# Patient Record
Sex: MALE | Race: WHITE | NOT HISPANIC OR LATINO | Employment: FULL TIME | URBAN - NONMETROPOLITAN AREA
[De-identification: names, ages, dates, MRNs, and addresses within clinical notes are randomized per-mention and may not be internally consistent; named-entity substitution may affect disease eponyms.]

---

## 2017-09-01 ENCOUNTER — OFFICE VISIT (OUTPATIENT)
Dept: PODIATRY | Facility: CLINIC | Age: 37
End: 2017-09-01

## 2017-09-01 VITALS — BODY MASS INDEX: 37.19 KG/M2 | HEART RATE: 85 BPM | WEIGHT: 315 LBS | HEIGHT: 77 IN | OXYGEN SATURATION: 97 %

## 2017-09-01 DIAGNOSIS — L60.0 INGROWN TOENAIL: Primary | ICD-10-CM

## 2017-09-01 DIAGNOSIS — M79.674 PAIN OF TOE OF RIGHT FOOT: ICD-10-CM

## 2017-09-01 DIAGNOSIS — L03.011 PARONYCHIA, RIGHT: ICD-10-CM

## 2017-09-01 PROCEDURE — 99203 OFFICE O/P NEW LOW 30 MIN: CPT | Performed by: PODIATRIST

## 2017-09-01 NOTE — PROGRESS NOTES
"Constantin Seals  1980  37 y.o. male   Patient presents today for right hallux ingrown toenail.      09/01/2017  Chief Complaint   Patient presents with   • Right Foot - Ingrown Toenail           History of Present Illness    Constnatin Seals is a 37 y.o. male who presents for evaluation of right hallux ingrown toenail.  He states is been ongoing for several weeks.  He relates associated sharp pain with direct pressure.  There is some redness and clear drainage from the site.  He states he does have a history of ingrown toenails in the past but has typically been able to care for them on his own.  He denies any trauma or injuries.  He denies any formal treatments for this issue.      No past medical history on file.      No past surgical history on file.      No family history on file.      Social History     Social History   • Marital status: Single     Spouse name: N/A   • Number of children: N/A   • Years of education: N/A     Occupational History   • Not on file.     Social History Main Topics   • Smoking status: Former Smoker   • Smokeless tobacco: Current User     Types: Chew   • Alcohol use No   • Drug use: Not on file   • Sexual activity: Not on file     Other Topics Concern   • Not on file     Social History Narrative   • No narrative on file         No current outpatient prescriptions on file.     No current facility-administered medications for this visit.          OBJECTIVE    Pulse 85  Ht 77\" (195.6 cm)  Wt (!) 510 lb (231 kg)  SpO2 97%  BMI 60.48 kg/m2      Review of Systems   Constitutional:  Denies recent weight loss, weight gain, fever or chills, no change in exercise tolerance  Musculoskeletal: Toe/foot pain.   Skin: No wounds or lesions  Neurological: Denies paresthesias.  Psychiatric/Behavioral: Denies depression        Physical Exam   Constitutional: he appears well-developed and well-nourished.   CV: No chest pain. Normal RR  Resp: Non labored respirations  Psychiatric: he has a " normal mood and affect. her behavior is normal.      Lower Extremity Exam:  Vascular: DP/PT pulses palpable 2+.   Right hallux edema  Toes warm  Neuro: Protective sensation intact, b/l.  DTRs intact  Integument: No open wounds.  Ingrown medial nail border, right hallux. Mild erythema, edema. +tenderness to palpation.  Web spaces c/d/i  No skin lesions  Musculoskeletal: LE muscle strength 5/5.   Gait normal  Ankle ROM full without pain or crepitus  STJ ROM full without pain or crepitus  No digital deformities      Nail Removal  Date/Time: 9/17/2017 9:24 PM  Performed by: NATHALY DICKINSON  Authorized by: NATHALY DICKINSON   Consent: Written consent obtained.  Risks and benefits: risks, benefits and alternatives were discussed  Consent given by: patient  Location: right foot  Location details: right big toe  Anesthesia: digital block    Anesthesia:  Local Anesthetic: lidocaine 2% without epinephrine  Anesthetic total: 3 mL    Sedation:  Patient sedated: no  Preparation: skin prepped with Betadine  Amount removed: complete  Wedge excision of skin of nail fold: no  Nail bed sutured: no  Nail matrix removed: complete  Removed nail replaced and anchored: no  Dressing: 4x4, antibiotic ointment and gauze roll  Patient tolerance: Patient tolerated the procedure well with no immediate complications  Comments: Matrixectomy with 10% NaOH. Neutralized with acetic acid.              ASSESSMENT AND PLAN    Constantin was seen today for ingrown toenail.    Diagnoses and all orders for this visit:    Ingrown toenail    Paronychia, right    Pain of toe of right foot    -Comprehensive foot and ankle exam performed  -Diagnosis, prevention, and treatment of ingrown toe nails discussed with patient, including risks and potential benefits of nail avulsion both temporary and permanent versus simple debridement.  -Pt elected for total permanent avulsion of right hallux  -Aftercare instructions for soapy barrios soaks BID  -Recheck 2  weeks            This document has been electronically signed by Trent Barnes DPM on September 17, 2017 9:24 PM     EMR Dragon/Transcription disclaimer:   Much of this encounter note is an electronic transcription/translation of spoken language to printed text. The electronic translation of spoken language may permit erroneous, or at times, nonsensical words or phrases to be inadvertently transcribed; Although I have reviewed the note for such errors, some may still exist.    Trent Barnes DPM  9/17/2017  9:24 PM

## 2017-09-17 PROCEDURE — 11750 EXCISION NAIL&NAIL MATRIX: CPT | Performed by: PODIATRIST

## 2017-09-21 ENCOUNTER — OFFICE VISIT (OUTPATIENT)
Dept: PODIATRY | Facility: CLINIC | Age: 37
End: 2017-09-21

## 2017-09-21 VITALS — WEIGHT: 315 LBS | HEIGHT: 77 IN | BODY MASS INDEX: 37.19 KG/M2

## 2017-09-21 DIAGNOSIS — M79.674 PAIN OF TOE OF RIGHT FOOT: ICD-10-CM

## 2017-09-21 DIAGNOSIS — L60.0 INGROWN TOENAIL: Primary | ICD-10-CM

## 2017-09-21 PROCEDURE — 99212 OFFICE O/P EST SF 10 MIN: CPT | Performed by: PODIATRIST

## 2019-06-07 ENCOUNTER — APPOINTMENT (OUTPATIENT)
Dept: LAB | Facility: HOSPITAL | Age: 39
End: 2019-06-07

## 2019-06-07 ENCOUNTER — TRANSCRIBE ORDERS (OUTPATIENT)
Dept: ADMINISTRATIVE | Facility: HOSPITAL | Age: 39
End: 2019-06-07

## 2019-06-07 DIAGNOSIS — Z31.41 FERTILITY TESTING: Primary | ICD-10-CM

## 2019-06-07 LAB
CHARACTER SMN: NORMAL
COLLECTION METHOD SMN: NORMAL
EPI CELLS #/AREA SMN HPF: NORMAL /HPF
LIQUEFACTION TIME SMN: NORMAL MIN
NON PROGRESSIVE MOTILITY: 14.4 %
RBC #/AREA SMN HPF: NORMAL /HPF
SEX ABSTIN DURATION TIME PATIENT: 2 DAYS
SLOW PROGRESSIVE MOTILITY: 24.6 %
SMI: 287 (ref 80–400)
SPEC CONTAINER SPEC: NORMAL
SPECIMEN VOL SMN: 3 ML
SPERM # SMN: 55.7 MILLIONS/ML
SPERM IMMOTILE NFR SMN: 3.4 %
SPERM MOTILE NFR SMN: 96.6 % MOTILE (ref 50–100)
SPERM PROG NFR SMN: 57.6 % (ref 25–100)
SPERM SMN: 33.4 % NORMAL (ref 15–100)
VISC SMN: NORMAL CP
WBC SMN QL: NORMAL /HPF
WHO STANDARD: NORMAL

## 2019-06-07 PROCEDURE — 89320 SEMEN ANAL VOL/COUNT/MOT: CPT | Performed by: OBSTETRICS & GYNECOLOGY

## 2021-04-05 ENCOUNTER — TRANSCRIBE ORDERS (OUTPATIENT)
Dept: GENERAL RADIOLOGY | Facility: HOSPITAL | Age: 41
End: 2021-04-05

## 2021-04-05 ENCOUNTER — HOSPITAL ENCOUNTER (OUTPATIENT)
Dept: GENERAL RADIOLOGY | Facility: HOSPITAL | Age: 41
Discharge: HOME | End: 2021-04-05
Admitting: NURSE PRACTITIONER

## 2021-04-05 DIAGNOSIS — S49.91XA RIGHT SHOULDER INJURY, INITIAL ENCOUNTER: Primary | ICD-10-CM

## 2021-04-05 PROCEDURE — 73030 X-RAY EXAM OF SHOULDER: CPT

## 2021-11-02 ENCOUNTER — PATIENT ROUNDING (BHMG ONLY) (OUTPATIENT)
Dept: BARIATRICS/WEIGHT MGMT | Facility: CLINIC | Age: 41
End: 2021-11-02

## 2021-11-02 ENCOUNTER — OFFICE VISIT (OUTPATIENT)
Dept: BARIATRICS/WEIGHT MGMT | Facility: CLINIC | Age: 41
End: 2021-11-02

## 2021-11-02 ENCOUNTER — OFFICE VISIT (OUTPATIENT)
Dept: BARIATRICS/WEIGHT MGMT | Facility: HOSPITAL | Age: 41
End: 2021-11-02

## 2021-11-02 VITALS
OXYGEN SATURATION: 96 % | BODY MASS INDEX: 38.36 KG/M2 | SYSTOLIC BLOOD PRESSURE: 138 MMHG | HEIGHT: 76 IN | TEMPERATURE: 97.7 F | DIASTOLIC BLOOD PRESSURE: 85 MMHG | HEART RATE: 68 BPM | WEIGHT: 315 LBS

## 2021-11-02 DIAGNOSIS — K21.9 GASTROESOPHAGEAL REFLUX DISEASE WITHOUT ESOPHAGITIS: ICD-10-CM

## 2021-11-02 DIAGNOSIS — G47.33 OSA (OBSTRUCTIVE SLEEP APNEA): ICD-10-CM

## 2021-11-02 DIAGNOSIS — I10 HYPERTENSION, UNSPECIFIED TYPE: ICD-10-CM

## 2021-11-02 DIAGNOSIS — E66.01 CLASS 3 SEVERE OBESITY DUE TO EXCESS CALORIES WITH SERIOUS COMORBIDITY AND BODY MASS INDEX (BMI) OF 60.0 TO 69.9 IN ADULT (HCC): Primary | ICD-10-CM

## 2021-11-02 PROBLEM — E66.813 CLASS 3 SEVERE OBESITY DUE TO EXCESS CALORIES WITH SERIOUS COMORBIDITY AND BODY MASS INDEX (BMI) OF 60.0 TO 69.9 IN ADULT: Status: ACTIVE | Noted: 2021-11-02

## 2021-11-02 PROCEDURE — 99204 OFFICE O/P NEW MOD 45 MIN: CPT | Performed by: NURSE PRACTITIONER

## 2021-11-02 RX ORDER — DICLOFENAC SODIUM AND MISOPROSTOL 75; 200 MG/1; UG/1
1 TABLET, DELAYED RELEASE ORAL 2 TIMES DAILY
COMMUNITY

## 2021-11-02 RX ORDER — AMLODIPINE BESYLATE AND BENAZEPRIL HYDROCHLORIDE 5; 10 MG/1; MG/1
1 CAPSULE ORAL DAILY
COMMUNITY

## 2021-11-02 RX ORDER — ACETAMINOPHEN AND CODEINE PHOSPHATE 300; 30 MG/1; MG/1
1 TABLET ORAL EVERY 4 HOURS PRN
COMMUNITY
End: 2022-06-06

## 2021-11-02 NOTE — PROGRESS NOTES
"Nutrition Services    Patient Name:  Constantin Seals  YOB: 1980  MRN: [id number]  Admit Date:  (Not on file)    NUTRITION BARIATRIC/[facility] NOTE     Visit 1  Initial Assessment   BA#   [facility]    Anthropometrics   Height: 75.75 in  Weight: 527 lbs 12.8 oz  BMI: 64.67     Nutrition Recall  Eating __1____ meals daily   Snacking  Limited sweet intake  Drinking carbonated beverages-occasionally  Drinking greater to or equal to 64 fluid ounces    Education    Goal Setting and Information Packet  4 meal per day diet plan  4 meal per day sample menu  \"Perfect Protein, Fiber in Foods, and Reducing Fat\"  Reinforce Nutritional Needs for Surgery  Reinforce Nutritional Needs for M[facility]    Nutrition Goals   Eat __4___ meals per day with protein  Protein goal: 65gms   discussed protein guidelines for shakes and bar  Eliminate snacks  Healthier food choices  Portion control / Use smaller plate or measuring cup   Eliminate soda  Increase fluid intake to 64 ounces per day      Electronically signed by:  Adriana Parekh  11/02/21 17:06 CDT   "

## 2021-11-02 NOTE — PROGRESS NOTES
November 2, 2021    Hello, may I speak with Constantin Seals?    My name is Kristie       I am  with [facility]  [address], [address]  [place] [address] [phone].    Before we get started may I verify your date of birth? 1980    I am calling to officially welcome you to our practice and ask about your recent visit. Is this a good time to talk? Yes it is.    Tell me about your visit with us. What things went well?  Well it was fine . She told me how much I will need to lose and it concerned me . But I do have a 3 month old daughter that I have to think about . I expressed that the office is available for questions and he can also send my chart messages. I expressed It is a lot of information to soak in on one day .       We're always looking for ways to make our patients' experiences even better. Do you have recommendations on ways we may improve?  No ma'am .    Overall were you satisfied with your first visit to our practice? Yes I was considering when I went to my PCP he told me I was 1 point away from stroke level. I need to do this for my health .       I appreciate you taking the time to speak with me today. Is there anything else I can do for you? No ma'am but I really appreciate you calling me back.      Thank you, and have a great day.

## 2021-11-02 NOTE — PROGRESS NOTES
Patient Care Team:  Giovani Lujan DO as PCP - General (Internal Medicine)      Subjective     Patient is a 41 y.o. male presents with morbid obesity and his Body mass index is 64.67 kg/m².     He is here for discussion of surgical weight loss options.  He stated he has been with the disease of obesity for year(s).  He stated he suffers from GERD, HTN and morbid obesity due to his weight gain.  He stated that weight loss helps alleviate these symptoms.   He stated that he has tried other diet regimens such as smaller portions to help with weight loss.  He stated that he has attempted these conservative methods for weight loss without maintaining long term success.  Today he would like to discuss surgical weight loss options such as the Laparoscopic Sleeve Gastrectomy or the Laparoscopic R - Y Gastric Bypass.     Review of Systems   Constitutional: Negative.    Respiratory: Negative.    Cardiovascular: Negative.    Gastrointestinal: Negative.    Endocrine: Negative.    Musculoskeletal: Positive for arthralgias.   Psychiatric/Behavioral: Negative.         History  Past Medical History:   Diagnosis Date   • Gout    • Heartburn    • Hypertension       Past Surgical History:   Procedure Laterality Date   • ELBOW PROCEDURE     • EYE SURGERY Right 1984      Social History     Socioeconomic History   • Marital status:    Tobacco Use   • Smoking status: Never Smoker   • Smokeless tobacco: Current User     Types: Chew   Substance and Sexual Activity   • Alcohol use: No      Family History   Problem Relation Age of Onset   • Diabetes Father    • Hypertension Father    • Heart disease Father       No Known Allergies       Current Outpatient Medications:   •  acetaminophen-codeine (TYLENOL #3) 300-30 MG per tablet, Take 1 tablet by mouth Every 4 (Four) Hours As Needed for Moderate Pain ., Disp: , Rfl:   •  amLODIPine-benazepril (LOTREL 5-10) 5-10 MG per capsule, Take 1 capsule by mouth Daily., Disp: , Rfl:   •   diclofenac-miSOPROStol (ARTHROTEC 75) 75-0.2 MG EC tablet, Take 1 tablet by mouth 2 (Two) Times a Day., Disp: , Rfl:   •  Misc Natural Products (BLACK CHERRY CONCENTRATE PO), Take  by mouth., Disp: , Rfl:   •  raNITIdine HCl (ZANTAC PO), Take  by mouth., Disp: , Rfl:     Objective     Vital Signs  Temp:  [97.7 °F (36.5 °C)] 97.7 °F (36.5 °C)  Heart Rate:  [68] 68  BP: (138)/(85) 138/85  Body mass index is 64.67 kg/m².      11/02/21  1355   Weight: (!) 239 kg (527 lb 12.8 oz)       Physical Exam  Vitals reviewed.   Constitutional:       Appearance: He is obese.   Eyes:      Pupils: Pupils are equal, round, and reactive to light.   Cardiovascular:      Rate and Rhythm: Normal rate and regular rhythm.   Pulmonary:      Effort: Pulmonary effort is normal.   Abdominal:      General: Bowel sounds are normal.      Palpations: Abdomen is soft.   Musculoskeletal:         General: Normal range of motion.   Skin:     General: Skin is warm and dry.   Neurological:      Mental Status: He is alert and oriented to person, place, and time.   Psychiatric:         Mood and Affect: Mood normal.         Behavior: Behavior normal.            Results Review:   I reviewed the patient's new clinical results.      Patient's Body mass index is 64.67 kg/m². indicating that he is morbidly obese (BMI > 40 or > 35 with obesity - related health condition). Obesity-related health conditions include the following: hypertension and GERD. Obesity is worsening. BMI is is above average; BMI management plan is completed. We discussed portion control and increasing exercise..      Assessment/Plan   Diagnoses and all orders for this visit:    1. Class 3 severe obesity due to excess calories with serious comorbidity and body mass index (BMI) of 60.0 to 69.9 in adult (HCC) (Primary)    2. Hypertension, unspecified type  Comments:  Patient counseling provided.  Follow-up with PCP if medication changes are needed.    3. Gastroesophageal reflux disease without  esophagitis  Comments:  Continue current regimen.  Patient will have endoscopy at later date.    4. SHERRON (obstructive sleep apnea)  Comments:  Patient states that he is not on CPAP machine.  Will discuss sleep study at next appointment if needed.        I discussed the patient's findings and my recommendations with patient.     I have also recommended that he obtain a cardiac risk assessment and psychiatric evaluation prior to surgery consideration.He has been provided a structured dietary regimen based off of his behavior.  I discussed with the patient the etiology of the disease of obesity and the potential comorbid conditions associated with this disease.  He was instructed to follow the dietary regimen and follow-up with our program in 1 month's time with any additional questions as they may arise during this time.  We emphasized on focusing on proteins and meals high in fiber as well as adequate hydration that exceed 64 ounces of water daily.    I explained that I anticipate the patient to lose weight prior to his next monthly visit.        1. Patient is a 41 y.o. male who has morbid obesity with Body mass index is 64.67 kg/m². and desires surgical weight loss. Patient has been advised that this surgery is considered to be elective major surgery that is typically done laparoscopically. Patient is a potentially good surgical candidate. Patient will need to meet with the Bariatric Surgeon to further discuss surgical options. Patient has been advised that they will need to have a work-up prior to surgery. This work-up will include but is not limited to an EKG, an EGD to assess for H. Pylori and Winchester's esophagus, and a psychological evaluation, with additional testing as necessary. Pre-op testing will be ordered at next visit. Today the patient  received the 4 meals/day diet prescription, which was explained to patient.  Patient will see the dietitian today to further discuss goals for diet, exercise, and  lifestyle. Patient has received intensive behavioral therapy for obesity today. I explained the pathophysiology of the disease and its storage component. We also discussed Dr. Hobbs' pearls of the program. Nutrition counseling ordered today.     2. Current comorbid condition of  hypertension, GERD, obstructive sleep apnea associated with his morbid obesity is reported to be stable on his current treatment regimen and medications. We anticipate the comorbid condition to improve as we address his morbid obesity.       Pre-op testing:     Seminar - Completed  EGD - Needed, but not yet ordered  H. Pylori - Will be done with EGD   H. Pylori Stool -  N/A    Psychological Evaluation - Needed, but not yet ordered    EKG - Needed, but not yet ordered    Cardiology - If EKG abnormal, cardiology will be ordered     TSH - Needed, but not yet ordered  Nicotine - NEEDED  Pulmonary Clearance - N/A   Drug Tests - N/A    Dietitian - Completed       Bianca Davis, APRN     11/02/21  14:34 CDT

## 2021-11-19 ENCOUNTER — TELEPHONE (OUTPATIENT)
Dept: BARIATRICS/WEIGHT MGMT | Facility: CLINIC | Age: 41
End: 2021-11-19

## 2021-11-19 NOTE — TELEPHONE ENCOUNTER
I called and spoke to patient regarding his insurance. [employer] does not cover bariatrics per his plan documents. They have been scanned in the chart. Patient expressed understanding and asked that next appointment be canceled.

## 2021-12-06 ENCOUNTER — TRANSCRIBE ORDERS (OUTPATIENT)
Dept: GENERAL RADIOLOGY | Facility: HOSPITAL | Age: 41
End: 2021-12-06

## 2021-12-06 ENCOUNTER — HOSPITAL ENCOUNTER (OUTPATIENT)
Dept: GENERAL RADIOLOGY | Facility: HOSPITAL | Age: 41
Discharge: HOME | End: 2021-12-06
Admitting: NURSE PRACTITIONER

## 2021-12-06 DIAGNOSIS — M79.672 LEFT FOOT PAIN: ICD-10-CM

## 2021-12-06 DIAGNOSIS — M79.672 LEFT FOOT PAIN: Primary | ICD-10-CM

## 2021-12-06 PROCEDURE — 73630 X-RAY EXAM OF FOOT: CPT

## 2022-04-16 ENCOUNTER — HOSPITAL ENCOUNTER (EMERGENCY)
Facility: HOSPITAL | Age: 42
Discharge: HOME | End: 2022-04-16
Admitting: EMERGENCY MEDICINE

## 2022-04-16 ENCOUNTER — APPOINTMENT (OUTPATIENT)
Dept: GENERAL RADIOLOGY | Facility: HOSPITAL | Age: 42
End: 2022-04-16

## 2022-04-16 VITALS
HEART RATE: 77 BPM | BODY MASS INDEX: 37.19 KG/M2 | RESPIRATION RATE: 18 BRPM | DIASTOLIC BLOOD PRESSURE: 89 MMHG | OXYGEN SATURATION: 96 % | TEMPERATURE: 98.5 F | WEIGHT: 315 LBS | HEIGHT: 77 IN | SYSTOLIC BLOOD PRESSURE: 151 MMHG

## 2022-04-16 DIAGNOSIS — M79.601 RIGHT ARM PAIN: Primary | ICD-10-CM

## 2022-04-16 PROCEDURE — 99284 EMERGENCY DEPT VISIT MOD MDM: CPT

## 2022-04-16 PROCEDURE — 99283 EMERGENCY DEPT VISIT LOW MDM: CPT

## 2022-04-16 PROCEDURE — 73080 X-RAY EXAM OF ELBOW: CPT

## 2022-04-16 RX ORDER — HYDROCODONE BITARTRATE AND ACETAMINOPHEN 7.5; 325 MG/1; MG/1
1 TABLET ORAL ONCE
Status: DISCONTINUED | OUTPATIENT
Start: 2022-04-16 | End: 2022-04-17 | Stop reason: HOSPADM

## 2022-04-16 RX ORDER — LIDOCAINE 50 MG/G
1 PATCH TOPICAL EVERY 24 HOURS
Qty: 6 EACH | Refills: 0 | Status: SHIPPED | OUTPATIENT
Start: 2022-04-16

## 2022-04-16 RX ORDER — ONDANSETRON 4 MG/1
4 TABLET, ORALLY DISINTEGRATING ORAL ONCE
Status: DISCONTINUED | OUTPATIENT
Start: 2022-04-16 | End: 2022-04-17 | Stop reason: HOSPADM

## 2022-04-16 RX ORDER — NAPROXEN 500 MG/1
500 TABLET ORAL ONCE
Status: COMPLETED | OUTPATIENT
Start: 2022-04-16 | End: 2022-04-16

## 2022-04-16 RX ORDER — TIZANIDINE 4 MG/1
4 TABLET ORAL EVERY 6 HOURS PRN
Qty: 12 TABLET | Refills: 0 | Status: SHIPPED | OUTPATIENT
Start: 2022-04-16 | End: 2022-06-06

## 2022-04-16 RX ADMIN — NAPROXEN 500 MG: 500 TABLET ORAL at 22:15

## 2022-04-16 RX ADMIN — LIDOCAINE HYDROCHLORIDE: 20 JELLY TOPICAL at 22:16

## 2022-04-21 ENCOUNTER — TRANSCRIBE ORDERS (OUTPATIENT)
Dept: ADMINISTRATIVE | Facility: HOSPITAL | Age: 42
End: 2022-04-21

## 2022-04-21 DIAGNOSIS — S49.91XA INJURY OF UPPER ARM, RIGHT, INITIAL ENCOUNTER: Primary | ICD-10-CM

## 2022-05-05 ENCOUNTER — HOSPITAL ENCOUNTER (OUTPATIENT)
Dept: GENERAL RADIOLOGY | Facility: HOSPITAL | Age: 42
Discharge: HOME | End: 2022-05-05
Admitting: NURSE PRACTITIONER

## 2022-05-05 ENCOUNTER — TRANSCRIBE ORDERS (OUTPATIENT)
Dept: GENERAL RADIOLOGY | Facility: HOSPITAL | Age: 42
End: 2022-05-05

## 2022-05-05 DIAGNOSIS — R31.9 HEMATURIA SYNDROME: Primary | ICD-10-CM

## 2022-05-05 DIAGNOSIS — R31.9 HEMATURIA SYNDROME: ICD-10-CM

## 2022-05-05 PROCEDURE — 74018 RADEX ABDOMEN 1 VIEW: CPT

## 2022-05-06 ENCOUNTER — TRANSCRIBE ORDERS (OUTPATIENT)
Dept: ADMINISTRATIVE | Facility: HOSPITAL | Age: 42
End: 2022-05-06

## 2022-05-06 DIAGNOSIS — R31.9 HEMATURIA, UNSPECIFIED TYPE: Primary | ICD-10-CM

## 2022-05-09 ENCOUNTER — HOSPITAL ENCOUNTER (OUTPATIENT)
Dept: MRI IMAGING | Facility: HOSPITAL | Age: 42
Discharge: HOME | End: 2022-05-09

## 2022-05-09 DIAGNOSIS — S49.91XA INJURY OF UPPER ARM, RIGHT, INITIAL ENCOUNTER: ICD-10-CM

## 2022-05-16 ENCOUNTER — HOSPITAL ENCOUNTER (OUTPATIENT)
Dept: CT IMAGING | Facility: HOSPITAL | Age: 42
Discharge: HOME | End: 2022-05-16
Admitting: NURSE PRACTITIONER

## 2022-05-16 DIAGNOSIS — R31.9 HEMATURIA, UNSPECIFIED TYPE: ICD-10-CM

## 2022-05-16 PROCEDURE — 74176 CT ABD & PELVIS W/O CONTRAST: CPT

## 2022-06-02 ENCOUNTER — TRANSCRIBE ORDERS (OUTPATIENT)
Dept: LAB | Facility: HOSPITAL | Age: 42
End: 2022-06-02

## 2022-06-02 DIAGNOSIS — Z11.59 SCREENING FOR VIRAL DISEASE: Primary | ICD-10-CM

## 2022-06-02 NOTE — DISCHARGE INSTRUCTIONS
What to expect after a Nerve Block  Nerve blocks administered to block pain affect many types of nerves, including those nerves that control movement, pain, and normal sensation.  Following a nerve block, you may notice some bruising at the site where the block was given.  You may experience sensations such as:  numbness of the affected area or limb, tingling, heaviness (that is the limb feels heavy to you), weakness or inability to move the affected arm or leg, or a feeling as if your arm or leg has “fallen asleep”.    A nerve block can last from 9-18 hours depending on the medications used.  Certain medications can last up to 72 hours, your anesthesiologist may have discussed this with you pre-op. Usually the weakness wears off first followed by the tingling and heaviness.  As the block wears off, you may begin to notice pain; however, this sequence of events may occur in any order.  Typically, you will be able to move your limb before you will feel it.  Once a nerve block begins to wear off, the effects are usually completely gone within 60 minutes.    If you experience continued side effects that you believe are block related, please call your healthcare provider.  Please see block-specific instructions below.      Instructions for any block involving the shoulder or arm  If you have had any kind of shoulder/arm block, you will go home with your arm in a sling.  Wear the sling until the block has completely worn off or as directed by your doctor.  You may be required to wear it for a longer period of time per your surgeon’s recommendations.  I you have had a shoulder/arm block; it is a good idea to sleep on a recliner with pillows under your arm.    Note:  If you have severe or prolonged shortness of breath, please seek medical assistance as soon as possible.    Protection of a “blocked” arm (limb)  After a nerve block, you cannot feel pain, pressure, or extremes of temperature in the affected  limb.  And because of this, your blocked limb is at more risk for injury.  For example, it is possible to burn your limb on an extremely hot surface without feeling it.  When resting, it is important to reposition your limb periodically to avoid prolonged pressure on it.  This may require the use of pillows and padding.  While sleeping, you should avoid rolling onto the affected limb or putting too much pressure on it.  If you have a cast or tight dressing, check the color of your fingers of the affected limb.  Call your surgeon if they look discolored (that is, dusky, dark colored)  Use caution in cold weather.  Cover your limb appropriately to protect it from the cold.  Pain Management  Your surgeon will give you a prescription for pain medication.  Begin taking this before the nerve block wears off.  Bear in mind that sometimes the block can wear off in the middle of the night.              UPPER EXTREMITY POST-OP INSTRUCTIONS - DR. QUICK    IMPORTANT PHONE NUMBERS:   For emergencies, please call [phone]   You may reach Dr. Quick and clinical staff at [phone]- M-Y 8:00 am-5:00 pm   After 5pm or on the weekends, please call [phone]   Call immediately if you have any of the following symptoms:     Elevated temperature above 101.5 degrees for more than 48 hours after surgery     Persistent drainage from wound     Severe pain around surgical site    Sling use: The sling is provided for your comfort and to ensure proper healing of your repair following surgery. Please place the abduction pillow with the curved side against your side and the sling on the side of the pillow. Your surgery requires that you wear the sling if noted below.  ____ For comfort. Remove sling 24 hours and begin range of motion exercises  ____ At all times except bathing, dressing, and therapy. Also wear the sling during sleep.  __x__ No sling required    Bathing:  ___No bandages, no restrictions!!  ___You may remove you dressing and  shower on the 3rd day after surgery (Ex. Tu surgery, shower on Friday)  ** if you are told to it is ok to remove your dressing and shower, DO NOT SOAK your incisions in a tub.  _x__Keep splint clean, dry, and intact. DO NOT place foreign objects into your splint.      Dressings: Keep dressing/splint intact unless instructed otherwise below. SOME DRAINAGE IS NORMAL!     DO NOT touch or apply ointment to the incision.     DO NOT remove the steri-strips over the incisions (if you have steri-strips). They will         generally fall off on their own or can be removed 1 weeksafter surgery.     If you have yellow gauze and it comes off, do not worry about it. Leave them off.    Signs of infection that warrant a phone call to our clinical line:     o Excessive drainage or redness     o Red streaking coming away from the incision  o Increased pain  o Increased temperature above 101 degrees      Physical Therapy:        *  Your physical therapy status will be discussed with you postoperatively and at your first post-op appointment. Some injuries will not require physical therapy.      *  If you have a shoulder manipulation, please schedule therapy for the next day      Medications: You will be discharged with the appropriate medications following your surgery. Fill these at the pharmacy and take them as directed on the label. Not all of the medications below may be prescribed. Occasionally, other medications may be prescribed with specific instructions.    Percocet/Lortab (oxycodone/hydrocodone with tylenol) - Pain Medication, will cause drowsiness, possibly itchiness (this is NOT an allergy - use benadryl or an over the counter allergy medication such as Claritin or Zyrtec)     o Take 1-2 tablets every 4-6 hours. DO NOT EXCEED 4,000mg of Tylenol in 24 hours.  **DO NOT MIX WITH ALCOHOL, DRIVE WHILE TAKING, OR TAKE with extra TYLENOL**    Colace (Docusate) - stool softener, used for constipation. Take this only if you feel  constipated.      Zofran (Ondansetron) or Phenergan - Anti-nausea medication, will cause drowsiness      *Starting January 2021, all narcotic medication must be prescribed electronically to your pharmacy.  Be sure to notify nursing of your preferred pharmacy.  If you are running low on pain medications, please notify us if you need a refill 24-48 hours prior to when you run out, so we can make arrangements to refill the prescription for you if we determine is necessary

## 2022-06-03 PROBLEM — S46.211A: Status: ACTIVE | Noted: 2022-06-03

## 2022-06-03 NOTE — OP NOTE
Patient Name: Melida  MRN: [id number]  : 1980    DATE of SURGERY: 2022    SURGEON: Benjamin العراقي MD    ASSISTANT: NONE    PREOPERATIVE DIAGNOSIS    Subacute traumatic rupture of the Right distal biceps tendon.     POSTOPERATIVE DIAGNOSIS    Subacute traumatic rupture of the Right distal biceps tendon.     PROCEDURE PERFORMED  Primary repair of traumatic Right distal biceps tendon rupture with allograft.     Please note that a 22 modifier will be added to the procedure due to the patient's morbid obesity and sub-acute nature of tear that required an allograft tendon.  His obesity increased the complexity of every portion of the case from positioning to splinting.      IMPLANTS  Arthrex biceps tendon repair kit, tibialis anterior allograft     ANESTHESIA USED  General endotracheal anesthesia.     OPERATIVE INDICATIONS  The patient is a 42 y.o. male, morbidly obese male (510 lbs) who presented to my clinic with an injury to the left elbow on 22.  He was fishing with his nephew when a lure got caught in a tree.  He reached to retrieve the lure and pulled a branch downward feeling a pop in the elbow.   Given the injury, as well as the sequela of not repairing the tendon (loss of strength with flexion and supination), I did recommend surgical treatment.  Risks included, but were not limited to, that of anesthesia, bleeding, infection, pain, damage to local structures, need for further surgery.  We discussed damage to the lateral antebrachial cutaneous nerve, damage to vascular structures, retear, loss of motion, loss of function and strength. He understood given the delay in treatment, an allograft tendon may be needed.     ESTIMATED BLOOD LOSS    Less than 10 mL.     SPECIMENS  None.     DRAINS  None.     COMPLICATIONS  None      PROCEDURE IN DETAIL  The patient was seen in the preoperative holding room.  Once again, the informed consent form was reviewed with the patient and signed.  The site  of surgery was marked with the patient's agreement.  The patient was transported to the operating room where a time out was performed identifying the correct patient as well as the operative site.  Perioperative antibiotics were given and the operative extremity was prepped and draped in the usual sterile fashion.  Tourniquet was placed about the brachium and inflated to 200 mmHg and total tourniquet time was less than 1 hour.        A transverse incision was made 4 cm distal to the elbow flexion crease. Soft tissue was dissected allowing the incision and multiple vessels were encountered and protected.  Proximally within the incision site, the ruptured biceps tendon was isolated, pulled distally.  After debriding the tendon's distal edge, a fiber loop suture was then placed within the tendon substance with the suture edges exiting distally. I twas readily apparent that the length of the graft would not be sufficient.  As a result, a tibialis anterior allograft was whipstitched into the distal end of the native tendon adding approximately 4 cm of tendon to the length.     Strategic retractors were then placed surrounding the radial tuberosity, which was located via C-arm imaging, keep the forearm in full supination.  The anatomic location of the biceps tendon origin was isolated.  A 2.4 mm spade tipped guide pin was inserted in this location and drilled bicortically.  The tendon was previously measured and the appropriately sized cannulated drill bit was used to drill the near cortex.     A cortical button was then utilized and the sutures from the tendon were placed in a tension-slide technique.  The button was placed through both cortices, flipped on the contralateral cortex, verified with C-arm imaging. The sutures from the tendon were then pulled such that the tendon was then walked into the bone tunnel on the near cortex.  One limb of suture was then passed through the substance of the tendon with a free needle  and a knot was tied.  This solidified fixation and anatomic repair was created.       The tourniquet was let down.  Hemostasis was achieved.  The incision was closed in layers and the skin was closed with adhesive glue.  Sterile dressing was placed followed by a well-padded long-arm splint.  The patient was awakened from anesthesia, transported to the recovery room in stable condition.     POSTOPERATIVE PLAN  The patient discharged home with family.  Follow up in 1 week for a clinical check.  Begin the distal biceps tendon repair protocol and placed into a range of motion brace.    Electronically signed by Benjamin العراقي MD on 6/7/2022 at 14:20 CDT

## 2022-06-06 ENCOUNTER — LAB (OUTPATIENT)
Dept: LAB | Facility: HOSPITAL | Age: 42
End: 2022-06-06

## 2022-06-06 ENCOUNTER — PRE-ADMISSION TESTING (OUTPATIENT)
Dept: PREADMISSION TESTING | Facility: HOSPITAL | Age: 42
End: 2022-06-06

## 2022-06-06 VITALS
HEART RATE: 92 BPM | DIASTOLIC BLOOD PRESSURE: 99 MMHG | RESPIRATION RATE: 18 BRPM | SYSTOLIC BLOOD PRESSURE: 151 MMHG | OXYGEN SATURATION: 99 % | BODY MASS INDEX: 38.36 KG/M2 | HEIGHT: 76 IN | WEIGHT: 315 LBS

## 2022-06-06 LAB
ANION GAP SERPL CALCULATED.3IONS-SCNC: 9 MMOL/L (ref 5–15)
BUN SERPL-MCNC: 12 MG/DL (ref 6–20)
BUN/CREAT SERPL: 13.3 (ref 7–25)
CALCIUM SPEC-SCNC: 9.2 MG/DL (ref 8.6–10.5)
CHLORIDE SERPL-SCNC: 103 MMOL/L (ref 98–107)
CO2 SERPL-SCNC: 28 MMOL/L (ref 22–29)
CREAT SERPL-MCNC: 0.9 MG/DL (ref 0.76–1.27)
DEPRECATED RDW RBC AUTO: 43.6 FL (ref 37–54)
EGFRCR SERPLBLD CKD-EPI 2021: 110 ML/MIN/1.73
ERYTHROCYTE [DISTWIDTH] IN BLOOD BY AUTOMATED COUNT: 13.4 % (ref 12.3–15.4)
GLUCOSE SERPL-MCNC: 108 MG/DL (ref 65–99)
HCT VFR BLD AUTO: 43.4 % (ref 37.5–51)
HGB BLD-MCNC: 13.9 G/DL (ref 13–17.7)
MCH RBC QN AUTO: 28.4 PG (ref 26.6–33)
MCHC RBC AUTO-ENTMCNC: 32 G/DL (ref 31.5–35.7)
MCV RBC AUTO: 88.6 FL (ref 79–97)
PLATELET # BLD AUTO: 279 10*3/MM3 (ref 140–450)
PMV BLD AUTO: 10.3 FL (ref 6–12)
POTASSIUM SERPL-SCNC: 4.6 MMOL/L (ref 3.5–5.2)
RBC # BLD AUTO: 4.9 10*6/MM3 (ref 4.14–5.8)
SARS-COV-2 RNA PNL SPEC NAA+PROBE: NOT DETECTED
SODIUM SERPL-SCNC: 140 MMOL/L (ref 136–145)
WBC NRBC COR # BLD: 7.48 10*3/MM3 (ref 3.4–10.8)

## 2022-06-06 PROCEDURE — 93005 ELECTROCARDIOGRAM TRACING: CPT | Performed by: ORTHOPAEDIC SURGERY

## 2022-06-06 PROCEDURE — 85027 COMPLETE CBC AUTOMATED: CPT | Performed by: ORTHOPAEDIC SURGERY

## 2022-06-06 PROCEDURE — 87635 SARS-COV-2 COVID-19 AMP PRB: CPT | Performed by: ORTHOPAEDIC SURGERY

## 2022-06-06 PROCEDURE — 93010 ELECTROCARDIOGRAM REPORT: CPT | Performed by: INTERNAL MEDICINE

## 2022-06-06 PROCEDURE — C9803 HOPD COVID-19 SPEC COLLECT: HCPCS

## 2022-06-06 PROCEDURE — 80048 BASIC METABOLIC PNL TOTAL CA: CPT | Performed by: ORTHOPAEDIC SURGERY

## 2022-06-06 RX ORDER — BISOPROLOL FUMARATE AND HYDROCHLOROTHIAZIDE 2.5; 6.25 MG/1; MG/1
1 TABLET ORAL DAILY
COMMUNITY

## 2022-06-06 NOTE — DISCHARGE INSTRUCTIONS
Before you come to the hospital        Arrival time: AS DIRECTED BY OFFICE     YOU MAY TAKE THE FOLLOWING MEDICATION(S) THE MORNING OF SURGERY WITH A SIP OF WATER: hold amlodipine- benazepril 24 hours prior to surgery; ok to take bisoprolol- htz morning of surgery with a sip of water            ALL OTHER HOME MEDICATION CHECK WITH YOUR PHYSICIAN (especially if you are taking diabetes medicines or blood thinners)    Do not take any Erectile Dysfunction medications (EX: CIALIS, VIAGRA) 24 hours prior to surgery      If you were given and instructed to use a germ- killing soap, use as directed the night before surgery and the morning of surgery before coming to the hospital.             Eating and drinking restrictions prior to scheduled arrival time    2 Hours before arrival time STOP   Drinking Clear liquids (water, apple juice-no pulp)     6 Hours before arrival time STOP   Milk or drinks that contain milk, full liquids    6 Hours before arrival time STOP   Light meals or foods, such as toast or cereal    8 Hours before arrival time STOP   Heavy foods, such as meat, fried foods, or fatty foods    (It is extremely important that you follow these guidelines to prevent delay or cancelation of your procedure)     Clear Liquids  Water and flavored water                                                                      Clear Fruit juices, such as cranberry juice and apple juice.  Black coffee (NO cream of any kind, including powdered).  Plain tea  Clear bouillon or broth.  Flavored gelatin.  Soda.  Gatorade or Powerade.  Full liquid examples  Juices that have pulp.  Frozen ice pops that contain fruit pieces.  Coffee with creamer  Milk.  Yogurt.              MANAGING PAIN AFTER SURGERY    We know you are probably wondering what your pain will be like after surgery.  Following surgery it is unrealistic to expect you will not have pain.   Pain is how our bodies let us know that something is wrong or cautions us to be  careful.  That said, our goal is to make your pain tolerable.    Methods we may use to treat your pain include (oral or IV medications, PCAs, epidurals, nerve blocks, etc.)   While some procedures require IV pain medications for a short time after surgery, transitioning to pain medications by mouth allows for better management of pain.   Your nurse will encourage you to take oral pain medications whenever possible.  IV medications work almost immediately, but only last a short while.  Taking medications by mouth allows for a more constant level of medication in your blood stream for a longer period of time.      Once your pain is out of control it is harder to get back under control.  It is important you are aware when your next dose of pain medication is due.  If you are admitted, your nurse may write the time of your next dose on the white board in your room to help you remember.      We are interested in your pain and encourage you to inform us about aggravating factors during your visit.   Many times a simple repositioning every few hours can make a big difference.    If your physician says it is okay, do not let your pain prevent you from getting out of bed. Be sure to call your nurse for assistance prior to getting up so you do not fall.      Before surgery, please decide your tolerable pain goal.  These faces help describe the pain ratings we use on a 0-10 scale.   Be prepared to tell us your goal and whether or not you take pain or anxiety medications at home.

## 2022-06-07 ENCOUNTER — ANESTHESIA EVENT (OUTPATIENT)
Dept: PERIOP | Facility: HOSPITAL | Age: 42
End: 2022-06-07

## 2022-06-07 ENCOUNTER — ANESTHESIA (OUTPATIENT)
Dept: PERIOP | Facility: HOSPITAL | Age: 42
End: 2022-06-07

## 2022-06-07 ENCOUNTER — APPOINTMENT (OUTPATIENT)
Dept: GENERAL RADIOLOGY | Facility: HOSPITAL | Age: 42
End: 2022-06-07

## 2022-06-07 ENCOUNTER — HOSPITAL ENCOUNTER (OUTPATIENT)
Facility: HOSPITAL | Age: 42
Setting detail: HOSPITAL OUTPATIENT SURGERY
Discharge: HOME | End: 2022-06-07
Attending: ORTHOPAEDIC SURGERY | Admitting: ORTHOPAEDIC SURGERY

## 2022-06-07 VITALS
RESPIRATION RATE: 16 BRPM | SYSTOLIC BLOOD PRESSURE: 141 MMHG | TEMPERATURE: 97.9 F | OXYGEN SATURATION: 99 % | HEART RATE: 50 BPM | DIASTOLIC BLOOD PRESSURE: 88 MMHG

## 2022-06-07 DIAGNOSIS — S46.211A TRAUMATIC RUPTURE OF DISTAL BICEPS TENDON, RIGHT, INITIAL ENCOUNTER: Primary | ICD-10-CM

## 2022-06-07 PROCEDURE — C1713 ANCHOR/SCREW BN/BN,TIS/BN: HCPCS | Performed by: ORTHOPAEDIC SURGERY

## 2022-06-07 PROCEDURE — 25010000002 DEXAMETHASONE PER 1 MG: Performed by: NURSE ANESTHETIST, CERTIFIED REGISTERED

## 2022-06-07 PROCEDURE — 76942 ECHO GUIDE FOR BIOPSY: CPT | Performed by: ORTHOPAEDIC SURGERY

## 2022-06-07 PROCEDURE — 73070 X-RAY EXAM OF ELBOW: CPT

## 2022-06-07 PROCEDURE — 76000 FLUOROSCOPY <1 HR PHYS/QHP: CPT

## 2022-06-07 PROCEDURE — 25010000002 PROPOFOL 10 MG/ML EMULSION: Performed by: NURSE ANESTHETIST, CERTIFIED REGISTERED

## 2022-06-07 PROCEDURE — 25010000002 ONDANSETRON PER 1 MG: Performed by: NURSE ANESTHETIST, CERTIFIED REGISTERED

## 2022-06-07 PROCEDURE — 25010000002 ROPIVACAINE PER 1 MG: Performed by: ANESTHESIOLOGY

## 2022-06-07 PROCEDURE — 25010000002 FENTANYL CITRATE (PF) 100 MCG/2ML SOLUTION: Performed by: NURSE ANESTHETIST, CERTIFIED REGISTERED

## 2022-06-07 PROCEDURE — 25010000002 CEFAZOLIN PER 500 MG: Performed by: ORTHOPAEDIC SURGERY

## 2022-06-07 PROCEDURE — 25010000002 FENTANYL CITRATE (PF) 50 MCG/ML SOLUTION: Performed by: ANESTHESIOLOGY

## 2022-06-07 DEVICE — KT BUTN REPR SHLDR DIST BIOCOMP: Type: IMPLANTABLE DEVICE | Site: ARM | Status: FUNCTIONAL

## 2022-06-07 DEVICE — SUT TAPE W/TPR END 1/2 CIR TPR NDL 1.3MM 36IN: Type: IMPLANTABLE DEVICE | Site: ARM | Status: FUNCTIONAL

## 2022-06-07 DEVICE — IMPLANTABLE DEVICE: Type: IMPLANTABLE DEVICE | Site: ARM | Status: FUNCTIONAL

## 2022-06-07 RX ORDER — OXYCODONE AND ACETAMINOPHEN 7.5; 325 MG/1; MG/1
1 TABLET ORAL EVERY 6 HOURS PRN
Qty: 20 TABLET | Refills: 0 | Status: SHIPPED | OUTPATIENT
Start: 2022-06-07

## 2022-06-07 RX ORDER — PROPOFOL 10 MG/ML
VIAL (ML) INTRAVENOUS AS NEEDED
Status: DISCONTINUED | OUTPATIENT
Start: 2022-06-07 | End: 2022-06-07 | Stop reason: SURG

## 2022-06-07 RX ORDER — OXYCODONE AND ACETAMINOPHEN 7.5; 325 MG/1; MG/1
2 TABLET ORAL EVERY 4 HOURS PRN
Status: DISCONTINUED | OUTPATIENT
Start: 2022-06-07 | End: 2022-06-07 | Stop reason: HOSPADM

## 2022-06-07 RX ORDER — MAGNESIUM HYDROXIDE 1200 MG/15ML
LIQUID ORAL AS NEEDED
Status: DISCONTINUED | OUTPATIENT
Start: 2022-06-07 | End: 2022-06-07 | Stop reason: HOSPADM

## 2022-06-07 RX ORDER — LIDOCAINE HYDROCHLORIDE 10 MG/ML
0.5 INJECTION, SOLUTION EPIDURAL; INFILTRATION; INTRACAUDAL; PERINEURAL ONCE AS NEEDED
Status: DISCONTINUED | OUTPATIENT
Start: 2022-06-07 | End: 2022-06-07 | Stop reason: HOSPADM

## 2022-06-07 RX ORDER — LIDOCAINE HYDROCHLORIDE 20 MG/ML
INJECTION, SOLUTION EPIDURAL; INFILTRATION; INTRACAUDAL; PERINEURAL AS NEEDED
Status: DISCONTINUED | OUTPATIENT
Start: 2022-06-07 | End: 2022-06-07 | Stop reason: SURG

## 2022-06-07 RX ORDER — FENTANYL CITRATE 50 UG/ML
INJECTION, SOLUTION INTRAMUSCULAR; INTRAVENOUS AS NEEDED
Status: DISCONTINUED | OUTPATIENT
Start: 2022-06-07 | End: 2022-06-07 | Stop reason: SURG

## 2022-06-07 RX ORDER — FENTANYL CITRATE 50 UG/ML
50 INJECTION, SOLUTION INTRAMUSCULAR; INTRAVENOUS ONCE
Status: COMPLETED | OUTPATIENT
Start: 2022-06-07 | End: 2022-06-07

## 2022-06-07 RX ORDER — FLUMAZENIL 0.1 MG/ML
0.2 INJECTION INTRAVENOUS AS NEEDED
Status: DISCONTINUED | OUTPATIENT
Start: 2022-06-07 | End: 2022-06-07 | Stop reason: HOSPADM

## 2022-06-07 RX ORDER — CEFAZOLIN SODIUM IN 0.9 % NACL 3 G/100 ML
3 INTRAVENOUS SOLUTION, PIGGYBACK (ML) INTRAVENOUS ONCE
Status: COMPLETED | OUTPATIENT
Start: 2022-06-07 | End: 2022-06-07

## 2022-06-07 RX ORDER — SUCCINYLCHOLINE/SOD CL,ISO/PF 200MG/10ML
SYRINGE (ML) INTRAVENOUS AS NEEDED
Status: DISCONTINUED | OUTPATIENT
Start: 2022-06-07 | End: 2022-06-07 | Stop reason: SURG

## 2022-06-07 RX ORDER — ROPIVACAINE HYDROCHLORIDE 5 MG/ML
INJECTION, SOLUTION EPIDURAL; INFILTRATION; PERINEURAL
Status: COMPLETED | OUTPATIENT
Start: 2022-06-07 | End: 2022-06-07

## 2022-06-07 RX ORDER — SODIUM CHLORIDE, SODIUM LACTATE, POTASSIUM CHLORIDE, CALCIUM CHLORIDE 600; 310; 30; 20 MG/100ML; MG/100ML; MG/100ML; MG/100ML
100 INJECTION, SOLUTION INTRAVENOUS CONTINUOUS PRN
Status: DISCONTINUED | OUTPATIENT
Start: 2022-06-07 | End: 2022-06-07 | Stop reason: HOSPADM

## 2022-06-07 RX ORDER — ONDANSETRON 2 MG/ML
4 INJECTION INTRAMUSCULAR; INTRAVENOUS ONCE AS NEEDED
Status: DISCONTINUED | OUTPATIENT
Start: 2022-06-07 | End: 2022-06-07 | Stop reason: HOSPADM

## 2022-06-07 RX ORDER — DROPERIDOL 2.5 MG/ML
0.62 INJECTION, SOLUTION INTRAMUSCULAR; INTRAVENOUS ONCE AS NEEDED
Status: DISCONTINUED | OUTPATIENT
Start: 2022-06-07 | End: 2022-06-07 | Stop reason: HOSPADM

## 2022-06-07 RX ORDER — NEOSTIGMINE METHYLSULFATE 5 MG/5 ML
SYRINGE (ML) INTRAVENOUS AS NEEDED
Status: DISCONTINUED | OUTPATIENT
Start: 2022-06-07 | End: 2022-06-07 | Stop reason: SURG

## 2022-06-07 RX ORDER — NALOXONE HCL 0.4 MG/ML
0.4 VIAL (ML) INJECTION AS NEEDED
Status: DISCONTINUED | OUTPATIENT
Start: 2022-06-07 | End: 2022-06-07 | Stop reason: HOSPADM

## 2022-06-07 RX ORDER — FENTANYL CITRATE 50 UG/ML
25 INJECTION, SOLUTION INTRAMUSCULAR; INTRAVENOUS
Status: DISCONTINUED | OUTPATIENT
Start: 2022-06-07 | End: 2022-06-07 | Stop reason: HOSPADM

## 2022-06-07 RX ORDER — SODIUM CHLORIDE 0.9 % (FLUSH) 0.9 %
10 SYRINGE (ML) INJECTION AS NEEDED
Status: DISCONTINUED | OUTPATIENT
Start: 2022-06-07 | End: 2022-06-07 | Stop reason: HOSPADM

## 2022-06-07 RX ORDER — DEXAMETHASONE SODIUM PHOSPHATE 4 MG/ML
INJECTION, SOLUTION INTRA-ARTICULAR; INTRALESIONAL; INTRAMUSCULAR; INTRAVENOUS; SOFT TISSUE AS NEEDED
Status: DISCONTINUED | OUTPATIENT
Start: 2022-06-07 | End: 2022-06-07 | Stop reason: SURG

## 2022-06-07 RX ORDER — OXYCODONE AND ACETAMINOPHEN 10; 325 MG/1; MG/1
1 TABLET ORAL ONCE AS NEEDED
Status: DISCONTINUED | OUTPATIENT
Start: 2022-06-07 | End: 2022-06-07 | Stop reason: HOSPADM

## 2022-06-07 RX ORDER — ONDANSETRON 2 MG/ML
INJECTION INTRAMUSCULAR; INTRAVENOUS AS NEEDED
Status: DISCONTINUED | OUTPATIENT
Start: 2022-06-07 | End: 2022-06-07 | Stop reason: SURG

## 2022-06-07 RX ORDER — LABETALOL HYDROCHLORIDE 5 MG/ML
5 INJECTION, SOLUTION INTRAVENOUS
Status: DISCONTINUED | OUTPATIENT
Start: 2022-06-07 | End: 2022-06-07 | Stop reason: HOSPADM

## 2022-06-07 RX ORDER — SODIUM CHLORIDE 0.9 % (FLUSH) 0.9 %
10 SYRINGE (ML) INJECTION EVERY 12 HOURS SCHEDULED
Status: DISCONTINUED | OUTPATIENT
Start: 2022-06-07 | End: 2022-06-07 | Stop reason: HOSPADM

## 2022-06-07 RX ORDER — ACETAMINOPHEN 500 MG
1000 TABLET ORAL ONCE
Status: COMPLETED | OUTPATIENT
Start: 2022-06-07 | End: 2022-06-07

## 2022-06-07 RX ORDER — ONDANSETRON 4 MG/1
4 TABLET, FILM COATED ORAL EVERY 8 HOURS PRN
Qty: 10 TABLET | Refills: 0 | Status: SHIPPED | OUTPATIENT
Start: 2022-06-07

## 2022-06-07 RX ORDER — MIDAZOLAM HYDROCHLORIDE 1 MG/ML
1 INJECTION INTRAMUSCULAR; INTRAVENOUS
Status: DISCONTINUED | OUTPATIENT
Start: 2022-06-07 | End: 2022-06-07 | Stop reason: HOSPADM

## 2022-06-07 RX ORDER — SODIUM CHLORIDE 0.9 % (FLUSH) 0.9 %
3-10 SYRINGE (ML) INJECTION AS NEEDED
Status: DISCONTINUED | OUTPATIENT
Start: 2022-06-07 | End: 2022-06-07 | Stop reason: HOSPADM

## 2022-06-07 RX ORDER — SODIUM CHLORIDE 0.9 % (FLUSH) 0.9 %
3 SYRINGE (ML) INJECTION EVERY 12 HOURS SCHEDULED
Status: DISCONTINUED | OUTPATIENT
Start: 2022-06-07 | End: 2022-06-07 | Stop reason: HOSPADM

## 2022-06-07 RX ORDER — ROCURONIUM BROMIDE 10 MG/ML
INJECTION, SOLUTION INTRAVENOUS AS NEEDED
Status: DISCONTINUED | OUTPATIENT
Start: 2022-06-07 | End: 2022-06-07 | Stop reason: SURG

## 2022-06-07 RX ORDER — SODIUM CHLORIDE, SODIUM LACTATE, POTASSIUM CHLORIDE, CALCIUM CHLORIDE 600; 310; 30; 20 MG/100ML; MG/100ML; MG/100ML; MG/100ML
100 INJECTION, SOLUTION INTRAVENOUS CONTINUOUS
Status: DISCONTINUED | OUTPATIENT
Start: 2022-06-07 | End: 2022-06-07 | Stop reason: HOSPADM

## 2022-06-07 RX ADMIN — ROPIVACAINE HYDROCHLORIDE 20 ML: 5 INJECTION, SOLUTION EPIDURAL; INFILTRATION; PERINEURAL at 11:40

## 2022-06-07 RX ADMIN — LIDOCAINE HYDROCHLORIDE 100 MG: 20 INJECTION, SOLUTION EPIDURAL; INFILTRATION; INTRACAUDAL; PERINEURAL at 12:54

## 2022-06-07 RX ADMIN — GLYCOPYRROLATE 0.4 MG: 0.2 INJECTION INTRAMUSCULAR; INTRAVENOUS at 14:15

## 2022-06-07 RX ADMIN — Medication 140 MG: at 12:56

## 2022-06-07 RX ADMIN — Medication 3 G: at 13:01

## 2022-06-07 RX ADMIN — FENTANYL CITRATE 100 MCG: 50 INJECTION, SOLUTION INTRAMUSCULAR; INTRAVENOUS at 12:54

## 2022-06-07 RX ADMIN — ONDANSETRON 4 MG: 2 INJECTION INTRAMUSCULAR; INTRAVENOUS at 13:39

## 2022-06-07 RX ADMIN — ROCURONIUM BROMIDE 40 MG: 10 SOLUTION INTRAVENOUS at 13:04

## 2022-06-07 RX ADMIN — PROPOFOL 300 MG: 10 INJECTION, EMULSION INTRAVENOUS at 12:55

## 2022-06-07 RX ADMIN — ROCURONIUM BROMIDE 5 MG: 10 SOLUTION INTRAVENOUS at 12:54

## 2022-06-07 RX ADMIN — DEXAMETHASONE SODIUM PHOSPHATE 4 MG: 4 INJECTION, SOLUTION INTRA-ARTICULAR; INTRALESIONAL; INTRAMUSCULAR; INTRAVENOUS; SOFT TISSUE at 13:39

## 2022-06-07 RX ADMIN — FENTANYL CITRATE 50 MCG: 50 INJECTION INTRAMUSCULAR; INTRAVENOUS at 11:35

## 2022-06-07 RX ADMIN — Medication 5 MG: at 14:16

## 2022-06-07 RX ADMIN — ACETAMINOPHEN 1000 MG: 500 TABLET ORAL at 11:38

## 2022-06-07 RX ADMIN — SODIUM CHLORIDE, POTASSIUM CHLORIDE, SODIUM LACTATE AND CALCIUM CHLORIDE 100 ML/HR: 600; 310; 30; 20 INJECTION, SOLUTION INTRAVENOUS at 11:08

## 2022-06-07 NOTE — ANESTHESIA PREPROCEDURE EVALUATION
Anesthesia Evaluation     Patient summary reviewed   no history of anesthetic complications:  NPO Solid Status: > 8 hours             Airway   Mallampati: II  TM distance: >3 FB  No difficulty expected  Dental - normal exam     Pulmonary    (+) sleep apnea,   (-) asthma, not a smoker  Cardiovascular   Exercise tolerance: good (4-7 METS)    ECG reviewed    (+) hypertension,   (-) past MI, CAD, dysrhythmias, cardiac stents, hyperlipidemia      Neuro/Psych  (-) seizures, TIA, CVA  GI/Hepatic/Renal/Endo    (+) morbid obesity,    (-) liver disease, no renal disease, diabetes    ROS Comment: On metformin for weight loss    Musculoskeletal     Abdominal    Substance History      OB/GYN          Other                        Anesthesia Plan    ASA 3     general with block     intravenous induction     Anesthetic plan, risks, benefits, and alternatives have been provided, discussed and informed consent has been obtained with: patient.        CODE STATUS:

## 2022-06-07 NOTE — ANESTHESIA PROCEDURE NOTES
Airway  Urgency: elective    Date/Time: 6/7/2022 12:58 PM  Airway not difficult    General Information and Staff    Patient location during procedure: OR  CRNA/CAA: Nathan Patten CRNA    Indications and Patient Condition  Indications for airway management: airway protection    Preoxygenated: yes  Mask difficulty assessment: 0 - not attempted    Final Airway Details  Final airway type: endotracheal airway      Successful airway: ETT  Cuffed: yes   Successful intubation technique: direct laryngoscopy  Blade: Nicholas  Blade size: 4  ETT size (mm): 8.0  Cormack-Lehane Classification: grade I - full view of glottis  Placement verified by: chest auscultation and capnometry   Cuff volume (mL): 8  Measured from: teeth  ETT/EBT  to teeth (cm): 23  Number of attempts at approach: 1  Assessment: lips, teeth, and gum same as pre-op and atraumatic intubation

## 2022-06-07 NOTE — BRIEF OP NOTE
TENDON DISTAL BICEPS REPAIR/RELEASE  Progress Note    Constantin Seals  6/7/2022    Pre-op Diagnosis:   846.211A       Post-Op Diagnosis Codes:     * Rupture of right distal biceps tendon, initial encounter [S46.211A]    Procedure/CPT® Codes:  MI REINSERT BI/TRICEPS TENDON,DISTAL [12412]      Procedure(s):  RIGHT DISTAL BICEPS TENDON REPAIR WITH SEMI-T ALLOGRAFT    Surgeon(s):  Benjamin العراقي MD    Anesthesia: General with Block    Staff:   Circulator: Callie Chávez RN; Jason Rodriguez RN  Scrub Person: Linda Novak; Jaren Garcia         Estimated Blood Loss: minimal    Urine Voided: * No values recorded between 6/7/2022 12:49 PM and 6/7/2022  2:09 PM *    Specimens:                None          Drains: * No LDAs found *    Findings: see op note         Complications: none    Benjamin العراقي MD     Date: 6/7/2022  Time: 14:18 CDT

## 2022-06-07 NOTE — ANESTHESIA POSTPROCEDURE EVALUATION
Patient: Constantin Seals    Procedure Summary     Date: 06/07/22 Room / Location: [facility] OR  /  [facility] OR    Anesthesia Start: 1248 Anesthesia Stop: 1429    Procedure: RIGHT DISTAL BICEPS TENDON REPAIR WITH SEMI-T ALLOGRAFT (Right Arm Upper) Diagnosis:       Rupture of right distal biceps tendon, initial encounter      (846.211A)    Surgeons: Benjamin العراقي MD Provider: Nathan Patten CRNA    Anesthesia Type: general with block ASA Status: 3          Anesthesia Type: general with block    Vitals  Vitals Value Taken Time   /81 06/07/22 1451   Temp 97.9 °F (36.6 °C) 06/07/22 1450   Pulse 42 06/07/22 1452   Resp 16 06/07/22 1450   SpO2 98 % 06/07/22 1452   Vitals shown include unvalidated device data.        Post Anesthesia Care and Evaluation    Patient location during evaluation: PACU  Patient participation: complete - patient participated  Level of consciousness: awake and alert  Pain management: adequate    Airway patency: patent  Anesthetic complications: No anesthetic complications  PONV Status: none  Cardiovascular status: acceptable and hemodynamically stable  Respiratory status: acceptable  Hydration status: acceptable    Comments: Blood pressure 141/88, pulse 50, temperature 97.9 °F (36.6 °C), temperature source Temporal, resp. rate 16, SpO2 99 %.    Patient discharged from PACU based upon Jake score. Please see RN notes for further details

## 2022-06-07 NOTE — H&P
Pt Name: Constantin Seals  MRN: [id number]  YOB: 1980  Date of evaluation: 6/7/2022    H&P including current review of systems was updated in the paper chart and/or the document previously scanned into the record.  There have been no significant changes or new problems since the original evaluation.  The patient's problems continue and indications for contemplated procedure have not changed.    Electronically signed by Benjamin العراقي MD on 6/7/2022 at 10:45 CDT

## 2022-06-07 NOTE — ANESTHESIA PROCEDURE NOTES
Peripheral Block    Pre-sedation assessment completed: 6/7/2022 11:33 AM    Patient reassessed immediately prior to procedure    Patient location during procedure: holding area  Start time: 6/7/2022 11:38 AM  Stop time: 6/7/2022 11:40 AM  Reason for block: procedure for pain, at surgeon's request, post-op pain management and Request by Dr. العراقي  Performed by  Anesthesiologist: Dodie Guerrero MD  Preanesthetic Checklist  Completed: patient identified, IV checked, site marked, risks and benefits discussed, surgical consent, monitors and equipment checked, pre-op evaluation and timeout performed  Prep:  Pt Position: supine  Sterile barriers:gloves, cap, mask and washed/disinfected hands  Prep: ChloraPrep  Patient monitoring: blood pressure monitoring, continuous pulse oximetry and EKG  Procedure    Sedation: yes    Guidance:ultrasound guided and Brachial plexus and axillary artery identified and local anesthetic seen surrounding nerves  Ultrasound documentation: picture printed and placed in patients chart.    Block Type:infraclavicular  Injection Technique:single-shot  Needle Type:echogenic  Needle Gauge:20 G      Medications Used: ropivacaine (NAROPIN) injection 0.5 %, 20 mL  Med administered at 6/7/2022 11:40 AM      Post Assessment  Injection Assessment: negative aspiration for heme, no paresthesia on injection and incremental injection  Patient Tolerance:comfortable throughout block  Complications:no

## 2022-06-08 LAB
QT INTERVAL: 382 MS
QTC INTERVAL: 435 MS

## 2024-05-31 ENCOUNTER — HOSPITAL ENCOUNTER (INPATIENT)
Facility: HOSPITAL | Age: 44
LOS: 12 days | Discharge: HOME | End: 2024-06-12
Attending: EMERGENCY MEDICINE | Admitting: INTERNAL MEDICINE
Payer: COMMERCIAL

## 2024-05-31 ENCOUNTER — APPOINTMENT (OUTPATIENT)
Dept: GENERAL RADIOLOGY | Facility: HOSPITAL | Age: 44
End: 2024-05-31
Payer: COMMERCIAL

## 2024-05-31 DIAGNOSIS — R07.9 CHEST PAIN, UNSPECIFIED TYPE: ICD-10-CM

## 2024-05-31 DIAGNOSIS — I25.110 CORONARY ARTERY DISEASE INVOLVING NATIVE CORONARY ARTERY OF NATIVE HEART WITH UNSTABLE ANGINA PECTORIS: ICD-10-CM

## 2024-05-31 DIAGNOSIS — R79.89 TROPONIN I ABOVE REFERENCE RANGE: ICD-10-CM

## 2024-05-31 DIAGNOSIS — Z95.1 S/P CABG (CORONARY ARTERY BYPASS GRAFT): Primary | ICD-10-CM

## 2024-05-31 DIAGNOSIS — I21.4 NSTEMI (NON-ST ELEVATED MYOCARDIAL INFARCTION): ICD-10-CM

## 2024-05-31 DIAGNOSIS — Z74.09 IMPAIRED FUNCTIONAL MOBILITY AND ACTIVITY TOLERANCE: ICD-10-CM

## 2024-05-31 LAB
ALBUMIN SERPL-MCNC: 4 G/DL (ref 3.5–5.2)
ALBUMIN/GLOB SERPL: 1.3 G/DL
ALP SERPL-CCNC: 79 U/L (ref 39–117)
ALT SERPL W P-5'-P-CCNC: 37 U/L (ref 1–41)
ANION GAP SERPL CALCULATED.3IONS-SCNC: 8 MMOL/L (ref 5–15)
AST SERPL-CCNC: 50 U/L (ref 1–40)
BASOPHILS # BLD AUTO: 0.07 10*3/MM3 (ref 0–0.2)
BASOPHILS NFR BLD AUTO: 0.9 % (ref 0–1.5)
BILIRUB SERPL-MCNC: 0.4 MG/DL (ref 0–1.2)
BUN SERPL-MCNC: 11 MG/DL (ref 6–20)
BUN/CREAT SERPL: 12.6 (ref 7–25)
CALCIUM SPEC-SCNC: 9.1 MG/DL (ref 8.6–10.5)
CHLORIDE SERPL-SCNC: 104 MMOL/L (ref 98–107)
CHOLEST SERPL-MCNC: 176 MG/DL (ref 0–200)
CO2 SERPL-SCNC: 27 MMOL/L (ref 22–29)
CREAT SERPL-MCNC: 0.87 MG/DL (ref 0.76–1.27)
D DIMER PPP FEU-MCNC: 0.63 MCGFEU/ML (ref 0–0.5)
DEPRECATED RDW RBC AUTO: 42.7 FL (ref 37–54)
EGFRCR SERPLBLD CKD-EPI 2021: 110.5 ML/MIN/1.73
EOSINOPHIL # BLD AUTO: 0.16 10*3/MM3 (ref 0–0.4)
EOSINOPHIL NFR BLD AUTO: 2.1 % (ref 0.3–6.2)
ERYTHROCYTE [DISTWIDTH] IN BLOOD BY AUTOMATED COUNT: 13.7 % (ref 12.3–15.4)
GEN 5 2HR TROPONIN T REFLEX: 740 NG/L
GLOBULIN UR ELPH-MCNC: 3 GM/DL
GLUCOSE SERPL-MCNC: 101 MG/DL (ref 65–99)
HBA1C MFR BLD: 5.6 % (ref 4.8–5.6)
HCT VFR BLD AUTO: 46.9 % (ref 37.5–51)
HDLC SERPL-MCNC: 38 MG/DL (ref 40–60)
HGB BLD-MCNC: 15.2 G/DL (ref 13–17.7)
HOLD SPECIMEN: NORMAL
HOLD SPECIMEN: NORMAL
IMM GRANULOCYTES # BLD AUTO: 0.03 10*3/MM3 (ref 0–0.05)
IMM GRANULOCYTES NFR BLD AUTO: 0.4 % (ref 0–0.5)
LDLC SERPL CALC-MCNC: 121 MG/DL (ref 0–100)
LDLC/HDLC SERPL: 3.16 {RATIO}
LYMPHOCYTES # BLD AUTO: 1.87 10*3/MM3 (ref 0.7–3.1)
LYMPHOCYTES NFR BLD AUTO: 24.1 % (ref 19.6–45.3)
MCH RBC QN AUTO: 27.5 PG (ref 26.6–33)
MCHC RBC AUTO-ENTMCNC: 32.4 G/DL (ref 31.5–35.7)
MCV RBC AUTO: 85 FL (ref 79–97)
MONOCYTES # BLD AUTO: 0.71 10*3/MM3 (ref 0.1–0.9)
MONOCYTES NFR BLD AUTO: 9.2 % (ref 5–12)
NEUTROPHILS NFR BLD AUTO: 4.91 10*3/MM3 (ref 1.7–7)
NEUTROPHILS NFR BLD AUTO: 63.3 % (ref 42.7–76)
NRBC BLD AUTO-RTO: 0 /100 WBC (ref 0–0.2)
NT-PROBNP SERPL-MCNC: 378 PG/ML (ref 0–450)
PLATELET # BLD AUTO: 280 10*3/MM3 (ref 140–450)
PMV BLD AUTO: 9.2 FL (ref 6–12)
POTASSIUM SERPL-SCNC: 4.2 MMOL/L (ref 3.5–5.2)
PROT SERPL-MCNC: 7 G/DL (ref 6–8.5)
RBC # BLD AUTO: 5.52 10*6/MM3 (ref 4.14–5.8)
SODIUM SERPL-SCNC: 139 MMOL/L (ref 136–145)
TRIGL SERPL-MCNC: 89 MG/DL (ref 0–150)
TROPONIN T DELTA: 85 NG/L
TROPONIN T SERPL HS-MCNC: 655 NG/L
VLDLC SERPL-MCNC: 17 MG/DL (ref 5–40)
WBC NRBC COR # BLD AUTO: 7.75 10*3/MM3 (ref 3.4–10.8)
WHOLE BLOOD HOLD COAG: NORMAL
WHOLE BLOOD HOLD SPECIMEN: NORMAL

## 2024-05-31 PROCEDURE — 25010000002 ENOXAPARIN PER 10 MG: Performed by: EMERGENCY MEDICINE

## 2024-05-31 PROCEDURE — 25010000002 FENTANYL CITRATE (PF) 50 MCG/ML SOLUTION: Performed by: INTERNAL MEDICINE

## 2024-05-31 PROCEDURE — 80053 COMPREHEN METABOLIC PANEL: CPT | Performed by: EMERGENCY MEDICINE

## 2024-05-31 PROCEDURE — 25010000002 HEPARIN (PORCINE) 1000-0.9 UT/500ML-% SOLUTION: Performed by: INTERNAL MEDICINE

## 2024-05-31 PROCEDURE — 25010000002 HEPARIN (PORCINE) PER 1000 UNITS: Performed by: INTERNAL MEDICINE

## 2024-05-31 PROCEDURE — C1894 INTRO/SHEATH, NON-LASER: HCPCS | Performed by: INTERNAL MEDICINE

## 2024-05-31 PROCEDURE — 36415 COLL VENOUS BLD VENIPUNCTURE: CPT

## 2024-05-31 PROCEDURE — 93010 ELECTROCARDIOGRAM REPORT: CPT | Performed by: STUDENT IN AN ORGANIZED HEALTH CARE EDUCATION/TRAINING PROGRAM

## 2024-05-31 PROCEDURE — 25010000002 ENOXAPARIN PER 10 MG: Performed by: INTERNAL MEDICINE

## 2024-05-31 PROCEDURE — 71045 X-RAY EXAM CHEST 1 VIEW: CPT

## 2024-05-31 PROCEDURE — 4A023N7 MEASUREMENT OF CARDIAC SAMPLING AND PRESSURE, LEFT HEART, PERCUTANEOUS APPROACH: ICD-10-PCS | Performed by: INTERNAL MEDICINE

## 2024-05-31 PROCEDURE — 25510000001 IOPAMIDOL PER 1 ML: Performed by: INTERNAL MEDICINE

## 2024-05-31 PROCEDURE — 83880 ASSAY OF NATRIURETIC PEPTIDE: CPT | Performed by: EMERGENCY MEDICINE

## 2024-05-31 PROCEDURE — 85379 FIBRIN DEGRADATION QUANT: CPT | Performed by: EMERGENCY MEDICINE

## 2024-05-31 PROCEDURE — 83036 HEMOGLOBIN GLYCOSYLATED A1C: CPT | Performed by: INTERNAL MEDICINE

## 2024-05-31 PROCEDURE — 99152 MOD SED SAME PHYS/QHP 5/>YRS: CPT | Performed by: INTERNAL MEDICINE

## 2024-05-31 PROCEDURE — 80061 LIPID PANEL: CPT | Performed by: INTERNAL MEDICINE

## 2024-05-31 PROCEDURE — B2111ZZ FLUOROSCOPY OF MULTIPLE CORONARY ARTERIES USING LOW OSMOLAR CONTRAST: ICD-10-PCS | Performed by: INTERNAL MEDICINE

## 2024-05-31 PROCEDURE — 93454 CORONARY ARTERY ANGIO S&I: CPT | Performed by: INTERNAL MEDICINE

## 2024-05-31 PROCEDURE — 25010000002 HEPARIN (PORCINE) 2000-0.9 UNIT/L-% SOLUTION: Performed by: INTERNAL MEDICINE

## 2024-05-31 PROCEDURE — 25010000002 MIDAZOLAM PER 1 MG: Performed by: INTERNAL MEDICINE

## 2024-05-31 PROCEDURE — 93005 ELECTROCARDIOGRAM TRACING: CPT | Performed by: EMERGENCY MEDICINE

## 2024-05-31 PROCEDURE — 25010000002 DIPHENHYDRAMINE PER 50 MG: Performed by: INTERNAL MEDICINE

## 2024-05-31 PROCEDURE — 99222 1ST HOSP IP/OBS MODERATE 55: CPT | Performed by: INTERNAL MEDICINE

## 2024-05-31 PROCEDURE — 84484 ASSAY OF TROPONIN QUANT: CPT | Performed by: EMERGENCY MEDICINE

## 2024-05-31 PROCEDURE — 85025 COMPLETE CBC W/AUTO DIFF WBC: CPT | Performed by: EMERGENCY MEDICINE

## 2024-05-31 PROCEDURE — 99285 EMERGENCY DEPT VISIT HI MDM: CPT

## 2024-05-31 RX ORDER — HEPARIN SODIUM 1000 [USP'U]/ML
INJECTION, SOLUTION INTRAVENOUS; SUBCUTANEOUS
Status: DISCONTINUED | OUTPATIENT
Start: 2024-05-31 | End: 2024-05-31 | Stop reason: HOSPADM

## 2024-05-31 RX ORDER — ENOXAPARIN SODIUM 100 MG/ML
1 INJECTION SUBCUTANEOUS ONCE
Qty: 2 ML | Refills: 0 | Status: COMPLETED | OUTPATIENT
Start: 2024-05-31 | End: 2024-05-31

## 2024-05-31 RX ORDER — TEMAZEPAM 15 MG/1
15 CAPSULE ORAL NIGHTLY PRN
Status: ACTIVE | OUTPATIENT
Start: 2024-05-31 | End: 2024-06-05

## 2024-05-31 RX ORDER — ONDANSETRON 4 MG/1
4 TABLET, ORALLY DISINTEGRATING ORAL EVERY 8 HOURS PRN
Status: DISCONTINUED | OUTPATIENT
Start: 2024-05-31 | End: 2024-06-06

## 2024-05-31 RX ORDER — ASPIRIN 81 MG/1
81 TABLET, CHEWABLE ORAL DAILY
Status: DISCONTINUED | OUTPATIENT
Start: 2024-06-01 | End: 2024-06-06

## 2024-05-31 RX ORDER — CARVEDILOL 3.12 MG/1
3.12 TABLET ORAL 2 TIMES DAILY WITH MEALS
Status: DISCONTINUED | OUTPATIENT
Start: 2024-05-31 | End: 2024-06-02

## 2024-05-31 RX ORDER — DIPHENHYDRAMINE HYDROCHLORIDE 50 MG/ML
INJECTION INTRAMUSCULAR; INTRAVENOUS
Status: DISCONTINUED | OUTPATIENT
Start: 2024-05-31 | End: 2024-05-31 | Stop reason: HOSPADM

## 2024-05-31 RX ORDER — HEPARIN SODIUM 200 [USP'U]/100ML
INJECTION, SOLUTION INTRAVENOUS
Status: DISCONTINUED | OUTPATIENT
Start: 2024-05-31 | End: 2024-05-31 | Stop reason: HOSPADM

## 2024-05-31 RX ORDER — COLCHICINE 0.6 MG/1
1.2 TABLET ORAL ONCE AS NEEDED
Status: COMPLETED | OUTPATIENT
Start: 2024-05-31 | End: 2024-05-31

## 2024-05-31 RX ORDER — FENTANYL CITRATE 50 UG/ML
INJECTION, SOLUTION INTRAMUSCULAR; INTRAVENOUS
Status: DISCONTINUED | OUTPATIENT
Start: 2024-05-31 | End: 2024-05-31 | Stop reason: HOSPADM

## 2024-05-31 RX ORDER — ALUMINA, MAGNESIA, AND SIMETHICONE 2400; 2400; 240 MG/30ML; MG/30ML; MG/30ML
15 SUSPENSION ORAL EVERY 6 HOURS PRN
Status: DISCONTINUED | OUTPATIENT
Start: 2024-05-31 | End: 2024-06-06

## 2024-05-31 RX ORDER — MAGNESIUM HYDROXIDE 1200 MG/15ML
LIQUID ORAL
Status: DISCONTINUED | OUTPATIENT
Start: 2024-05-31 | End: 2024-05-31 | Stop reason: HOSPADM

## 2024-05-31 RX ORDER — COLCHICINE 0.6 MG/1
0.6 TABLET ORAL DAILY PRN
COMMUNITY

## 2024-05-31 RX ORDER — SEMAGLUTIDE 2.68 MG/ML
2 INJECTION, SOLUTION SUBCUTANEOUS WEEKLY
COMMUNITY
Start: 2024-04-06

## 2024-05-31 RX ORDER — FAMOTIDINE 20 MG/1
20 TABLET, FILM COATED ORAL DAILY
Status: DISCONTINUED | OUTPATIENT
Start: 2024-06-01 | End: 2024-06-06

## 2024-05-31 RX ORDER — ACETAMINOPHEN 325 MG/1
650 TABLET ORAL EVERY 4 HOURS PRN
Status: DISCONTINUED | OUTPATIENT
Start: 2024-05-31 | End: 2024-06-06

## 2024-05-31 RX ORDER — DICLOFENAC SODIUM 75 MG/1
75 TABLET, DELAYED RELEASE ORAL 2 TIMES DAILY PRN
COMMUNITY
Start: 2024-02-23 | End: 2024-06-12 | Stop reason: HOSPADM

## 2024-05-31 RX ORDER — ATORVASTATIN CALCIUM 40 MG/1
40 TABLET, FILM COATED ORAL NIGHTLY
Status: DISCONTINUED | OUTPATIENT
Start: 2024-05-31 | End: 2024-06-06

## 2024-05-31 RX ORDER — ASPIRIN 81 MG/1
324 TABLET, CHEWABLE ORAL ONCE
Status: COMPLETED | OUTPATIENT
Start: 2024-05-31 | End: 2024-05-31

## 2024-05-31 RX ORDER — ALLOPURINOL 100 MG/1
2 TABLET ORAL DAILY
Status: ON HOLD | COMMUNITY
End: 2024-06-01

## 2024-05-31 RX ORDER — SODIUM CHLORIDE 0.9 % (FLUSH) 0.9 %
10 SYRINGE (ML) INJECTION AS NEEDED
Status: DISCONTINUED | OUTPATIENT
Start: 2024-05-31 | End: 2024-06-06

## 2024-05-31 RX ORDER — AMLODIPINE BESYLATE 5 MG/1
5 TABLET ORAL
Status: DISCONTINUED | OUTPATIENT
Start: 2024-05-31 | End: 2024-06-04

## 2024-05-31 RX ORDER — COLCHICINE 0.6 MG/1
0.6 TABLET ORAL DAILY PRN
Status: DISCONTINUED | OUTPATIENT
Start: 2024-05-31 | End: 2024-06-06

## 2024-05-31 RX ORDER — ENOXAPARIN SODIUM 150 MG/ML
0.7 INJECTION SUBCUTANEOUS EVERY 12 HOURS
Status: DISCONTINUED | OUTPATIENT
Start: 2024-05-31 | End: 2024-06-02

## 2024-05-31 RX ORDER — MIDAZOLAM HYDROCHLORIDE 1 MG/ML
INJECTION INTRAMUSCULAR; INTRAVENOUS
Status: DISCONTINUED | OUTPATIENT
Start: 2024-05-31 | End: 2024-05-31 | Stop reason: HOSPADM

## 2024-05-31 RX ORDER — LISINOPRIL 10 MG/1
10 TABLET ORAL
Status: DISCONTINUED | OUTPATIENT
Start: 2024-05-31 | End: 2024-06-04

## 2024-05-31 RX ORDER — LIDOCAINE HYDROCHLORIDE 20 MG/ML
INJECTION, SOLUTION INFILTRATION; PERINEURAL
Status: DISCONTINUED | OUTPATIENT
Start: 2024-05-31 | End: 2024-05-31 | Stop reason: HOSPADM

## 2024-05-31 RX ORDER — NITROGLYCERIN 0.4 MG/1
0.4 TABLET SUBLINGUAL
Status: DISCONTINUED | OUTPATIENT
Start: 2024-05-31 | End: 2024-06-06

## 2024-05-31 RX ORDER — VERAPAMIL HYDROCHLORIDE 2.5 MG/ML
INJECTION, SOLUTION INTRAVENOUS
Status: DISCONTINUED | OUTPATIENT
Start: 2024-05-31 | End: 2024-05-31 | Stop reason: HOSPADM

## 2024-05-31 RX ADMIN — ASPIRIN 81 MG CHEWABLE TABLET 324 MG: 81 TABLET CHEWABLE at 08:20

## 2024-05-31 RX ADMIN — ENOXAPARIN SODIUM 150 MG: 150 INJECTION SUBCUTANEOUS at 20:22

## 2024-05-31 RX ADMIN — AMLODIPINE BESYLATE 5 MG: 5 TABLET ORAL at 20:22

## 2024-05-31 RX ADMIN — ATORVASTATIN CALCIUM 40 MG: 40 TABLET ORAL at 20:22

## 2024-05-31 RX ADMIN — CARVEDILOL 3.12 MG: 3.12 TABLET, FILM COATED ORAL at 20:22

## 2024-05-31 RX ADMIN — COLCHICINE 1.2 MG: 0.6 TABLET ORAL at 22:40

## 2024-05-31 RX ADMIN — LISINOPRIL 10 MG: 10 TABLET ORAL at 20:22

## 2024-05-31 RX ADMIN — ENOXAPARIN SODIUM 200 MG: 100 INJECTION SUBCUTANEOUS at 09:11

## 2024-05-31 RX ADMIN — COLCHICINE 0.6 MG: 0.6 TABLET, FILM COATED ORAL at 23:40

## 2024-05-31 NOTE — ED PROVIDER NOTES
Subjective   History of Present Illness  Patient is a morbidly obese gentleman who came to the ED with complains of chest pain.  Episodically there is no exacerbating factor associate with this.    Chest Pain  Pain location:  Substernal area  Pain quality: aching and tightness    Pain radiates to:  Does not radiate  Pain severity:  Moderate  Onset quality:  Gradual  Timing:  Intermittent  Progression:  Waxing and waning  Chronicity:  New  Context: not breathing, not drug use, not eating, not lifting, not movement and not raising an arm    Relieved by:  Nothing  Worsened by:  Movement and deep breathing  Ineffective treatments:  None tried  Associated symptoms: no abdominal pain, no AICD problem, no altered mental status, no anorexia, no anxiety, no back pain, no cough, no diaphoresis, no dizziness, no dysphagia, no fatigue, no fever, no headache, no heartburn, no nausea, no near-syncope, no orthopnea, no shortness of breath, no syncope, no vomiting and no weakness    Risk factors: high cholesterol, hypertension, male sex and obesity    Risk factors: no aortic disease, no coronary artery disease, no Amaya-Danlos syndrome, no immobilization and no smoking        Review of Systems   Constitutional: Negative.  Negative for chills, diaphoresis, fatigue and fever.   HENT: Negative.  Negative for congestion and trouble swallowing.    Respiratory: Negative.  Negative for cough, chest tightness, shortness of breath and stridor.    Cardiovascular:  Positive for chest pain. Negative for orthopnea, syncope and near-syncope.   Gastrointestinal: Negative.  Negative for abdominal distention, abdominal pain, anorexia, heartburn, nausea and vomiting.   Endocrine: Negative.    Genitourinary: Negative.  Negative for difficulty urinating and flank pain.   Musculoskeletal: Negative.  Negative for back pain.   Skin: Negative.  Negative for color change.   Neurological: Negative.  Negative for dizziness, weakness and headaches.   All  other systems reviewed and are negative.      Past Medical History:   Diagnosis Date    COVID     GERD (gastroesophageal reflux disease)     Gout     Heartburn     Hypertension     Obesity        No Known Allergies    Past Surgical History:   Procedure Laterality Date    DISTAL BICEPS TENDON REPAIR Right 6/7/2022    Procedure: RIGHT DISTAL BICEPS TENDON REPAIR WITH SEMI-T ALLOGRAFT;  Surgeon: Benjamin العراقي MD;  Location: [facility] OR;  Service: Orthopedics;  Laterality: Right;    ELBOW PROCEDURE      EYE SURGERY Right 1984       Family History   Problem Relation Age of Onset    Diabetes Father     Hypertension Father     Heart disease Father        Social History     Socioeconomic History    Marital status:    Tobacco Use    Smoking status: Never    Smokeless tobacco: Current     Types: Chew   Vaping Use    Vaping status: Never Used   Substance and Sexual Activity    Alcohol use: No    Drug use: Defer           Objective   Physical Exam  Vitals and nursing note reviewed. Exam conducted with a chaperone present.   Constitutional:       General: He is not in acute distress.     Appearance: Normal appearance. He is well-developed. He is obese. He is not toxic-appearing.   HENT:      Head: Normocephalic and atraumatic.      Nose: Nose normal.      Mouth/Throat:      Mouth: Mucous membranes are moist.      Pharynx: Uvula midline.   Eyes:      General: Lids are normal. Lids are everted, no foreign bodies appreciated.      Conjunctiva/sclera: Conjunctivae normal.      Pupils: Pupils are equal, round, and reactive to light.   Neck:      Vascular: Normal carotid pulses. No carotid bruit or JVD.      Trachea: Trachea and phonation normal. No tracheal deviation.   Cardiovascular:      Rate and Rhythm: Normal rate and regular rhythm.      Chest Wall: PMI is not displaced.      Pulses: Normal pulses.      Heart sounds: Normal heart sounds.      No gallop.   Pulmonary:      Effort: Pulmonary effort is normal. No  tachypnea, accessory muscle usage or respiratory distress.      Breath sounds: Normal breath sounds. No stridor. No decreased breath sounds, wheezing, rhonchi or rales.   Abdominal:      General: Bowel sounds are normal. There is no distension.      Palpations: Abdomen is soft.      Tenderness: There is no abdominal tenderness.   Musculoskeletal:         General: No swelling. Normal range of motion.      Cervical back: Full passive range of motion without pain, normal range of motion and neck supple. No rigidity.      Comments: Lower extremity exam bilaterally is unremarkable.  There is no right or left calf tenderness .  There is no palpable venous cord.  No obvious difference in the size of the legs.  No pitting edema.  The dorsalis pedis and posterior tibial femoral and popliteal pulses are palpable and +2 bilaterally.  Homans sign is negative   Skin:     General: Skin is warm and dry.      Capillary Refill: Capillary refill takes less than 2 seconds.      Coloration: Skin is not jaundiced or pale.      Nails: There is no clubbing.   Neurological:      General: No focal deficit present.      Mental Status: He is alert and oriented to person, place, and time.      GCS: GCS eye subscore is 4. GCS verbal subscore is 5. GCS motor subscore is 6.      Cranial Nerves: No cranial nerve deficit.      Motor: Motor function is intact.      Gait: Gait normal.      Deep Tendon Reflexes: Reflexes are normal and symmetric. Reflexes normal.   Psychiatric:         Speech: Speech normal.         Behavior: Behavior normal.         Procedures           ED Course  ED Course as of 05/31/24 0904   Fri May 31, 2024   0855 Patient came with chest pain EKG shows normal sinus rhythm without any evidence of acute ischemia troponin is 655 [TS]   0855 Calling cardiology [TS]      ED Course User Index  [TS] Adán Bailey MD                HEART Score: 5                              Medical Decision Making  Differential Diagnosis:  I  considered chest wall pain, muscle strain, costochondritis, pleurisy, rib fracture, herpes zoster, cardiovascular etiology, myocardial infarction, intermediate coronary syndrome, unstable angina, angina, aortic dissection, pericarditis, pulmonary etiology, pulmonary embolism, pneumonia, pneumothorax, lung cancer, gastroesophageal reflux disease, esophagitis, esophageal spasm and gastrointestinal etiology as a possible cause of chest pain in this patient. This is a partial list of diagnoses considered.        Problems Addressed:  Chest pain, unspecified type: acute illness or injury  Troponin I above reference range: acute illness or injury    Amount and/or Complexity of Data Reviewed  Labs: ordered.     Details: Labs reviewed  Radiology: ordered.     Details: X-rays reviewed  ECG/medicine tests: ordered.     Details: Normal sinus rhythm  Discussion of management or test interpretation with external provider(s): Discussed with Dr. Aleksandr Trevino  OTC drugs.  Prescription drug management.  Risk Details: The patient came in the ER with chest pain  Heart score is 5  Wells score is 0  Years Algorithm for Pulmonary Embolus  To be used in hemodynamically stable patient >18 years old    No signs of DVT are present, there is no hemoptysis, PE is not the most likely diagnosis and the D-dimer is not greater or equal to 1000 ng/mL. Therefore PE is excluded . The Years algorithm rules out PE (0.43 % with symptomatic VTE during 3-month follow-up)    Patient's troponin is elevated he is on any pain at this time.  Was given Lovenox Case were discussed with cardiology the emergency and taken to the Cath Lab.        Final diagnoses:   Chest pain, unspecified type   Troponin I above reference range       ED Disposition  ED Disposition       ED Disposition   Intended Admit    Condition   --    Comment   --               No follow-up provider specified.       Medication List      No changes were made to your prescriptions during this  visit.            Adán Bailey MD  05/31/24 0828       Adán Bailey MD  05/31/24 0853       Adán Bailey MD  05/31/24 0904

## 2024-05-31 NOTE — ED NOTES
Dr. Bailey notified of pt 2nd trop. Dr. Roberts called to notify of 2nd trop. Dr. Roberts stated on the phone that he is on his way down to see pt now.

## 2024-05-31 NOTE — H&P
Chief Complaint: chest tightness    HPI: Mr Seals is a 43-year-old male with significant past medical history of morbid obesity, hypertension, prediabetes, GERD, tobacco use, and obstructive sleep apnea.  He started having chest tightness 2 days ago.  He reported as a squeezing sensation in the center of his chest that reminded him a little bit of his acid reflux.  This eventually resolved on its own.  He went up to the fire station and got an EKG done.  Last night the symptoms returned and he ultimately came to [facility] for evaluation this morning.  Initial troponin was elevated at 655 with repeat at 740.  He is not currently having any chest tightness or other symptoms.  He denies any palpitations, dizziness, syncope, PND, orthopnea, fever, or chills.  He is taking Ozempic for weight loss and reports losing about 70 pounds to date.      Patient Active Problem List   Diagnosis    Class 3 severe obesity due to excess calories with serious comorbidity and body mass index (BMI) of 60.0 to 69.9 in adult    Hypertension    Gastroesophageal reflux disease without esophagitis    SHERRON (obstructive sleep apnea)    Traumatic rupture of distal biceps tendon, right, initial encounter       Past Medical History:   Diagnosis Date    COVID     GERD (gastroesophageal reflux disease)     Gout     Heartburn     Hypertension     Obesity      Past Surgical History:   Procedure Laterality Date    DISTAL BICEPS TENDON REPAIR Right 6/7/2022    Procedure: RIGHT DISTAL BICEPS TENDON REPAIR WITH SEMI-T ALLOGRAFT;  Surgeon: Benjamin العراقي MD;  Location: [facility];  Service: Orthopedics;  Laterality: Right;    ELBOW PROCEDURE      EYE SURGERY Right 1984       The following portions of the patient's history were reviewed and updated as appropriate: allergies, current medications, past family history, past medical history, past social history, past surgical history, and problem list.    Social History     Socioeconomic  "History    Marital status:    Tobacco Use    Smoking status: Never    Smokeless tobacco: Current     Types: Chew   Vaping Use    Vaping status: Never Used   Substance and Sexual Activity    Alcohol use: No    Drug use: Defer       Family History   Problem Relation Age of Onset    Diabetes Father     Hypertension Father     Heart disease Father        Review of Systems: A 12 system review of systems was completed and is negative except stated in HPI.     Objective   /95   Pulse 70   Temp 98.2 °F (36.8 °C)   Resp 22   Ht 195.6 cm (77\")   Wt (!) 204 kg (449 lb)   SpO2 100%   BMI 53.24 kg/m²     Physical Exam:  Constitutional:       Appearance: Well-developed. Morbidly obese.   Eyes:      Conjunctiva/sclera: Conjunctivae normal.      Pupils: Pupils are equal, round, and reactive to light.   HENT:      Head: Normocephalic and atraumatic.   Neck:      Thyroid: No thyromegaly.      Vascular: No JVD.   Pulmonary:      Effort: Pulmonary effort is normal.      Breath sounds: Normal breath sounds. No wheezing. No rales.   Cardiovascular:      Normal rate. Regular rhythm.      Murmurs: There is no murmur.   Edema:     Peripheral edema absent.   Abdominal:      General: Bowel sounds are normal. There is no distension.      Palpations: Abdomen is soft.      Tenderness: There is no abdominal tenderness.   Musculoskeletal: Normal range of motion.      Cervical back: Normal range of motion and neck supple. Skin:     General: Skin is warm and dry.      Findings: No rash.   Neurological:      Mental Status: Alert and oriented to person, place, and time.      Coordination: Coordination normal.   Psychiatric:         Behavior: Behavior normal.         Lab Results   Component Value Date    TROPONINT 740 (C) 05/31/2024     Lab Results   Component Value Date    GLUCOSE 101 (H) 05/31/2024    CALCIUM 9.1 05/31/2024     05/31/2024    K 4.2 05/31/2024    CO2 27.0 05/31/2024     05/31/2024    BUN 11 05/31/2024    " "CREATININE 0.87 05/31/2024    BCR 12.6 05/31/2024    ANIONGAP 8.0 05/31/2024     Lab Results   Component Value Date    WBC 7.75 05/31/2024    HGB 15.2 05/31/2024    HCT 46.9 05/31/2024    MCV 85.0 05/31/2024     05/31/2024     Lab Results   Component Value Date    CHOL 176 05/31/2024     Lab Results   Component Value Date    TRIG 89 05/31/2024     Lab Results   Component Value Date    HDL 38 (L) 05/31/2024     No components found for: \"LDLCALC\"  Lab Results   Component Value Date     (H) 05/31/2024           Assessment:  1.  Non-STEMI: Initial troponin 655 with repeat at 740.  Currently chest pain-free.  No acute EKG changes.  2.  Morbid obesity: Body mass index is 53.24 kg/m².   3.  Essential hypertension  4.  Mixed hyperlipidemia: Lipid panel today shows mildly elevated LDL with low HDL.  5.  Prediabetes  6.  Obstructive sleep apnea    Plan:  -Urgent cardiac catheterization today.  -Patient given aspirin and Lovenox in the emergency room.  -Start high intensity statin.  -Further management following heart catheterization.  "

## 2024-06-01 ENCOUNTER — APPOINTMENT (OUTPATIENT)
Dept: CARDIOLOGY | Facility: HOSPITAL | Age: 44
End: 2024-06-01
Payer: COMMERCIAL

## 2024-06-01 PROBLEM — E66.01 CLASS 3 SEVERE OBESITY DUE TO EXCESS CALORIES WITH SERIOUS COMORBIDITY AND BODY MASS INDEX (BMI) OF 50.0 TO 59.9 IN ADULT: Chronic | Status: ACTIVE | Noted: 2021-11-02

## 2024-06-01 PROBLEM — S46.211A: Status: RESOLVED | Noted: 2022-06-03 | Resolved: 2024-06-01

## 2024-06-01 PROBLEM — I25.700 CORONARY ARTERY DISEASE INVOLVING CORONARY BYPASS GRAFT OF NATIVE HEART WITH UNSTABLE ANGINA PECTORIS: Status: ACTIVE | Noted: 2024-06-01

## 2024-06-01 PROBLEM — E66.813 CLASS 3 SEVERE OBESITY DUE TO EXCESS CALORIES WITH SERIOUS COMORBIDITY AND BODY MASS INDEX (BMI) OF 50.0 TO 59.9 IN ADULT: Chronic | Status: ACTIVE | Noted: 2021-11-02

## 2024-06-01 PROBLEM — I25.110 CORONARY ARTERY DISEASE INVOLVING NATIVE CORONARY ARTERY OF NATIVE HEART WITH UNSTABLE ANGINA PECTORIS: Status: ACTIVE | Noted: 2024-06-01

## 2024-06-01 LAB
ANION GAP SERPL CALCULATED.3IONS-SCNC: 9 MMOL/L (ref 5–15)
BUN SERPL-MCNC: 13 MG/DL (ref 6–20)
BUN/CREAT SERPL: 15.3 (ref 7–25)
CALCIUM SPEC-SCNC: 8.9 MG/DL (ref 8.6–10.5)
CHLORIDE SERPL-SCNC: 104 MMOL/L (ref 98–107)
CO2 SERPL-SCNC: 26 MMOL/L (ref 22–29)
CREAT SERPL-MCNC: 0.85 MG/DL (ref 0.76–1.27)
DEPRECATED RDW RBC AUTO: 44.2 FL (ref 37–54)
EGFRCR SERPLBLD CKD-EPI 2021: 110.6 ML/MIN/1.73
ERYTHROCYTE [DISTWIDTH] IN BLOOD BY AUTOMATED COUNT: 13.7 % (ref 12.3–15.4)
GLUCOSE SERPL-MCNC: 97 MG/DL (ref 65–99)
HCT VFR BLD AUTO: 48.3 % (ref 37.5–51)
HGB BLD-MCNC: 15.3 G/DL (ref 13–17.7)
MCH RBC QN AUTO: 27.5 PG (ref 26.6–33)
MCHC RBC AUTO-ENTMCNC: 31.7 G/DL (ref 31.5–35.7)
MCV RBC AUTO: 86.9 FL (ref 79–97)
PLATELET # BLD AUTO: 259 10*3/MM3 (ref 140–450)
PMV BLD AUTO: 9.5 FL (ref 6–12)
POTASSIUM SERPL-SCNC: 4.4 MMOL/L (ref 3.5–5.2)
QT INTERVAL: 392 MS
QT INTERVAL: 394 MS
QTC INTERVAL: 422 MS
QTC INTERVAL: 429 MS
RBC # BLD AUTO: 5.56 10*6/MM3 (ref 4.14–5.8)
SODIUM SERPL-SCNC: 139 MMOL/L (ref 136–145)
WBC NRBC COR # BLD AUTO: 5.92 10*3/MM3 (ref 3.4–10.8)

## 2024-06-01 PROCEDURE — 99232 SBSQ HOSP IP/OBS MODERATE 35: CPT | Performed by: INTERNAL MEDICINE

## 2024-06-01 PROCEDURE — 93356 MYOCRD STRAIN IMG SPCKL TRCK: CPT

## 2024-06-01 PROCEDURE — 93306 TTE W/DOPPLER COMPLETE: CPT

## 2024-06-01 PROCEDURE — 80048 BASIC METABOLIC PNL TOTAL CA: CPT | Performed by: INTERNAL MEDICINE

## 2024-06-01 PROCEDURE — 93306 TTE W/DOPPLER COMPLETE: CPT | Performed by: INTERNAL MEDICINE

## 2024-06-01 PROCEDURE — 85027 COMPLETE CBC AUTOMATED: CPT | Performed by: INTERNAL MEDICINE

## 2024-06-01 PROCEDURE — 99223 1ST HOSP IP/OBS HIGH 75: CPT | Performed by: THORACIC SURGERY (CARDIOTHORACIC VASCULAR SURGERY)

## 2024-06-01 PROCEDURE — 25010000002 ENOXAPARIN PER 10 MG: Performed by: INTERNAL MEDICINE

## 2024-06-01 PROCEDURE — 93356 MYOCRD STRAIN IMG SPCKL TRCK: CPT | Performed by: INTERNAL MEDICINE

## 2024-06-01 RX ADMIN — CARVEDILOL 3.12 MG: 3.12 TABLET, FILM COATED ORAL at 17:51

## 2024-06-01 RX ADMIN — ENOXAPARIN SODIUM 150 MG: 150 INJECTION SUBCUTANEOUS at 09:05

## 2024-06-01 RX ADMIN — FAMOTIDINE 20 MG: 20 TABLET, FILM COATED ORAL at 09:04

## 2024-06-01 RX ADMIN — AMLODIPINE BESYLATE 5 MG: 5 TABLET ORAL at 09:04

## 2024-06-01 RX ADMIN — ASPIRIN 81 MG CHEWABLE TABLET 81 MG: 81 TABLET CHEWABLE at 09:04

## 2024-06-01 RX ADMIN — ENOXAPARIN SODIUM 150 MG: 150 INJECTION SUBCUTANEOUS at 20:29

## 2024-06-01 RX ADMIN — ATORVASTATIN CALCIUM 40 MG: 40 TABLET ORAL at 20:29

## 2024-06-01 RX ADMIN — LISINOPRIL 10 MG: 10 TABLET ORAL at 09:04

## 2024-06-01 RX ADMIN — CARVEDILOL 3.12 MG: 3.12 TABLET, FILM COATED ORAL at 09:04

## 2024-06-01 NOTE — PLAN OF CARE
Goal Outcome Evaluation:  Plan of Care Reviewed With: patient, spouse        Progress: no change  Outcome Evaluation: S 65-93 w/ PVCs.  PRN colchicine added for gout flare (home dose).  R radial cath site is soft, without hematoma.  Denies pain or feeling of pressure.  Wife at bedside, safety maintained.

## 2024-06-01 NOTE — CONSULTS
History and Physical   Cardiothoracic Surgeon     Chief Complaint   Patient presents with    Chest Pain         Subjective     History of Present Illness    Mr. Seals is a 44-year-old male with salient past medical history of prediabetes, hypertension, tobacco use, morbid obesity, sedentary lifestyle, previous COVID 19 infection, gout, and a family history of heart disease presents with chest pain for couple days prior to hospitalization.  Wife thought this was similar to his reflux disease symptoms he did have recurrent symptoms that ultimately he decided present to [facility] for further evaluation.  He did rule in for a non-STEMI.  Further workup identified multivessel coronary disease.  He is currently taking Ozempic for weight loss and reports losing 70 pounds thus far.  His last dose of Ozempic was taken last Tuesday night.  With the aforementioned I have been asked to evaluate for consideration of coronary artery bypass grafting.  He is chest pain-free.      Review of Systems   Constitutional:  Positive for activity change and fatigue. Negative for appetite change, chills, diaphoresis, fever and unexpected weight change.   HENT:  Negative for dental problem, hearing loss, nosebleeds, sore throat, trouble swallowing and voice change.    Eyes:  Negative for photophobia, redness and visual disturbance.   Respiratory:  Positive for shortness of breath. Negative for apnea, cough, chest tightness, wheezing and stridor.    Cardiovascular:  Positive for chest pain and leg swelling (Right greater than left). Negative for palpitations.   Gastrointestinal:  Negative for abdominal distention, abdominal pain, blood in stool, constipation, diarrhea, nausea and vomiting.   Endocrine: Negative for cold intolerance, heat intolerance, polyphagia and polyuria.   Genitourinary:  Negative for decreased urine volume, difficulty urinating, dysuria, flank pain, frequency, hematuria and urgency.   Musculoskeletal:  Negative for  arthralgias, back pain, gait problem, joint swelling, myalgias and neck pain.   Skin:  Negative for pallor, rash and wound.   Allergic/Immunologic: Negative for immunocompromised state.   Neurological:  Negative for dizziness, tremors, seizures, syncope, speech difficulty, weakness, light-headedness, numbness and headaches.   Hematological:  Does not bruise/bleed easily.   Psychiatric/Behavioral:  Negative for confusion, sleep disturbance and suicidal ideas. The patient is not nervous/anxious.           Past Medical History:   Diagnosis Date    COVID     GERD (gastroesophageal reflux disease)     Gout     Heartburn     Hypertension     Obesity      Past Surgical History:   Procedure Laterality Date    DISTAL BICEPS TENDON REPAIR Right 6/7/2022    Procedure: RIGHT DISTAL BICEPS TENDON REPAIR WITH SEMI-T ALLOGRAFT;  Surgeon: Benjamin العراقي MD;  Location: [facility];  Service: Orthopedics;  Laterality: Right;    ELBOW PROCEDURE      EYE SURGERY Right 1984     Family History   Problem Relation Age of Onset    Diabetes Father     Hypertension Father     Heart disease Father      Social History     Tobacco Use    Smoking status: Never    Smokeless tobacco: Current     Types: Chew   Vaping Use    Vaping status: Never Used   Substance Use Topics    Alcohol use: No    Drug use: Defer     Medications Prior to Admission   Medication Sig Dispense Refill Last Dose    amLODIPine-benazepril (LOTREL) 10-40 MG per capsule Take 1 capsule by mouth Daily.   5/30/2024    colchicine 0.6 MG tablet Take 1 tablet by mouth Daily As Needed (gout flare up).       Ozempic, 2 MG/DOSE, 8 MG/3ML solution pen-injector Inject 2 mg under the skin into the appropriate area as directed 1 (One) Time Per Week.   5/30/2024    diclofenac (VOLTAREN) 75 MG EC tablet Take 1 tablet by mouth 2 (Two) Times a Day As Needed.       raNITIdine HCl (ZANTAC PO) Take 1 tablet by mouth Daily As Needed.   More than a month     Allergies:  Patient has no known  allergies.    Objective      Vital Signs  Temp:  [97.5 °F (36.4 °C)-98.4 °F (36.9 °C)] 98 °F (36.7 °C)  Heart Rate:  [62-80] 62  Resp:  [15-79] 16  BP: ()/(53-89) 152/89    Physical Exam  Constitutional:       Appearance: He is well-developed. He is obese.   HENT:      Head: Normocephalic and atraumatic.   Eyes:      Pupils: Pupils are equal, round, and reactive to light.   Neck:      Thyroid: No thyromegaly.      Vascular: No JVD.      Trachea: No tracheal deviation.   Cardiovascular:      Rate and Rhythm: Normal rate and regular rhythm.      Heart sounds: Normal heart sounds. No murmur heard.     No friction rub. No gallop.   Pulmonary:      Effort: Pulmonary effort is normal. No respiratory distress.      Breath sounds: Normal breath sounds. No wheezing or rales.   Chest:      Chest wall: No tenderness.   Abdominal:      General: There is no distension.      Palpations: Abdomen is soft.      Tenderness: There is no abdominal tenderness.   Musculoskeletal:         General: Normal range of motion.      Cervical back: Normal range of motion and neck supple.      Right lower leg: Edema present.      Left lower leg: Edema present.   Lymphadenopathy:      Cervical: No cervical adenopathy.   Skin:     General: Skin is warm and dry.   Neurological:      Mental Status: He is alert and oriented to person, place, and time.      Cranial Nerves: No cranial nerve deficit.         Results Review:   Cardiac Catheterization/Vascular Study    Result Date: 5/31/2024  Narrative: Date: 5/31/2024  Procedures: 1. Selective coronary angiography 2. Monitoring of conscious sedation  Indication: NSTEMI  Attending: Chip Roberts  Risks, benefits, and alternatives discussed with the patient and/or family.  Plan is for moderate sedation, and the patient agrees to proceed with the procedure.  An immediate assessment was done prior to the administration of moderate sedation. Procedure Description: The patient was brought to the cardiac  catheterization lab in a fasting state. Informed consent was obtained after explaining the risks, benefits, alternatives of the procedure. The patient was sterilely prepped and draped for right radial artery access usual fashion by the cath lab staff. Time out was taken to confirm the correct patient, site, procedure. The area over the right radial artery was anesthetized with 1% lidocaine. A micropuncture needle was used to puncture the right radial artery resulting bright red pulsatile arterial blood flow. Following this a 5 Stateless glide sheath was placed and the sheath was aspirated and flushed. Next, a 5 Malay Zoya diagnostic catheter was advanced under fluoroscopic guidance into the ascending aorta and selectively engaged into the right coronary artery.  A cineogram was obtained via power injection of contrast in [place] projection.  The catheter was unable to engage the left main coronary artery and was exchanged for a 5 Stateless JL 3.5 diagnostic coronary catheter.  This was selectively engaged into the left main coronary artery under fluoroscopic guidance.  Multiple cineograms were obtained via power injection of contrast in multiple orthogonal views.  The catheter was then removed and the sheath was aspirated and flushed.  A TR band was then placed and adequate hemostasis was obtained.  The patient was then transferred to cardiac observation unit in stable condition.  There is no early complications noted.  I supervised the administration of conscious sedation by nursing staff throughout the case.  First dose was given at 15:02 and the end of my face-to-face encounter was at 15:23, accounting for a total of 21 minutes of supervision.  During the case, continuous pulse oximetry, heart rate, blood pressure, and patient status were monitored. Total contrast: 98 ml Fluoro time: 4 minutes Radiation: 395 mGy Estimated Blood Loss: < 20 ml Specimens: None Complications: None  Findings: Selective coronary angiography:  1. Left main: The left main bifurcates into the LAD and left circumflex arteries.  There is mild distal narrowing. 2. Left anterior descending artery: The LAD runs in the interventricular groove giving off 1 significant bifurcating diagonal and multiple septal perforators before wrapping around the apex as an apical recurrent branch.  There is a severe 70 to 80% stenosis in the proximal vessel. 3. Left circumflex artery: The left circumflex is nondominant for posterior circulation.  It gives off 1 small proximal obtuse marginal branch and a second moderate-sized distal branch.  Obtuse marginal branches.  There is a moderate to severe 60 to 70% stenosis in the proximal portion of the larger OM. 4. Right coronary artery: The RCA is dominant for posterior circulation giving rise to PDA and right PL branches.  The RCA is chronically totally occluded proximally.  The PDA and right PL branches fill via left to right collaterals.  Impression: 1.  Severe multi-vessel coronary artery disease as described above.  Plan: -CT surgery consult for evaluation of CABG. -Admit to telemetry. -Check echo -Optimize medical therapy -Routine post-cath care and TR band removal. Chip Roberts MD    XR Chest 1 View    Result Date: 5/31/2024  Narrative: EXAM: XR CHEST 1 VW-  DATE: 5/31/2024 7:13 AM  HISTORY: Chest Pain Protocol   COMPARISON: None available.  TECHNIQUE:  Frontal view(s) of the chest submitted.  FINDINGS:  No pneumothorax, pleural effusion or focal consolidation. Cardiac mediastinal silhouette within normal limits. No acute bony finding.       Impression: 1. No acute cardiopulmonary findings.  This report was signed and finalized on 5/31/2024 8:25 AM by Kehinde Rios.            I reviewed the patient's new clinical results.  Discussed with patient and family(wife, mother and father)      Assessment & Plan       NSTEMI (non-ST elevated myocardial infarction)    Class 3 severe obesity due to excess calories with serious  comorbidity and body mass index (BMI) of 50.0 to 59.9 in adult    Primary hypertension    Gastroesophageal reflux disease without esophagitis    SHERRON (obstructive sleep apnea)    Coronary artery disease involving native coronary artery of native heart with unstable angina pectoris  Prediabetes  Use of Ozempic      I discussed the patients findings and my recommendations with patient and family.  We discussed the options for treatment of coronary artery disease to include medical therapy, coronary stenting, and surgical revascularization.  We discussed the pros and cons of each option and how it pertains to the current case.  The STS Risk score was calculated using the STS Risk Calculator and discussed with the patient with available data.  Coronary artery bypass grafting is best option given patient's findings and risk factors.  We discussed the operative conduct and expected hospital and outpatient recovery.  Risks were discussed to include but not limited to bleeding, infection, stroke, heart attack, need for additional procedures, anesthesia risk, organ dysfunction and/or failure, prolonged mechanical ventilation, prolonged ICU stay, chronic pain syndromes, sternal nonunion, and/or death.  It is acknowledged that he has significant morbid obesity with a BMI of 53 and his weight we will have anticipated difficulty providing potentially full support while on cardiopulmonary bypass which may result in increased morbidity and mortality.  In the setting of an MI this only further increases his risk.  While he will be considered a moderate to high risk candidate for surgical revascularization given his age, high function, previous set of efforts to improve his overall wellbeing the benefits of CABG in a prediabetic outweigh anticipated risks.    We discussed the need to utilize all medical treatments additionally prescribed post surgery.       We discussed that he will require his beard to be trimmed given the length it  exists at this time he is agreeable to do so  The patient and family understands the risks, benefits, and alternatives and he wishes to proceed forward with surgery.    I favor CABG with sternal plating/Prevena application end of next week.  As he did use Ozempic on Tuesday evening it is recommended to prevent time for washout of Ozempic.  1 to 2 weeks is the current consideration of time.  As he remains chest pain-free we will shoot for a timeline between 1 to 2 weeks from last use of Ozempic    Sean Trejo MD  06/01/24  14:01 CDT

## 2024-06-01 NOTE — H&P (VIEW-ONLY)
History and Physical   Cardiothoracic Surgeon     Chief Complaint   Patient presents with    Chest Pain         Subjective     History of Present Illness    Mr. Seals is a 44-year-old male with salient past medical history of prediabetes, hypertension, tobacco use, morbid obesity, sedentary lifestyle, previous COVID 19 infection, gout, and a family history of heart disease presents with chest pain for couple days prior to hospitalization.  Wife thought this was similar to his reflux disease symptoms he did have recurrent symptoms that ultimately he decided present to [facility] for further evaluation.  He did rule in for a non-STEMI.  Further workup identified multivessel coronary disease.  He is currently taking Ozempic for weight loss and reports losing 70 pounds thus far.  His last dose of Ozempic was taken last Tuesday night.  With the aforementioned I have been asked to evaluate for consideration of coronary artery bypass grafting.  He is chest pain-free.      Review of Systems   Constitutional:  Positive for activity change and fatigue. Negative for appetite change, chills, diaphoresis, fever and unexpected weight change.   HENT:  Negative for dental problem, hearing loss, nosebleeds, sore throat, trouble swallowing and voice change.    Eyes:  Negative for photophobia, redness and visual disturbance.   Respiratory:  Positive for shortness of breath. Negative for apnea, cough, chest tightness, wheezing and stridor.    Cardiovascular:  Positive for chest pain and leg swelling (Right greater than left). Negative for palpitations.   Gastrointestinal:  Negative for abdominal distention, abdominal pain, blood in stool, constipation, diarrhea, nausea and vomiting.   Endocrine: Negative for cold intolerance, heat intolerance, polyphagia and polyuria.   Genitourinary:  Negative for decreased urine volume, difficulty urinating, dysuria, flank pain, frequency, hematuria and urgency.   Musculoskeletal:  Negative for  arthralgias, back pain, gait problem, joint swelling, myalgias and neck pain.   Skin:  Negative for pallor, rash and wound.   Allergic/Immunologic: Negative for immunocompromised state.   Neurological:  Negative for dizziness, tremors, seizures, syncope, speech difficulty, weakness, light-headedness, numbness and headaches.   Hematological:  Does not bruise/bleed easily.   Psychiatric/Behavioral:  Negative for confusion, sleep disturbance and suicidal ideas. The patient is not nervous/anxious.           Past Medical History:   Diagnosis Date    COVID     GERD (gastroesophageal reflux disease)     Gout     Heartburn     Hypertension     Obesity      Past Surgical History:   Procedure Laterality Date    DISTAL BICEPS TENDON REPAIR Right 6/7/2022    Procedure: RIGHT DISTAL BICEPS TENDON REPAIR WITH SEMI-T ALLOGRAFT;  Surgeon: Benjamin العراقي MD;  Location: [facility];  Service: Orthopedics;  Laterality: Right;    ELBOW PROCEDURE      EYE SURGERY Right 1984     Family History   Problem Relation Age of Onset    Diabetes Father     Hypertension Father     Heart disease Father      Social History     Tobacco Use    Smoking status: Never    Smokeless tobacco: Current     Types: Chew   Vaping Use    Vaping status: Never Used   Substance Use Topics    Alcohol use: No    Drug use: Defer     Medications Prior to Admission   Medication Sig Dispense Refill Last Dose    amLODIPine-benazepril (LOTREL) 10-40 MG per capsule Take 1 capsule by mouth Daily.   5/30/2024    colchicine 0.6 MG tablet Take 1 tablet by mouth Daily As Needed (gout flare up).       Ozempic, 2 MG/DOSE, 8 MG/3ML solution pen-injector Inject 2 mg under the skin into the appropriate area as directed 1 (One) Time Per Week.   5/30/2024    diclofenac (VOLTAREN) 75 MG EC tablet Take 1 tablet by mouth 2 (Two) Times a Day As Needed.       raNITIdine HCl (ZANTAC PO) Take 1 tablet by mouth Daily As Needed.   More than a month     Allergies:  Patient has no known  allergies.    Objective      Vital Signs  Temp:  [97.5 °F (36.4 °C)-98.4 °F (36.9 °C)] 98 °F (36.7 °C)  Heart Rate:  [62-80] 62  Resp:  [15-79] 16  BP: ()/(53-89) 152/89    Physical Exam  Constitutional:       Appearance: He is well-developed. He is obese.   HENT:      Head: Normocephalic and atraumatic.   Eyes:      Pupils: Pupils are equal, round, and reactive to light.   Neck:      Thyroid: No thyromegaly.      Vascular: No JVD.      Trachea: No tracheal deviation.   Cardiovascular:      Rate and Rhythm: Normal rate and regular rhythm.      Heart sounds: Normal heart sounds. No murmur heard.     No friction rub. No gallop.   Pulmonary:      Effort: Pulmonary effort is normal. No respiratory distress.      Breath sounds: Normal breath sounds. No wheezing or rales.   Chest:      Chest wall: No tenderness.   Abdominal:      General: There is no distension.      Palpations: Abdomen is soft.      Tenderness: There is no abdominal tenderness.   Musculoskeletal:         General: Normal range of motion.      Cervical back: Normal range of motion and neck supple.      Right lower leg: Edema present.      Left lower leg: Edema present.   Lymphadenopathy:      Cervical: No cervical adenopathy.   Skin:     General: Skin is warm and dry.   Neurological:      Mental Status: He is alert and oriented to person, place, and time.      Cranial Nerves: No cranial nerve deficit.         Results Review:   Cardiac Catheterization/Vascular Study    Result Date: 5/31/2024  Narrative: Date: 5/31/2024  Procedures: 1. Selective coronary angiography 2. Monitoring of conscious sedation  Indication: NSTEMI  Attending: Chip Roberts  Risks, benefits, and alternatives discussed with the patient and/or family.  Plan is for moderate sedation, and the patient agrees to proceed with the procedure.  An immediate assessment was done prior to the administration of moderate sedation. Procedure Description: The patient was brought to the cardiac  catheterization lab in a fasting state. Informed consent was obtained after explaining the risks, benefits, alternatives of the procedure. The patient was sterilely prepped and draped for right radial artery access usual fashion by the cath lab staff. Time out was taken to confirm the correct patient, site, procedure. The area over the right radial artery was anesthetized with 1% lidocaine. A micropuncture needle was used to puncture the right radial artery resulting bright red pulsatile arterial blood flow. Following this a 5 Tajik glide sheath was placed and the sheath was aspirated and flushed. Next, a 5 Irish Zoya diagnostic catheter was advanced under fluoroscopic guidance into the ascending aorta and selectively engaged into the right coronary artery.  A cineogram was obtained via power injection of contrast in Hebrew projection.  The catheter was unable to engage the left main coronary artery and was exchanged for a 5 Tajik JL 3.5 diagnostic coronary catheter.  This was selectively engaged into the left main coronary artery under fluoroscopic guidance.  Multiple cineograms were obtained via power injection of contrast in multiple orthogonal views.  The catheter was then removed and the sheath was aspirated and flushed.  A TR band was then placed and adequate hemostasis was obtained.  The patient was then transferred to cardiac observation unit in stable condition.  There is no early complications noted.  I supervised the administration of conscious sedation by nursing staff throughout the case.  First dose was given at 15:02 and the end of my face-to-face encounter was at 15:23, accounting for a total of 21 minutes of supervision.  During the case, continuous pulse oximetry, heart rate, blood pressure, and patient status were monitored. Total contrast: 98 ml Fluoro time: 4 minutes Radiation: 395 mGy Estimated Blood Loss: < 20 ml Specimens: None Complications: None  Findings: Selective coronary angiography:  1. Left main: The left main bifurcates into the LAD and left circumflex arteries.  There is mild distal narrowing. 2. Left anterior descending artery: The LAD runs in the interventricular groove giving off 1 significant bifurcating diagonal and multiple septal perforators before wrapping around the apex as an apical recurrent branch.  There is a severe 70 to 80% stenosis in the proximal vessel. 3. Left circumflex artery: The left circumflex is nondominant for posterior circulation.  It gives off 1 small proximal obtuse marginal branch and a second moderate-sized distal branch.  Obtuse marginal branches.  There is a moderate to severe 60 to 70% stenosis in the proximal portion of the larger OM. 4. Right coronary artery: The RCA is dominant for posterior circulation giving rise to PDA and right PL branches.  The RCA is chronically totally occluded proximally.  The PDA and right PL branches fill via left to right collaterals.  Impression: 1.  Severe multi-vessel coronary artery disease as described above.  Plan: -CT surgery consult for evaluation of CABG. -Admit to telemetry. -Check echo -Optimize medical therapy -Routine post-cath care and TR band removal. Chip Roberts MD    XR Chest 1 View    Result Date: 5/31/2024  Narrative: EXAM: XR CHEST 1 VW-  DATE: 5/31/2024 7:13 AM  HISTORY: Chest Pain Protocol   COMPARISON: None available.  TECHNIQUE:  Frontal view(s) of the chest submitted.  FINDINGS:  No pneumothorax, pleural effusion or focal consolidation. Cardiac mediastinal silhouette within normal limits. No acute bony finding.       Impression: 1. No acute cardiopulmonary findings.  This report was signed and finalized on 5/31/2024 8:25 AM by Kehinde Rios.            I reviewed the patient's new clinical results.  Discussed with patient and family(wife, mother and father)      Assessment & Plan       NSTEMI (non-ST elevated myocardial infarction)    Class 3 severe obesity due to excess calories with serious  comorbidity and body mass index (BMI) of 50.0 to 59.9 in adult    Primary hypertension    Gastroesophageal reflux disease without esophagitis    SHERRON (obstructive sleep apnea)    Coronary artery disease involving native coronary artery of native heart with unstable angina pectoris  Prediabetes  Use of Ozempic      I discussed the patients findings and my recommendations with patient and family.  We discussed the options for treatment of coronary artery disease to include medical therapy, coronary stenting, and surgical revascularization.  We discussed the pros and cons of each option and how it pertains to the current case.  The STS Risk score was calculated using the STS Risk Calculator and discussed with the patient with available data.  Coronary artery bypass grafting is best option given patient's findings and risk factors.  We discussed the operative conduct and expected hospital and outpatient recovery.  Risks were discussed to include but not limited to bleeding, infection, stroke, heart attack, need for additional procedures, anesthesia risk, organ dysfunction and/or failure, prolonged mechanical ventilation, prolonged ICU stay, chronic pain syndromes, sternal nonunion, and/or death.  It is acknowledged that he has significant morbid obesity with a BMI of 53 and his weight we will have anticipated difficulty providing potentially full support while on cardiopulmonary bypass which may result in increased morbidity and mortality.  In the setting of an MI this only further increases his risk.  While he will be considered a moderate to high risk candidate for surgical revascularization given his age, high function, previous set of efforts to improve his overall wellbeing the benefits of CABG in a prediabetic outweigh anticipated risks.    We discussed the need to utilize all medical treatments additionally prescribed post surgery.       We discussed that he will require his beard to be trimmed given the length it  exists at this time he is agreeable to do so  The patient and family understands the risks, benefits, and alternatives and he wishes to proceed forward with surgery.    I favor CABG with sternal plating/Prevena application end of next week.  As he did use Ozempic on Tuesday evening it is recommended to prevent time for washout of Ozempic.  1 to 2 weeks is the current consideration of time.  As he remains chest pain-free we will shoot for a timeline between 1 to 2 weeks from last use of Ozempic    Sean Trejo MD  06/01/24  14:01 CDT

## 2024-06-01 NOTE — PROGRESS NOTES
"Pharmacy Dosing Service  Anticoagulant  Enoxaparin    Assessment/Action/Plan:  Continues on Lovenox 0.7 mg/kg q 12hrs for ACS.  Dose adjusted due to BMI >50. Ordered Anti-Factor Xa level, due at 1300 6/2/24.  Labs reviewed.  Renal function reviewed.  Continue as ordered.     Subjective:  Constantin Seals is a 44 y.o. male on Enoxaparin 0.7 mg SQ every 12 hours for indication of ACS.  Objective:  [Ht: 195.6 cm (77\"); Wt: (!) 204 kg (449 lb); BMI: Body mass index is 53.24 kg/m².]  Estimated Creatinine Clearance: 214 mL/min (by C-G formula based on SCr of 0.85 mg/dL). No results found for: \"DDIMER\" No results found for: \"INR\", \"PROTIME\"   Lab Results   Component Value Date    HGB 15.3 06/01/2024    HGB 15.2 05/31/2024    HGB 13.9 06/06/2022      Lab Results   Component Value Date     06/01/2024     05/31/2024     06/06/2022       RADHA Leonard [facility]  06/01/24 12:22 CDT     "

## 2024-06-01 NOTE — PROGRESS NOTES
"[facility] -  Progress Note     LOS: 1 day   Patient Care Team:  Giovani Lujan DO as PCP - General (Internal Medicine)    Chief Complaint: F/U CAD    Subjective   Resting in bed, family at bedside. Denies any chest pain or shortness of breath. \"I feel like I can go and cut down a tree.\"      REVIEW OF SYSTEMS:  Constitutional: Denies fever or chills. Denies change in energy level or malaise.  HEENT: Denies headaches or visual disturbances. Denies difficulty swallowing or sore throat.  Respiratory: Denies cough or hoarseness. Denies shortness of breath.   Cardiovascular: Denies chest pain or pressure. Denies palpitations. Denies presyncope/syncope. Denies orthopnea/PND. Denies lower extremity edema.   Gastrointestinal: Denies abdominal pain. Denies nausea/vomiting. Denies change in bowel habits or history of recent GI tract blood loss.   Genitourinary: Denies urinary urgency or frequency. Denies dysuria, hematuria.  Musculoskeletal: Denies pain or swelling in joints.  Neurological: Denies paresthesias. Denies headache. Denies seizure or stroke symptoms.  Behavioral/Psych: Denies problems with anxiety or depression.    All other systems are negative except where stated above.      Objective     Vital Sign Min/Max for last 24 hours  Temp  Min: 97.5 °F (36.4 °C)  Max: 98.4 °F (36.9 °C)   BP  Min: 97/53  Max: 148/77   Pulse  Min: 65  Max: 97   Resp  Min: 15  Max: 79   SpO2  Min: 96 %  Max: 100 %   No data recorded   No data recorded         05/31/24  0752   Weight: (!) 204 kg (449 lb)         Intake/Output Summary (Last 24 hours) at 6/1/2024 0967  Last data filed at 6/1/2024 0856  Gross per 24 hour   Intake 1560 ml   Output 450 ml   Net 1110 ml         Physical Exam:    General Appearance:  Alert, cooperative, in no acute distress. Morbidly obese.   Head:  Normocephalic. Atraumatic. DAVID.   Lungs:  Clear to auscultation, respirations regular, even and unlabored    Heart:  Regular rhythm and " normal rate, normal S1 and S2, no murmur, no gallop, no rub, no click   Abdomen:  Normal bowel sounds, obese, soft, non-tender, non-distended   Extremities:  Moves all extremities, no edema, no cyanosis, no redness   Pulses:  Pulses palpable and equal bilaterally   Skin:  No bleeding, bruising or rash. Right radial puncture site without evidence of bleeding or hematoma. Strong radial pulse. Strong hand .    Neurologic:  Grossly intact         Results Review:   Lab Results (last 72 hours)       Procedure Component Value Units Date/Time    Basic Metabolic Panel [[id number]]  (Normal) Collected: 06/01/24 0343    Specimen: Blood Updated: 06/01/24 0526     Glucose 97 mg/dL      BUN 13 mg/dL      Creatinine 0.85 mg/dL      Sodium 139 mmol/L      Potassium 4.4 mmol/L      Chloride 104 mmol/L      CO2 26.0 mmol/L      Calcium 8.9 mg/dL      BUN/Creatinine Ratio 15.3     Anion Gap 9.0 mmol/L      eGFR 110.6 mL/min/1.73     Narrative:      GFR Normal >60  Chronic Kidney Disease <60  Kidney Failure <15      CBC (No Diff) [[id number]]  (Normal) Collected: 06/01/24 0343    Specimen: Blood Updated: 06/01/24 0504     WBC 5.92 10*3/mm3      RBC 5.56 10*6/mm3      Hemoglobin 15.3 g/dL      Hematocrit 48.3 %      MCV 86.9 fL      MCH 27.5 pg      MCHC 31.7 g/dL      RDW 13.7 %      RDW-SD 44.2 fl      MPV 9.5 fL      Platelets 259 10*3/mm3     Hemoglobin A1c [[id number]]  (Normal) Collected: 05/31/24 0814    Specimen: Blood Updated: 05/31/24 1116     Hemoglobin A1C 5.60 %     Narrative:      Hemoglobin A1C Ranges:    Increased Risk for Diabetes  5.7% to 6.4%  Diabetes                     >= 6.5%  Diabetic Goal                < 7.0%    Lipid Panel [[id number]]  (Abnormal) Collected: 05/31/24 1014    Specimen: Blood from Arm, Right Updated: 05/31/24 1116     Total Cholesterol 176 mg/dL      Triglycerides 89 mg/dL      HDL Cholesterol 38 mg/dL      LDL Cholesterol  121 mg/dL      VLDL Cholesterol 17 mg/dL      LDL/HDL Ratio 3.16     Narrative:      Cholesterol Reference Ranges  (U.S. Department of Health and Human Services ATP III Classifications)    Desirable          <200 mg/dL  Borderline High    200-239 mg/dL  High Risk          >240 mg/dL      Triglyceride Reference Ranges  (U.S. Department of Health and Human Services ATP III Classifications)    Normal           <150 mg/dL  Borderline High  150-199 mg/dL  High             200-499 mg/dL  Very High        >500 mg/dL    HDL Reference Ranges  (U.S. Department of Health and Human Services ATP III Classifications)    Low     <40 mg/dl (major risk factor for CHD)  High    >60 mg/dl ('negative' risk factor for CHD)        LDL Reference Ranges  (U.S. Department of Health and Human Services ATP III Classifications)    Optimal          <100 mg/dL  Near Optimal     100-129 mg/dL  Borderline High  130-159 mg/dL  High             160-189 mg/dL  Very High        >189 mg/dL    High Sensitivity Troponin T 2Hr [[id number]]  (Abnormal) Collected: 05/31/24 1014    Specimen: Blood from Arm, Right Updated: 05/31/24 1046     HS Troponin T 740 ng/L      Troponin T Delta 85 ng/L     Narrative:      High Sensitive Troponin T Reference Range:  <14.0 ng/L- Negative Female for AMI  <22.0 ng/L- Negative Male for AMI  >=14 - Abnormal Female indicating possible myocardial injury.  >=22 - Abnormal Male indicating possible myocardial injury.   Clinicians would have to utilize clinical acumen, EKG, Troponin, and serial changes to determine if it is an Acute Myocardial Infarction or myocardial injury due to an underlying chronic condition.         High Sensitivity Troponin T [[id number]]  (Abnormal) Collected: 05/31/24 0814    Specimen: Blood Updated: 05/31/24 0845     HS Troponin T 655 ng/L     Narrative:      High Sensitive Troponin T Reference Range:  <14.0 ng/L- Negative Female for AMI  <22.0 ng/L- Negative Male for AMI  >=14 - Abnormal Female indicating possible myocardial injury.  >=22 - Abnormal Male indicating  possible myocardial injury.   Clinicians would have to utilize clinical acumen, EKG, Troponin, and serial changes to determine if it is an Acute Myocardial Infarction or myocardial injury due to an underlying chronic condition.         Comprehensive Metabolic Panel [[id number]]  (Abnormal) Collected: 05/31/24 0814    Specimen: Blood Updated: 05/31/24 0845     Glucose 101 mg/dL      BUN 11 mg/dL      Creatinine 0.87 mg/dL      Sodium 139 mmol/L      Potassium 4.2 mmol/L      Comment: Slight hemolysis detected by analyzer. Result may be falsely elevated.        Chloride 104 mmol/L      CO2 27.0 mmol/L      Calcium 9.1 mg/dL      Total Protein 7.0 g/dL      Albumin 4.0 g/dL      ALT (SGPT) 37 U/L      AST (SGOT) 50 U/L      Alkaline Phosphatase 79 U/L      Total Bilirubin 0.4 mg/dL      Globulin 3.0 gm/dL      A/G Ratio 1.3 g/dL      BUN/Creatinine Ratio 12.6     Anion Gap 8.0 mmol/L      eGFR 110.5 mL/min/1.73     Narrative:      GFR Normal >60  Chronic Kidney Disease <60  Kidney Failure <15      D-dimer, Quantitative [[id number]]  (Abnormal) Collected: 05/31/24 0814    Specimen: Blood Updated: 05/31/24 0844     D-Dimer, Quantitative 0.63 MCGFEU/mL     Narrative:      According to the assay 's published package insert, a normal (<0.50 MCGFEU/mL) D-dimer result in conjunction with a non-high clinical probability assessment, excludes deep vein thrombosis (DVT) and pulmonary embolism (PE) with high sensitivity.    D-dimer values increase with age and this can make VTE exclusion of an older population difficult. To address this, the [employer], based on best available evidence and recent guidelines, recommends that clinicians use age-adjusted D-dimer thresholds in patients greater than 50 years of age with: a) a low probability of PE who do not meet all Pulmonary Embolism Rule Out Criteria, or b) in those with intermediate probability of PE.   The formula for an age-adjusted D-dimer cut-off  "is \"age/100\".  For example, a 60 year old patient would have an age-adjusted cut-off of 0.60 MCGFEU/mL and an 80 year old 0.80 MCGFEU/mL.    BNP [[id number]]  (Normal) Collected: 05/31/24 0814    Specimen: Blood Updated: 05/31/24 0842     proBNP 378.0 pg/mL     Narrative:      This assay is used as an aid in the diagnosis of individuals suspected of having heart failure. It can be used as an aid in the diagnosis of acute decompensated heart failure (ADHF) in patients presenting with signs and symptoms of ADHF to the emergency department (ED). In addition, NT-proBNP of <300 pg/mL indicates ADHF is not likely.    Age Range Result Interpretation  NT-proBNP Concentration (pg/mL:      <50             Positive            >450                   Gray                 300-450                    Negative             <300    50-75           Positive            >900                  Gray                300-900                  Negative            <300      >75             Positive            >1800                  Gray                300-1800                  Negative            <300    [place] Draw [[id number]] Collected: 05/31/24 0814    Specimen: Blood Updated: 05/31/24 0830    Narrative:      The following orders were created for panel order [place] Draw.  Procedure                               Abnormality         Status                     ---------                               -----------         ------                     Green Top (Gel)[[id number]]                                  Final result               Lavender Top[[id number]]                                     Final result               Red Top[[id number]]                                          Final result               Light Blue Top[[id number]]                                   Final result                 Please view results for these tests on the individual orders.    Green Top (Gel) [[id number]] Collected: 05/31/24 0814    Specimen: Blood Updated: 05/31/24 0830 "     Extra Tube Hold for add-ons.     Comment: Auto resulted.       Lavender Top [[id number]] Collected: 05/31/24 0814    Specimen: Blood Updated: 05/31/24 0830     Extra Tube hold for add-on     Comment: Auto resulted       Red Top [[id number]] Collected: 05/31/24 0814    Specimen: Blood Updated: 05/31/24 0830     Extra Tube Hold for add-ons.     Comment: Auto resulted.       Light Blue Top [[id number]] Collected: 05/31/24 0814    Specimen: Blood Updated: 05/31/24 0830     Extra Tube Hold for add-ons.     Comment: Auto resulted       CBC & Differential [[id number]]  (Normal) Collected: 05/31/24 0814    Specimen: Blood Updated: 05/31/24 0825    Narrative:      The following orders were created for panel order CBC & Differential.  Procedure                               Abnormality         Status                     ---------                               -----------         ------                     CBC Auto Differential[[id number]]        Normal              Final result                 Please view results for these tests on the individual orders.    CBC Auto Differential [[id number]]  (Normal) Collected: 05/31/24 0814    Specimen: Blood Updated: 05/31/24 0825     WBC 7.75 10*3/mm3      RBC 5.52 10*6/mm3      Hemoglobin 15.2 g/dL      Hematocrit 46.9 %      MCV 85.0 fL      MCH 27.5 pg      MCHC 32.4 g/dL      RDW 13.7 %      RDW-SD 42.7 fl      MPV 9.2 fL      Platelets 280 10*3/mm3      Neutrophil % 63.3 %      Lymphocyte % 24.1 %      Monocyte % 9.2 %      Eosinophil % 2.1 %      Basophil % 0.9 %      Immature Grans % 0.4 %      Neutrophils, Absolute 4.91 10*3/mm3      Lymphocytes, Absolute 1.87 10*3/mm3      Monocytes, Absolute 0.71 10*3/mm3      Eosinophils, Absolute 0.16 10*3/mm3      Basophils, Absolute 0.07 10*3/mm3      Immature Grans, Absolute 0.03 10*3/mm3      nRBC 0.0 /100 WBC                Medication Review: yes  Current Facility-Administered Medications   Medication Dose Route Frequency Provider Last Rate  Last Admin    acetaminophen (TYLENOL) tablet 650 mg  650 mg Oral Q4H PRN Chip Roberts MD        aluminum-magnesium hydroxide-simethicone (MAALOX MAX) 400-400-40 MG/5ML suspension 15 mL  15 mL Oral Q6H PRN Chip Roberts MD        amLODIPine (NORVASC) tablet 5 mg  5 mg Oral Q24H Chip Roberts MD   5 mg at 06/01/24 0904    And    lisinopril (PRINIVIL,ZESTRIL) tablet 10 mg  10 mg Oral Q24H Chip Roberts MD   10 mg at 06/01/24 0904    aspirin chewable tablet 81 mg  81 mg Oral Daily Chip Roberts MD   81 mg at 06/01/24 0904    atorvastatin (LIPITOR) tablet 40 mg  40 mg Oral Nightly Chip Roberts MD   40 mg at 05/31/24 2022    carvedilol (COREG) tablet 3.125 mg  3.125 mg Oral BID With Meals Chip Roberts MD   3.125 mg at 06/01/24 0904    colchicine tablet 0.6 mg  0.6 mg Oral Daily PRN Chip Roberts MD   0.6 mg at 05/31/24 2340    Enoxaparin Sodium (LOVENOX) syringe 150 mg  0.7 mg/kg Subcutaneous Q12H Chip Roberts MD   150 mg at 06/01/24 0905    famotidine (PEPCID) tablet 20 mg  20 mg Oral Daily Chip Roberts MD   20 mg at 06/01/24 0904    magnesium hydroxide (MILK OF MAGNESIA) suspension 10 mL  10 mL Oral Daily PRN Chip Roberts MD        nicotine polacrilex (NICORETTE) gum 2 mg  2 mg Mouth/Throat Q1H PRN Chip Roberts MD        nitroglycerin (NITROSTAT) SL tablet 0.4 mg  0.4 mg Sublingual Q5 Min PRN Chip Roberts MD        ondansetron ODT (ZOFRAN-ODT) disintegrating tablet 4 mg  4 mg Translingual Q8H PRN Chip Roberts MD        sodium chloride 0.9 % flush 10 mL  10 mL Intravenous PRN Chip Roberts MD        temazepam (RESTORIL) capsule 15 mg  15 mg Oral Nightly PRN Chip Roberts MD             Assessment & Plan       NSTEMI (non-ST elevated myocardial infarction) associated with coronary artery disease - CT surgery consulted. Currently on ASA 81 mg daily, carvedilol 3.125 mg BID, Lotrel 5-10 mg daily, and atorvastatin 40 mg nightly. Patient is pain free. Await CT surgery  recommendations.     Primary hypertension - controlled with carvedilol and Lotrel. Continue to monitor.     Mixed hyperlipidemia - on high-intensity statin    Gastroesophageal reflux disease without esophagitis    Class 3 severe obesity due to excess calories with serious comorbidity and body mass index (BMI) of 50.0 to 59.9 in adult    SHERRON (obstructive sleep apnea)      Patient is stable from cardiology standpoint. Awaiting CT surgery evaluation and recommendations.       KURT Welsh  06/01/24  09:26 CDT

## 2024-06-02 LAB
ASCENDING AORTA: 3.6 CM
BH CV ECHO LEFT VENTRICLE GLOBAL LONGITUDINAL STRAIN: -16.7 %
BH CV ECHO MEAS - AO MAX PG: 6 MMHG
BH CV ECHO MEAS - AO MEAN PG: 3.6 MMHG
BH CV ECHO MEAS - AO ROOT DIAM: 3.7 CM
BH CV ECHO MEAS - AO V2 MAX: 122.4 CM/SEC
BH CV ECHO MEAS - AO V2 VTI: 23.4 CM
BH CV ECHO MEAS - AVA(I,D): 4.1 CM2
BH CV ECHO MEAS - EDV(CUBED): 144.7 ML
BH CV ECHO MEAS - EDV(MOD-SP2): 135.6 ML
BH CV ECHO MEAS - EDV(MOD-SP4): 169.1 ML
BH CV ECHO MEAS - EF(MOD-BP): 56 %
BH CV ECHO MEAS - EF(MOD-SP2): 49.7 %
BH CV ECHO MEAS - EF(MOD-SP4): 62.3 %
BH CV ECHO MEAS - ESV(CUBED): 44.2 ML
BH CV ECHO MEAS - ESV(MOD-SP2): 68.2 ML
BH CV ECHO MEAS - ESV(MOD-SP4): 63.7 ML
BH CV ECHO MEAS - FS: 32.7 %
BH CV ECHO MEAS - IVS/LVPW: 1.09 CM
BH CV ECHO MEAS - IVSD: 1.17 CM
BH CV ECHO MEAS - LA DIMENSION: 4.2 CM
BH CV ECHO MEAS - LAT PEAK E' VEL: 14 CM/SEC
BH CV ECHO MEAS - LV DIASTOLIC VOL/BSA (35-75): 53.6 CM2
BH CV ECHO MEAS - LV MASS(C)D: 231.3 GRAMS
BH CV ECHO MEAS - LV MAX PG: 4.4 MMHG
BH CV ECHO MEAS - LV MEAN PG: 2.36 MMHG
BH CV ECHO MEAS - LV SYSTOLIC VOL/BSA (12-30): 20.2 CM2
BH CV ECHO MEAS - LV V1 MAX: 104.4 CM/SEC
BH CV ECHO MEAS - LV V1 VTI: 20.8 CM
BH CV ECHO MEAS - LVIDD: 5.2 CM
BH CV ECHO MEAS - LVIDS: 3.5 CM
BH CV ECHO MEAS - LVOT AREA: 4.6 CM2
BH CV ECHO MEAS - LVOT DIAM: 2.42 CM
BH CV ECHO MEAS - LVPWD: 1.08 CM
BH CV ECHO MEAS - MED PEAK E' VEL: 12 CM/SEC
BH CV ECHO MEAS - MV A MAX VEL: 39.9 CM/SEC
BH CV ECHO MEAS - MV DEC SLOPE: 279.7 CM/SEC2
BH CV ECHO MEAS - MV DEC TIME: 0.23 SEC
BH CV ECHO MEAS - MV E MAX VEL: 63.3 CM/SEC
BH CV ECHO MEAS - MV E/A: 1.58
BH CV ECHO MEAS - MV MAX PG: 2.7 MMHG
BH CV ECHO MEAS - MV MEAN PG: 0.99 MMHG
BH CV ECHO MEAS - MV P1/2T: 66 MSEC
BH CV ECHO MEAS - MV V2 VTI: 21.7 CM
BH CV ECHO MEAS - MVA(P1/2T): 3.4 CM2
BH CV ECHO MEAS - MVA(VTI): 4.4 CM2
BH CV ECHO MEAS - PA V2 MAX: 99.7 CM/SEC
BH CV ECHO MEAS - PI END-D VEL: 108 CM/SEC
BH CV ECHO MEAS - PULM A REVS DUR: 0.08 SEC
BH CV ECHO MEAS - PULM A REVS VEL: 21.9 CM/SEC
BH CV ECHO MEAS - RV MAX PG: 2.28 MMHG
BH CV ECHO MEAS - RV V1 MAX: 75.5 CM/SEC
BH CV ECHO MEAS - RV V1 VTI: 16 CM
BH CV ECHO MEAS - RVDD: 3.9 CM
BH CV ECHO MEAS - SV(LVOT): 95.5 ML
BH CV ECHO MEAS - SV(MOD-SP2): 67.4 ML
BH CV ECHO MEAS - SV(MOD-SP4): 105.4 ML
BH CV ECHO MEAS - SVI(LVOT): 30.3 ML/M2
BH CV ECHO MEAS - SVI(MOD-SP2): 21.4 ML/M2
BH CV ECHO MEAS - SVI(MOD-SP4): 33.4 ML/M2
BH CV ECHO MEAS - TAPSE (>1.6): 2.5 CM
BH CV ECHO MEASUREMENTS AVERAGE E/E' RATIO: 4.87
BH CV XLRA - RV BASE: 4 CM
BH CV XLRA - RV LENGTH: 9.8 CM
BH CV XLRA - RV MID: 4.4 CM
BH CV XLRA - TDI S': 14 CM/SEC
LEFT ATRIUM VOLUME INDEX: 27 ML/M2
LEFT ATRIUM VOLUME: 82 ML
UFH PPP CHRO-ACNC: 0.56 IU/ML

## 2024-06-02 PROCEDURE — 93005 ELECTROCARDIOGRAM TRACING: CPT | Performed by: INTERNAL MEDICINE

## 2024-06-02 PROCEDURE — 25010000002 ENOXAPARIN PER 10 MG: Performed by: INTERNAL MEDICINE

## 2024-06-02 PROCEDURE — 99232 SBSQ HOSP IP/OBS MODERATE 35: CPT | Performed by: INTERNAL MEDICINE

## 2024-06-02 PROCEDURE — 93010 ELECTROCARDIOGRAM REPORT: CPT | Performed by: INTERNAL MEDICINE

## 2024-06-02 PROCEDURE — 99232 SBSQ HOSP IP/OBS MODERATE 35: CPT | Performed by: THORACIC SURGERY (CARDIOTHORACIC VASCULAR SURGERY)

## 2024-06-02 PROCEDURE — 85520 HEPARIN ASSAY: CPT | Performed by: INTERNAL MEDICINE

## 2024-06-02 RX ORDER — METOPROLOL TARTRATE 50 MG/1
25 TABLET, FILM COATED ORAL EVERY 12 HOURS SCHEDULED
Status: DISCONTINUED | OUTPATIENT
Start: 2024-06-02 | End: 2024-06-06

## 2024-06-02 RX ORDER — ENOXAPARIN SODIUM 100 MG/ML
165 INJECTION SUBCUTANEOUS EVERY 12 HOURS
Status: DISCONTINUED | OUTPATIENT
Start: 2024-06-02 | End: 2024-06-06

## 2024-06-02 RX ADMIN — ENOXAPARIN SODIUM 160 MG: 100 INJECTION SUBCUTANEOUS at 21:45

## 2024-06-02 RX ADMIN — ASPIRIN 81 MG CHEWABLE TABLET 81 MG: 81 TABLET CHEWABLE at 10:25

## 2024-06-02 RX ADMIN — LISINOPRIL 10 MG: 10 TABLET ORAL at 10:25

## 2024-06-02 RX ADMIN — ATORVASTATIN CALCIUM 40 MG: 40 TABLET ORAL at 21:19

## 2024-06-02 RX ADMIN — NITROGLYCERIN 0.4 MG: 0.4 TABLET SUBLINGUAL at 00:34

## 2024-06-02 RX ADMIN — METOPROLOL TARTRATE 25 MG: 50 TABLET, FILM COATED ORAL at 17:05

## 2024-06-02 RX ADMIN — AMLODIPINE BESYLATE 5 MG: 5 TABLET ORAL at 10:25

## 2024-06-02 RX ADMIN — FAMOTIDINE 20 MG: 20 TABLET, FILM COATED ORAL at 10:25

## 2024-06-02 RX ADMIN — CARVEDILOL 3.12 MG: 3.12 TABLET, FILM COATED ORAL at 10:25

## 2024-06-02 RX ADMIN — ENOXAPARIN SODIUM 150 MG: 150 INJECTION SUBCUTANEOUS at 10:25

## 2024-06-02 NOTE — CASE MANAGEMENT/SOCIAL WORK
Discharge Planning Assessment  [facility]     Patient Name: Constantin Seals  MRN: [id number]  Today's Date: 6/2/2024    Admit Date: 5/31/2024        Discharge Needs Assessment       Row Name 06/02/24 1049       Living Environment    People in Home spouse    Current Living Arrangements home    Potentially Unsafe Housing Conditions none    Primary Care Provided by self    Provides Primary Care For no one    Family Caregiver if Needed spouse    Quality of Family Relationships helpful;involved;supportive    Able to Return to Prior Arrangements yes       Resource/Environmental Concerns    Resource/Environmental Concerns none    Transportation Concerns none       Transition Planning    Patient/Family Anticipates Transition to home    Patient/Family Anticipated Services at Transition none    Transportation Anticipated family or friend will provide       Discharge Needs Assessment    Readmission Within the Last 30 Days no previous admission in last 30 days    Equipment Currently Used at Home none    Concerns to be Addressed denies needs/concerns at this time    Anticipated Changes Related to Illness none    Equipment Needed After Discharge none    Discharge Coordination/Progress PT resides at home with spouse and was independent prior to admission. PT is scheduled for CABG this Thursday. Will follow up after CABG for dc planning needs.                   Discharge Plan    No documentation.                 Continued Care and Services - Admitted Since 5/31/2024    No active coordination exists for this encounter.       Expected Discharge Date and Time       Expected Discharge Date Expected Discharge Time    Jun 4, 2024            Demographic Summary    No documentation.                  Functional Status    No documentation.                  Psychosocial    No documentation.                  Abuse/Neglect    No documentation.                  Legal    No documentation.                  Substance Abuse    No  documentation.                  Patient Forms    No documentation.                     BILL PardoW

## 2024-06-02 NOTE — PROGRESS NOTES
"Chief Complaint   Patient presents with    Chest Pain       Chest pain last night and felt like he ate ice cream.  His chest pain that prompted him to present to the emergency room was similar but also had left arm pain.  No shortness of breath.  He did take 1 sublingual nitroglycerin.  This relieved this.    Otherwise no complaints.      Visit Vitals  /79 (BP Location: Right arm, Patient Position: Lying)   Pulse 77   Temp 97.9 °F (36.6 °C) (Oral)   Resp 16   Ht 195.6 cm (77\")   Wt (!) 204 kg (449 lb)   SpO2 99%   BMI 53.24 kg/m²         Intake/Output Summary (Last 24 hours) at 6/2/2024 1541  Last data filed at 6/2/2024 1410  Gross per 24 hour   Intake 1020 ml   Output 1875 ml   Net -855 ml       Physical Exam:    General: No acute distress, in good spirits, obese.  With beard  Cardiovascular: RRR, no murmur, rubs, or gallops.    Pulmonary: Clear to auscultation bilaterally, no wheezing, rubs, or rales.  Abdomen: Soft, nondistended, and nontender.  Extremities: Warm, moves all extremities.  Neurologic:  No focal deficits, CN II-XII intact grossly.        Data:  Interpretation Summary         Left ventricular systolic function is normal. Left ventricular ejection fraction appears to be 61 - 65%.    Left ventricular wall thickness is consistent with mild concentric hypertrophy.    Left ventricular diastolic function was normal.    Normal right ventricular cavity size and systolic function noted.    There is no significant (greater than mild) valvular dysfunction.     Cardiac History    Diagnosis Date Comment Source   Hypertension        Social History    Tobacco Use    Never   Smokeless Tobacco: Current user of smokeless tobacco; Types: Chew.     Vaping Use    Never used     Family Cardiac History as of 6/2/2024  Pedigree Problem Relation Age of Onset   Diabetes Father    Heart disease Father    Hypertension Father      Additional Study Details    Study Quality The study is technically adequate for diagnosis. "     Echocardiogram Findings    Left Ventricle Left ventricular systolic function is normal. Left ventricular ejection fraction appears to be 61 - 65%.   Global longitudinal LV strain (GLS) = -16.7%. Left ventricle strain data was reviewed by the physician and found to be accurate. Normal left ventricular cavity size noted. Left ventricular wall thickness is consistent with mild concentric hypertrophy. All left ventricular wall segments contract normally. Left ventricular diastolic function was normal.   Right Ventricle Normal right ventricular cavity size and systolic function noted.   Left Atrium The left atrial cavity is borderline dilated.   Right Atrium Normal right atrial cavity size noted. Right atrial volume is 101 ml.   Aortic Valve The aortic valve is structurally normal with no stenosis present. The aortic valve appears trileaflet. No significant aortic valve regurgitation is present.   Mitral Valve The mitral valve is structurally normal with no significant stenosis present. No significant mitral valve regurgitation is present.   Tricuspid Valve The tricuspid valve is structurally normal with no significant regurgitation or significant stenosis present. Insufficient TR velocity profile to estimate the right ventricular systolic pressure.   Pulmonic Valve The pulmonic valve is grossly normal in structure. There is no significant pulmonic valve regurgitation present. There is no pulmonic valve stenosis present.   Greater Vessels No dilation of the aortic root is present. The inferior vena cava is normally sized. Normal IVC inspiratory collapse of greater than 50% noted.   Pericardium The pericardium is normal. There is no evidence of pericardial effusion. .            LV Measurements    Dimensions   LVIDd 5.2 cm         IVSd 1.17 cm         FS 32.7 %         ESV(cubed) 44.2 ml         EDV(cubed) 144.7 ml         LVOT area 4.6 cm2         LVOT diam 2.42 cm         EDV(MOD-sp2) 135.6 ml         ESV(MOD-sp2)  68.2 ml         Diastolic Filling   MV E max niall 63.3 cm/sec         MV A max niall 39.9 cm/sec         MV dec time 0.23 sec         MV E/A 1.58         Pulm A Revs Dur 0.08 sec         LA ESV Index (BP) 27 ml/m2         Med Peak E' Niall 12 cm/sec         Lat Peak E' Niall 14 cm/sec         Avg E/e' ratio 4.87         Shunt Ratio   SV(LVOT) 95.5 ml          Dimensions   LVIDs 3.5 cm         LVPWd 1.08 cm         IVS/LVPW 1.09 cm         LV Sys Vol (BSA corrected) 20.2 cm2         LV Chua Vol (BSA corrected) 53.6 cm2         LV mass(C)d 231.3 grams         EDV(MOD-sp4) 169.1 ml         ESV(MOD-sp4) 63.7 ml         Systolic Function   SV(MOD-sp2) 67.4 ml         SV(MOD-sp4) 105.4 ml         SVi(MOD-SP2) 21.4 ml/m2         SVi(MOD-SP4) 33.4 ml/m2         SVi (LVOT) 30.3 ml/m2         EF(MOD-sp2) 49.7 %         EF(MOD-sp4) 62.3 %         EF(MOD-bp) 56 %                Right Ventricle Measurements    RVIDd 3.9 cm         RV Base 4 cm         RV Mid 4.4 cm         RV Length 9.8 cm          TAPSE (>1.6) 2.5 cm         RV S' 14 cm/sec                LA Measurements    LA Dimensions   LA dimension (2D) 4.2 cm         LA ESV (BP) 82 ml         LA ESV Index (BP) 27 ml/m2          Pulmonary Veins   Pulm A Revs Niall 21.9 cm/sec         Pulm A Revs Dur 0.08 sec                Aortic Valve Measurements    Stenosis   LVOT diam 2.42 cm         LV V1 max 104.4 cm/sec         LV V1 max PG 4.4 mmHg         LV V1 mean PG 2.36 mmHg         LV V1 VTI 20.8 cm         Ao pk niall 122.4 cm/sec          Stenosis   Ao max PG 6 mmHg         Ao mean PG 3.6 mmHg         Ao V2 VTI 23.4 cm         SHARMAINE(I,D) 4.1 cm2                Mitral Valve Measurements    Stenosis   MV max PG 2.7 mmHg         MV mean PG 0.99 mmHg         MV V2 VTI 21.7 cm         MV P1/2t 66 msec         MVA(P1/2t) 3.4 cm2         MVA(VTI) 4.4 cm2         MV dec slope 279.7 cm/sec2         MV dec time 0.23 sec                   Pulmonic Valve Measurements    Stenosis   RV V1 max PG 2.28  mmHg         RV V1 max 75.5 cm/sec         RV V1 VTI 16 cm         PA V2 max 99.7 cm/sec          Regurgitation   PI end-d donovan 108 cm/sec                Greater Vessels Measurements    Ao root diam 3.7 cm         Ascending aorta 3.6 cm             Impression:  Coronary artery disease  Prediabetes  Obesity, class III, BMI 53  Hypertension  NSTEMI  Obstructive sleep apnea  Doubt    Medical decision-making/recommendations/plan:    Currently chest pain-free.  Continue ongoing workup with plan tentatively for coronary artery bypass grafting this upcoming Thursday.  Plan for CTA chest tomorrow and he will need full pulmonary function test pre and postbronchodilator with DLCO and ABG  Continue to hold Ozempic  All questions answered raised by both he and his wife.  Discussed that he will require trimming his beard a bit for planned sternotomy.

## 2024-06-02 NOTE — NURSING NOTE
Pt c/o chest pain. Pain 2/3. Vitals taken, and charted. Nitro given EKG done. Pt feels fine. No c/o chest pain anymore.

## 2024-06-02 NOTE — PROGRESS NOTES
[facility] -  Progress Note     LOS: 2 days   Patient Care Team:  Giovani Lujan DO as PCP - General (Internal Medicine)    Chief Complaint: F/U Chest Pain/CAD    Subjective   Patient sitting up in bed. Sister at bedside. Stated he had chest pain last night, described as if he had eaten ice cream and caused his esophagus to spasm. No shortness of breath or nausea.       REVIEW OF SYSTEMS:  Constitutional: Denies fever or chills. Denies change in energy level or malaise.  HEENT: Denies headaches or visual disturbances. Denies difficulty swallowing or sore throat.  Respiratory: Denies cough or hoarseness. Denies shortness of breath.   Cardiovascular: + chest pain last night - relieved with NTG x 1. Denies palpitations. Denies presyncope/syncope. Denies orthopnea/PND. Denies lower extremity edema.   Gastrointestinal: Denies abdominal pain. Denies nausea/vomiting. Denies change in bowel habits or history of recent GI tract blood loss.   Genitourinary: Denies urinary urgency or frequency. Denies dysuria, hematuria.  Musculoskeletal: Denies pain or swelling in joints.  Neurological: Denies paresthesias. Denies headache. Denies seizure or stroke symptoms.  Behavioral/Psych: Denies problems with anxiety or depression.    All other systems are negative except where stated above.      Objective     Vital Sign Min/Max for last 24 hours  Temp  Min: 97.4 °F (36.3 °C)  Max: 98.1 °F (36.7 °C)   BP  Min: 124/64  Max: 152/89   Pulse  Min: 62  Max: 84   Resp  Min: 16  Max: 16   SpO2  Min: 95 %  Max: 99 %   No data recorded   Weight  Min: 204 kg (449 lb)  Max: 204 kg (449 lb)         06/01/24  1452   Weight: (!) 204 kg (449 lb)         Intake/Output Summary (Last 24 hours) at 6/2/2024 0811  Last data filed at 6/2/2024 0509  Gross per 24 hour   Intake 1380 ml   Output 600 ml   Net 780 ml         Physical Exam:    General Appearance:   Alert, cooperative, in no acute distress. Morbidly obese.   Head:    Normocephalic. Atraumatic. DAVID.   Lungs:   Clear to auscultation, respirations regular, even and unlabored    Heart:   Regular rhythm and normal rate, normal S1 and S2, no murmur, no gallop, no rub, no click   Abdomen:   Normal bowel sounds, obese, soft, non-tender, non-distended   Extremities:   Moves all extremities, no edema, no cyanosis, no redness   Pulses:   Pulses palpable and equal bilaterally   Skin:   No bleeding, bruising or rash. Right radial puncture site without evidence of bleeding or hematoma. Strong radial pulse. Strong hand .    Neurologic:   Grossly intact       Results Review:   Lab Results (last 72 hours)       Procedure Component Value Units Date/Time    Basic Metabolic Panel [[id number]]  (Normal) Collected: 06/01/24 0343    Specimen: Blood Updated: 06/01/24 0526     Glucose 97 mg/dL      BUN 13 mg/dL      Creatinine 0.85 mg/dL      Sodium 139 mmol/L      Potassium 4.4 mmol/L      Chloride 104 mmol/L      CO2 26.0 mmol/L      Calcium 8.9 mg/dL      BUN/Creatinine Ratio 15.3     Anion Gap 9.0 mmol/L      eGFR 110.6 mL/min/1.73     Narrative:      GFR Normal >60  Chronic Kidney Disease <60  Kidney Failure <15      CBC (No Diff) [[id number]]  (Normal) Collected: 06/01/24 0343    Specimen: Blood Updated: 06/01/24 0504     WBC 5.92 10*3/mm3      RBC 5.56 10*6/mm3      Hemoglobin 15.3 g/dL      Hematocrit 48.3 %      MCV 86.9 fL      MCH 27.5 pg      MCHC 31.7 g/dL      RDW 13.7 %      RDW-SD 44.2 fl      MPV 9.5 fL      Platelets 259 10*3/mm3     Hemoglobin A1c [[id number]]  (Normal) Collected: 05/31/24 0814    Specimen: Blood Updated: 05/31/24 1116     Hemoglobin A1C 5.60 %     Narrative:      Hemoglobin A1C Ranges:    Increased Risk for Diabetes  5.7% to 6.4%  Diabetes                     >= 6.5%  Diabetic Goal                < 7.0%    Lipid Panel [[id number]]  (Abnormal) Collected: 05/31/24 1014    Specimen: Blood from Arm, Right Updated: 05/31/24 1116     Total Cholesterol 176 mg/dL       Triglycerides 89 mg/dL      HDL Cholesterol 38 mg/dL      LDL Cholesterol  121 mg/dL      VLDL Cholesterol 17 mg/dL      LDL/HDL Ratio 3.16    Narrative:      Cholesterol Reference Ranges  (U.S. Department of Health and Human Services ATP III Classifications)    Desirable          <200 mg/dL  Borderline High    200-239 mg/dL  High Risk          >240 mg/dL      Triglyceride Reference Ranges  (U.S. Department of Health and Human Services ATP III Classifications)    Normal           <150 mg/dL  Borderline High  150-199 mg/dL  High             200-499 mg/dL  Very High        >500 mg/dL    HDL Reference Ranges  (U.S. Department of Health and Human Services ATP III Classifications)    Low     <40 mg/dl (major risk factor for CHD)  High    >60 mg/dl ('negative' risk factor for CHD)        LDL Reference Ranges  (U.S. Department of Health and Human Services ATP III Classifications)    Optimal          <100 mg/dL  Near Optimal     100-129 mg/dL  Borderline High  130-159 mg/dL  High             160-189 mg/dL  Very High        >189 mg/dL    High Sensitivity Troponin T 2Hr [[id number]]  (Abnormal) Collected: 05/31/24 1014    Specimen: Blood from Arm, Right Updated: 05/31/24 1046     HS Troponin T 740 ng/L      Troponin T Delta 85 ng/L     Narrative:      High Sensitive Troponin T Reference Range:  <14.0 ng/L- Negative Female for AMI  <22.0 ng/L- Negative Male for AMI  >=14 - Abnormal Female indicating possible myocardial injury.  >=22 - Abnormal Male indicating possible myocardial injury.   Clinicians would have to utilize clinical acumen, EKG, Troponin, and serial changes to determine if it is an Acute Myocardial Infarction or myocardial injury due to an underlying chronic condition.         High Sensitivity Troponin T [[id number]]  (Abnormal) Collected: 05/31/24 0814    Specimen: Blood Updated: 05/31/24 0845     HS Troponin T 655 ng/L     Narrative:      High Sensitive Troponin T Reference Range:  <14.0 ng/L- Negative Female for  AMI  <22.0 ng/L- Negative Male for AMI  >=14 - Abnormal Female indicating possible myocardial injury.  >=22 - Abnormal Male indicating possible myocardial injury.   Clinicians would have to utilize clinical acumen, EKG, Troponin, and serial changes to determine if it is an Acute Myocardial Infarction or myocardial injury due to an underlying chronic condition.         Comprehensive Metabolic Panel [[id number]]  (Abnormal) Collected: 05/31/24 0814    Specimen: Blood Updated: 05/31/24 0845     Glucose 101 mg/dL      BUN 11 mg/dL      Creatinine 0.87 mg/dL      Sodium 139 mmol/L      Potassium 4.2 mmol/L      Comment: Slight hemolysis detected by analyzer. Result may be falsely elevated.        Chloride 104 mmol/L      CO2 27.0 mmol/L      Calcium 9.1 mg/dL      Total Protein 7.0 g/dL      Albumin 4.0 g/dL      ALT (SGPT) 37 U/L      AST (SGOT) 50 U/L      Alkaline Phosphatase 79 U/L      Total Bilirubin 0.4 mg/dL      Globulin 3.0 gm/dL      A/G Ratio 1.3 g/dL      BUN/Creatinine Ratio 12.6     Anion Gap 8.0 mmol/L      eGFR 110.5 mL/min/1.73     Narrative:      GFR Normal >60  Chronic Kidney Disease <60  Kidney Failure <15      D-dimer, Quantitative [[id number]]  (Abnormal) Collected: 05/31/24 0814    Specimen: Blood Updated: 05/31/24 0844     D-Dimer, Quantitative 0.63 MCGFEU/mL     Narrative:      According to the assay 's published package insert, a normal (<0.50 MCGFEU/mL) D-dimer result in conjunction with a non-high clinical probability assessment, excludes deep vein thrombosis (DVT) and pulmonary embolism (PE) with high sensitivity.    D-dimer values increase with age and this can make VTE exclusion of an older population difficult. To address this, the [employer], based on best available evidence and recent guidelines, recommends that clinicians use age-adjusted D-dimer thresholds in patients greater than 50 years of age with: a) a low probability of PE who do not meet all  "Pulmonary Embolism Rule Out Criteria, or b) in those with intermediate probability of PE.   The formula for an age-adjusted D-dimer cut-off is \"age/100\".  For example, a 60 year old patient would have an age-adjusted cut-off of 0.60 MCGFEU/mL and an 80 year old 0.80 MCGFEU/mL.    BNP [[id number]]  (Normal) Collected: 05/31/24 0814    Specimen: Blood Updated: 05/31/24 0842     proBNP 378.0 pg/mL     Narrative:      This assay is used as an aid in the diagnosis of individuals suspected of having heart failure. It can be used as an aid in the diagnosis of acute decompensated heart failure (ADHF) in patients presenting with signs and symptoms of ADHF to the emergency department (ED). In addition, NT-proBNP of <300 pg/mL indicates ADHF is not likely.    Age Range Result Interpretation  NT-proBNP Concentration (pg/mL:      <50             Positive            >450                   Gray                 300-450                    Negative             <300    50-75           Positive            >900                  Gray                300-900                  Negative            <300      >75             Positive            >1800                  Gray                300-1800                  Negative            <300    [place] Draw [[id number]] Collected: 05/31/24 0814    Specimen: Blood Updated: 05/31/24 0830    Narrative:      The following orders were created for panel order [place] Draw.  Procedure                               Abnormality         Status                     ---------                               -----------         ------                     Green Top (Gel)[[id number]]                                  Final result               Lavender Top[[id number]]                                     Final result               Red Top[[id number]]                                          Final result               Light Blue Top[[id number]]                                   Final result                 Please view results " for these tests on the individual orders.    Green Top (Gel) [[id number]] Collected: 05/31/24 0814    Specimen: Blood Updated: 05/31/24 0830     Extra Tube Hold for add-ons.     Comment: Auto resulted.       Lavender Top [[id number]] Collected: 05/31/24 0814    Specimen: Blood Updated: 05/31/24 0830     Extra Tube hold for add-on     Comment: Auto resulted       Red Top [[id number]] Collected: 05/31/24 0814    Specimen: Blood Updated: 05/31/24 0830     Extra Tube Hold for add-ons.     Comment: Auto resulted.       Light Blue Top [[id number]] Collected: 05/31/24 0814    Specimen: Blood Updated: 05/31/24 0830     Extra Tube Hold for add-ons.     Comment: Auto resulted       CBC & Differential [[id number]]  (Normal) Collected: 05/31/24 0814    Specimen: Blood Updated: 05/31/24 0825    Narrative:      The following orders were created for panel order CBC & Differential.  Procedure                               Abnormality         Status                     ---------                               -----------         ------                     CBC Auto Differential[[id number]]        Normal              Final result                 Please view results for these tests on the individual orders.    CBC Auto Differential [[id number]]  (Normal) Collected: 05/31/24 0814    Specimen: Blood Updated: 05/31/24 0825     WBC 7.75 10*3/mm3      RBC 5.52 10*6/mm3      Hemoglobin 15.2 g/dL      Hematocrit 46.9 %      MCV 85.0 fL      MCH 27.5 pg      MCHC 32.4 g/dL      RDW 13.7 %      RDW-SD 42.7 fl      MPV 9.2 fL      Platelets 280 10*3/mm3      Neutrophil % 63.3 %      Lymphocyte % 24.1 %      Monocyte % 9.2 %      Eosinophil % 2.1 %      Basophil % 0.9 %      Immature Grans % 0.4 %      Neutrophils, Absolute 4.91 10*3/mm3      Lymphocytes, Absolute 1.87 10*3/mm3      Monocytes, Absolute 0.71 10*3/mm3      Eosinophils, Absolute 0.16 10*3/mm3      Basophils, Absolute 0.07 10*3/mm3      Immature Grans, Absolute 0.03 10*3/mm3      nRBC 0.0  /100 WBC              2D Echocardiogram:  Completed, report pending.         Medication Review: yes  Current Facility-Administered Medications   Medication Dose Route Frequency Provider Last Rate Last Admin    acetaminophen (TYLENOL) tablet 650 mg  650 mg Oral Q4H PRN Chip Roberts MD        aluminum-magnesium hydroxide-simethicone (MAALOX MAX) 400-400-40 MG/5ML suspension 15 mL  15 mL Oral Q6H PRN Chip Roberts MD        amLODIPine (NORVASC) tablet 5 mg  5 mg Oral Q24H Chip Roberts MD   5 mg at 06/01/24 0904    And    lisinopril (PRINIVIL,ZESTRIL) tablet 10 mg  10 mg Oral Q24H Chip Roberts MD   10 mg at 06/01/24 0904    aspirin chewable tablet 81 mg  81 mg Oral Daily Chip Roberts MD   81 mg at 06/01/24 0904    atorvastatin (LIPITOR) tablet 40 mg  40 mg Oral Nightly Chip Roberts MD   40 mg at 06/01/24 2029    carvedilol (COREG) tablet 3.125 mg  3.125 mg Oral BID With Meals Chip Roberts MD   3.125 mg at 06/01/24 1751    colchicine tablet 0.6 mg  0.6 mg Oral Daily PRN Chip Roberts MD   0.6 mg at 05/31/24 2340    Enoxaparin Sodium (LOVENOX) syringe 150 mg  0.7 mg/kg Subcutaneous Q12H Chip Roberts MD   150 mg at 06/01/24 2029    famotidine (PEPCID) tablet 20 mg  20 mg Oral Daily Chip Roberts MD   20 mg at 06/01/24 0904    magnesium hydroxide (MILK OF MAGNESIA) suspension 10 mL  10 mL Oral Daily PRN Chip Roberts MD        nicotine polacrilex (NICORETTE) gum 2 mg  2 mg Mouth/Throat Q1H PRN Chip Roberts MD        nitroglycerin (NITROSTAT) SL tablet 0.4 mg  0.4 mg Sublingual Q5 Min PRN Chip Roberts MD   0.4 mg at 06/02/24 0034    ondansetron ODT (ZOFRAN-ODT) disintegrating tablet 4 mg  4 mg Translingual Q8H PRN Chip Roberts MD        sodium chloride 0.9 % flush 10 mL  10 mL Intravenous PRN Chip Roberts MD        temazepam (RESTORIL) capsule 15 mg  15 mg Oral Nightly PRN Chip Roberts MD             Assessment & Plan       NSTEMI (non-ST elevated myocardial  infarction) associated with coronary artery disease - Currently on ASA 81 mg daily, carvedilol 3.125 mg BID, Lotrel 5-10 mg daily, and atorvastatin 40 mg nightly. Patient is pain free. Tentative plan for CABG on Thursday, June 6.      Primary hypertension - controlled with carvedilol and Lotrel. Continue to monitor.     Mixed hyperlipidemia - on high-intensity statin    Gastroesophageal reflux disease without esophagitis    Class 3 severe obesity due to excess calories with serious comorbidity and body mass index (BMI) of 50.0 to 59.9 in adult    SHERRON (obstructive sleep apnea)    Continue medical treatment with plans for CABG on Thursday.       Babita Rust, KURT  06/02/24  08:11 CDT

## 2024-06-02 NOTE — PROGRESS NOTES
"Pharmacy Dosing Service  Anticoagulant  Enoxaparin    Assessment/Action/Plan:  Continues on Lovenox 0.7 mg/kg q 12hrs for ACS (BMI >50).  Anti-Factor Xa level = 0.56 units/ml.  Dose adjusted to 160 mg q 12hrs per protocol.  Pharmacy will continue to follow and adjust as needed     Subjective:  Constantin Seals is a 44 y.o. male on Enoxaparin 160 mg SQ every 12 hours for indication of ACS.  Objective:  [Ht: 195.6 cm (77\"); Wt: (!) 204 kg (449 lb); BMI: Body mass index is 53.24 kg/m².]  Estimated Creatinine Clearance: 214 mL/min (by C-G formula based on SCr of 0.85 mg/dL). No results found for: \"DDIMER\" No results found for: \"INR\", \"PROTIME\"   Lab Results   Component Value Date    HGB 15.3 06/01/2024    HGB 15.2 05/31/2024    HGB 13.9 06/06/2022      Lab Results   Component Value Date     06/01/2024     05/31/2024     06/06/2022       L Shaq Leonard [facility]  06/02/24 14:17 CDT     "

## 2024-06-02 NOTE — PLAN OF CARE
Goal Outcome Evaluation:              Outcome Evaluation: ALOX4. RA. NSR on tele. Rt. radial site looks CDI. getting up to void.

## 2024-06-03 ENCOUNTER — APPOINTMENT (OUTPATIENT)
Dept: ULTRASOUND IMAGING | Facility: HOSPITAL | Age: 44
End: 2024-06-03
Payer: COMMERCIAL

## 2024-06-03 ENCOUNTER — APPOINTMENT (OUTPATIENT)
Dept: CT IMAGING | Facility: HOSPITAL | Age: 44
End: 2024-06-03
Payer: COMMERCIAL

## 2024-06-03 LAB
ANION GAP SERPL CALCULATED.3IONS-SCNC: 10 MMOL/L (ref 5–15)
BUN SERPL-MCNC: 12 MG/DL (ref 6–20)
BUN/CREAT SERPL: 15.6 (ref 7–25)
CALCIUM SPEC-SCNC: 9.2 MG/DL (ref 8.6–10.5)
CHLORIDE SERPL-SCNC: 101 MMOL/L (ref 98–107)
CO2 SERPL-SCNC: 26 MMOL/L (ref 22–29)
CREAT SERPL-MCNC: 0.77 MG/DL (ref 0.76–1.27)
DEPRECATED RDW RBC AUTO: 42.5 FL (ref 37–54)
EGFRCR SERPLBLD CKD-EPI 2021: 113.9 ML/MIN/1.73
ERYTHROCYTE [DISTWIDTH] IN BLOOD BY AUTOMATED COUNT: 13.5 % (ref 12.3–15.4)
GLUCOSE SERPL-MCNC: 94 MG/DL (ref 65–99)
HCT VFR BLD AUTO: 49.6 % (ref 37.5–51)
HGB BLD-MCNC: 15.9 G/DL (ref 13–17.7)
MCH RBC QN AUTO: 27.7 PG (ref 26.6–33)
MCHC RBC AUTO-ENTMCNC: 32.1 G/DL (ref 31.5–35.7)
MCV RBC AUTO: 86.3 FL (ref 79–97)
PLATELET # BLD AUTO: 269 10*3/MM3 (ref 140–450)
PMV BLD AUTO: 9.6 FL (ref 6–12)
POTASSIUM SERPL-SCNC: 4.2 MMOL/L (ref 3.5–5.2)
RBC # BLD AUTO: 5.75 10*6/MM3 (ref 4.14–5.8)
SODIUM SERPL-SCNC: 137 MMOL/L (ref 136–145)
WBC NRBC COR # BLD AUTO: 6.65 10*3/MM3 (ref 3.4–10.8)

## 2024-06-03 PROCEDURE — 80048 BASIC METABOLIC PNL TOTAL CA: CPT | Performed by: INTERNAL MEDICINE

## 2024-06-03 PROCEDURE — 25510000001 IOPAMIDOL PER 1 ML: Performed by: INTERNAL MEDICINE

## 2024-06-03 PROCEDURE — 93880 EXTRACRANIAL BILAT STUDY: CPT

## 2024-06-03 PROCEDURE — 99232 SBSQ HOSP IP/OBS MODERATE 35: CPT | Performed by: SURGERY

## 2024-06-03 PROCEDURE — 71275 CT ANGIOGRAPHY CHEST: CPT

## 2024-06-03 PROCEDURE — 93880 EXTRACRANIAL BILAT STUDY: CPT | Performed by: SURGERY

## 2024-06-03 PROCEDURE — 25010000002 ENOXAPARIN PER 10 MG: Performed by: INTERNAL MEDICINE

## 2024-06-03 PROCEDURE — 85027 COMPLETE CBC AUTOMATED: CPT | Performed by: INTERNAL MEDICINE

## 2024-06-03 PROCEDURE — 99232 SBSQ HOSP IP/OBS MODERATE 35: CPT | Performed by: NURSE PRACTITIONER

## 2024-06-03 RX ADMIN — METOPROLOL TARTRATE 25 MG: 50 TABLET, FILM COATED ORAL at 20:44

## 2024-06-03 RX ADMIN — ATORVASTATIN CALCIUM 40 MG: 40 TABLET ORAL at 20:44

## 2024-06-03 RX ADMIN — FAMOTIDINE 20 MG: 20 TABLET, FILM COATED ORAL at 10:56

## 2024-06-03 RX ADMIN — IOPAMIDOL 100 ML: 755 INJECTION, SOLUTION INTRAVENOUS at 10:42

## 2024-06-03 RX ADMIN — METOPROLOL TARTRATE 25 MG: 50 TABLET, FILM COATED ORAL at 10:56

## 2024-06-03 RX ADMIN — ENOXAPARIN SODIUM 160 MG: 100 INJECTION SUBCUTANEOUS at 10:57

## 2024-06-03 RX ADMIN — AMLODIPINE BESYLATE 5 MG: 5 TABLET ORAL at 10:56

## 2024-06-03 RX ADMIN — ENOXAPARIN SODIUM 160 MG: 100 INJECTION SUBCUTANEOUS at 20:42

## 2024-06-03 RX ADMIN — ASPIRIN 81 MG CHEWABLE TABLET 81 MG: 81 TABLET CHEWABLE at 10:56

## 2024-06-03 RX ADMIN — LISINOPRIL 10 MG: 10 TABLET ORAL at 10:56

## 2024-06-03 NOTE — PLAN OF CARE
Goal Outcome Evaluation:  Plan of Care Reviewed With: patient        Progress: no change  Outcome Evaluation: AAOx4, VSS, pt on bariatric bed, had LHC on 31st of May, multi vessel disease, is here waiting for Ozempic to rid his system and then CABG on Thursday 6-6-24. Pt had no c/o pain overnight, wife at bedside. SR 60-90 on tele. safety maintained, up ad trang to toilet. care plan ongoing.

## 2024-06-03 NOTE — PROGRESS NOTES
CHIEF COMPLAINT:   Chief Complaint   Patient presents with   • Cough       HISTORY OF PRESENT ILLNESS:   Patient is a 49-year-old female who comes to Urgent Care today with allergy symptoms for the last 5 days and new onset of cold-like symptoms with chest congestion, coughing, sinus congestion with postnasal drip and sinus pressure over the last day.  Cough kept her up last night.  She did take NyQuil and DayQuil without much relief.  She has been using her albuterol inhaler occasionally.  She denies any sore throat, does have itchy ears but no pain in the ears, no fevers, chills, body aches, loss of taste and smell, chest pain, shortness of breath, GI symptoms.  Appetite and fluid intake have been normal.  Patient reports that around 2 weeks ago she visiting her mom and the nursing home, mom had pneumonia.  Patient states she did wear mask when she visited.    Past Medical History:   Diagnosis Date   • Anxiety    • COVID-19 11/09/2020   • Disc disorder of cervical region    • Esophageal reflux 1/16/2014   • Headache(784.0) 1/16/2014   • Iron deficiency anemia due to chronic blood loss 2/11/2020   • Migraine    • Mild intermittent asthma without complication 1/16/2014   • Obesity (BMI 30-39.9) 2/7/2020       Past Surgical History:   Procedure Laterality Date   • Carpal tunnel release     • Oral surgery procedure         Current Outpatient Medications   Medication Sig Dispense Refill   • ibuprofen (MOTRIN) 200 MG tablet Take 200 mg by mouth every 6 hours as needed.     • triamcinolone (ARISTOCORT) 0.1 % cream Apply twice a day to rash areas of concern until resolved 80 g 1   • nystatin (MYCOSTATIN) 923886 UNIT/GM cream Apply topically 2 times daily.     • ondansetron (ZOFRAN ODT) 4 MG disintegrating tablet Place 1 tablet onto the tongue every 8 hours as needed for Nausea. 21 tablet 0   • Prenatal Vit-Fe Fumarate-FA (KP PRENATAL MULTIVITAMINS PO)      • ASHWAGANDHA PO      • Carlos Root (CARLOS PO)      • sumatriptan  [facility] -  Progress Note     LOS: 3 days   Patient Care Team:  Givoani Lujan DO as PCP - General (Internal Medicine)    Chief Complaint: follow up NSTEMI     Subjective     Interval History:     Patient Complaints: The patient is currently resting comfortably.  He denies any chest pain or shortness of breath during the night or today.  He is maintaining normal sinus rhythm on telemetry.        Review of Systems:     Review of Systems   Constitutional:  Negative for diaphoresis, fatigue, fever and unexpected weight change.   HENT:  Negative for nosebleeds.    Respiratory:  Negative for apnea, cough, chest tightness, shortness of breath and wheezing.    Cardiovascular:  Negative for chest pain, palpitations and leg swelling.   Gastrointestinal:  Negative for abdominal distention, nausea and vomiting.   Genitourinary:  Negative for hematuria.   Musculoskeletal:  Negative for gait problem.   Skin:  Negative for color change.   Neurological:  Negative for dizziness, syncope, weakness and light-headedness.     Objective     Vital Sign Min/Max for last 24 hours  Temp  Min: 97.3 °F (36.3 °C)  Max: 97.9 °F (36.6 °C)   BP  Min: 123/67  Max: 152/79   Pulse  Min: 60  Max: 77   Resp  Min: 16  Max: 18   SpO2  Min: 97 %  Max: 99 %   No data recorded   No data recorded         06/01/24  1452   Weight: (!) 204 kg (449 lb)       Physical Exam:    Vitals and nursing note reviewed.   Constitutional:       General: Not in acute distress.     Appearance: Well-developed and not in distress. Not diaphoretic.   Neck:      Vascular: No JVD.   Pulmonary:      Effort: Pulmonary effort is normal. No respiratory distress.      Breath sounds: Normal breath sounds.   Cardiovascular:      Normal rate. Regular rhythm.      Murmurs: There is no murmur.   Edema:     Peripheral edema absent.   Abdominal:      Tenderness: There is no abdominal tenderness.   Skin:     General: Skin is warm and dry.   Neurological:      Mental  (IMITREX) 100 MG tablet Take 1 tablet by mouth as needed for Migraine. Take one tablet at onset of headache and may repeat one tab in two hours if ineffective 18 tablet 3   • DISPENSE Liver Fractions (Heme iron) - Folate, Vitamin B12, Iron, Liver fractions     • DISPENSE Pancreatin 10x - 200 mg  (Digestive enzyme) - Pancreatin 10X, amylase, protease, lipase     • VITAMIN D, CHOLECALCIFEROL, PO Take 5,000 Units by mouth.      • NON FORMULARY Indications: Extreme Edge (has caffeine in it)      • NON FORMULARY as needed. Indications: Maranga Powder      • Spacer/Aero-Holding Chambers (AEROCHAMBER) Use as directed with Albuterol inhaler 1 each 0   • albuterol 108 (90 Base) MCG/ACT inhaler USE 2 INHALATIONS EVERY 4 HOURS AS NEEDED FOR SHORTNESS OF BREATH OR WHEEZING 17 g 4   • cyclobenzaprine (FLEXERIL) 5 MG tablet Take 1 tablet by mouth 3 times daily as needed for Muscle spasms. 60 tablet 0   • fluticasone-vilanterol (BREO ELLIPTA) 200-25 MCG/INH inhaler Inhale 1 puff into the lungs daily. 180 each 3     No current facility-administered medications for this visit.       Allergies as of 06/27/2021 - Reviewed 06/27/2021   Allergen Reaction Noted   • Amoxicillin-pot clavulanate HIVES 01/13/2012   • Latex   (environmental) RASH 03/19/2021   • Mold   (environmental) Other (See Comments) 03/19/2021   • Naproxen  01/16/2014   • Sulfa antibiotics RASH and SWELLING 01/16/2014   • Penicillins RASH 01/16/2014         I have reviewed the past medical history, family history, social history, medications and allergies listed in the medical record as obtained by my nursing staff and support staff and agree with their documentation    ROS:  Limited by:none  As per HPI unless otherwise noted    PHYSICAL EXAM:  VITAL SIGNS:      Visit Vitals  /86   Pulse 88   Temp 97.5 °F (36.4 °C) (Temporal)   Resp 18   Ht 5' 8\" (1.727 m)   Wt 115 kg   LMP 01/21/2021   SpO2 97%   BMI 38.55 kg/m²     GENERAL:   Avis Zapata is alert slighlty  Status: Alert and oriented to person, place, and time.       Results Review:   Lab Results (last 72 hours)       Procedure Component Value Units Date/Time    Basic Metabolic Panel [[id number]]  (Normal) Collected: 06/03/24 0321    Specimen: Blood Updated: 06/03/24 0435     Glucose 94 mg/dL      BUN 12 mg/dL      Creatinine 0.77 mg/dL      Sodium 137 mmol/L      Potassium 4.2 mmol/L      Comment: Slight hemolysis detected by analyzer. Result may be falsely elevated.        Chloride 101 mmol/L      CO2 26.0 mmol/L      Calcium 9.2 mg/dL      BUN/Creatinine Ratio 15.6     Anion Gap 10.0 mmol/L      eGFR 113.9 mL/min/1.73     Narrative:      GFR Normal >60  Chronic Kidney Disease <60  Kidney Failure <15      CBC (No Diff) [[id number]]  (Normal) Collected: 06/03/24 0321    Specimen: Blood Updated: 06/03/24 0413     WBC 6.65 10*3/mm3      RBC 5.75 10*6/mm3      Hemoglobin 15.9 g/dL      Hematocrit 49.6 %      MCV 86.3 fL      MCH 27.7 pg      MCHC 32.1 g/dL      RDW 13.5 %      RDW-SD 42.5 fl      MPV 9.6 fL      Platelets 269 10*3/mm3     Heparin Anti-Xa LMWH [[id number]]  (Normal) Collected: 06/02/24 1231    Specimen: Blood Updated: 06/02/24 1400     Heparin Anti-Xa (LMWH) 0.56 IU/ml     Narrative:      For Therapeutic Doses of Low Molecular Weight Heparin   These levels represent target drug concentrations collected 4 hours after at least 4 doses = Steady state LMWH levels.    Anti-Xa LMWH ranges:   ·Treatment of VTE (full anticoagulation): 0.6-1 units/ml for TWICE daily dosing   ·Treatment of VTE (full anticoagulation): 1-2 units/ml for ONCE daily dosing   ·Prevention of VTE: less than 0.5 units/ml      Basic Metabolic Panel [[id number]]  (Normal) Collected: 06/01/24 0343    Specimen: Blood Updated: 06/01/24 0526     Glucose 97 mg/dL      BUN 13 mg/dL      Creatinine 0.85 mg/dL      Sodium 139 mmol/L      Potassium 4.4 mmol/L      Chloride 104 mmol/L      CO2 26.0 mmol/L      Calcium 8.9 mg/dL      BUN/Creatinine Ratio  "15.3     Anion Gap 9.0 mmol/L      eGFR 110.6 mL/min/1.73     Narrative:      GFR Normal >60  Chronic Kidney Disease <60  Kidney Failure <15      CBC (No Diff) [[id number]]  (Normal) Collected: 06/01/24 0343    Specimen: Blood Updated: 06/01/24 0504     WBC 5.92 10*3/mm3      RBC 5.56 10*6/mm3      Hemoglobin 15.3 g/dL      Hematocrit 48.3 %      MCV 86.9 fL      MCH 27.5 pg      MCHC 31.7 g/dL      RDW 13.7 %      RDW-SD 44.2 fl      MPV 9.5 fL      Platelets 259 10*3/mm3                 Echo EF Estimated  No results found for: \"ECHOEFEST\"      Cath Ejection Fraction Quantitative  No results found for: \"CATHEF\"        Medication Review: yes  Current Facility-Administered Medications   Medication Dose Route Frequency Provider Last Rate Last Admin    acetaminophen (TYLENOL) tablet 650 mg  650 mg Oral Q4H PRN Chip Roberts MD        aluminum-magnesium hydroxide-simethicone (MAALOX MAX) 400-400-40 MG/5ML suspension 15 mL  15 mL Oral Q6H PRN Chip Roberts MD        amLODIPine (NORVASC) tablet 5 mg  5 mg Oral Q24H Chip Roberts MD   5 mg at 06/03/24 1056    And    lisinopril (PRINIVIL,ZESTRIL) tablet 10 mg  10 mg Oral Q24H Chip Roberts MD   10 mg at 06/03/24 1056    aspirin chewable tablet 81 mg  81 mg Oral Daily Chip Roberts MD   81 mg at 06/03/24 1056    atorvastatin (LIPITOR) tablet 40 mg  40 mg Oral Nightly Chip Roberts MD   40 mg at 06/02/24 2119    colchicine tablet 0.6 mg  0.6 mg Oral Daily PRN Chip Roberts MD   0.6 mg at 05/31/24 2340    Enoxaparin Sodium (LOVENOX) syringe 160 mg  160 mg Subcutaneous Q12H Chip Roberts MD   160 mg at 06/03/24 1057    famotidine (PEPCID) tablet 20 mg  20 mg Oral Daily Chip Roberts MD   20 mg at 06/03/24 1056    magnesium hydroxide (MILK OF MAGNESIA) suspension 10 mL  10 mL Oral Daily ANGIEN Chip Roberts MD        metoprolol tartrate (LOPRESSOR) tablet 25 mg  25 mg Oral Q12H Sean Trejo MD   25 mg at 06/03/24 1056    nicotine polacrilex " ill -appearing and in minimal or slight distress.  HEENT:   Eyes:  Normal lids, sclerae and conjunctivae bilaterally.  Pupils equally reactive to light.  EARS:  Normal pinnae and canals bilaterally.  Left tympanic membrane: neutral position normal appearing and translucent grey in color  Right tympanic membrane:  neutral position normal appearing and translucent grey in color    NOSE:   Septum normal. Turbinates are erythematous and congested.  Nasal discharge: moderate clear, watery  THROAT:  normal  no exudate present.  Tonsils:  normal.   NECK:  Supple  Left anterior cervical shotty  Right anterior cervical shotty  HEART:   Regular rate and rhythm, no murmurs/gallops/rubs,  LUNGS:   Clear to auscultation bilaterally, no wheezing, rhonchi, rales; no increased respiratory effort. Breath sounds equal bilaterally.   SKIN:   Warm and dry, well perfused.  NEUROLOGICAL:  Alert and awake  PSYCHIATRIC:  Speech and behavior appropriate.     Clinical Course:    Orders Placed This Encounter   • COVID DIAGNOSTIC TESTING     Avis was seen today for cough.    Diagnoses and all orders for this visit:    Viral upper respiratory tract infection with cough  -     COVID DIAGNOSTIC TEST         Discussed with patient the physical exam findings  History and physical consistent with viral upper respiratory tract infection with cough    Rapid COVID negative    Antibiotics are not indicated at this time.  Lungs clear to auscultation throughout, normal SpO2, no fever and chills, chest x-ray not indicated at this time.  Patient has only had cold and cough symptoms for 24 hours.    Patient was prescribed Tessalon Perles to help with cough, she has misplaced her albuterol spacer, I did prescribe a new spacer    Patient provided with list of over the counter medications and home remedies that may help with symptoms.  Written Instructions Provided in after visit summary to the patient and reviewed in detail, all questions  (NICORETTE) gum 2 mg  2 mg Mouth/Throat Q1H PRN Chip Roberts MD        nitroglycerin (NITROSTAT) SL tablet 0.4 mg  0.4 mg Sublingual Q5 Min PRN Chip Roberts MD   0.4 mg at 06/02/24 0034    ondansetron ODT (ZOFRAN-ODT) disintegrating tablet 4 mg  4 mg Translingual Q8H PRN Chip Roberts MD        sodium chloride 0.9 % flush 10 mL  10 mL Intravenous PRN Chip Roberts MD        temazepam (RESTORIL) capsule 15 mg  15 mg Oral Nightly PRN Chip Roberts MD             Assessment & Plan     1.  Non-STEMI with evidence of three-vessel coronary artery disease on cardiac catheterization: Continue current medical therapy including aspirin, therapeutic Lovenox, high intensity statin, beta-blocker.  Tentative plans are for CABG 6/6 with Dr. Trejo.  Appreciate CT surgery.    2.  Morbid obesity  3.  Hypertension  4.  Hyperlipidemia  5.  Obstructive sleep apnea        Lana Arvizu, APRN  06/03/24  12:02 CDT              answered.    Follow up with Primary MD in the next 5-7 days if no improvement -sooner if worse. Return or go to the ER with any worsening symptoms, problems, concerns.    Patient acknowledged shared decision making at the end of our conversation.    Discharge plan: Attached and as above    Provider wore full PPE    Today, a total of at least 35  minutes were spent during this encounter reviewing previous records (visits and studies), obtaining pertinent history and exam findings listed above, discussing/counseling about my medical decision making, discussing with other care providers if necessary, and subsequently documenting in the electronic medical record.

## 2024-06-03 NOTE — PAYOR COMM NOTE
"6/3/24 [facility] [phone]  [phone]      ER ADMIT TO INPATIENT ON 5/31/24. INPATIENT ORDER. FAXING FOR INPATIENT REVIEW.            Wilbur Seals (44 y.o. Male)       Date of Birth   1980    Social Security Number       Address   [address] [address]    Home Phone   [phone]    MRN   [id number]       [employer]   [facility]    Marital Status                               Admission Date   5/31/24    Admission Type   Emergency    Admitting Provider   Chip Roberts MD    Attending Provider   Chip Roberts MD    Department, Room/Bed   [facility] 4B, 404/1       Discharge Date       Discharge Disposition       Discharge Destination                                 Attending Provider: Chip Roberts MD    Allergies: Jardiance [Empagliflozin]    Isolation: None   Infection: None   Code Status: CPR    Ht: 195.6 cm (77\")   Wt: 204 kg (449 lb)    Admission Cmt: None   Principal Problem: NSTEMI (non-ST elevated myocardial infarction) [I21.4]                   Active Insurance as of 5/31/2024       Primary Coverage       Payor Plan Insurance Group Employer/Plan Group    [facility] [id number]       Payor Plan Address Payor Plan Phone Number Payor Plan Fax Number Effective Dates    [address] [address]   9/1/2023 - None Entered    [facility] [id number]         Subscriber Name Subscriber Birth Date Member ID       WILBUR SEALS 1980 [id number]                     Emergency Contacts        (Rel.) Home Phone Work Phone Mobile Phone    Selma Seals (Significant Other) [phone] -- --    MINI LAUON (Sister) -- -- [phone]             [facility] Encounter Date/Time: 5/31/2024 [facility]   Hospital Account: [id number]    MRN: [id number]   Patient:  Wilbur Seals   Contact Serial #: [id number]   SSN:          ENCOUNTER             Patient Class: Inpatient   Unit:  " [address] Service: Cardiology     Bed: 404/1   Admitting Provider: Chip Roberts MD   Referring Physician:     Attending Provider: Chip Roberts MD   Adm Diagnosis: NSTEMI (non-ST elevated *               PATIENT             Name: Wilbur Seals : 1980 (44 yrs)   Address: [address] [place] [address] Sex: Male   City: [place] [address]   County: [place]   Marital Status:  Ethnicity: NOT                                                                         Race: WHITE   Primary Care Provider: Giovani Lujan DO Patients Phone: Home Phone: [phone]     Mobile Phone: [phone]     EMERGENCY CONTACT   Contact Name Legal Guardian? Relationship to Patient Home Phone Work Phone Mobile Phone   1. Selma Seals  2. CK LAU      Significant Other  Sister [phone]   GUARANTOR             Guarantor: Wilbur Seals     : 1980   Address: [address] [place] [address] Sex: [place] [address]     Relation to Patient: Self       Home Phone: [phone]   Guarantor ID: [id number]       Work Phone:     GUARANTOR EMPLOYER   Employer: [employer]         Status: FULL TIME   COVERAGE          PRIMARY INSURANCE   Payor: [employer] Plan: [employer]   Group Number: [id number] Insurance Type: INDEMNITY   Subscriber Name: WILBUR SEALS Subscriber : 1980   Subscriber ID: [id number] Coverage Address: [place] [address]   Pat. Rel. to Subscriber: Self Coverage Phone:     SECONDARY INSURANCE   Payor: N/A Plan: N/A   Group Number:   Insurance Type:     Subscriber Name:   Subscriber :     Subscriber ID:   Coverage Address:     Pat. Rel. to Subscriber:   Coverage Phone:        Contact Serial # ([id number])         Susannah 3, 2024    Chart ID ([id number])            Chip Roberts MD   Physician  Cardiology     H&P     Signed     Date of Service: 24 1120  Creation Time: 24 112     Signed        Expand All Collapse All    Chief Complaint: chest tightness     HPI: Mr Seals is a 43-year-old male with significant past medical history of morbid obesity, hypertension, prediabetes, GERD, tobacco use, and obstructive sleep apnea.  He started having chest tightness 2 days ago.  He reported as a squeezing sensation in the center of his chest that reminded him a little bit of his acid reflux.  This eventually resolved on its own.  He went up to the fire station and got an EKG done.  Last night the symptoms returned and he ultimately came to [facility] for evaluation this morning.  Initial troponin was elevated at 655 with repeat at 740.  He is not currently having any chest tightness or other symptoms.  He denies any palpitations, dizziness, syncope, PND, orthopnea, fever, or chills.  He is taking Ozempic for weight loss and reports losing about 70 pounds to date.        Problem List       Patient Active Problem List   Diagnosis    Class 3 severe obesity due to excess calories with serious comorbidity and body mass index (BMI) of 60.0 to 69.9 in adult    Hypertension    Gastroesophageal reflux disease without esophagitis    SHERRON (obstructive sleep apnea)    Traumatic rupture of distal biceps tendon, right, initial encounter            Medical History        Past Medical History:   Diagnosis Date    COVID      GERD (gastroesophageal reflux disease)      Gout      Heartburn      Hypertension      Obesity           Surgical History         Past Surgical History:   Procedure Laterality Date    DISTAL BICEPS TENDON REPAIR Right 6/7/2022     Procedure: RIGHT DISTAL BICEPS TENDON REPAIR WITH SEMI-T ALLOGRAFT;  Surgeon: Benjamin العراقي MD;  Location: [facility];  Service: Orthopedics;  Laterality: Right;    ELBOW PROCEDURE        EYE SURGERY Right 1984            The following portions of the patient's history were reviewed and updated as appropriate: allergies, current medications, past family  "history, past medical history, past social history, past surgical history, and problem list.     Social History   Social History            Socioeconomic History    Marital status:    Tobacco Use    Smoking status: Never    Smokeless tobacco: Current       Types: Chew   Vaping Use    Vaping status: Never Used   Substance and Sexual Activity    Alcohol use: No    Drug use: Defer                  Family History   Problem Relation Age of Onset    Diabetes Father      Hypertension Father      Heart disease Father           Review of Systems: A 12 system review of systems was completed and is negative except stated in HPI.         Objective[]Expand by Default  /95   Pulse 70   Temp 98.2 °F (36.8 °C)   Resp 22   Ht 195.6 cm (77\")   Wt (!) 204 kg (449 lb)   SpO2 100%   BMI 53.24 kg/m²      Physical Exam:  Constitutional:       Appearance: Well-developed. Morbidly obese.   Eyes:      Conjunctiva/sclera: Conjunctivae normal.      Pupils: Pupils are equal, round, and reactive to light.   HENT:      Head: Normocephalic and atraumatic.   Neck:      Thyroid: No thyromegaly.      Vascular: No JVD.   Pulmonary:      Effort: Pulmonary effort is normal.      Breath sounds: Normal breath sounds. No wheezing. No rales.   Cardiovascular:      Normal rate. Regular rhythm.      Murmurs: There is no murmur.   Edema:     Peripheral edema absent.   Abdominal:      General: Bowel sounds are normal. There is no distension.      Palpations: Abdomen is soft.      Tenderness: There is no abdominal tenderness.   Musculoskeletal: Normal range of motion.      Cervical back: Normal range of motion and neck supple. Skin:     General: Skin is warm and dry.      Findings: No rash.   Neurological:      Mental Status: Alert and oriented to person, place, and time.      Coordination: Coordination normal.   Psychiatric:         Behavior: Behavior normal.                  Lab Results   Component Value Date     TROPONINT 740 (C) " "05/31/2024            Lab Results   Component Value Date     GLUCOSE 101 (H) 05/31/2024     CALCIUM 9.1 05/31/2024      05/31/2024     K 4.2 05/31/2024     CO2 27.0 05/31/2024      05/31/2024     BUN 11 05/31/2024     CREATININE 0.87 05/31/2024     BCR 12.6 05/31/2024     ANIONGAP 8.0 05/31/2024            Lab Results   Component Value Date     WBC 7.75 05/31/2024     HGB 15.2 05/31/2024     HCT 46.9 05/31/2024     MCV 85.0 05/31/2024      05/31/2024            Lab Results   Component Value Date     CHOL 176 05/31/2024            Lab Results   Component Value Date     TRIG 89 05/31/2024            Lab Results   Component Value Date     HDL 38 (L) 05/31/2024      No components found for: \"LDLCALC\"        Lab Results   Component Value Date      (H) 05/31/2024               Assessment:  1.  Non-STEMI: Initial troponin 655 with repeat at 740.  Currently chest pain-free.  No acute EKG changes.  2.  Morbid obesity: Body mass index is 53.24 kg/m².   3.  Essential hypertension  4.  Mixed hyperlipidemia: Lipid panel today shows mildly elevated LDL with low HDL.  5.  Prediabetes  6.  Obstructive sleep apnea     Plan:  -Urgent cardiac catheterization today.  -Patient given aspirin and Lovenox in the emergency room.  -Start high intensity statin.  -Further management following heart catheterization.                  Barrera, Babita L, APRN   Nurse Practitioner  Cardiology     Progress Notes     Attested     Date of Service: 06/01/24 0925  Creation Time: 06/01/24 0925     Attested           Attestation signed by Chip Roberts MD at 06/01/24 1120     Patient was seen, evaluated, and examined with KURT.  I agree with assessment and plan as documented.  This note compliments hers.         CC: chest pain     S: Patient reports being chest pain-free at this time.  No other new complaints or issues.  He has questions regarding possible CABG.     Vital signs reviewed:  Gen: A&Ox3, NAD  CV: RRR w/o " "m/r/g  Chest: CTA bilateral, normal respiratory effort  Abdomen: Soft, morbidly obese, nontender, bowel sounds positive     Labs and diagnostic studies reviewed  -[facility] (5/31/2024) severe multivessel coronary artery disease     Assessment:  1.  Non-STEMI: Initial troponin 655 with repeat at 740.  Currently chest pain-free.  No acute EKG changes.  2.  Morbid obesity: Body mass index is 53.24 kg/m².   3.  Essential hypertension  4.  Mixed hyperlipidemia: Lipid panel today shows mildly elevated LDL with low HDL.  5.  Prediabetes  6.  Obstructive sleep apnea  7.  Gout     Plan:  -CT surgery evaluating for CABG.  -Continue aspirin, atorvastatin, therapeutic enoxaparin, and carvedilol.            Expand All Collapse All    [facility] -  Progress Note      LOS: 1 day   Patient Care Team:  Giovani Lujan DO as PCP - General (Internal Medicine)     Chief Complaint: F/U CAD        Subjective  Resting in bed, family at bedside. Denies any chest pain or shortness of breath. \"I feel like I can go and cut down a tree.\"        REVIEW OF SYSTEMS:  Constitutional: Denies fever or chills. Denies change in energy level or malaise.  HEENT: Denies headaches or visual disturbances. Denies difficulty swallowing or sore throat.  Respiratory: Denies cough or hoarseness. Denies shortness of breath.   Cardiovascular: Denies chest pain or pressure. Denies palpitations. Denies presyncope/syncope. Denies orthopnea/PND. Denies lower extremity edema.   Gastrointestinal: Denies abdominal pain. Denies nausea/vomiting. Denies change in bowel habits or history of recent GI tract blood loss.   Genitourinary: Denies urinary urgency or frequency. Denies dysuria, hematuria.  Musculoskeletal: Denies pain or swelling in joints.  Neurological: Denies paresthesias. Denies headache. Denies seizure or stroke symptoms.  Behavioral/Psych: Denies problems with anxiety or depression.     All other systems are negative except where stated " above.              Objective[]Expand by Default  Vital Sign Min/Max for last 24 hours  Temp  Min: 97.5 °F (36.4 °C)  Max: 98.4 °F (36.9 °C)   BP  Min: 97/53  Max: 148/77   Pulse  Min: 65  Max: 97   Resp  Min: 15  Max: 79   SpO2  Min: 96 %  Max: 100 %   No data recorded   No data recorded      Vitals             05/31/24  0752   Weight: (!) 204 kg (449 lb)               Intake/Output Summary (Last 24 hours) at 6/1/2024 0926  Last data filed at 6/1/2024 0856      Gross per 24 hour   Intake 1560 ml   Output 450 ml   Net 1110 ml            Physical Exam:     General Appearance:   Alert, cooperative, in no acute distress. Morbidly obese.   Head:   Normocephalic. Atraumatic. DAVID.   Lungs:   Clear to auscultation, respirations regular, even and unlabored    Heart:   Regular rhythm and normal rate, normal S1 and S2, no murmur, no gallop, no rub, no click   Abdomen:   Normal bowel sounds, obese, soft, non-tender, non-distended   Extremities:   Moves all extremities, no edema, no cyanosis, no redness   Pulses:   Pulses palpable and equal bilaterally   Skin:   No bleeding, bruising or rash. Right radial puncture site without evidence of bleeding or hematoma. Strong radial pulse. Strong hand .    Neurologic:   Grossly intact            Results Review:   Lab Results (last 72 hours)         Procedure Component Value Units Date/Time     Basic Metabolic Panel [[id number]]  (Normal) Collected: 06/01/24 0343     Specimen: Blood Updated: 06/01/24 0526       Glucose 97 mg/dL         BUN 13 mg/dL         Creatinine 0.85 mg/dL         Sodium 139 mmol/L         Potassium 4.4 mmol/L         Chloride 104 mmol/L         CO2 26.0 mmol/L         Calcium 8.9 mg/dL         BUN/Creatinine Ratio 15.3       Anion Gap 9.0 mmol/L         eGFR 110.6 mL/min/1.73       Narrative:       GFR Normal >60  Chronic Kidney Disease <60  Kidney Failure <15        CBC (No Diff) [[id number]]  (Normal) Collected: 06/01/24 0343     Specimen: Blood Updated:  06/01/24 0504       WBC 5.92 10*3/mm3         RBC 5.56 10*6/mm3         Hemoglobin 15.3 g/dL         Hematocrit 48.3 %         MCV 86.9 fL         MCH 27.5 pg         MCHC 31.7 g/dL         RDW 13.7 %         RDW-SD 44.2 fl         MPV 9.5 fL         Platelets 259 10*3/mm3       Hemoglobin A1c [[id number]]  (Normal) Collected: 05/31/24 0814     Specimen: Blood Updated: 05/31/24 1116       Hemoglobin A1C 5.60 %       Narrative:       Hemoglobin A1C Ranges:     Increased Risk for Diabetes  5.7% to 6.4%  Diabetes                     >= 6.5%  Diabetic Goal                < 7.0%     Lipid Panel [[id number]]  (Abnormal) Collected: 05/31/24 1014     Specimen: Blood from Arm, Right Updated: 05/31/24 1116       Total Cholesterol 176 mg/dL         Triglycerides 89 mg/dL         HDL Cholesterol 38 mg/dL         LDL Cholesterol  121 mg/dL         VLDL Cholesterol 17 mg/dL         LDL/HDL Ratio 3.16     Narrative:       Cholesterol Reference Ranges  (U.S. Department of Health and Human Services ATP III Classifications)     Desirable          <200 mg/dL  Borderline High    200-239 mg/dL  High Risk          >240 mg/dL        Triglyceride Reference Ranges  (U.S. Department of Health and Human Services ATP III Classifications)     Normal           <150 mg/dL  Borderline High  150-199 mg/dL  High             200-499 mg/dL  Very High        >500 mg/dL     HDL Reference Ranges  (U.S. Department of Health and Human Services ATP III Classifications)     Low     <40 mg/dl (major risk factor for CHD)  High    >60 mg/dl ('negative' risk factor for CHD)           LDL Reference Ranges  (U.S. Department of Health and Human Services ATP III Classifications)     Optimal          <100 mg/dL  Near Optimal     100-129 mg/dL  Borderline High  130-159 mg/dL  High             160-189 mg/dL  Very High        >189 mg/dL     High Sensitivity Troponin T 2Hr [[id number]]  (Abnormal) Collected: 05/31/24 1014     Specimen: Blood from Arm, Right Updated:  05/31/24 1046       HS Troponin T 740 ng/L         Troponin T Delta 85 ng/L       Narrative:       High Sensitive Troponin T Reference Range:  <14.0 ng/L- Negative Female for AMI  <22.0 ng/L- Negative Male for AMI  >=14 - Abnormal Female indicating possible myocardial injury.  >=22 - Abnormal Male indicating possible myocardial injury.   Clinicians would have to utilize clinical acumen, EKG, Troponin, and serial changes to determine if it is an Acute Myocardial Infarction or myocardial injury due to an underlying chronic condition.           High Sensitivity Troponin T [[id number]]  (Abnormal) Collected: 05/31/24 0814     Specimen: Blood Updated: 05/31/24 0845       HS Troponin T 655 ng/L       Narrative:       High Sensitive Troponin T Reference Range:  <14.0 ng/L- Negative Female for AMI  <22.0 ng/L- Negative Male for AMI  >=14 - Abnormal Female indicating possible myocardial injury.  >=22 - Abnormal Male indicating possible myocardial injury.   Clinicians would have to utilize clinical acumen, EKG, Troponin, and serial changes to determine if it is an Acute Myocardial Infarction or myocardial injury due to an underlying chronic condition.           Comprehensive Metabolic Panel [[id number]]  (Abnormal) Collected: 05/31/24 0814     Specimen: Blood Updated: 05/31/24 0845       Glucose 101 mg/dL         BUN 11 mg/dL         Creatinine 0.87 mg/dL         Sodium 139 mmol/L         Potassium 4.2 mmol/L         Comment: Slight hemolysis detected by analyzer. Result may be falsely elevated.          Chloride 104 mmol/L         CO2 27.0 mmol/L         Calcium 9.1 mg/dL         Total Protein 7.0 g/dL         Albumin 4.0 g/dL         ALT (SGPT) 37 U/L         AST (SGOT) 50 U/L         Alkaline Phosphatase 79 U/L         Total Bilirubin 0.4 mg/dL         Globulin 3.0 gm/dL         A/G Ratio 1.3 g/dL         BUN/Creatinine Ratio 12.6       Anion Gap 8.0 mmol/L         eGFR 110.5 mL/min/1.73       Narrative:       GFR Normal  ">60  Chronic Kidney Disease <60  Kidney Failure <15        D-dimer, Quantitative [[id number]]  (Abnormal) Collected: 05/31/24 0814     Specimen: Blood Updated: 05/31/24 0844       D-Dimer, Quantitative 0.63 MCGFEU/mL       Narrative:       According to the assay 's published package insert, a normal (<0.50 MCGFEU/mL) D-dimer result in conjunction with a non-high clinical probability assessment, excludes deep vein thrombosis (DVT) and pulmonary embolism (PE) with high sensitivity.     D-dimer values increase with age and this can make VTE exclusion of an older population difficult. To address this, the American College of Physicians, based on best available evidence and recent guidelines, recommends that clinicians use age-adjusted D-dimer thresholds in patients greater than 50 years of age with: a) a low probability of PE who do not meet all Pulmonary Embolism Rule Out Criteria, or b) in those with intermediate probability of PE.   The formula for an age-adjusted D-dimer cut-off is \"age/100\".  For example, a 60 year old patient would have an age-adjusted cut-off of 0.60 MCGFEU/mL and an 80 year old 0.80 MCGFEU/mL.     BNP [[id number]]  (Normal) Collected: 05/31/24 0814     Specimen: Blood Updated: 05/31/24 0842       proBNP 378.0 pg/mL       Narrative:       This assay is used as an aid in the diagnosis of individuals suspected of having heart failure. It can be used as an aid in the diagnosis of acute decompensated heart failure (ADHF) in patients presenting with signs and symptoms of ADHF to the emergency department (ED). In addition, NT-proBNP of <300 pg/mL indicates ADHF is not likely.     Age Range         Result Interpretation  NT-proBNP Concentration (pg/mL:        <50             Positive            >450                         Isaac                           300-450                           Negative               <300     50-75           Positive            >900                  Isaac               "  300-900                  Negative            <300        >75             Positive            >1800                  Gray                300-1800                  Negative            <300     [place] Draw [[id number]] Collected: 05/31/24 0814     Specimen: Blood Updated: 05/31/24 0830     Narrative:       The following orders were created for panel order [place] Draw.  Procedure                               Abnormality         Status                     ---------                               -----------         ------                     Green Top (Gel)[[id number]]                                  Final result               Lavender Top[[id number]]                                     Final result               Red Top[[id number]]                                          Final result               Light Blue Top[[id number]]                                   Final result                  Please view results for these tests on the individual orders.     Green Top (Gel) [[id number]] Collected: 05/31/24 0814     Specimen: Blood Updated: 05/31/24 0830       Extra Tube Hold for add-ons.       Comment: Auto resulted.        Lavender Top [[id number]] Collected: 05/31/24 0814     Specimen: Blood Updated: 05/31/24 0830       Extra Tube hold for add-on       Comment: Auto resulted        Red Top [[id number]] Collected: 05/31/24 0814     Specimen: Blood Updated: 05/31/24 0830       Extra Tube Hold for add-ons.       Comment: Auto resulted.        Light Blue Top [[id number]] Collected: 05/31/24 0814     Specimen: Blood Updated: 05/31/24 0830       Extra Tube Hold for add-ons.       Comment: Auto resulted        CBC & Differential [[id number]]  (Normal) Collected: 05/31/24 0814     Specimen: Blood Updated: 05/31/24 0825     Narrative:       The following orders were created for panel order CBC & Differential.  Procedure                               Abnormality         Status                     ---------                                -----------         ------                     CBC Auto Differential[[id number]]        Normal              Final result                  Please view results for these tests on the individual orders.     CBC Auto Differential [[id number]]  (Normal) Collected: 05/31/24 0814     Specimen: Blood Updated: 05/31/24 0825       WBC 7.75 10*3/mm3         RBC 5.52 10*6/mm3         Hemoglobin 15.2 g/dL         Hematocrit 46.9 %         MCV 85.0 fL         MCH 27.5 pg         MCHC 32.4 g/dL         RDW 13.7 %         RDW-SD 42.7 fl         MPV 9.2 fL         Platelets 280 10*3/mm3         Neutrophil % 63.3 %         Lymphocyte % 24.1 %         Monocyte % 9.2 %         Eosinophil % 2.1 %         Basophil % 0.9 %         Immature Grans % 0.4 %         Neutrophils, Absolute 4.91 10*3/mm3         Lymphocytes, Absolute 1.87 10*3/mm3         Monocytes, Absolute 0.71 10*3/mm3         Eosinophils, Absolute 0.16 10*3/mm3         Basophils, Absolute 0.07 10*3/mm3         Immature Grans, Absolute 0.03 10*3/mm3         nRBC 0.0 /100 WBC                     Medication Review: yes  Current Medications             Current Facility-Administered Medications   Medication Dose Route Frequency Provider Last Rate Last Admin    acetaminophen (TYLENOL) tablet 650 mg  650 mg Oral Q4H PRN Chip Roberts MD        aluminum-magnesium hydroxide-simethicone (MAALOX MAX) 400-400-40 MG/5ML suspension 15 mL  15 mL Oral Q6H PRN Chip Roberts MD        amLODIPine (NORVASC) tablet 5 mg  5 mg Oral Q24H Chip Roberts MD   5 mg at 06/01/24 0904     And    lisinopril (PRINIVIL,ZESTRIL) tablet 10 mg  10 mg Oral Q24H Chip Roberts MD   10 mg at 06/01/24 0904    aspirin chewable tablet 81 mg  81 mg Oral Daily Chip Roberts MD   81 mg at 06/01/24 0904    atorvastatin (LIPITOR) tablet 40 mg  40 mg Oral Nightly Chip Roberts MD   40 mg at 05/31/24 2022    carvedilol (COREG) tablet 3.125 mg  3.125 mg Oral BID With Meals Chip Roberts MD   3.125 mg at  06/01/24 0904    colchicine tablet 0.6 mg  0.6 mg Oral Daily PRN Chip Roberts MD   0.6 mg at 05/31/24 2340    Enoxaparin Sodium (LOVENOX) syringe 150 mg  0.7 mg/kg Subcutaneous Q12H Chip Roberts MD   150 mg at 06/01/24 0905    famotidine (PEPCID) tablet 20 mg  20 mg Oral Daily Chip Roberts MD   20 mg at 06/01/24 0904    magnesium hydroxide (MILK OF MAGNESIA) suspension 10 mL  10 mL Oral Daily PRN Chip Roberts MD        nicotine polacrilex (NICORETTE) gum 2 mg  2 mg Mouth/Throat Q1H PRN Chip Roberts MD        nitroglycerin (NITROSTAT) SL tablet 0.4 mg  0.4 mg Sublingual Q5 Min PRN Chip Roberts MD        ondansetron ODT (ZOFRAN-ODT) disintegrating tablet 4 mg  4 mg Translingual Q8H PRN Chip Roberts MD        sodium chloride 0.9 % flush 10 mL  10 mL Intravenous PRN Chip Roberts MD        temazepam (RESTORIL) capsule 15 mg  15 mg Oral Nightly PRN Chip Roberts MD                         Assessment & Plan  NSTEMI (non-ST elevated myocardial infarction) associated with coronary artery disease - CT surgery consulted. Currently on ASA 81 mg daily, carvedilol 3.125 mg BID, Lotrel 5-10 mg daily, and atorvastatin 40 mg nightly. Patient is pain free. Await CT surgery recommendations.     Primary hypertension - controlled with carvedilol and Lotrel. Continue to monitor.     Mixed hyperlipidemia - on high-intensity statin    Gastroesophageal reflux disease without esophagitis    Class 3 severe obesity due to excess calories with serious comorbidity and body mass index (BMI) of 50.0 to 59.9 in adult    SHERRON (obstructive sleep apnea)        Patient is stable from cardiology standpoint. Awaiting CT surgery evaluation and recommendations.         KURT Welsh  06/01/24  09:26 CDT                                 Cosigned by: Chip Roberts MD at 06/01/24 1120     Al Leonard, [facility]   Pharmacist  Pharmacy     Progress Notes     Signed     Date of Service: 06/01/24 1223  Creation  "Time: 06/01/24 1223     Signed         Pharmacy Dosing Service  Anticoagulant  Enoxaparin     Assessment/Action/Plan:  Continues on Lovenox 0.7 mg/kg q 12hrs for ACS.  Dose adjusted due to BMI >50. Ordered Anti-Factor Xa level, due at 1300 6/2/24.  Labs reviewed.  Renal function reviewed.  Continue as ordered.      Subjective:  Constantin Seals is a 44 y.o. male on Enoxaparin 0.7 mg SQ every 12 hours for indication of ACS.  Objective:  [Ht: 195.6 cm (77\"); Wt: (!) 204 kg (449 lb); BMI: Body mass index is 53.24 kg/m².]  Estimated Creatinine Clearance: 214 mL/min (by C-G formula based on SCr of 0.85 mg/dL). No results found for: \"DDIMER\" No results found for: \"INR\", \"PROTIME\"         Lab Results   Component Value Date     HGB 15.3 06/01/2024     HGB 15.2 05/31/2024     HGB 13.9 06/06/2022            Lab Results   Component Value Date      06/01/2024      05/31/2024      06/06/2022         L Shaq Leonard, [facility]  06/01/24 12:22 CDT                         Sean Trejo MD   Physician  Cardiothoracic Surgery     Consults     Signed     Date of Service: 06/01/24 1401  Creation Time: 06/01/24 1401  Consult Orders   Inpatient Cardiothoracic Surgery Consult [[id number]] ordered by Chip Roberts MD at 05/31/24 1809          Signed       Expand All Collapse All    History and Physical   Cardiothoracic Surgeon          Chief Complaint   Patient presents with    Chest Pain               Subjective  History of Present Illness     Mr. Seals is a 44-year-old male with salient past medical history of prediabetes, hypertension, tobacco use, morbid obesity, sedentary lifestyle, previous COVID 19 infection, gout, and a family history of heart disease presents with chest pain for couple days prior to hospitalization.  Wife thought this was similar to his reflux disease symptoms he did have recurrent symptoms that ultimately he decided present to [facility] for further evaluation.  He did rule in for a " non-STEMI.  Further workup identified multivessel coronary disease.  He is currently taking Ozempic for weight loss and reports losing 70 pounds thus far.  His last dose of Ozempic was taken last Tuesday night.  With the aforementioned I have been asked to evaluate for consideration of coronary artery bypass grafting.  He is chest pain-free.        Review of Systems   Constitutional:  Positive for activity change and fatigue. Negative for appetite change, chills, diaphoresis, fever and unexpected weight change.   HENT:  Negative for dental problem, hearing loss, nosebleeds, sore throat, trouble swallowing and voice change.    Eyes:  Negative for photophobia, redness and visual disturbance.   Respiratory:  Positive for shortness of breath. Negative for apnea, cough, chest tightness, wheezing and stridor.    Cardiovascular:  Positive for chest pain and leg swelling (Right greater than left). Negative for palpitations.   Gastrointestinal:  Negative for abdominal distention, abdominal pain, blood in stool, constipation, diarrhea, nausea and vomiting.   Endocrine: Negative for cold intolerance, heat intolerance, polyphagia and polyuria.   Genitourinary:  Negative for decreased urine volume, difficulty urinating, dysuria, flank pain, frequency, hematuria and urgency.   Musculoskeletal:  Negative for arthralgias, back pain, gait problem, joint swelling, myalgias and neck pain.   Skin:  Negative for pallor, rash and wound.   Allergic/Immunologic: Negative for immunocompromised state.   Neurological:  Negative for dizziness, tremors, seizures, syncope, speech difficulty, weakness, light-headedness, numbness and headaches.   Hematological:  Does not bruise/bleed easily.   Psychiatric/Behavioral:  Negative for confusion, sleep disturbance and suicidal ideas. The patient is not nervous/anxious.             Medical History        Past Medical History:   Diagnosis Date    COVID      GERD (gastroesophageal reflux disease)       Gout      Heartburn      Hypertension      Obesity           Surgical History         Past Surgical History:   Procedure Laterality Date    DISTAL BICEPS TENDON REPAIR Right 6/7/2022     Procedure: RIGHT DISTAL BICEPS TENDON REPAIR WITH SEMI-T ALLOGRAFT;  Surgeon: Benjamin العراقي MD;  Location: [facility] OR;  Service: Orthopedics;  Laterality: Right;    ELBOW PROCEDURE        EYE SURGERY Right 1984               Family History   Problem Relation Age of Onset    Diabetes Father      Hypertension Father      Heart disease Father        Social History   Social History            Tobacco Use    Smoking status: Never    Smokeless tobacco: Current       Types: Chew   Vaping Use    Vaping status: Never Used   Substance Use Topics    Alcohol use: No    Drug use: Defer         Prescriptions Prior to Admission           Medications Prior to Admission   Medication Sig Dispense Refill Last Dose    amLODIPine-benazepril (LOTREL) 10-40 MG per capsule Take 1 capsule by mouth Daily.     5/30/2024    colchicine 0.6 MG tablet Take 1 tablet by mouth Daily As Needed (gout flare up).          Ozempic, 2 MG/DOSE, 8 MG/3ML solution pen-injector Inject 2 mg under the skin into the appropriate area as directed 1 (One) Time Per Week.     5/30/2024    diclofenac (VOLTAREN) 75 MG EC tablet Take 1 tablet by mouth 2 (Two) Times a Day As Needed.          raNITIdine HCl (ZANTAC PO) Take 1 tablet by mouth Daily As Needed.     More than a month         Allergies:  Patient has no known allergies.           Objective[]Expand by Default  Vital Signs  Temp:  [97.5 °F (36.4 °C)-98.4 °F (36.9 °C)] 98 °F (36.7 °C)  Heart Rate:  [62-80] 62  Resp:  [15-79] 16  BP: ()/(53-89) 152/89     Physical Exam  Constitutional:       Appearance: He is well-developed. He is obese.   HENT:      Head: Normocephalic and atraumatic.   Eyes:      Pupils: Pupils are equal, round, and reactive to light.   Neck:      Thyroid: No thyromegaly.      Vascular: No JVD.       Trachea: No tracheal deviation.   Cardiovascular:      Rate and Rhythm: Normal rate and regular rhythm.      Heart sounds: Normal heart sounds. No murmur heard.     No friction rub. No gallop.   Pulmonary:      Effort: Pulmonary effort is normal. No respiratory distress.      Breath sounds: Normal breath sounds. No wheezing or rales.   Chest:      Chest wall: No tenderness.   Abdominal:      General: There is no distension.      Palpations: Abdomen is soft.      Tenderness: There is no abdominal tenderness.   Musculoskeletal:         General: Normal range of motion.      Cervical back: Normal range of motion and neck supple.      Right lower leg: Edema present.      Left lower leg: Edema present.   Lymphadenopathy:      Cervical: No cervical adenopathy.   Skin:     General: Skin is warm and dry.   Neurological:      Mental Status: He is alert and oriented to person, place, and time.      Cranial Nerves: No cranial nerve deficit.            Results Review:   Cardiac Catheterization/Vascular Study     Result Date: 5/31/2024  Narrative: Date: 5/31/2024  Procedures: 1. Selective coronary angiography 2. Monitoring of conscious sedation  Indication: NSTEMI  Attending: Chip Roberts  Risks, benefits, and alternatives discussed with the patient and/or family.  Plan is for moderate sedation, and the patient agrees to proceed with the procedure.  An immediate assessment was done prior to the administration of moderate sedation. Procedure Description: The patient was brought to the cardiac catheterization lab in a fasting state. Informed consent was obtained after explaining the risks, benefits, alternatives of the procedure. The patient was sterilely prepped and draped for right radial artery access usual fashion by the cath lab staff. Time out was taken to confirm the correct patient, site, procedure. The area over the right radial artery was anesthetized with 1% lidocaine. A micropuncture needle was used to puncture the right  radial artery resulting bright red pulsatile arterial blood flow. Following this a 5 Uzbek glide sheath was placed and the sheath was aspirated and flushed. Next, a 5 Kiswahili Zoya diagnostic catheter was advanced under fluoroscopic guidance into the ascending aorta and selectively engaged into the right coronary artery.  A cineogram was obtained via power injection of contrast in [place]an projection.  The catheter was unable to engage the left main coronary artery and was exchanged for a 5 Uzbek JL 3.5 diagnostic coronary catheter.  This was selectively engaged into the left main coronary artery under fluoroscopic guidance.  Multiple cineograms were obtained via power injection of contrast in multiple orthogonal views.  The catheter was then removed and the sheath was aspirated and flushed.  A TR band was then placed and adequate hemostasis was obtained.  The patient was then transferred to cardiac observation unit in stable condition.  There is no early complications noted.  I supervised the administration of conscious sedation by nursing staff throughout the case.  First dose was given at 15:02 and the end of my face-to-face encounter was at 15:23, accounting for a total of 21 minutes of supervision.  During the case, continuous pulse oximetry, heart rate, blood pressure, and patient status were monitored. Total contrast: 98 ml Fluoro time: 4 minutes Radiation: 395 mGy Estimated Blood Loss: < 20 ml Specimens: None Complications: None  Findings: Selective coronary angiography: 1. Left main: The left main bifurcates into the LAD and left circumflex arteries.  There is mild distal narrowing. 2. Left anterior descending artery: The LAD runs in the interventricular groove giving off 1 significant bifurcating diagonal and multiple septal perforators before wrapping around the apex as an apical recurrent branch.  There is a severe 70 to 80% stenosis in the proximal vessel. 3. Left circumflex artery: The left circumflex is  nondominant for posterior circulation.  It gives off 1 small proximal obtuse marginal branch and a second moderate-sized distal branch.  Obtuse marginal branches.  There is a moderate to severe 60 to 70% stenosis in the proximal portion of the larger OM. 4. Right coronary artery: The RCA is dominant for posterior circulation giving rise to PDA and right PL branches.  The RCA is chronically totally occluded proximally.  The PDA and right PL branches fill via left to right collaterals.  Impression: 1.  Severe multi-vessel coronary artery disease as described above.  Plan: -CT surgery consult for evaluation of CABG. -Admit to telemetry. -Check echo -Optimize medical therapy -Routine post-cath care and TR band removal. Chip Roberts MD     XR Chest 1 View     Result Date: 5/31/2024  Narrative: EXAM: XR CHEST 1 VW-  DATE: 5/31/2024 7:13 AM  HISTORY: Chest Pain Protocol   COMPARISON: None available.  TECHNIQUE:  Frontal view(s) of the chest submitted.  FINDINGS:  No pneumothorax, pleural effusion or focal consolidation. Cardiac mediastinal silhouette within normal limits. No acute bony finding.        Impression: 1. No acute cardiopulmonary findings.  This report was signed and finalized on 5/31/2024 8:25 AM by Kehinde Rios.                         I reviewed the patient's new clinical results.  Discussed with patient and family(wife, mother and father)              Assessment & Plan    NSTEMI (non-ST elevated myocardial infarction)    Class 3 severe obesity due to excess calories with serious comorbidity and body mass index (BMI) of 50.0 to 59.9 in adult    Primary hypertension    Gastroesophageal reflux disease without esophagitis    SHERRON (obstructive sleep apnea)    Coronary artery disease involving native coronary artery of native heart with unstable angina pectoris  Prediabetes  Use of Ozempic        I discussed the patients findings and my recommendations with patient and family.  We discussed the options for  treatment of coronary artery disease to include medical therapy, coronary stenting, and surgical revascularization.  We discussed the pros and cons of each option and how it pertains to the current case.  The STS Risk score was calculated using the STS Risk Calculator and discussed with the patient with available data.  Coronary artery bypass grafting is best option given patient's findings and risk factors.  We discussed the operative conduct and expected hospital and outpatient recovery.  Risks were discussed to include but not limited to bleeding, infection, stroke, heart attack, need for additional procedures, anesthesia risk, organ dysfunction and/or failure, prolonged mechanical ventilation, prolonged ICU stay, chronic pain syndromes, sternal nonunion, and/or death.  It is acknowledged that he has significant morbid obesity with a BMI of 53 and his weight we will have anticipated difficulty providing potentially full support while on cardiopulmonary bypass which may result in increased morbidity and mortality.  In the setting of an MI this only further increases his risk.  While he will be considered a moderate to high risk candidate for surgical revascularization given his age, high function, previous set of efforts to improve his overall wellbeing the benefits of CABG in a prediabetic outweigh anticipated risks.    We discussed the need to utilize all medical treatments additionally prescribed post surgery.       We discussed that he will require his beard to be trimmed given the length it exists at this time he is agreeable to do so  The patient and family understands the risks, benefits, and alternatives and he wishes to proceed forward with surgery.     I favor CABG with sternal plating/Prevena application end of next week.  As he did use Ozempic on Tuesday evening it is recommended to prevent time for washout of Ozempic.  1 to 2 weeks is the current consideration of time.  As he remains chest pain-free  "we will shoot for a timeline between 1 to 2 weeks from last use of Ozempic     Sean Trejo MD  06/01/24  14:01 CDT                         Sean Trejo MD   Physician  Cardiothoracic Surgery     Progress Notes     Signed     Date of Service: 06/02/24 1540  Creation Time: 06/02/24 1540     Signed             Chief Complaint   Patient presents with    Chest Pain         Chest pain last night and felt like he ate ice cream.  His chest pain that prompted him to present to the emergency room was similar but also had left arm pain.  No shortness of breath.  He did take 1 sublingual nitroglycerin.  This relieved this.    Otherwise no complaints.       Visit Vitals  /79 (BP Location: Right arm, Patient Position: Lying)   Pulse 77   Temp 97.9 °F (36.6 °C) (Oral)   Resp 16   Ht 195.6 cm (77\")   Wt (!) 204 kg (449 lb)   SpO2 99%   BMI 53.24 kg/m²            Intake/Output Summary (Last 24 hours) at 6/2/2024 1541  Last data filed at 6/2/2024 1410      Gross per 24 hour   Intake 1020 ml   Output 1875 ml   Net -855 ml         Physical Exam:     General: No acute distress, in good spirits, obese.  With beard  Cardiovascular: RRR, no murmur, rubs, or gallops.    Pulmonary: Clear to auscultation bilaterally, no wheezing, rubs, or rales.  Abdomen: Soft, nondistended, and nontender.  Extremities: Warm, moves all extremities.  Neurologic:  No focal deficits, CN II-XII intact grossly.          Data:  Interpretation Summary          Left ventricular systolic function is normal. Left ventricular ejection fraction appears to be 61 - 65%.    Left ventricular wall thickness is consistent with mild concentric hypertrophy.    Left ventricular diastolic function was normal.    Normal right ventricular cavity size and systolic function noted.    There is no significant (greater than mild) valvular dysfunction.     Cardiac History     Diagnosis Date Comment Source   Hypertension            Social History     Tobacco Use   "   Never   Smokeless Tobacco: Current user of smokeless tobacco; Types: Chew.      Vaping Use     Never used      Family Cardiac History as of 6/2/2024  Pedigree Problem Relation Age of Onset   Diabetes Father     Heart disease Father     Hypertension Father        Additional Study Details     Study Quality The study is technically adequate for diagnosis.      Echocardiogram Findings     Left Ventricle Left ventricular systolic function is normal. Left ventricular ejection fraction appears to be 61 - 65%.   Global longitudinal LV strain (GLS) = -16.7%. Left ventricle strain data was reviewed by the physician and found to be accurate. Normal left ventricular cavity size noted. Left ventricular wall thickness is consistent with mild concentric hypertrophy. All left ventricular wall segments contract normally. Left ventricular diastolic function was normal.   Right Ventricle Normal right ventricular cavity size and systolic function noted.   Left Atrium The left atrial cavity is borderline dilated.   Right Atrium Normal right atrial cavity size noted. Right atrial volume is 101 ml.   Aortic Valve The aortic valve is structurally normal with no stenosis present. The aortic valve appears trileaflet. No significant aortic valve regurgitation is present.   Mitral Valve The mitral valve is structurally normal with no significant stenosis present. No significant mitral valve regurgitation is present.   Tricuspid Valve The tricuspid valve is structurally normal with no significant regurgitation or significant stenosis present. Insufficient TR velocity profile to estimate the right ventricular systolic pressure.   Pulmonic Valve The pulmonic valve is grossly normal in structure. There is no significant pulmonic valve regurgitation present. There is no pulmonic valve stenosis present.   Greater Vessels No dilation of the aortic root is present. The inferior vena cava is normally sized. Normal IVC inspiratory collapse of greater  than 50% noted.   Pericardium The pericardium is normal. There is no evidence of pericardial effusion. .                LV Measurements          Dimensions   LVIDd 5.2 cm           IVSd 1.17 cm           FS 32.7 %           ESV(cubed) 44.2 ml           EDV(cubed) 144.7 ml           LVOT area 4.6 cm2           LVOT diam 2.42 cm           EDV(MOD-sp2) 135.6 ml           ESV(MOD-sp2) 68.2 ml           Diastolic Filling   MV E max niall 63.3 cm/sec           MV A max niall 39.9 cm/sec           MV dec time 0.23 sec           MV E/A 1.58           Pulm A Revs Dur 0.08 sec           LA ESV Index (BP) 27 ml/m2           Med Peak E' Niall 12 cm/sec           Lat Peak E' Niall 14 cm/sec           Avg E/e' ratio 4.87           Shunt Ratio   SV(LVOT) 95.5 ml                 Dimensions   LVIDs 3.5 cm           LVPWd 1.08 cm           IVS/LVPW 1.09 cm           LV Sys Vol (BSA corrected) 20.2 cm2           LV Chua Vol (BSA corrected) 53.6 cm2           LV mass(C)d 231.3 grams           EDV(MOD-sp4) 169.1 ml           ESV(MOD-sp4) 63.7 ml           Systolic Function   SV(MOD-sp2) 67.4 ml           SV(MOD-sp4) 105.4 ml           SVi(MOD-SP2) 21.4 ml/m2           SVi(MOD-SP4) 33.4 ml/m2           SVi (LVOT) 30.3 ml/m2           EF(MOD-sp2) 49.7 %           EF(MOD-sp4) 62.3 %           EF(MOD-bp) 56 %                    Right Ventricle Measurements     RVIDd 3.9 cm          RV Base 4 cm          RV Mid 4.4 cm          RV Length 9.8 cm           TAPSE (>1.6) 2.5 cm          RV S' 14 cm/sec                   LA Measurements          LA Dimensions   LA dimension (2D) 4.2 cm           LA ESV (BP) 82 ml           LA ESV Index (BP) 27 ml/m2                 Pulmonary Veins   Pulm A Revs Niall 21.9 cm/sec           Pulm A Revs Dur 0.08 sec                    Aortic Valve Measurements          Stenosis   LVOT diam 2.42 cm           LV V1 max 104.4 cm/sec           LV V1 max PG 4.4 mmHg           LV V1 mean PG 2.36 mmHg           LV V1 VTI 20.8 cm            Ao pk donovan 122.4 cm/sec                 Stenosis   Ao max PG 6 mmHg           Ao mean PG 3.6 mmHg           Ao V2 VTI 23.4 cm           SHARMAINE(I,D) 4.1 cm2                    Mitral Valve Measurements          Stenosis   MV max PG 2.7 mmHg           MV mean PG 0.99 mmHg           MV V2 VTI 21.7 cm           MV P1/2t 66 msec           MVA(P1/2t) 3.4 cm2           MVA(VTI) 4.4 cm2           MV dec slope 279.7 cm/sec2           MV dec time 0.23 sec                         Pulmonic Valve Measurements          Stenosis   RV V1 max PG 2.28 mmHg           RV V1 max 75.5 cm/sec           RV V1 VTI 16 cm           PA V2 max 99.7 cm/sec                 Regurgitation   PI end-d donovan 108 cm/sec                    Greater Vessels Measurements     Ao root diam 3.7 cm          Ascending aorta 3.6 cm               Impression:  Coronary artery disease  Prediabetes  Obesity, class III, BMI 53  Hypertension  NSTEMI  Obstructive sleep apnea  Doubt     Medical decision-making/recommendations/plan:     Currently chest pain-free.  Continue ongoing workup with plan tentatively for coronary artery bypass grafting this upcoming Thursday.  Plan for CTA chest tomorrow and he will need full pulmonary function test pre and postbronchodilator with DLCO and ABG  Continue to hold Ozempic  All questions answered raised by both he and his wife.  Discussed that he will require trimming his beard a bit for planned sternotomy.                   Veronica Trinidad, KURT   Nurse Practitioner  Cardiothoracic Surgery     Progress Notes      Cosign Needed     Date of Service: 06/03/24 0857  Creation Time: 06/03/24 0857     Cosign Needed             Chief Complaint   Patient presents with    Chest Pain      Subjective  CC: Chest tightness     Resting in bed.  Tolerating room air.  Wife is at bedside.  No reports of chest pain overnight.  He wishes to braid his beard instead of trimming it for surgery.     Visit Vitals  /84 (BP Location: Right arm, Patient  "Position: Lying)   Pulse 72   Temp 97.9 °F (36.6 °C) (Oral)   Resp 18   Ht 195.6 cm (77\")   Wt (!) 204 kg (449 lb)   SpO2 98%   BMI 53.24 kg/m²            Intake/Output Summary (Last 24 hours) at 6/3/2024 0857  Last data filed at 6/2/2024 2300      Gross per 24 hour   Intake 720 ml   Output 2525 ml   Net -1805 ml      Lab:          Results review:     Results for orders placed during the hospital encounter of 05/31/24     Adult Transthoracic Echo Complete W/ Cont if Necessary Per Protocol     Interpretation Summary    Left ventricular systolic function is normal. Left ventricular ejection fraction appears to be 61 - 65%.    Left ventricular wall thickness is consistent with mild concentric hypertrophy.    Left ventricular diastolic function was normal.    Normal right ventricular cavity size and systolic function noted.    There is no significant (greater than mild) valvular dysfunction.     Physical Exam:     General: No acute distress, in good spirits, obese.  With beard  Cardiovascular: RRR, no murmur, rubs, or gallops.    Pulmonary: Clear to auscultation bilaterally, no wheezing, rubs, or rales.  Abdomen: Soft, nondistended, and nontender.  Extremities: Warm, moves all extremities.  Neurologic:  No focal deficits, CN II-XII intact grossly.       Impression:  Coronary artery disease  Prediabetes  Obesity, class III, BMI 53  Hypertension  NSTEMI  Obstructive sleep apnea     Continue ongoing workup with plan tentatively for coronary artery bypass grafting this upcoming Thursday.  CT angiogram of the chest, pulmonary function tests and carotid ultrasound today.  Continue to hold Ozempic  Discussed with patient and wife.     KURT Carson Kelly L, APRN   Nurse Practitioner  Cardiothoracic Surgery     Progress Notes      Cosign Needed     Date of Service: 06/03/24 0857  Creation Time: 06/03/24 0857     Cosign Needed             Chief Complaint   Patient presents with    Chest Pain    " "  Subjective  CC: Chest tightness     Resting in bed.  Tolerating room air.  Wife is at bedside.  No reports of chest pain overnight.  He wishes to braid his beard instead of trimming it for surgery.     Visit Vitals  /84 (BP Location: Right arm, Patient Position: Lying)   Pulse 72   Temp 97.9 °F (36.6 °C) (Oral)   Resp 18   Ht 195.6 cm (77\")   Wt (!) 204 kg (449 lb)   SpO2 98%   BMI 53.24 kg/m²            Intake/Output Summary (Last 24 hours) at 6/3/2024 0857  Last data filed at 6/2/2024 2300      Gross per 24 hour   Intake 720 ml   Output 2525 ml   Net -1805 ml      Lab:          Results review:     Results for orders placed during the hospital encounter of 05/31/24     Adult Transthoracic Echo Complete W/ Cont if Necessary Per Protocol     Interpretation Summary    Left ventricular systolic function is normal. Left ventricular ejection fraction appears to be 61 - 65%.    Left ventricular wall thickness is consistent with mild concentric hypertrophy.    Left ventricular diastolic function was normal.    Normal right ventricular cavity size and systolic function noted.    There is no significant (greater than mild) valvular dysfunction.     Physical Exam:     General: No acute distress, in good spirits, obese.  With beard  Cardiovascular: RRR, no murmur, rubs, or gallops.    Pulmonary: Clear to auscultation bilaterally, no wheezing, rubs, or rales.  Abdomen: Soft, nondistended, and nontender.  Extremities: Warm, moves all extremities.  Neurologic:  No focal deficits, CN II-XII intact grossly.       Impression:  Coronary artery disease  Prediabetes  Obesity, class III, BMI 53  Hypertension  NSTEMI  Obstructive sleep apnea     Continue ongoing workup with plan tentatively for coronary artery bypass grafting this upcoming Thursday.  CT angiogram of the chest, pulmonary function tests and carotid ultrasound today.  Continue to hold Ozempic  Discussed with patient and wife.     Veronica Trinidad, APRN                "       Current Facility-Administered Medications   Medication Dose Route Frequency Provider Last Rate Last Admin    acetaminophen (TYLENOL) tablet 650 mg  650 mg Oral Q4H PRN Chip Roberts MD        aluminum-magnesium hydroxide-simethicone (MAALOX MAX) 400-400-40 MG/5ML suspension 15 mL  15 mL Oral Q6H PRN Chip Roberts MD        amLODIPine (NORVASC) tablet 5 mg  5 mg Oral Q24H Chip Roberts MD   5 mg at 06/03/24 1056    And    lisinopril (PRINIVIL,ZESTRIL) tablet 10 mg  10 mg Oral Q24H Chip Roberts MD   10 mg at 06/03/24 1056    aspirin chewable tablet 81 mg  81 mg Oral Daily Chip Roberts MD   81 mg at 06/03/24 1056    atorvastatin (LIPITOR) tablet 40 mg  40 mg Oral Nightly Chip Roberts MD   40 mg at 06/02/24 2119    colchicine tablet 0.6 mg  0.6 mg Oral Daily PRN Chip Roberts MD   0.6 mg at 05/31/24 2340    Enoxaparin Sodium (LOVENOX) syringe 160 mg  160 mg Subcutaneous Q12H Chip Roberts MD   160 mg at 06/03/24 1057    famotidine (PEPCID) tablet 20 mg  20 mg Oral Daily Chip Roberts MD   20 mg at 06/03/24 1056    magnesium hydroxide (MILK OF MAGNESIA) suspension 10 mL  10 mL Oral Daily PRN Chip Roberts MD        metoprolol tartrate (LOPRESSOR) tablet 25 mg  25 mg Oral Q12H Sean Trejo MD   25 mg at 06/03/24 1056    nicotine polacrilex (NICORETTE) gum 2 mg  2 mg Mouth/Throat Q1H PRN Chip Roberts MD        nitroglycerin (NITROSTAT) SL tablet 0.4 mg  0.4 mg Sublingual Q5 Min PRN Chip Roberts MD   0.4 mg at 06/02/24 0034    ondansetron ODT (ZOFRAN-ODT) disintegrating tablet 4 mg  4 mg Translingual Q8H PRN Chip Roberts MD        sodium chloride 0.9 % flush 10 mL  10 mL Intravenous PRN Chip Roberts MD        temazepam (RESTORIL) capsule 15 mg  15 mg Oral Nightly PRN Chip Roberts MD

## 2024-06-03 NOTE — PROGRESS NOTES
"Chief Complaint   Patient presents with    Chest Pain     Subjective  CC: Chest tightness    Resting in bed.  Tolerating room air.  Wife is at bedside.  No reports of chest pain overnight.  He wishes to braid his beard instead of trimming it for surgery.    Visit Vitals  /84 (BP Location: Right arm, Patient Position: Lying)   Pulse 72   Temp 97.9 °F (36.6 °C) (Oral)   Resp 18   Ht 195.6 cm (77\")   Wt (!) 204 kg (449 lb)   SpO2 98%   BMI 53.24 kg/m²         Intake/Output Summary (Last 24 hours) at 6/3/2024 0857  Last data filed at 6/2/2024 2300  Gross per 24 hour   Intake 720 ml   Output 2525 ml   Net -1805 ml     Lab:         Results review:    Results for orders placed during the hospital encounter of 05/31/24    Adult Transthoracic Echo Complete W/ Cont if Necessary Per Protocol    Interpretation Summary    Left ventricular systolic function is normal. Left ventricular ejection fraction appears to be 61 - 65%.    Left ventricular wall thickness is consistent with mild concentric hypertrophy.    Left ventricular diastolic function was normal.    Normal right ventricular cavity size and systolic function noted.    There is no significant (greater than mild) valvular dysfunction.    Physical Exam:    General: No acute distress, in good spirits, obese.  With beard  Cardiovascular: RRR, no murmur, rubs, or gallops.    Pulmonary: Clear to auscultation bilaterally, no wheezing, rubs, or rales.  Abdomen: Soft, nondistended, and nontender.  Extremities: Warm, moves all extremities.  Neurologic:  No focal deficits, CN II-XII intact grossly.      Impression:  Coronary artery disease  Prediabetes  Obesity, class III, BMI 53  Hypertension  NSTEMI  Obstructive sleep apnea    Continue ongoing workup with plan tentatively for coronary artery bypass grafting this upcoming Thursday.  CT angiogram of the chest, pulmonary function tests and carotid ultrasound today.  Continue to hold Ozempic  Discussed with patient and " wife.    Veronica Trinidad, APRN

## 2024-06-04 ENCOUNTER — APPOINTMENT (OUTPATIENT)
Dept: PULMONOLOGY | Facility: HOSPITAL | Age: 44
End: 2024-06-04
Payer: COMMERCIAL

## 2024-06-04 LAB
ARTERIAL PATENCY WRIST A: POSITIVE
ATMOSPHERIC PRESS: 747 MMHG
BASE EXCESS BLDA CALC-SCNC: 0.8 MMOL/L (ref 0–2)
BDY SITE: NORMAL
BODY TEMPERATURE: 37
HCO3 BLDA-SCNC: 25.1 MMOL/L (ref 20–26)
Lab: NORMAL
MODALITY: NORMAL
PCO2 BLDA: 38.4 MM HG (ref 35–45)
PCO2 TEMP ADJ BLD: 38.4 MM HG (ref 35–45)
PH BLDA: 7.42 PH UNITS (ref 7.35–7.45)
PH, TEMP CORRECTED: 7.42 PH UNITS (ref 7.35–7.45)
PO2 BLDA: 99.6 MM HG (ref 83–108)
PO2 TEMP ADJ BLD: 99.6 MM HG (ref 83–108)
QT INTERVAL: 366 MS
QTC INTERVAL: 455 MS
SAO2 % BLDCOA: 98.4 % (ref 94–99)
UFH PPP CHRO-ACNC: 1.16 IU/ML
VENTILATOR MODE: NORMAL

## 2024-06-04 PROCEDURE — 94726 PLETHYSMOGRAPHY LUNG VOLUMES: CPT | Performed by: INTERNAL MEDICINE

## 2024-06-04 PROCEDURE — 94060 EVALUATION OF WHEEZING: CPT | Performed by: INTERNAL MEDICINE

## 2024-06-04 PROCEDURE — 94726 PLETHYSMOGRAPHY LUNG VOLUMES: CPT

## 2024-06-04 PROCEDURE — 99232 SBSQ HOSP IP/OBS MODERATE 35: CPT

## 2024-06-04 PROCEDURE — 25010000002 ENOXAPARIN PER 10 MG: Performed by: INTERNAL MEDICINE

## 2024-06-04 PROCEDURE — 82803 BLOOD GASES ANY COMBINATION: CPT

## 2024-06-04 PROCEDURE — 85520 HEPARIN ASSAY: CPT | Performed by: INTERNAL MEDICINE

## 2024-06-04 PROCEDURE — 94729 DIFFUSING CAPACITY: CPT

## 2024-06-04 PROCEDURE — 94729 DIFFUSING CAPACITY: CPT | Performed by: INTERNAL MEDICINE

## 2024-06-04 PROCEDURE — 99232 SBSQ HOSP IP/OBS MODERATE 35: CPT | Performed by: NURSE PRACTITIONER

## 2024-06-04 PROCEDURE — 94060 EVALUATION OF WHEEZING: CPT

## 2024-06-04 RX ORDER — ALBUTEROL SULFATE 2.5 MG/3ML
2.5 SOLUTION RESPIRATORY (INHALATION) ONCE
Status: COMPLETED | OUTPATIENT
Start: 2024-06-04 | End: 2024-06-04

## 2024-06-04 RX ORDER — AMLODIPINE BESYLATE 5 MG/1
7.5 TABLET ORAL
Status: DISCONTINUED | OUTPATIENT
Start: 2024-06-05 | End: 2024-06-06

## 2024-06-04 RX ORDER — LISINOPRIL 10 MG/1
10 TABLET ORAL
Status: DISCONTINUED | OUTPATIENT
Start: 2024-06-05 | End: 2024-06-06

## 2024-06-04 RX ADMIN — METOPROLOL TARTRATE 25 MG: 50 TABLET, FILM COATED ORAL at 22:15

## 2024-06-04 RX ADMIN — ENOXAPARIN SODIUM 160 MG: 100 INJECTION SUBCUTANEOUS at 10:56

## 2024-06-04 RX ADMIN — ALBUTEROL SULFATE 2.5 MG: 2.5 SOLUTION RESPIRATORY (INHALATION) at 07:44

## 2024-06-04 RX ADMIN — ASPIRIN 81 MG CHEWABLE TABLET 81 MG: 81 TABLET CHEWABLE at 10:55

## 2024-06-04 RX ADMIN — METOPROLOL TARTRATE 25 MG: 50 TABLET, FILM COATED ORAL at 10:57

## 2024-06-04 RX ADMIN — ATORVASTATIN CALCIUM 40 MG: 40 TABLET ORAL at 22:15

## 2024-06-04 RX ADMIN — LISINOPRIL 10 MG: 10 TABLET ORAL at 10:56

## 2024-06-04 RX ADMIN — AMLODIPINE BESYLATE 5 MG: 5 TABLET ORAL at 10:56

## 2024-06-04 RX ADMIN — ENOXAPARIN SODIUM 160 MG: 100 INJECTION SUBCUTANEOUS at 22:15

## 2024-06-04 RX ADMIN — FAMOTIDINE 20 MG: 20 TABLET, FILM COATED ORAL at 10:56

## 2024-06-04 NOTE — PROGRESS NOTES
"Chief Complaint   Patient presents with    Chest Pain     Subjective  CC: Chest tightness    Resting in bed.  Tolerating room air.  Parents are at bedside.  No reports of chest pain overnight. PFTs completed.     Dr. Trejo discussed the need for beard trimming with patient to lower infection risk during surgery.      Visit Vitals  /85 (BP Location: Right arm, Patient Position: Lying)   Pulse 73   Temp 97.4 °F (36.3 °C) (Oral)   Resp 20   Ht 195.6 cm (77\")   Wt (!) 204 kg (449 lb)   SpO2 97%   BMI 53.24 kg/m²         Intake/Output Summary (Last 24 hours) at 6/4/2024 1203  Last data filed at 6/4/2024 0730  Gross per 24 hour   Intake 720 ml   Output 3650 ml   Net -2930 ml     Lab:         Results review:    Results for orders placed during the hospital encounter of 05/31/24    Adult Transthoracic Echo Complete W/ Cont if Necessary Per Protocol    Interpretation Summary    Left ventricular systolic function is normal. Left ventricular ejection fraction appears to be 61 - 65%.    Left ventricular wall thickness is consistent with mild concentric hypertrophy.    Left ventricular diastolic function was normal.    Normal right ventricular cavity size and systolic function noted.    There is no significant (greater than mild) valvular dysfunction.                      Physical Exam:  General: No acute distress, in good spirits, obese.  With beard  Cardiovascular: RRR, no murmur, rubs, or gallops.    Pulmonary: Clear to auscultation bilaterally, no wheezing, rubs, or rales.  Abdomen: Soft, nondistended, and nontender.  Extremities: Warm, moves all extremities.  Neurologic:  No focal deficits, CN II-XII intact grossly.      Impression:  Coronary artery disease  Prediabetes  Obesity, class III, BMI 53  Hypertension  NSTEMI  Obstructive sleep apnea    Plan:   Continue ongoing preoperative testing  Continue to hold Ozempic  Dr. Trejo discussed the need for beard trimming with patient to help lower infection risk. "   Tentative plans for coronary artery bypass grafting, sternal plating, and application of prevena later this week by Dr. Trejo  Discussed with patient and wife.    Veronica Trinidad, APRN

## 2024-06-04 NOTE — PLAN OF CARE
Goal Outcome Evaluation:   PT A&O, VSS. PT appeared to rest well overnight w/o complaints of pain. Safety was maintained and call light within reach. Tele s [phone]

## 2024-06-04 NOTE — PROGRESS NOTES
[facility] -  Progress Note     LOS: 4 days   Patient Care Team:  Giovani Lujan DO as PCP - General (Internal Medicine)    Chief Complaint: follow up NSTEMI     Subjective     Interval History:     Patient Complaints: The patient is currently resting comfortably.  He denies any chest pain or shortness of breath during the night or today.  He is maintaining normal sinus rhythm on telemetry.  Recent systolic blood pressures have been mostly in the 140s to 150s, up to 170.        Review of Systems:     Review of Systems   Constitutional:  Negative for diaphoresis, fatigue, fever and unexpected weight change.   HENT:  Negative for nosebleeds.    Respiratory:  Negative for apnea, cough, chest tightness, shortness of breath and wheezing.    Cardiovascular:  Negative for chest pain, palpitations and leg swelling.   Gastrointestinal:  Negative for abdominal distention, nausea and vomiting.   Genitourinary:  Negative for hematuria.   Musculoskeletal:  Negative for gait problem.   Skin:  Negative for color change.   Neurological:  Negative for dizziness, syncope, weakness and light-headedness.     Objective     Vital Sign Min/Max for last 24 hours  Temp  Min: 97.4 °F (36.3 °C)  Max: 98.1 °F (36.7 °C)   BP  Min: 152/87  Max: 170/96   Pulse  Min: 65  Max: 77   Resp  Min: 17  Max: 22   SpO2  Min: 97 %  Max: 99 %   No data recorded   No data recorded         06/01/24  1452   Weight: (!) 204 kg (449 lb)       Physical Exam:    Vitals and nursing note reviewed.   Constitutional:       General: Not in acute distress.     Appearance: Well-developed and not in distress. Not diaphoretic.   Neck:      Vascular: No JVD.   Pulmonary:      Effort: Pulmonary effort is normal. No respiratory distress.      Breath sounds: Normal breath sounds.   Cardiovascular:      Normal rate. Regular rhythm.      Murmurs: There is no murmur.   Edema:     Peripheral edema absent.   Abdominal:      Tenderness: There is no abdominal  tenderness.   Skin:     General: Skin is warm and dry.   Neurological:      Mental Status: Alert and oriented to person, place, and time.       Results Review:   Lab Results (last 72 hours)       Procedure Component Value Units Date/Time    Basic Metabolic Panel [[id number]]  (Normal) Collected: 06/03/24 0321    Specimen: Blood Updated: 06/03/24 0435     Glucose 94 mg/dL      BUN 12 mg/dL      Creatinine 0.77 mg/dL      Sodium 137 mmol/L      Potassium 4.2 mmol/L      Comment: Slight hemolysis detected by analyzer. Result may be falsely elevated.        Chloride 101 mmol/L      CO2 26.0 mmol/L      Calcium 9.2 mg/dL      BUN/Creatinine Ratio 15.6     Anion Gap 10.0 mmol/L      eGFR 113.9 mL/min/1.73     Narrative:      GFR Normal >60  Chronic Kidney Disease <60  Kidney Failure <15      CBC (No Diff) [[id number]]  (Normal) Collected: 06/03/24 0321    Specimen: Blood Updated: 06/03/24 0413     WBC 6.65 10*3/mm3      RBC 5.75 10*6/mm3      Hemoglobin 15.9 g/dL      Hematocrit 49.6 %      MCV 86.3 fL      MCH 27.7 pg      MCHC 32.1 g/dL      RDW 13.5 %      RDW-SD 42.5 fl      MPV 9.6 fL      Platelets 269 10*3/mm3     Heparin Anti-Xa LMWH [[id number]]  (Normal) Collected: 06/02/24 1231    Specimen: Blood Updated: 06/02/24 1400     Heparin Anti-Xa (LMWH) 0.56 IU/ml     Narrative:      For Therapeutic Doses of Low Molecular Weight Heparin   These levels represent target drug concentrations collected 4 hours after at least 4 doses = Steady state LMWH levels.    Anti-Xa LMWH ranges:   ·Treatment of VTE (full anticoagulation): 0.6-1 units/ml for TWICE daily dosing   ·Treatment of VTE (full anticoagulation): 1-2 units/ml for ONCE daily dosing   ·Prevention of VTE: less than 0.5 units/ml      Basic Metabolic Panel [[id number]]  (Normal) Collected: 06/01/24 0343    Specimen: Blood Updated: 06/01/24 0526     Glucose 97 mg/dL      BUN 13 mg/dL      Creatinine 0.85 mg/dL      Sodium 139 mmol/L      Potassium 4.4 mmol/L      Chloride  "104 mmol/L      CO2 26.0 mmol/L      Calcium 8.9 mg/dL      BUN/Creatinine Ratio 15.3     Anion Gap 9.0 mmol/L      eGFR 110.6 mL/min/1.73     Narrative:      GFR Normal >60  Chronic Kidney Disease <60  Kidney Failure <15      CBC (No Diff) [[id number]]  (Normal) Collected: 06/01/24 0343    Specimen: Blood Updated: 06/01/24 0504     WBC 5.92 10*3/mm3      RBC 5.56 10*6/mm3      Hemoglobin 15.3 g/dL      Hematocrit 48.3 %      MCV 86.9 fL      MCH 27.5 pg      MCHC 31.7 g/dL      RDW 13.7 %      RDW-SD 44.2 fl      MPV 9.5 fL      Platelets 259 10*3/mm3                 Echo EF Estimated  No results found for: \"ECHOEFEST\"      Cath Ejection Fraction Quantitative  No results found for: \"CATHEF\"        Medication Review: yes  Current Facility-Administered Medications   Medication Dose Route Frequency Provider Last Rate Last Admin    acetaminophen (TYLENOL) tablet 650 mg  650 mg Oral Q4H PRN Chip Roberts MD        aluminum-magnesium hydroxide-simethicone (MAALOX MAX) 400-400-40 MG/5ML suspension 15 mL  15 mL Oral Q6H PRN Chip Roberts MD        [START ON 6/5/2024] amLODIPine (NORVASC) tablet 7.5 mg  7.5 mg Oral Q24H Lana Arvizu APRN        And    [START ON 6/5/2024] lisinopril (PRINIVIL,ZESTRIL) tablet 10 mg  10 mg Oral Q24H Lana Arvizu APRN        aspirin chewable tablet 81 mg  81 mg Oral Daily Chip Roberts MD   81 mg at 06/04/24 1055    atorvastatin (LIPITOR) tablet 40 mg  40 mg Oral Nightly Chip Roberts MD   40 mg at 06/03/24 2044    colchicine tablet 0.6 mg  0.6 mg Oral Daily PRN Chip Roberts MD   0.6 mg at 05/31/24 2340    Enoxaparin Sodium (LOVENOX) syringe 160 mg  160 mg Subcutaneous Q12H Chip Roberts MD   160 mg at 06/04/24 1056    famotidine (PEPCID) tablet 20 mg  20 mg Oral Daily Chip Roberts MD   20 mg at 06/04/24 1056    magnesium hydroxide (MILK OF MAGNESIA) suspension 10 mL  10 mL Oral Daily PRN Chip Roberts MD        metoprolol tartrate (LOPRESSOR) tablet 25 mg  25 mg " Oral Q12H Sean Trejo MD   25 mg at 06/04/24 1057    nicotine polacrilex (NICORETTE) gum 2 mg  2 mg Mouth/Throat Q1H PRN Chip Roberts MD        nitroglycerin (NITROSTAT) SL tablet 0.4 mg  0.4 mg Sublingual Q5 Min PRN Chip Roberts MD   0.4 mg at 06/02/24 0034    ondansetron ODT (ZOFRAN-ODT) disintegrating tablet 4 mg  4 mg Translingual Q8H PRN Chip Roberts MD        sodium chloride 0.9 % flush 10 mL  10 mL Intravenous PRN Chip Roberts MD        temazepam (RESTORIL) capsule 15 mg  15 mg Oral Nightly PRN Chip Roberts MD             Assessment & Plan     1.  Non-STEMI with evidence of three-vessel coronary artery disease on cardiac catheterization: Continue current medical therapy including aspirin, therapeutic Lovenox, high intensity statin, beta-blocker.  Tentative plans are for CABG 6/6 with Dr. Trejo.  Appreciate CT surgery.    2.  Morbid obesity  3.  Hypertension-uptitrate amlodipine slightly for better blood pressure control.  Continue current medical therapy otherwise.  4.  Hyperlipidemia  5.  Obstructive sleep apnea        Lana Arvizu, APRN  06/04/24  13:28 CDT

## 2024-06-05 ENCOUNTER — ANESTHESIA EVENT (OUTPATIENT)
Dept: PERIOP | Facility: HOSPITAL | Age: 44
End: 2024-06-05
Payer: COMMERCIAL

## 2024-06-05 LAB
ABO GROUP BLD: NORMAL
ANION GAP SERPL CALCULATED.3IONS-SCNC: 9 MMOL/L (ref 5–15)
BASOPHILS # BLD AUTO: 0.07 10*3/MM3 (ref 0–0.2)
BASOPHILS NFR BLD AUTO: 1 % (ref 0–1.5)
BLD GP AB SCN SERPL QL: NEGATIVE
BUN SERPL-MCNC: 15 MG/DL (ref 6–20)
BUN/CREAT SERPL: 17.9 (ref 7–25)
CALCIUM SPEC-SCNC: 9 MG/DL (ref 8.6–10.5)
CHLORIDE SERPL-SCNC: 99 MMOL/L (ref 98–107)
CO2 SERPL-SCNC: 26 MMOL/L (ref 22–29)
CREAT SERPL-MCNC: 0.84 MG/DL (ref 0.76–1.27)
DEPRECATED RDW RBC AUTO: 42 FL (ref 37–54)
EGFRCR SERPLBLD CKD-EPI 2021: 111 ML/MIN/1.73
EOSINOPHIL # BLD AUTO: 0.36 10*3/MM3 (ref 0–0.4)
EOSINOPHIL NFR BLD AUTO: 5.1 % (ref 0.3–6.2)
ERYTHROCYTE [DISTWIDTH] IN BLOOD BY AUTOMATED COUNT: 13.6 % (ref 12.3–15.4)
GLUCOSE SERPL-MCNC: 99 MG/DL (ref 65–99)
HCT VFR BLD AUTO: 47.5 % (ref 37.5–51)
HGB BLD-MCNC: 15.6 G/DL (ref 13–17.7)
IMM GRANULOCYTES # BLD AUTO: 0.02 10*3/MM3 (ref 0–0.05)
IMM GRANULOCYTES NFR BLD AUTO: 0.3 % (ref 0–0.5)
LYMPHOCYTES # BLD AUTO: 2.81 10*3/MM3 (ref 0.7–3.1)
LYMPHOCYTES NFR BLD AUTO: 40 % (ref 19.6–45.3)
MCH RBC QN AUTO: 27.8 PG (ref 26.6–33)
MCHC RBC AUTO-ENTMCNC: 32.8 G/DL (ref 31.5–35.7)
MCV RBC AUTO: 84.7 FL (ref 79–97)
MONOCYTES # BLD AUTO: 0.66 10*3/MM3 (ref 0.1–0.9)
MONOCYTES NFR BLD AUTO: 9.4 % (ref 5–12)
NEUTROPHILS NFR BLD AUTO: 3.1 10*3/MM3 (ref 1.7–7)
NEUTROPHILS NFR BLD AUTO: 44.2 % (ref 42.7–76)
NRBC BLD AUTO-RTO: 0 /100 WBC (ref 0–0.2)
PLATELET # BLD AUTO: 273 10*3/MM3 (ref 140–450)
PMV BLD AUTO: 9.7 FL (ref 6–12)
POTASSIUM SERPL-SCNC: 4.1 MMOL/L (ref 3.5–5.2)
RBC # BLD AUTO: 5.61 10*6/MM3 (ref 4.14–5.8)
RH BLD: POSITIVE
SODIUM SERPL-SCNC: 134 MMOL/L (ref 136–145)
T&S EXPIRATION DATE: NORMAL
WBC NRBC COR # BLD AUTO: 7.02 10*3/MM3 (ref 3.4–10.8)

## 2024-06-05 PROCEDURE — 86901 BLOOD TYPING SEROLOGIC RH(D): CPT | Performed by: NURSE PRACTITIONER

## 2024-06-05 PROCEDURE — 86900 BLOOD TYPING SEROLOGIC ABO: CPT | Performed by: NURSE PRACTITIONER

## 2024-06-05 PROCEDURE — 25010000002 ENOXAPARIN PER 10 MG: Performed by: INTERNAL MEDICINE

## 2024-06-05 PROCEDURE — 80048 BASIC METABOLIC PNL TOTAL CA: CPT | Performed by: NURSE PRACTITIONER

## 2024-06-05 PROCEDURE — 85025 COMPLETE CBC W/AUTO DIFF WBC: CPT | Performed by: NURSE PRACTITIONER

## 2024-06-05 PROCEDURE — 93010 ELECTROCARDIOGRAM REPORT: CPT | Performed by: EMERGENCY MEDICINE

## 2024-06-05 PROCEDURE — 86850 RBC ANTIBODY SCREEN: CPT | Performed by: NURSE PRACTITIONER

## 2024-06-05 PROCEDURE — 99232 SBSQ HOSP IP/OBS MODERATE 35: CPT | Performed by: NURSE PRACTITIONER

## 2024-06-05 PROCEDURE — 99024 POSTOP FOLLOW-UP VISIT: CPT | Performed by: THORACIC SURGERY (CARDIOTHORACIC VASCULAR SURGERY)

## 2024-06-05 PROCEDURE — 93005 ELECTROCARDIOGRAM TRACING: CPT | Performed by: NURSE PRACTITIONER

## 2024-06-05 RX ORDER — SODIUM CHLORIDE 0.9 % (FLUSH) 0.9 %
10 SYRINGE (ML) INJECTION EVERY 12 HOURS SCHEDULED
Status: DISCONTINUED | OUTPATIENT
Start: 2024-06-05 | End: 2024-06-06

## 2024-06-05 RX ORDER — SODIUM CHLORIDE 0.9 % (FLUSH) 0.9 %
10 SYRINGE (ML) INJECTION AS NEEDED
Status: DISCONTINUED | OUTPATIENT
Start: 2024-06-05 | End: 2024-06-06

## 2024-06-05 RX ADMIN — ATORVASTATIN CALCIUM 40 MG: 40 TABLET ORAL at 21:58

## 2024-06-05 RX ADMIN — ENOXAPARIN SODIUM 160 MG: 100 INJECTION SUBCUTANEOUS at 21:59

## 2024-06-05 RX ADMIN — METOPROLOL TARTRATE 25 MG: 50 TABLET, FILM COATED ORAL at 21:59

## 2024-06-05 RX ADMIN — AMLODIPINE BESYLATE 7.5 MG: 5 TABLET ORAL at 08:29

## 2024-06-05 RX ADMIN — ASPIRIN 81 MG CHEWABLE TABLET 81 MG: 81 TABLET CHEWABLE at 08:28

## 2024-06-05 RX ADMIN — ENOXAPARIN SODIUM 160 MG: 100 INJECTION SUBCUTANEOUS at 08:32

## 2024-06-05 RX ADMIN — METOPROLOL TARTRATE 25 MG: 50 TABLET, FILM COATED ORAL at 08:28

## 2024-06-05 RX ADMIN — Medication 10 ML: at 21:59

## 2024-06-05 RX ADMIN — Medication: at 17:51

## 2024-06-05 RX ADMIN — LISINOPRIL 10 MG: 10 TABLET ORAL at 08:29

## 2024-06-05 RX ADMIN — FAMOTIDINE 20 MG: 20 TABLET, FILM COATED ORAL at 08:29

## 2024-06-05 NOTE — PROGRESS NOTES
[facility] -  Progress Note     LOS: 5 days   Patient Care Team:  Giovani Lujan DO as PCP - General (Internal Medicine)    Chief Complaint: follow up NSTEMI     Subjective     Interval History:     Patient Complaints: The patient is currently resting comfortably.  He denies any chest pain or shortness of breath during the night or today.  He is maintaining normal sinus rhythm on telemetry.  Plans are for CABG tomorrow.        Review of Systems:     Review of Systems   Constitutional:  Negative for diaphoresis, fatigue, fever and unexpected weight change.   HENT:  Negative for nosebleeds.    Respiratory:  Negative for apnea, cough, chest tightness, shortness of breath and wheezing.    Cardiovascular:  Negative for chest pain, palpitations and leg swelling.   Gastrointestinal:  Negative for abdominal distention, nausea and vomiting.   Genitourinary:  Negative for hematuria.   Musculoskeletal:  Negative for gait problem.   Skin:  Negative for color change.   Neurological:  Negative for dizziness, syncope, weakness and light-headedness.     Objective     Vital Sign Min/Max for last 24 hours  Temp  Min: 97.5 °F (36.4 °C)  Max: 98.2 °F (36.8 °C)   BP  Min: 128/71  Max: 155/93   Pulse  Min: 65  Max: 71   Resp  Min: 16  Max: 22   SpO2  Min: 97 %  Max: 99 %   No data recorded   No data recorded         06/01/24  1452   Weight: (!) 204 kg (449 lb)       Physical Exam:    Vitals and nursing note reviewed.   Constitutional:       General: Not in acute distress.     Appearance: Well-developed and not in distress. Not diaphoretic.   Neck:      Vascular: No JVD.   Pulmonary:      Effort: Pulmonary effort is normal. No respiratory distress.      Breath sounds: Normal breath sounds.   Cardiovascular:      Normal rate. Regular rhythm.      Murmurs: There is no murmur.   Edema:     Peripheral edema absent.   Abdominal:      Tenderness: There is no abdominal tenderness.   Skin:     General: Skin is warm and  dry.   Neurological:      Mental Status: Alert and oriented to person, place, and time.       Results Review:   Lab Results (last 72 hours)       Procedure Component Value Units Date/Time    Basic Metabolic Panel [[id number]]  (Normal) Collected: 06/03/24 0321    Specimen: Blood Updated: 06/03/24 0435     Glucose 94 mg/dL      BUN 12 mg/dL      Creatinine 0.77 mg/dL      Sodium 137 mmol/L      Potassium 4.2 mmol/L      Comment: Slight hemolysis detected by analyzer. Result may be falsely elevated.        Chloride 101 mmol/L      CO2 26.0 mmol/L      Calcium 9.2 mg/dL      BUN/Creatinine Ratio 15.6     Anion Gap 10.0 mmol/L      eGFR 113.9 mL/min/1.73     Narrative:      GFR Normal >60  Chronic Kidney Disease <60  Kidney Failure <15      CBC (No Diff) [[id number]]  (Normal) Collected: 06/03/24 0321    Specimen: Blood Updated: 06/03/24 0413     WBC 6.65 10*3/mm3      RBC 5.75 10*6/mm3      Hemoglobin 15.9 g/dL      Hematocrit 49.6 %      MCV 86.3 fL      MCH 27.7 pg      MCHC 32.1 g/dL      RDW 13.5 %      RDW-SD 42.5 fl      MPV 9.6 fL      Platelets 269 10*3/mm3     Heparin Anti-Xa LMWH [[id number]]  (Normal) Collected: 06/02/24 1231    Specimen: Blood Updated: 06/02/24 1400     Heparin Anti-Xa (LMWH) 0.56 IU/ml     Narrative:      For Therapeutic Doses of Low Molecular Weight Heparin   These levels represent target drug concentrations collected 4 hours after at least 4 doses = Steady state LMWH levels.    Anti-Xa LMWH ranges:   ·Treatment of VTE (full anticoagulation): 0.6-1 units/ml for TWICE daily dosing   ·Treatment of VTE (full anticoagulation): 1-2 units/ml for ONCE daily dosing   ·Prevention of VTE: less than 0.5 units/ml      Basic Metabolic Panel [[id number]]  (Normal) Collected: 06/01/24 0343    Specimen: Blood Updated: 06/01/24 0526     Glucose 97 mg/dL      BUN 13 mg/dL      Creatinine 0.85 mg/dL      Sodium 139 mmol/L      Potassium 4.4 mmol/L      Chloride 104 mmol/L      CO2 26.0 mmol/L      Calcium 8.9  "mg/dL      BUN/Creatinine Ratio 15.3     Anion Gap 9.0 mmol/L      eGFR 110.6 mL/min/1.73     Narrative:      GFR Normal >60  Chronic Kidney Disease <60  Kidney Failure <15      CBC (No Diff) [[id number]]  (Normal) Collected: 06/01/24 0343    Specimen: Blood Updated: 06/01/24 0504     WBC 5.92 10*3/mm3      RBC 5.56 10*6/mm3      Hemoglobin 15.3 g/dL      Hematocrit 48.3 %      MCV 86.9 fL      MCH 27.5 pg      MCHC 31.7 g/dL      RDW 13.7 %      RDW-SD 44.2 fl      MPV 9.5 fL      Platelets 259 10*3/mm3                 Echo EF Estimated  No results found for: \"ECHOEFEST\"      Cath Ejection Fraction Quantitative  No results found for: \"CATHEF\"        Medication Review: yes  Current Facility-Administered Medications   Medication Dose Route Frequency Provider Last Rate Last Admin    acetaminophen (TYLENOL) tablet 650 mg  650 mg Oral Q4H PRN Chip Roberts MD        aluminum-magnesium hydroxide-simethicone (MAALOX MAX) 400-400-40 MG/5ML suspension 15 mL  15 mL Oral Q6H PRN Chip Roberts MD        amLODIPine (NORVASC) tablet 7.5 mg  7.5 mg Oral Q24H Lana Arvizu APRN   7.5 mg at 06/05/24 0829    And    lisinopril (PRINIVIL,ZESTRIL) tablet 10 mg  10 mg Oral Q24H Lana Arvizu APRN   10 mg at 06/05/24 0829    aspirin chewable tablet 81 mg  81 mg Oral Daily Chip Roberts MD   81 mg at 06/05/24 0828    atorvastatin (LIPITOR) tablet 40 mg  40 mg Oral Nightly Chip Roberts MD   40 mg at 06/04/24 2215    [START ON 6/6/2024] ceFAZolin 3000 mg IVPB in 100 mL NS (MBP)  3 g Intravenous Once Veronica Hardin APRN        colchicine tablet 0.6 mg  0.6 mg Oral Daily PRN Chip Roberts MD   0.6 mg at 05/31/24 2340    Enoxaparin Sodium (LOVENOX) syringe 160 mg  160 mg Subcutaneous Q12H Chip Roberts MD   160 mg at 06/05/24 0832    famotidine (PEPCID) tablet 20 mg  20 mg Oral Daily Chip Roberts MD   20 mg at 06/05/24 0829    magnesium hydroxide (MILK OF MAGNESIA) suspension 10 mL  10 mL Oral Daily PRN Chip Roberts " MD Jonah        metoprolol tartrate (LOPRESSOR) tablet 25 mg  25 mg Oral Q12H Sean Trejo MD   25 mg at 06/05/24 0828    mupirocin (BACTROBAN) 2 % nasal ointment   Each Nare Q12H Veronica Hardin APRN        nicotine polacrilex (NICORETTE) gum 2 mg  2 mg Mouth/Throat Q1H PRN Chip Roberts MD        nitroglycerin (NITROSTAT) SL tablet 0.4 mg  0.4 mg Sublingual Q5 Min PRN Chip Roberts MD   0.4 mg at 06/02/24 0034    ondansetron ODT (ZOFRAN-ODT) disintegrating tablet 4 mg  4 mg Translingual Q8H PRN Chip Roberts MD        sodium chloride 0.9 % flush 10 mL  10 mL Intravenous PRN Chip Roberts MD        sodium chloride 0.9 % flush 10 mL  10 mL Intravenous Q12H Veronica Hardin APRN        sodium chloride 0.9 % flush 10 mL  10 mL Intravenous PRN Veronica Hardin APRN        temazepam (RESTORIL) capsule 15 mg  15 mg Oral Nightly PRN Chip Roberts MD             Assessment & Plan     1.  Non-STEMI with evidence of three-vessel coronary artery disease on cardiac catheterization: Continue current medical therapy including aspirin, therapeutic Lovenox, high intensity statin, beta-blocker.  Tentative plans are for CABG 6/6 with Dr. Trejo.  Appreciate CT surgery.    2.  Morbid obesity  3.  Hypertension  4.  Hyperlipidemia  5.  Obstructive sleep apnea        KURT Garcia  06/05/24  11:41 CDT

## 2024-06-05 NOTE — PROGRESS NOTES
"Chief Complaint   Patient presents with    Chest Pain     Subjective  CC: Chest tightness    Resting in bed.  On room air.  No chest pain.  He is planning on cutting his beard when his wife arrives later today.  Agreeable to proceed with coronary artery bypass grafting tomorrow.    PSHx remarkable for right eye surgery including a cataract, lens, and detached retina.  He does have decreased vision in the right eye.    Visit Vitals  /71 Comment: RN notified   Pulse 65   Temp 97.6 °F (36.4 °C) (Oral)   Resp 16   Ht 195.6 cm (77\")   Wt (!) 204 kg (449 lb)   SpO2 99%   BMI 53.24 kg/m²       Intake/Output Summary (Last 24 hours) at 6/5/2024 0950  Last data filed at 6/5/2024 0751  Gross per 24 hour   Intake 240 ml   Output 3575 ml   Net -3335 ml     Lab:           Results for orders placed during the hospital encounter of 05/31/24    Adult Transthoracic Echo Complete W/ Cont if Necessary Per Protocol    Interpretation Summary    Left ventricular systolic function is normal. Left ventricular ejection fraction appears to be 61 - 65%.    Left ventricular wall thickness is consistent with mild concentric hypertrophy.    Left ventricular diastolic function was normal.    Normal right ventricular cavity size and systolic function noted.    There is no significant (greater than mild) valvular dysfunction.                  Heart catheterization 5/31/2024:    Findings:  Selective coronary angiography:  1. Left main: The left main bifurcates into the LAD and left circumflex arteries.  There is mild distal narrowing.  2. Left anterior descending artery: The LAD runs in the interventricular groove giving off 1 significant bifurcating diagonal and multiple septal perforators before wrapping around the apex as an apical recurrent branch.  There is a severe 70 to 80% stenosis in the proximal vessel.  3. Left circumflex artery: The left circumflex is nondominant for posterior circulation.  It gives off 1 small proximal obtuse " marginal branch and a second moderate-sized distal branch.  Obtuse marginal branches.  There is a moderate to severe 60 to 70% stenosis in the proximal portion of the larger OM.  4. Right coronary artery: The RCA is dominant for posterior circulation giving rise to PDA and right PL branches.  The RCA is chronically totally occluded proximally.  The PDA and right PL branches fill via left to right collaterals.     Impression:  1.  Severe multi-vessel coronary artery disease as described above.    Physical Exam:  General: No acute distress, in good spirits, obese.   Cardiovascular: RRR, no murmur, rubs, or gallops.    Pulmonary: Clear to auscultation bilaterally, no wheezing, rubs, or rales.  Abdomen: Soft, nondistended, and nontender.  Extremities: Warm, moves all extremities.  Neurologic:  No focal deficits, CN II-XII intact grossly.      Impression:  Non-STEMI  Coronary artery disease  Prediabetes  Obesity, class III, BMI 53  Hypertension  Obstructive sleep apnea  Use of Ozempic-Last dose 5/28    Plan:   For coronary artery bypass grafting, sternal plating, and application of prevena incision management system tomorrow by Dr. Trejo.  Risks and benefits discussed.  All questions have been answered.  He is agreeable to proceed with surgical revascularization tomorrow.  He will plan on trimming his beard today to minimize infection risk.

## 2024-06-05 NOTE — CASE MANAGEMENT/SOCIAL WORK
Continued Stay Note   [facility]     Patient Name: Constantin Seals  MRN: [id number]  Today's Date: 6/5/2024    Admit Date: 5/31/2024    Plan: Home   Discharge Plan       Row Name 06/05/24 0828       Plan    Plan Home    Patient/Family in Agreement with Plan yes    Plan Comments Chart reviewed; events noted.   Pt is scheduled for CABG this Thursday 6/6. SW will follow up after surgery.                   Discharge Codes    No documentation.                       CEDRIC Partida

## 2024-06-05 NOTE — PLAN OF CARE
Goal Outcome Evaluation:   PT is A&O, VSS< and s 61-74 on tele. PT as without complaint of pain overnight and appeared to rest well. Safety was maintained and call light within reach.

## 2024-06-06 ENCOUNTER — ANESTHESIA (OUTPATIENT)
Dept: PERIOP | Facility: HOSPITAL | Age: 44
End: 2024-06-06
Payer: COMMERCIAL

## 2024-06-06 ENCOUNTER — APPOINTMENT (OUTPATIENT)
Dept: GENERAL RADIOLOGY | Facility: HOSPITAL | Age: 44
End: 2024-06-06
Payer: COMMERCIAL

## 2024-06-06 ENCOUNTER — APPOINTMENT (OUTPATIENT)
Dept: CARDIOLOGY | Facility: HOSPITAL | Age: 44
End: 2024-06-06
Payer: COMMERCIAL

## 2024-06-06 LAB
ABO GROUP BLD: NORMAL
ALBUMIN SERPL-MCNC: 3.7 G/DL (ref 3.5–5.2)
ALBUMIN SERPL-MCNC: 3.8 G/DL (ref 3.5–5.2)
ALBUMIN SERPL-MCNC: 4.2 G/DL (ref 3.5–5.2)
ALBUMIN/GLOB SERPL: 1.4 G/DL
ALP SERPL-CCNC: 90 U/L (ref 39–117)
ALT SERPL W P-5'-P-CCNC: 158 U/L (ref 1–41)
ANION GAP SERPL CALCULATED.3IONS-SCNC: 6 MMOL/L (ref 5–15)
ANION GAP SERPL CALCULATED.3IONS-SCNC: 8 MMOL/L (ref 5–15)
ANION GAP SERPL CALCULATED.3IONS-SCNC: 9 MMOL/L (ref 5–15)
APTT PPP: 37.4 SECONDS (ref 24.5–36)
APTT PPP: 39.8 SECONDS (ref 24.5–36)
APTT PPP: 73.3 SECONDS (ref 24.5–36)
ARTERIAL PATENCY WRIST A: ABNORMAL
AST SERPL-CCNC: 74 U/L (ref 1–40)
ATMOSPHERIC PRESS: 746 MMHG
ATMOSPHERIC PRESS: 746 MMHG
ATMOSPHERIC PRESS: 747 MMHG
ATMOSPHERIC PRESS: 748 MMHG
BASE EXCESS BLDA CALC-SCNC: -0.5 MMOL/L (ref 0–2)
BASE EXCESS BLDA CALC-SCNC: -0.6 MMOL/L (ref 0–2)
BASE EXCESS BLDA CALC-SCNC: -0.9 MMOL/L (ref 0–2)
BASE EXCESS BLDA CALC-SCNC: -1.1 MMOL/L (ref 0–2)
BASE EXCESS BLDA CALC-SCNC: -1.2 MMOL/L (ref 0–2)
BASE EXCESS BLDA CALC-SCNC: -1.4 MMOL/L (ref 0–2)
BASE EXCESS BLDA CALC-SCNC: -1.6 MMOL/L (ref 0–2)
BASE EXCESS BLDA CALC-SCNC: -2.3 MMOL/L (ref 0–2)
BASE EXCESS BLDA CALC-SCNC: -2.4 MMOL/L (ref 0–2)
BASE EXCESS BLDA CALC-SCNC: -2.5 MMOL/L (ref 0–2)
BASE EXCESS BLDA CALC-SCNC: 0 MMOL/L (ref 0–2)
BASE EXCESS BLDA CALC-SCNC: 0.1 MMOL/L (ref 0–2)
BASOPHILS # BLD MANUAL: 0 10*3/MM3 (ref 0–0.2)
BASOPHILS NFR BLD MANUAL: 0 % (ref 0–1.5)
BDY SITE: ABNORMAL
BH BB BLOOD EXPIRATION DATE: NORMAL
BH BB BLOOD EXPIRATION DATE: NORMAL
BH BB BLOOD TYPE BARCODE: 6200
BH BB BLOOD TYPE BARCODE: 6200
BH BB DISPENSE STATUS: NORMAL
BH BB DISPENSE STATUS: NORMAL
BH BB PRODUCT CODE: NORMAL
BH BB PRODUCT CODE: NORMAL
BH BB UNIT NUMBER: NORMAL
BH BB UNIT NUMBER: NORMAL
BILIRUB SERPL-MCNC: 0.6 MG/DL (ref 0–1.2)
BLD GP AB SCN SERPL QL: NEGATIVE
BODY TEMPERATURE: 37
BUN SERPL-MCNC: 16 MG/DL (ref 6–20)
BUN SERPL-MCNC: 19 MG/DL (ref 6–20)
BUN SERPL-MCNC: 20 MG/DL (ref 6–20)
BUN/CREAT SERPL: 13.8 (ref 7–25)
BUN/CREAT SERPL: 14.5 (ref 7–25)
BUN/CREAT SERPL: 19.3 (ref 7–25)
BURR CELLS BLD QL SMEAR: ABNORMAL
CA-I BLD-MCNC: 4.17 MG/DL (ref 4.6–5.4)
CA-I BLD-MCNC: 4.23 MG/DL (ref 4.6–5.4)
CA-I BLD-MCNC: 4.26 MG/DL (ref 4.6–5.4)
CA-I BLD-MCNC: 4.29 MG/DL (ref 4.6–5.4)
CA-I BLD-MCNC: 4.33 MG/DL (ref 4.6–5.4)
CA-I BLD-MCNC: 4.56 MG/DL (ref 4.6–5.4)
CA-I BLD-MCNC: 4.58 MG/DL (ref 4.6–5.4)
CA-I BLD-MCNC: 4.6 MG/DL (ref 4.6–5.4)
CA-I BLD-MCNC: 4.64 MG/DL (ref 4.6–5.4)
CA-I BLD-MCNC: 4.7 MG/DL (ref 4.6–5.4)
CA-I BLD-MCNC: 4.74 MG/DL (ref 4.6–5.4)
CALCIUM SPEC-SCNC: 8.6 MG/DL (ref 8.6–10.5)
CALCIUM SPEC-SCNC: 8.7 MG/DL (ref 8.6–10.5)
CALCIUM SPEC-SCNC: 9.2 MG/DL (ref 8.6–10.5)
CHLORIDE SERPL-SCNC: 102 MMOL/L (ref 98–107)
CHLORIDE SERPL-SCNC: 105 MMOL/L (ref 98–107)
CHLORIDE SERPL-SCNC: 106 MMOL/L (ref 98–107)
CO2 SERPL-SCNC: 24 MMOL/L (ref 22–29)
CO2 SERPL-SCNC: 24 MMOL/L (ref 22–29)
CO2 SERPL-SCNC: 26 MMOL/L (ref 22–29)
COHGB MFR BLD: 0.2 % (ref 0–5)
COHGB MFR BLD: 0.5 % (ref 0–5)
COHGB MFR BLD: 0.5 % (ref 0–5)
COHGB MFR BLD: 0.6 % (ref 0–5)
COHGB MFR BLD: 0.7 % (ref 0–5)
COHGB MFR BLD: 0.7 % (ref 0–5)
COHGB MFR BLD: 0.8 % (ref 0–5)
COHGB MFR BLD: 0.9 % (ref 0–5)
COHGB MFR BLD: 1 % (ref 0–5)
COHGB MFR BLD: 1.2 % (ref 0–5)
CREAT SERPL-MCNC: 0.83 MG/DL (ref 0.76–1.27)
CREAT SERPL-MCNC: 1.38 MG/DL (ref 0.76–1.27)
CREAT SERPL-MCNC: 1.38 MG/DL (ref 0.76–1.27)
CROSSMATCH INTERPRETATION: NORMAL
CROSSMATCH INTERPRETATION: NORMAL
DEPRECATED RDW RBC AUTO: 41.4 FL (ref 37–54)
DEPRECATED RDW RBC AUTO: 41.8 FL (ref 37–54)
DEPRECATED RDW RBC AUTO: 41.9 FL (ref 37–54)
DEPRECATED RDW RBC AUTO: 42.1 FL (ref 37–54)
EGFRCR SERPLBLD CKD-EPI 2021: 111.4 ML/MIN/1.73
EGFRCR SERPLBLD CKD-EPI 2021: 65.1 ML/MIN/1.73
EGFRCR SERPLBLD CKD-EPI 2021: 65.1 ML/MIN/1.73
EOSINOPHIL # BLD MANUAL: 0 10*3/MM3 (ref 0–0.4)
EOSINOPHIL NFR BLD MANUAL: 0 % (ref 0.3–6.2)
ERYTHROCYTE [DISTWIDTH] IN BLOOD BY AUTOMATED COUNT: 13.4 % (ref 12.3–15.4)
ERYTHROCYTE [DISTWIDTH] IN BLOOD BY AUTOMATED COUNT: 13.4 % (ref 12.3–15.4)
ERYTHROCYTE [DISTWIDTH] IN BLOOD BY AUTOMATED COUNT: 13.5 % (ref 12.3–15.4)
ERYTHROCYTE [DISTWIDTH] IN BLOOD BY AUTOMATED COUNT: 13.5 % (ref 12.3–15.4)
FIBRINOGEN PPP-MCNC: 321 MG/DL (ref 240–460)
FIBRINOGEN PPP-MCNC: 375 MG/DL (ref 240–460)
FSP PPP LA-ACNC: NORMAL
GAS FLOW AIRWAY: 5 LPM
GAS FLOW AIRWAY: 5 LPM
GLOBULIN UR ELPH-MCNC: 3.1 GM/DL
GLUCOSE BLDC GLUCOMTR-MCNC: 111 MG/DL (ref 70–130)
GLUCOSE BLDC GLUCOMTR-MCNC: 121 MG/DL (ref 70–130)
GLUCOSE BLDC GLUCOMTR-MCNC: 137 MG/DL (ref 70–130)
GLUCOSE BLDC GLUCOMTR-MCNC: 156 MG/DL (ref 70–130)
GLUCOSE BLDC GLUCOMTR-MCNC: 239 MG/DL (ref 70–130)
GLUCOSE SERPL-MCNC: 101 MG/DL (ref 65–99)
GLUCOSE SERPL-MCNC: 137 MG/DL (ref 65–99)
GLUCOSE SERPL-MCNC: 154 MG/DL (ref 65–99)
HCO3 BLDA-SCNC: 23.7 MMOL/L (ref 20–26)
HCO3 BLDA-SCNC: 23.8 MMOL/L (ref 20–26)
HCO3 BLDA-SCNC: 23.9 MMOL/L (ref 20–26)
HCO3 BLDA-SCNC: 24.3 MMOL/L (ref 20–26)
HCO3 BLDA-SCNC: 24.5 MMOL/L (ref 20–26)
HCO3 BLDA-SCNC: 24.5 MMOL/L (ref 20–26)
HCO3 BLDA-SCNC: 24.6 MMOL/L (ref 20–26)
HCO3 BLDA-SCNC: 24.9 MMOL/L (ref 20–26)
HCO3 BLDA-SCNC: 25.1 MMOL/L (ref 20–26)
HCO3 BLDA-SCNC: 25.5 MMOL/L (ref 20–26)
HCO3 BLDA-SCNC: 25.7 MMOL/L (ref 20–26)
HCO3 BLDA-SCNC: 25.8 MMOL/L (ref 20–26)
HCT VFR BLD AUTO: 38 % (ref 37.5–51)
HCT VFR BLD AUTO: 40.8 % (ref 37.5–51)
HCT VFR BLD AUTO: 43.1 % (ref 37.5–51)
HCT VFR BLD AUTO: 47.6 % (ref 37.5–51)
HCT VFR BLD CALC: 39.1 % (ref 38–51)
HCT VFR BLD CALC: 39.5 % (ref 38–51)
HCT VFR BLD CALC: 40.7 % (ref 38–51)
HCT VFR BLD CALC: 40.7 % (ref 38–51)
HCT VFR BLD CALC: 41.8 % (ref 38–51)
HCT VFR BLD CALC: 41.8 % (ref 38–51)
HCT VFR BLD CALC: 44.3 % (ref 38–51)
HCT VFR BLD CALC: 46.4 % (ref 38–51)
HCT VFR BLD CALC: 46.4 % (ref 38–51)
HCT VFR BLD CALC: 47.3 % (ref 38–51)
HGB BLD-MCNC: 12.2 G/DL (ref 13–17.7)
HGB BLD-MCNC: 13.6 G/DL (ref 13–17.7)
HGB BLD-MCNC: 14.1 G/DL (ref 13–17.7)
HGB BLD-MCNC: 15.6 G/DL (ref 13–17.7)
HGB BLDA-MCNC: 12.8 G/DL (ref 14–18)
HGB BLDA-MCNC: 12.9 G/DL (ref 14–18)
HGB BLDA-MCNC: 13.3 G/DL (ref 14–18)
HGB BLDA-MCNC: 13.3 G/DL (ref 14–18)
HGB BLDA-MCNC: 13.6 G/DL (ref 14–18)
HGB BLDA-MCNC: 13.6 G/DL (ref 14–18)
HGB BLDA-MCNC: 14.5 G/DL (ref 14–18)
HGB BLDA-MCNC: 15.1 G/DL (ref 14–18)
HGB BLDA-MCNC: 15.1 G/DL (ref 14–18)
HGB BLDA-MCNC: 15.4 G/DL (ref 14–18)
INHALED O2 CONCENTRATION: 100 %
INHALED O2 CONCENTRATION: 30 %
INHALED O2 CONCENTRATION: 50 %
INHALED O2 CONCENTRATION: 50 %
INHALED O2 CONCENTRATION: 60 %
INR PPP: 1 (ref 0.91–1.09)
INR PPP: 1.1 (ref 0.91–1.09)
INR PPP: 1.3 (ref 0.91–1.09)
LYMPHOCYTES # BLD MANUAL: 1.45 10*3/MM3 (ref 0.7–3.1)
LYMPHOCYTES NFR BLD MANUAL: 1 % (ref 5–12)
Lab: 2240
Lab: ABNORMAL
Lab: NORMAL
MCH RBC QN AUTO: 27.7 PG (ref 26.6–33)
MCH RBC QN AUTO: 27.9 PG (ref 26.6–33)
MCH RBC QN AUTO: 28.2 PG (ref 26.6–33)
MCH RBC QN AUTO: 28.3 PG (ref 26.6–33)
MCHC RBC AUTO-ENTMCNC: 32.1 G/DL (ref 31.5–35.7)
MCHC RBC AUTO-ENTMCNC: 32.7 G/DL (ref 31.5–35.7)
MCHC RBC AUTO-ENTMCNC: 32.8 G/DL (ref 31.5–35.7)
MCHC RBC AUTO-ENTMCNC: 33.3 G/DL (ref 31.5–35.7)
MCV RBC AUTO: 84.5 FL (ref 79–97)
MCV RBC AUTO: 85 FL (ref 79–97)
MCV RBC AUTO: 86.4 FL (ref 79–97)
MCV RBC AUTO: 86.4 FL (ref 79–97)
METHGB BLD QL: 0.5 % (ref 0–3)
METHGB BLD QL: 0.7 % (ref 0–3)
METHGB BLD QL: 0.8 % (ref 0–3)
METHGB BLD QL: 0.8 % (ref 0–3)
MODALITY: ABNORMAL
MONOCYTES # BLD: 0.18 10*3/MM3 (ref 0.1–0.9)
MYOGLOBIN SERPL-MCNC: 32.5 NG/ML (ref 28–72)
NEUTROPHILS # BLD AUTO: 16.32 10*3/MM3 (ref 1.7–7)
NEUTROPHILS NFR BLD MANUAL: 77.8 % (ref 42.7–76)
NEUTS BAND NFR BLD MANUAL: 13.1 % (ref 0–5)
NOTIFIED BY: ABNORMAL
NOTIFIED BY: ABNORMAL
NOTIFIED WHO: ABNORMAL
NOTIFIED WHO: ABNORMAL
OXYHGB MFR BLDV: 90.9 % (ref 94–99)
OXYHGB MFR BLDV: 95.4 % (ref 94–99)
OXYHGB MFR BLDV: 96.9 % (ref 94–99)
OXYHGB MFR BLDV: 98.3 % (ref 94–99)
OXYHGB MFR BLDV: 98.4 % (ref 94–99)
OXYHGB MFR BLDV: 98.5 % (ref 94–99)
OXYHGB MFR BLDV: 98.8 % (ref 94–99)
OXYHGB MFR BLDV: 98.9 % (ref 94–99)
OXYHGB MFR BLDV: 98.9 % (ref 94–99)
OXYHGB MFR BLDV: 99.1 % (ref 94–99)
PCO2 BLDA: 38.2 MM HG (ref 35–45)
PCO2 BLDA: 39.2 MM HG (ref 35–45)
PCO2 BLDA: 41.2 MM HG (ref 35–45)
PCO2 BLDA: 41.2 MM HG (ref 35–45)
PCO2 BLDA: 42 MM HG (ref 35–45)
PCO2 BLDA: 43.7 MM HG (ref 35–45)
PCO2 BLDA: 43.8 MM HG (ref 35–45)
PCO2 BLDA: 43.9 MM HG (ref 35–45)
PCO2 BLDA: 44.3 MM HG (ref 35–45)
PCO2 BLDA: 49.9 MM HG (ref 35–45)
PCO2 BLDA: 55.6 MM HG (ref 35–45)
PCO2 BLDA: 56.9 MM HG (ref 35–45)
PCO2 TEMP ADJ BLD: 38.2 MM HG (ref 35–45)
PCO2 TEMP ADJ BLD: 39.2 MM HG (ref 35–45)
PCO2 TEMP ADJ BLD: 41.2 MM HG (ref 35–45)
PCO2 TEMP ADJ BLD: 41.2 MM HG (ref 35–45)
PCO2 TEMP ADJ BLD: 42 MM HG (ref 35–45)
PCO2 TEMP ADJ BLD: 43.7 MM HG (ref 35–45)
PCO2 TEMP ADJ BLD: 43.8 MM HG (ref 35–45)
PCO2 TEMP ADJ BLD: 43.9 MM HG (ref 35–45)
PCO2 TEMP ADJ BLD: 44.3 MM HG (ref 35–45)
PCO2 TEMP ADJ BLD: 49.9 MM HG (ref 35–45)
PCO2 TEMP ADJ BLD: 55.6 MM HG (ref 35–45)
PCO2 TEMP ADJ BLD: 56.9 MM HG (ref 35–45)
PEEP RESPIRATORY: 5 CM[H2O]
PEEP RESPIRATORY: 8 CM[H2O]
PH BLDA: 7.26 PH UNITS (ref 7.35–7.45)
PH BLDA: 7.27 PH UNITS (ref 7.35–7.45)
PH BLDA: 7.3 PH UNITS (ref 7.35–7.45)
PH BLDA: 7.35 PH UNITS (ref 7.35–7.45)
PH BLDA: 7.36 PH UNITS (ref 7.35–7.45)
PH BLDA: 7.37 PH UNITS (ref 7.35–7.45)
PH BLDA: 7.38 PH UNITS (ref 7.35–7.45)
PH BLDA: 7.39 PH UNITS (ref 7.35–7.45)
PH BLDA: 7.39 PH UNITS (ref 7.35–7.45)
PH BLDA: 7.41 PH UNITS (ref 7.35–7.45)
PH, TEMP CORRECTED: 7.26 PH UNITS (ref 7.35–7.45)
PH, TEMP CORRECTED: 7.27 PH UNITS (ref 7.35–7.45)
PH, TEMP CORRECTED: 7.3 PH UNITS (ref 7.35–7.45)
PH, TEMP CORRECTED: 7.35 PH UNITS (ref 7.35–7.45)
PH, TEMP CORRECTED: 7.36 PH UNITS (ref 7.35–7.45)
PH, TEMP CORRECTED: 7.37 PH UNITS (ref 7.35–7.45)
PH, TEMP CORRECTED: 7.38 PH UNITS (ref 7.35–7.45)
PH, TEMP CORRECTED: 7.39 PH UNITS (ref 7.35–7.45)
PH, TEMP CORRECTED: 7.39 PH UNITS (ref 7.35–7.45)
PH, TEMP CORRECTED: 7.41 PH UNITS (ref 7.35–7.45)
PHOSPHATE SERPL-MCNC: 3.9 MG/DL (ref 2.5–4.5)
PHOSPHATE SERPL-MCNC: 4.6 MG/DL (ref 2.5–4.5)
PLAT MORPH BLD: NORMAL
PLATELET # BLD AUTO: 187 10*3/MM3 (ref 140–450)
PLATELET # BLD AUTO: 192 10*3/MM3 (ref 140–450)
PLATELET # BLD AUTO: 218 10*3/MM3 (ref 140–450)
PLATELET # BLD AUTO: 262 10*3/MM3 (ref 140–450)
PMV BLD AUTO: 10 FL (ref 6–12)
PMV BLD AUTO: 10 FL (ref 6–12)
PMV BLD AUTO: 9.3 FL (ref 6–12)
PMV BLD AUTO: 9.8 FL (ref 6–12)
PO2 BLD: 236 MM[HG] (ref 0–500)
PO2 BLD: 293 MM[HG] (ref 0–500)
PO2 BLDA: 102 MM HG (ref 83–108)
PO2 BLDA: 111 MM HG (ref 83–108)
PO2 BLDA: 118 MM HG (ref 83–108)
PO2 BLDA: 213 MM HG (ref 83–108)
PO2 BLDA: 356 MM HG (ref 83–108)
PO2 BLDA: 358 MM HG (ref 83–108)
PO2 BLDA: 405 MM HG (ref 83–108)
PO2 BLDA: 408 MM HG (ref 83–108)
PO2 BLDA: 428 MM HG (ref 83–108)
PO2 BLDA: 434 MM HG (ref 83–108)
PO2 BLDA: 71.3 MM HG (ref 83–108)
PO2 BLDA: 87.8 MM HG (ref 83–108)
PO2 TEMP ADJ BLD: 102 MM HG (ref 83–108)
PO2 TEMP ADJ BLD: 111 MM HG (ref 83–108)
PO2 TEMP ADJ BLD: 118 MM HG (ref 83–108)
PO2 TEMP ADJ BLD: 213 MM HG (ref 83–108)
PO2 TEMP ADJ BLD: 356 MM HG (ref 83–108)
PO2 TEMP ADJ BLD: 358 MM HG (ref 83–108)
PO2 TEMP ADJ BLD: 405 MM HG (ref 83–108)
PO2 TEMP ADJ BLD: 408 MM HG (ref 83–108)
PO2 TEMP ADJ BLD: 428 MM HG (ref 83–108)
PO2 TEMP ADJ BLD: 434 MM HG (ref 83–108)
PO2 TEMP ADJ BLD: 71.3 MM HG (ref 83–108)
PO2 TEMP ADJ BLD: 87.8 MM HG (ref 83–108)
POIKILOCYTOSIS BLD QL SMEAR: ABNORMAL
POTASSIUM BLDA-SCNC: 4.6 MMOL/L (ref 3.5–5.2)
POTASSIUM BLDA-SCNC: 5 MMOL/L (ref 3.5–5.2)
POTASSIUM BLDA-SCNC: 5.3 MMOL/L (ref 3.5–5.2)
POTASSIUM BLDA-SCNC: 5.5 MMOL/L (ref 3.5–5.2)
POTASSIUM BLDA-SCNC: 5.8 MMOL/L (ref 3.5–5.2)
POTASSIUM BLDA-SCNC: 5.9 MMOL/L (ref 3.5–5.2)
POTASSIUM BLDA-SCNC: 6 MMOL/L (ref 3.5–5.2)
POTASSIUM BLDA-SCNC: 6.4 MMOL/L (ref 3.5–5.2)
POTASSIUM SERPL-SCNC: 4.3 MMOL/L (ref 3.5–5.2)
POTASSIUM SERPL-SCNC: 4.6 MMOL/L (ref 3.5–5.2)
POTASSIUM SERPL-SCNC: 6.1 MMOL/L (ref 3.5–5.2)
PROT SERPL-MCNC: 7.3 G/DL (ref 6–8.5)
PROTHROMBIN TIME: 13.6 SECONDS (ref 11.8–14.8)
PROTHROMBIN TIME: 14.7 SECONDS (ref 11.8–14.8)
PROTHROMBIN TIME: 16.7 SECONDS (ref 11.8–14.8)
PSV: 10 CMH2O
QT INTERVAL: 376 MS
QTC INTERVAL: 419 MS
RBC # BLD AUTO: 4.4 10*6/MM3 (ref 4.14–5.8)
RBC # BLD AUTO: 4.83 10*6/MM3 (ref 4.14–5.8)
RBC # BLD AUTO: 4.99 10*6/MM3 (ref 4.14–5.8)
RBC # BLD AUTO: 5.6 10*6/MM3 (ref 4.14–5.8)
RH BLD: POSITIVE
SAO2 % BLDCOA: 100 % (ref 94–99)
SAO2 % BLDCOA: 92.5 % (ref 94–99)
SAO2 % BLDCOA: 97.3 % (ref 94–99)
SAO2 % BLDCOA: 97.5 % (ref 94–99)
SAO2 % BLDCOA: 98.4 % (ref 94–99)
SAO2 % BLDCOA: 98.9 % (ref 94–99)
SAO2 % BLDCOA: 99.5 % (ref 94–99)
SAO2 % BLDCOA: 99.8 % (ref 94–99)
SAO2 % BLDCOA: >100.1 % (ref 94–99)
SAO2 % BLDCOA: >100.1 % (ref 94–99)
SET MECH RESP RATE: 18
SET MECH RESP RATE: 22
SET MECH RESP RATE: 22
SODIUM BLDA-SCNC: 134 MMOL/L (ref 136–145)
SODIUM BLDA-SCNC: 134 MMOL/L (ref 136–145)
SODIUM BLDA-SCNC: 135 MMOL/L (ref 136–145)
SODIUM BLDA-SCNC: 136 MMOL/L (ref 136–145)
SODIUM BLDA-SCNC: 137 MMOL/L (ref 136–145)
SODIUM BLDA-SCNC: 137 MMOL/L (ref 136–145)
SODIUM BLDA-SCNC: 138 MMOL/L (ref 136–145)
SODIUM BLDA-SCNC: 138 MMOL/L (ref 136–145)
SODIUM SERPL-SCNC: 135 MMOL/L (ref 136–145)
SODIUM SERPL-SCNC: 137 MMOL/L (ref 136–145)
SODIUM SERPL-SCNC: 138 MMOL/L (ref 136–145)
T&S EXPIRATION DATE: NORMAL
UNIT  ABO: NORMAL
UNIT  ABO: NORMAL
UNIT  RH: NORMAL
UNIT  RH: NORMAL
VARIANT LYMPHS NFR BLD MANUAL: 2 % (ref 0–5)
VARIANT LYMPHS NFR BLD MANUAL: 6.1 % (ref 19.6–45.3)
VENTILATOR MODE: ABNORMAL
VT ON VENT VENT: 600 ML
VT ON VENT VENT: 750 ML
VT ON VENT VENT: 750 ML
WBC MORPH BLD: NORMAL
WBC NRBC COR # BLD AUTO: 12.45 10*3/MM3 (ref 3.4–10.8)
WBC NRBC COR # BLD AUTO: 13.88 10*3/MM3 (ref 3.4–10.8)
WBC NRBC COR # BLD AUTO: 17.8 10*3/MM3 (ref 3.4–10.8)
WBC NRBC COR # BLD AUTO: 8.1 10*3/MM3 (ref 3.4–10.8)

## 2024-06-06 PROCEDURE — 0 INSULIN REGULAR HUMAN PER 5 UNITS: Performed by: THORACIC SURGERY (CARDIOTHORACIC VASCULAR SURGERY)

## 2024-06-06 PROCEDURE — 25010000002 METHYLENE BLUE 50 MG/10ML SOLUTION: Performed by: THORACIC SURGERY (CARDIOTHORACIC VASCULAR SURGERY)

## 2024-06-06 PROCEDURE — 86923 COMPATIBILITY TEST ELECTRIC: CPT

## 2024-06-06 PROCEDURE — 51700 IRRIGATION OF BLADDER: CPT | Performed by: UROLOGY

## 2024-06-06 PROCEDURE — 86900 BLOOD TYPING SEROLOGIC ABO: CPT

## 2024-06-06 PROCEDURE — C1751 CATH, INF, PER/CENT/MIDLINE: HCPCS | Performed by: NURSE ANESTHETIST, CERTIFIED REGISTERED

## 2024-06-06 PROCEDURE — 86901 BLOOD TYPING SEROLOGIC RH(D): CPT | Performed by: THORACIC SURGERY (CARDIOTHORACIC VASCULAR SURGERY)

## 2024-06-06 PROCEDURE — 25010000002 FUROSEMIDE PER 20 MG

## 2024-06-06 PROCEDURE — 82375 ASSAY CARBOXYHB QUANT: CPT

## 2024-06-06 PROCEDURE — 25010000002 VANCOMYCIN 1 G RECONSTITUTED SOLUTION: Performed by: THORACIC SURGERY (CARDIOTHORACIC VASCULAR SURGERY)

## 2024-06-06 PROCEDURE — 25010000002 PAPAVERINE PER 60 MG: Performed by: THORACIC SURGERY (CARDIOTHORACIC VASCULAR SURGERY)

## 2024-06-06 PROCEDURE — 85362 FIBRIN DEGRADATION PRODUCTS: CPT | Performed by: THORACIC SURGERY (CARDIOTHORACIC VASCULAR SURGERY)

## 2024-06-06 PROCEDURE — 85027 COMPLETE CBC AUTOMATED: CPT | Performed by: THORACIC SURGERY (CARDIOTHORACIC VASCULAR SURGERY)

## 2024-06-06 PROCEDURE — 25010000002 POTASSIUM CHLORIDE PER 2 MEQ OF POTASSIUM

## 2024-06-06 PROCEDURE — 83874 ASSAY OF MYOGLOBIN: CPT | Performed by: THORACIC SURGERY (CARDIOTHORACIC VASCULAR SURGERY)

## 2024-06-06 PROCEDURE — 25010000002 VANCOMYCIN 10 G RECONSTITUTED SOLUTION: Performed by: THORACIC SURGERY (CARDIOTHORACIC VASCULAR SURGERY)

## 2024-06-06 PROCEDURE — 25010000002 HEPARIN (PORCINE) PER 1000 UNITS: Performed by: THORACIC SURGERY (CARDIOTHORACIC VASCULAR SURGERY)

## 2024-06-06 PROCEDURE — G0378 HOSPITAL OBSERVATION PER HR: HCPCS

## 2024-06-06 PROCEDURE — 25010000002 PHENYLEPHRINE 10 MG/ML SOLUTION

## 2024-06-06 PROCEDURE — 25810000003 LACTATED RINGERS PER 1000 ML: Performed by: THORACIC SURGERY (CARDIOTHORACIC VASCULAR SURGERY)

## 2024-06-06 PROCEDURE — 93005 ELECTROCARDIOGRAM TRACING: CPT | Performed by: THORACIC SURGERY (CARDIOTHORACIC VASCULAR SURGERY)

## 2024-06-06 PROCEDURE — P9047 ALBUMIN (HUMAN), 25%, 50ML: HCPCS

## 2024-06-06 PROCEDURE — 5A1221Z PERFORMANCE OF CARDIAC OUTPUT, CONTINUOUS: ICD-10-PCS | Performed by: THORACIC SURGERY (CARDIOTHORACIC VASCULAR SURGERY)

## 2024-06-06 PROCEDURE — 25810000003 SODIUM CHLORIDE 0.9 % SOLUTION: Performed by: NURSE PRACTITIONER

## 2024-06-06 PROCEDURE — 33519 CABG ARTERY-VEIN THREE: CPT | Performed by: THORACIC SURGERY (CARDIOTHORACIC VASCULAR SURGERY)

## 2024-06-06 PROCEDURE — 25810000003 SODIUM CHLORIDE 0.9 % SOLUTION

## 2024-06-06 PROCEDURE — 82805 BLOOD GASES W/O2 SATURATION: CPT

## 2024-06-06 PROCEDURE — 33533 CABG ARTERIAL SINGLE: CPT | Performed by: THORACIC SURGERY (CARDIOTHORACIC VASCULAR SURGERY)

## 2024-06-06 PROCEDURE — 86850 RBC ANTIBODY SCREEN: CPT | Performed by: THORACIC SURGERY (CARDIOTHORACIC VASCULAR SURGERY)

## 2024-06-06 PROCEDURE — C1713 ANCHOR/SCREW BN/BN,TIS/BN: HCPCS | Performed by: THORACIC SURGERY (CARDIOTHORACIC VASCULAR SURGERY)

## 2024-06-06 PROCEDURE — 84100 ASSAY OF PHOSPHORUS: CPT | Performed by: NURSE PRACTITIONER

## 2024-06-06 PROCEDURE — 93010 ELECTROCARDIOGRAM REPORT: CPT | Performed by: EMERGENCY MEDICINE

## 2024-06-06 PROCEDURE — 25810000003 SODIUM CHLORIDE 0.9 % SOLUTION 250 ML FLEX CONT: Performed by: NURSE ANESTHETIST, CERTIFIED REGISTERED

## 2024-06-06 PROCEDURE — 94799 UNLISTED PULMONARY SVC/PX: CPT

## 2024-06-06 PROCEDURE — 25810000003 SODIUM CHLORIDE 0.9 % SOLUTION: Performed by: THORACIC SURGERY (CARDIOTHORACIC VASCULAR SURGERY)

## 2024-06-06 PROCEDURE — 86920 COMPATIBILITY TEST SPIN: CPT

## 2024-06-06 PROCEDURE — 25010000002 ALBUMIN HUMAN 25% PER 50 ML

## 2024-06-06 PROCEDURE — 85610 PROTHROMBIN TIME: CPT | Performed by: THORACIC SURGERY (CARDIOTHORACIC VASCULAR SURGERY)

## 2024-06-06 PROCEDURE — 25010000002 PROTAMINE SULFATE PER 10 MG

## 2024-06-06 PROCEDURE — 25010000002 MIDAZOLAM PER 1 MG: Performed by: NURSE ANESTHETIST, CERTIFIED REGISTERED

## 2024-06-06 PROCEDURE — 06BP4ZZ EXCISION OF RIGHT SAPHENOUS VEIN, PERCUTANEOUS ENDOSCOPIC APPROACH: ICD-10-PCS | Performed by: THORACIC SURGERY (CARDIOTHORACIC VASCULAR SURGERY)

## 2024-06-06 PROCEDURE — 25010000002 PHENYLEPHRINE 10 MG/ML SOLUTION 5 ML VIAL: Performed by: NURSE ANESTHETIST, CERTIFIED REGISTERED

## 2024-06-06 PROCEDURE — 25810000003 LACTATED RINGERS PER 1000 ML: Performed by: ANESTHESIOLOGY

## 2024-06-06 PROCEDURE — 86901 BLOOD TYPING SEROLOGIC RH(D): CPT

## 2024-06-06 PROCEDURE — B246ZZ4 ULTRASONOGRAPHY OF RIGHT AND LEFT HEART, TRANSESOPHAGEAL: ICD-10-PCS | Performed by: THORACIC SURGERY (CARDIOTHORACIC VASCULAR SURGERY)

## 2024-06-06 PROCEDURE — 94640 AIRWAY INHALATION TREATMENT: CPT

## 2024-06-06 PROCEDURE — 94002 VENT MGMT INPAT INIT DAY: CPT

## 2024-06-06 PROCEDURE — 25010000002 CEFAZOLIN 3 G RECONSTITUTED SOLUTION 1 EACH VIAL: Performed by: THORACIC SURGERY (CARDIOTHORACIC VASCULAR SURGERY)

## 2024-06-06 PROCEDURE — 02100Z9 BYPASS CORONARY ARTERY, ONE ARTERY FROM LEFT INTERNAL MAMMARY, OPEN APPROACH: ICD-10-PCS | Performed by: THORACIC SURGERY (CARDIOTHORACIC VASCULAR SURGERY)

## 2024-06-06 PROCEDURE — 25810000003 DEXTROSE 5 % WITH KCL 20 MEQ 20 MEQ/L SOLUTION: Performed by: THORACIC SURGERY (CARDIOTHORACIC VASCULAR SURGERY)

## 2024-06-06 PROCEDURE — 82948 REAGENT STRIP/BLOOD GLUCOSE: CPT

## 2024-06-06 PROCEDURE — 82803 BLOOD GASES ANY COMBINATION: CPT

## 2024-06-06 PROCEDURE — 25010000002 HEPARIN (PORCINE) PER 1000 UNITS: Performed by: NURSE ANESTHETIST, CERTIFIED REGISTERED

## 2024-06-06 PROCEDURE — 021209W BYPASS CORONARY ARTERY, THREE ARTERIES FROM AORTA WITH AUTOLOGOUS VENOUS TISSUE, OPEN APPROACH: ICD-10-PCS | Performed by: THORACIC SURGERY (CARDIOTHORACIC VASCULAR SURGERY)

## 2024-06-06 PROCEDURE — 71045 X-RAY EXAM CHEST 1 VIEW: CPT

## 2024-06-06 PROCEDURE — 25810000003 LACTATED RINGERS SOLUTION: Performed by: THORACIC SURGERY (CARDIOTHORACIC VASCULAR SURGERY)

## 2024-06-06 PROCEDURE — 25010000002 MIDAZOLAM PER 1 MG: Performed by: ANESTHESIOLOGY

## 2024-06-06 PROCEDURE — 63710000001 INSULIN REGULAR HUMAN PER 5 UNITS: Performed by: THORACIC SURGERY (CARDIOTHORACIC VASCULAR SURGERY)

## 2024-06-06 PROCEDURE — 85610 PROTHROMBIN TIME: CPT | Performed by: NURSE PRACTITIONER

## 2024-06-06 PROCEDURE — 25010000002 ACETAMINOPHEN 10 MG/ML SOLUTION: Performed by: THORACIC SURGERY (CARDIOTHORACIC VASCULAR SURGERY)

## 2024-06-06 PROCEDURE — 25810000003 LACTATED RINGERS PER 1000 ML: Performed by: NURSE ANESTHETIST, CERTIFIED REGISTERED

## 2024-06-06 PROCEDURE — 0 DEXTROSE 5 % SOLUTION: Performed by: THORACIC SURGERY (CARDIOTHORACIC VASCULAR SURGERY)

## 2024-06-06 PROCEDURE — 25010000002 FUROSEMIDE PER 20 MG: Performed by: THORACIC SURGERY (CARDIOTHORACIC VASCULAR SURGERY)

## 2024-06-06 PROCEDURE — 80053 COMPREHEN METABOLIC PANEL: CPT | Performed by: NURSE PRACTITIONER

## 2024-06-06 PROCEDURE — 86900 BLOOD TYPING SEROLOGIC ABO: CPT | Performed by: THORACIC SURGERY (CARDIOTHORACIC VASCULAR SURGERY)

## 2024-06-06 PROCEDURE — 85384 FIBRINOGEN ACTIVITY: CPT | Performed by: THORACIC SURGERY (CARDIOTHORACIC VASCULAR SURGERY)

## 2024-06-06 PROCEDURE — 94761 N-INVAS EAR/PLS OXIMETRY MLT: CPT

## 2024-06-06 PROCEDURE — 85007 BL SMEAR W/DIFF WBC COUNT: CPT | Performed by: THORACIC SURGERY (CARDIOTHORACIC VASCULAR SURGERY)

## 2024-06-06 PROCEDURE — 0T9B70Z DRAINAGE OF BLADDER WITH DRAINAGE DEVICE, VIA NATURAL OR ARTIFICIAL OPENING: ICD-10-PCS | Performed by: UROLOGY

## 2024-06-06 PROCEDURE — 25010000002 PHENYLEPHRINE 10 MG/ML SOLUTION: Performed by: NURSE ANESTHETIST, CERTIFIED REGISTERED

## 2024-06-06 PROCEDURE — A4648 IMPLANTABLE TISSUE MARKER: HCPCS | Performed by: THORACIC SURGERY (CARDIOTHORACIC VASCULAR SURGERY)

## 2024-06-06 PROCEDURE — 85730 THROMBOPLASTIN TIME PARTIAL: CPT | Performed by: THORACIC SURGERY (CARDIOTHORACIC VASCULAR SURGERY)

## 2024-06-06 PROCEDURE — 25010000002 MANNITOL PER 50 ML

## 2024-06-06 PROCEDURE — 25010000002 CEFAZOLIN 3 G RECONSTITUTED SOLUTION 1 EACH VIAL: Performed by: NURSE PRACTITIONER

## 2024-06-06 PROCEDURE — 83050 HGB METHEMOGLOBIN QUAN: CPT

## 2024-06-06 PROCEDURE — 82330 ASSAY OF CALCIUM: CPT

## 2024-06-06 PROCEDURE — 25010000002 HEPARIN (PORCINE) PER 1000 UNITS

## 2024-06-06 PROCEDURE — 25010000002 SUGAMMADEX 200 MG/2ML SOLUTION: Performed by: NURSE ANESTHETIST, CERTIFIED REGISTERED

## 2024-06-06 PROCEDURE — 85027 COMPLETE CBC AUTOMATED: CPT | Performed by: NURSE PRACTITIONER

## 2024-06-06 PROCEDURE — 25010000002 PROPOFOL 10 MG/ML EMULSION: Performed by: NURSE ANESTHETIST, CERTIFIED REGISTERED

## 2024-06-06 PROCEDURE — 25810000003 LACTATED RINGERS PER 1000 ML

## 2024-06-06 PROCEDURE — 85730 THROMBOPLASTIN TIME PARTIAL: CPT | Performed by: NURSE PRACTITIONER

## 2024-06-06 DEVICE — CLIP LIGAT VASC HORIZON TI LG ORNG 6CT: Type: IMPLANTABLE DEVICE | Site: CHEST | Status: FUNCTIONAL

## 2024-06-06 DEVICE — PLEDGET INCISIONLINE REINF TFE SFT PTFE 1.5X3X7MM WHT: Type: IMPLANTABLE DEVICE | Site: HEART | Status: FUNCTIONAL

## 2024-06-06 DEVICE — HEMOST ABS SURGIFOAM SZ100 8X12 10MM: Type: IMPLANTABLE DEVICE | Site: CHEST | Status: FUNCTIONAL

## 2024-06-06 DEVICE — KT STERNALOCK XP X/PLATE: Type: IMPLANTABLE DEVICE | Site: CHEST | Status: FUNCTIONAL

## 2024-06-06 DEVICE — WR SUT NONABS MF SS V40 1/2CIR TC 6/0 18IN M649G: Type: IMPLANTABLE DEVICE | Site: CHEST | Status: FUNCTIONAL

## 2024-06-06 DEVICE — KT PLT STERNALOCK/XP AXILR SHRP: Type: IMPLANTABLE DEVICE | Site: CHEST | Status: FUNCTIONAL

## 2024-06-06 DEVICE — KT HEMOST ABS SURGIFOAM PORCN 1GRAM: Type: IMPLANTABLE DEVICE | Site: CHEST | Status: FUNCTIONAL

## 2024-06-06 DEVICE — LIGACLIP EXTRA LIGATING CLIP CARTRIDGES: 6 TITANIUM CLIPS/ CARTRIDGE (SMALL)
Type: IMPLANTABLE DEVICE | Site: HEART | Status: FUNCTIONAL
Brand: LIGACLIP

## 2024-06-06 DEVICE — DISK-SHAPED STYLE, SILICONE (1 PER STERILE PKG)
Type: IMPLANTABLE DEVICE | Site: HEART | Status: FUNCTIONAL
Brand: SCANLAN® RADIOMARK® GRAFT MARKERS

## 2024-06-06 DEVICE — IMPLANTABLE DEVICE: Type: IMPLANTABLE DEVICE | Site: HEART | Status: FUNCTIONAL

## 2024-06-06 RX ORDER — PANTOPRAZOLE SODIUM 40 MG/1
40 TABLET, DELAYED RELEASE ORAL EVERY MORNING
Status: DISCONTINUED | OUTPATIENT
Start: 2024-06-07 | End: 2024-06-12 | Stop reason: HOSPADM

## 2024-06-06 RX ORDER — SODIUM CHLORIDE, SODIUM LACTATE, POTASSIUM CHLORIDE, CALCIUM CHLORIDE 600; 310; 30; 20 MG/100ML; MG/100ML; MG/100ML; MG/100ML
INJECTION, SOLUTION INTRAVENOUS CONTINUOUS PRN
Status: DISCONTINUED | OUTPATIENT
Start: 2024-06-06 | End: 2024-06-06 | Stop reason: SURG

## 2024-06-06 RX ORDER — NICOTINE POLACRILEX 4 MG
15 LOZENGE BUCCAL
Status: DISCONTINUED | OUTPATIENT
Start: 2024-06-06 | End: 2024-06-12 | Stop reason: HOSPADM

## 2024-06-06 RX ORDER — BUPIVACAINE HCL/0.9 % NACL/PF 0.125 %
PLASTIC BAG, INJECTION (ML) EPIDURAL AS NEEDED
Status: DISCONTINUED | OUTPATIENT
Start: 2024-06-06 | End: 2024-06-06

## 2024-06-06 RX ORDER — SODIUM CHLORIDE, SODIUM LACTATE, POTASSIUM CHLORIDE, CALCIUM CHLORIDE 600; 310; 30; 20 MG/100ML; MG/100ML; MG/100ML; MG/100ML
1000 INJECTION, SOLUTION INTRAVENOUS CONTINUOUS
Status: DISCONTINUED | OUTPATIENT
Start: 2024-06-06 | End: 2024-06-06

## 2024-06-06 RX ORDER — POTASSIUM CHLORIDE, DEXTROSE MONOHYDRATE 150; 5 MG/100ML; G/100ML
30 INJECTION, SOLUTION INTRAVENOUS CONTINUOUS
Status: DISCONTINUED | OUTPATIENT
Start: 2024-06-06 | End: 2024-06-06

## 2024-06-06 RX ORDER — MIDAZOLAM HYDROCHLORIDE 1 MG/ML
2 INJECTION INTRAMUSCULAR; INTRAVENOUS ONCE
Status: COMPLETED | OUTPATIENT
Start: 2024-06-06 | End: 2024-06-06

## 2024-06-06 RX ORDER — FUROSEMIDE 10 MG/ML
20 INJECTION INTRAMUSCULAR; INTRAVENOUS ONCE
Status: COMPLETED | OUTPATIENT
Start: 2024-06-06 | End: 2024-06-06

## 2024-06-06 RX ORDER — IPRATROPIUM BROMIDE AND ALBUTEROL SULFATE 2.5; .5 MG/3ML; MG/3ML
1.5 SOLUTION RESPIRATORY (INHALATION)
Status: DISCONTINUED | OUTPATIENT
Start: 2024-06-06 | End: 2024-06-07

## 2024-06-06 RX ORDER — DEXMEDETOMIDINE HYDROCHLORIDE 4 UG/ML
INJECTION, SOLUTION INTRAVENOUS CONTINUOUS PRN
Status: DISCONTINUED | OUTPATIENT
Start: 2024-06-06 | End: 2024-06-06 | Stop reason: SURG

## 2024-06-06 RX ORDER — PREGABALIN 25 MG/1
25 CAPSULE ORAL EVERY 12 HOURS SCHEDULED
Status: DISCONTINUED | OUTPATIENT
Start: 2024-06-07 | End: 2024-06-12 | Stop reason: HOSPADM

## 2024-06-06 RX ORDER — CHLORHEXIDINE GLUCONATE ORAL RINSE 1.2 MG/ML
15 SOLUTION DENTAL EVERY 12 HOURS
Status: DISCONTINUED | OUTPATIENT
Start: 2024-06-06 | End: 2024-06-10

## 2024-06-06 RX ORDER — CHLORHEXIDINE GLUCONATE 500 MG/1
CLOTH TOPICAL EVERY 12 HOURS PRN
Status: DISCONTINUED | OUTPATIENT
Start: 2024-06-06 | End: 2024-06-06 | Stop reason: HOSPADM

## 2024-06-06 RX ORDER — METOPROLOL TARTRATE 1 MG/ML
INJECTION, SOLUTION INTRAVENOUS AS NEEDED
Status: DISCONTINUED | OUTPATIENT
Start: 2024-06-06 | End: 2024-06-06 | Stop reason: SURG

## 2024-06-06 RX ORDER — EPHEDRINE SULFATE 50 MG/ML
INJECTION INTRAVENOUS AS NEEDED
Status: DISCONTINUED | OUTPATIENT
Start: 2024-06-06 | End: 2024-06-06 | Stop reason: SURG

## 2024-06-06 RX ORDER — LIDOCAINE HYDROCHLORIDE 10 MG/ML
0.5 INJECTION, SOLUTION EPIDURAL; INFILTRATION; INTRACAUDAL; PERINEURAL ONCE AS NEEDED
Status: DISCONTINUED | OUTPATIENT
Start: 2024-06-06 | End: 2024-06-06 | Stop reason: HOSPADM

## 2024-06-06 RX ORDER — ACETAMINOPHEN 10 MG/ML
1000 INJECTION, SOLUTION INTRAVENOUS EVERY 8 HOURS
Status: COMPLETED | OUTPATIENT
Start: 2024-06-06 | End: 2024-06-07

## 2024-06-06 RX ORDER — BISACODYL 5 MG/1
10 TABLET, DELAYED RELEASE ORAL 2 TIMES DAILY
Status: DISCONTINUED | OUTPATIENT
Start: 2024-06-07 | End: 2024-06-12 | Stop reason: HOSPADM

## 2024-06-06 RX ORDER — SODIUM CHLORIDE 0.9 % (FLUSH) 0.9 %
10 SYRINGE (ML) INJECTION AS NEEDED
Status: DISCONTINUED | OUTPATIENT
Start: 2024-06-06 | End: 2024-06-06 | Stop reason: HOSPADM

## 2024-06-06 RX ORDER — ALBUTEROL SULFATE 2.5 MG/3ML
2.5 SOLUTION RESPIRATORY (INHALATION) EVERY 4 HOURS PRN
Status: ACTIVE | OUTPATIENT
Start: 2024-06-06 | End: 2024-06-07

## 2024-06-06 RX ORDER — CALCIUM CHLORIDE 100 MG/ML
1 INJECTION INTRAVENOUS; INTRAVENTRICULAR ONCE
Status: COMPLETED | OUTPATIENT
Start: 2024-06-06 | End: 2024-06-06

## 2024-06-06 RX ORDER — CHLORHEXIDINE GLUCONATE ORAL RINSE 1.2 MG/ML
15 SOLUTION DENTAL EVERY 12 HOURS SCHEDULED
Status: DISCONTINUED | OUTPATIENT
Start: 2024-06-06 | End: 2024-06-06 | Stop reason: SDUPTHER

## 2024-06-06 RX ORDER — MAGNESIUM HYDROXIDE 1200 MG/15ML
LIQUID ORAL AS NEEDED
Status: DISCONTINUED | OUTPATIENT
Start: 2024-06-06 | End: 2024-06-06 | Stop reason: HOSPADM

## 2024-06-06 RX ORDER — VANCOMYCIN 2 GRAM/500 ML IN 0.9 % SODIUM CHLORIDE INTRAVENOUS
AS NEEDED
Status: DISCONTINUED | OUTPATIENT
Start: 2024-06-06 | End: 2024-06-06 | Stop reason: SURG

## 2024-06-06 RX ORDER — VANCOMYCIN/0.9 % SOD CHLORIDE 1.5G/250ML
1500 PLASTIC BAG, INJECTION (ML) INTRAVENOUS EVERY 12 HOURS
Status: COMPLETED | OUTPATIENT
Start: 2024-06-06 | End: 2024-06-07

## 2024-06-06 RX ORDER — OXYCODONE HYDROCHLORIDE 10 MG/1
10 TABLET ORAL
Status: DISCONTINUED | OUTPATIENT
Start: 2024-06-06 | End: 2024-06-07

## 2024-06-06 RX ORDER — DEXTROSE MONOHYDRATE 25 G/50ML
10-50 INJECTION, SOLUTION INTRAVENOUS
Status: DISCONTINUED | OUTPATIENT
Start: 2024-06-06 | End: 2024-06-12 | Stop reason: HOSPADM

## 2024-06-06 RX ORDER — FENTANYL CITRATE 50 UG/ML
25 INJECTION, SOLUTION INTRAMUSCULAR; INTRAVENOUS
Status: DISCONTINUED | OUTPATIENT
Start: 2024-06-06 | End: 2024-06-07

## 2024-06-06 RX ORDER — ACETAMINOPHEN 160 MG/5ML
1000 SOLUTION ORAL EVERY 6 HOURS
Status: DISCONTINUED | OUTPATIENT
Start: 2024-06-07 | End: 2024-06-12 | Stop reason: HOSPADM

## 2024-06-06 RX ORDER — SODIUM CHLORIDE 0.9 % (FLUSH) 0.9 %
3 SYRINGE (ML) INJECTION EVERY 12 HOURS SCHEDULED
Status: DISCONTINUED | OUTPATIENT
Start: 2024-06-06 | End: 2024-06-06 | Stop reason: HOSPADM

## 2024-06-06 RX ORDER — DEXTROSE MONOHYDRATE 50 MG/ML
30 INJECTION, SOLUTION INTRAVENOUS CONTINUOUS
Status: DISCONTINUED | OUTPATIENT
Start: 2024-06-06 | End: 2024-06-07

## 2024-06-06 RX ORDER — ASPIRIN 81 MG/1
81 TABLET ORAL DAILY
Status: DISCONTINUED | OUTPATIENT
Start: 2024-06-08 | End: 2024-06-12 | Stop reason: HOSPADM

## 2024-06-06 RX ORDER — VANCOMYCIN HYDROCHLORIDE 1 G/20ML
INJECTION, POWDER, LYOPHILIZED, FOR SOLUTION INTRAVENOUS AS NEEDED
Status: DISCONTINUED | OUTPATIENT
Start: 2024-06-06 | End: 2024-06-06 | Stop reason: HOSPADM

## 2024-06-06 RX ORDER — MIDAZOLAM HYDROCHLORIDE 1 MG/ML
INJECTION INTRAMUSCULAR; INTRAVENOUS AS NEEDED
Status: DISCONTINUED | OUTPATIENT
Start: 2024-06-06 | End: 2024-06-06 | Stop reason: SURG

## 2024-06-06 RX ORDER — ACETAMINOPHEN 500 MG
1000 TABLET ORAL EVERY 6 HOURS
Status: DISCONTINUED | OUTPATIENT
Start: 2024-06-07 | End: 2024-06-12 | Stop reason: HOSPADM

## 2024-06-06 RX ORDER — CLOPIDOGREL BISULFATE 75 MG/1
75 TABLET ORAL DAILY
Status: DISCONTINUED | OUTPATIENT
Start: 2024-06-07 | End: 2024-06-07

## 2024-06-06 RX ORDER — DEXTROSE MONOHYDRATE 25 G/50ML
50 INJECTION, SOLUTION INTRAVENOUS
Status: DISCONTINUED | OUTPATIENT
Start: 2024-06-06 | End: 2024-06-12 | Stop reason: HOSPADM

## 2024-06-06 RX ORDER — ACETAMINOPHEN 650 MG/1
650 SUPPOSITORY RECTAL EVERY 6 HOURS
Status: DISCONTINUED | OUTPATIENT
Start: 2024-06-07 | End: 2024-06-12 | Stop reason: HOSPADM

## 2024-06-06 RX ORDER — ROCURONIUM BROMIDE 10 MG/ML
INJECTION, SOLUTION INTRAVENOUS AS NEEDED
Status: DISCONTINUED | OUTPATIENT
Start: 2024-06-06 | End: 2024-06-06 | Stop reason: SURG

## 2024-06-06 RX ORDER — AMIODARONE HYDROCHLORIDE 200 MG/1
400 TABLET ORAL 2 TIMES DAILY WITH MEALS
Status: DISCONTINUED | OUTPATIENT
Start: 2024-06-07 | End: 2024-06-12 | Stop reason: HOSPADM

## 2024-06-06 RX ORDER — NICOTINE POLACRILEX 4 MG
15 LOZENGE BUCCAL
Status: DISCONTINUED | OUTPATIENT
Start: 2024-06-06 | End: 2024-06-06 | Stop reason: HOSPADM

## 2024-06-06 RX ORDER — SODIUM CHLORIDE 9 MG/ML
40 INJECTION, SOLUTION INTRAVENOUS AS NEEDED
Status: DISCONTINUED | OUTPATIENT
Start: 2024-06-06 | End: 2024-06-06 | Stop reason: HOSPADM

## 2024-06-06 RX ORDER — MIDAZOLAM HYDROCHLORIDE 1 MG/ML
2 INJECTION INTRAMUSCULAR; INTRAVENOUS ONCE
Status: DISCONTINUED | OUTPATIENT
Start: 2024-06-06 | End: 2024-06-06 | Stop reason: HOSPADM

## 2024-06-06 RX ORDER — PREGABALIN 25 MG/1
25 CAPSULE ORAL ONCE
Status: COMPLETED | OUTPATIENT
Start: 2024-06-06 | End: 2024-06-06

## 2024-06-06 RX ORDER — LIDOCAINE 4 G/G
2 PATCH TOPICAL
Status: DISCONTINUED | OUTPATIENT
Start: 2024-06-06 | End: 2024-06-12 | Stop reason: HOSPADM

## 2024-06-06 RX ORDER — ASPIRIN 81 MG/1
162 TABLET, CHEWABLE ORAL ONCE
Status: COMPLETED | OUTPATIENT
Start: 2024-06-07 | End: 2024-06-07

## 2024-06-06 RX ORDER — OXYCODONE HYDROCHLORIDE 5 MG/1
5 TABLET ORAL
Status: DISCONTINUED | OUTPATIENT
Start: 2024-06-06 | End: 2024-06-08

## 2024-06-06 RX ORDER — SODIUM CHLORIDE 0.9 % (FLUSH) 0.9 %
10 SYRINGE (ML) INJECTION EVERY 12 HOURS SCHEDULED
Status: DISCONTINUED | OUTPATIENT
Start: 2024-06-06 | End: 2024-06-06 | Stop reason: HOSPADM

## 2024-06-06 RX ORDER — DEXTROSE MONOHYDRATE 25 G/50ML
10-50 INJECTION, SOLUTION INTRAVENOUS
Status: DISCONTINUED | OUTPATIENT
Start: 2024-06-06 | End: 2024-06-06 | Stop reason: HOSPADM

## 2024-06-06 RX ORDER — PANTOPRAZOLE SODIUM 40 MG/10ML
40 INJECTION, POWDER, LYOPHILIZED, FOR SOLUTION INTRAVENOUS ONCE
Status: COMPLETED | OUTPATIENT
Start: 2024-06-06 | End: 2024-06-06

## 2024-06-06 RX ORDER — IBUPROFEN 600 MG/1
1 TABLET ORAL
Status: DISCONTINUED | OUTPATIENT
Start: 2024-06-06 | End: 2024-06-06 | Stop reason: HOSPADM

## 2024-06-06 RX ORDER — PROPOFOL 10 MG/ML
VIAL (ML) INTRAVENOUS AS NEEDED
Status: DISCONTINUED | OUTPATIENT
Start: 2024-06-06 | End: 2024-06-06 | Stop reason: SURG

## 2024-06-06 RX ORDER — NITROGLYCERIN 20 MG/100ML
5 INJECTION INTRAVENOUS CONTINUOUS
Status: DISCONTINUED | OUTPATIENT
Start: 2024-06-06 | End: 2024-06-07

## 2024-06-06 RX ORDER — NALOXONE HCL 0.4 MG/ML
0.4 VIAL (ML) INJECTION
Status: ACTIVE | OUTPATIENT
Start: 2024-06-06 | End: 2024-06-08

## 2024-06-06 RX ORDER — FENTANYL CITRATE 50 UG/ML
50 INJECTION, SOLUTION INTRAMUSCULAR; INTRAVENOUS
Status: DISCONTINUED | OUTPATIENT
Start: 2024-06-06 | End: 2024-06-07

## 2024-06-06 RX ORDER — HEPARIN SODIUM 1000 [USP'U]/ML
INJECTION, SOLUTION INTRAVENOUS; SUBCUTANEOUS AS NEEDED
Status: DISCONTINUED | OUTPATIENT
Start: 2024-06-06 | End: 2024-06-06 | Stop reason: SURG

## 2024-06-06 RX ORDER — ATORVASTATIN CALCIUM 10 MG/1
20 TABLET, FILM COATED ORAL NIGHTLY
Status: DISCONTINUED | OUTPATIENT
Start: 2024-06-07 | End: 2024-06-12 | Stop reason: HOSPADM

## 2024-06-06 RX ORDER — LIDOCAINE HYDROCHLORIDE 20 MG/ML
INJECTION, SOLUTION EPIDURAL; INFILTRATION; INTRACAUDAL; PERINEURAL AS NEEDED
Status: DISCONTINUED | OUTPATIENT
Start: 2024-06-06 | End: 2024-06-06 | Stop reason: SURG

## 2024-06-06 RX ORDER — PHENYLEPHRINE HYDROCHLORIDE 10 MG/ML
INJECTION INTRAVENOUS AS NEEDED
Status: DISCONTINUED | OUTPATIENT
Start: 2024-06-06 | End: 2024-06-06 | Stop reason: SURG

## 2024-06-06 RX ORDER — DEXMEDETOMIDINE HYDROCHLORIDE 4 UG/ML
.2-1.5 INJECTION, SOLUTION INTRAVENOUS
Status: DISCONTINUED | OUTPATIENT
Start: 2024-06-06 | End: 2024-06-07

## 2024-06-06 RX ORDER — SODIUM CHLORIDE 0.9 % (FLUSH) 0.9 %
3 SYRINGE (ML) INJECTION AS NEEDED
Status: DISCONTINUED | OUTPATIENT
Start: 2024-06-06 | End: 2024-06-06 | Stop reason: HOSPADM

## 2024-06-06 RX ORDER — PHENYLEPHRINE HCL IN 0.9% NACL 50MG/250ML
.5-3 PLASTIC BAG, INJECTION (ML) INTRAVENOUS CONTINUOUS PRN
Status: DISCONTINUED | OUTPATIENT
Start: 2024-06-06 | End: 2024-06-07

## 2024-06-06 RX ORDER — POLYETHYLENE GLYCOL 3350 17 G/17G
17 POWDER, FOR SOLUTION ORAL DAILY
Status: DISCONTINUED | OUTPATIENT
Start: 2024-06-07 | End: 2024-06-12 | Stop reason: HOSPADM

## 2024-06-06 RX ORDER — SODIUM CHLORIDE 0.9 % (FLUSH) 0.9 %
30 SYRINGE (ML) INJECTION ONCE AS NEEDED
Status: DISCONTINUED | OUTPATIENT
Start: 2024-06-06 | End: 2024-06-06 | Stop reason: HOSPADM

## 2024-06-06 RX ORDER — MEPERIDINE HYDROCHLORIDE 50 MG/ML
25 INJECTION INTRAMUSCULAR; INTRAVENOUS; SUBCUTANEOUS
Status: DISCONTINUED | OUTPATIENT
Start: 2024-06-06 | End: 2024-06-07

## 2024-06-06 RX ORDER — SODIUM CHLORIDE, SODIUM LACTATE, POTASSIUM CHLORIDE, CALCIUM CHLORIDE 600; 310; 30; 20 MG/100ML; MG/100ML; MG/100ML; MG/100ML
100 INJECTION, SOLUTION INTRAVENOUS CONTINUOUS
Status: DISCONTINUED | OUTPATIENT
Start: 2024-06-06 | End: 2024-06-06

## 2024-06-06 RX ORDER — VECURONIUM BROMIDE 1 MG/ML
INJECTION, POWDER, LYOPHILIZED, FOR SOLUTION INTRAVENOUS AS NEEDED
Status: DISCONTINUED | OUTPATIENT
Start: 2024-06-06 | End: 2024-06-06 | Stop reason: SURG

## 2024-06-06 RX ORDER — ACETAMINOPHEN 500 MG
1000 TABLET ORAL ONCE
Status: COMPLETED | OUTPATIENT
Start: 2024-06-06 | End: 2024-06-06

## 2024-06-06 RX ORDER — MIDAZOLAM HYDROCHLORIDE 1 MG/ML
1 INJECTION INTRAMUSCULAR; INTRAVENOUS
Status: DISCONTINUED | OUTPATIENT
Start: 2024-06-06 | End: 2024-06-06 | Stop reason: HOSPADM

## 2024-06-06 RX ORDER — SODIUM CHLORIDE 0.9 % (FLUSH) 0.9 %
3-10 SYRINGE (ML) INJECTION AS NEEDED
Status: DISCONTINUED | OUTPATIENT
Start: 2024-06-06 | End: 2024-06-06 | Stop reason: HOSPADM

## 2024-06-06 RX ORDER — CHLORHEXIDINE GLUCONATE 500 MG/1
1 CLOTH TOPICAL EVERY 24 HOURS
Status: DISCONTINUED | OUTPATIENT
Start: 2024-06-07 | End: 2024-06-10 | Stop reason: HOSPADM

## 2024-06-06 RX ORDER — SUFENTANIL CITRATE 50 UG/ML
INJECTION EPIDURAL; INTRAVENOUS AS NEEDED
Status: DISCONTINUED | OUTPATIENT
Start: 2024-06-06 | End: 2024-06-06 | Stop reason: SURG

## 2024-06-06 RX ORDER — SODIUM CHLORIDE 9 MG/ML
30 INJECTION, SOLUTION INTRAVENOUS CONTINUOUS PRN
Status: DISCONTINUED | OUTPATIENT
Start: 2024-06-06 | End: 2024-06-06 | Stop reason: HOSPADM

## 2024-06-06 RX ORDER — ONDANSETRON 2 MG/ML
4 INJECTION INTRAMUSCULAR; INTRAVENOUS EVERY 6 HOURS PRN
Status: DISCONTINUED | OUTPATIENT
Start: 2024-06-06 | End: 2024-06-12 | Stop reason: HOSPADM

## 2024-06-06 RX ADMIN — SUFENTANIL CITRATE 25 MCG: 50 INJECTION, SOLUTION EPIDURAL; INTRAVENOUS at 11:21

## 2024-06-06 RX ADMIN — POTASSIUM CHLORIDE AND DEXTROSE MONOHYDRATE 30 ML/HR: 150; 5 INJECTION, SOLUTION INTRAVENOUS at 17:21

## 2024-06-06 RX ADMIN — SUFENTANIL CITRATE 25 MCG: 50 INJECTION, SOLUTION EPIDURAL; INTRAVENOUS at 13:52

## 2024-06-06 RX ADMIN — SUFENTANIL CITRATE 25 MCG: 50 INJECTION, SOLUTION EPIDURAL; INTRAVENOUS at 16:02

## 2024-06-06 RX ADMIN — VANCOMYCIN 2 GRAM/500 ML IN 0.9 % SODIUM CHLORIDE INTRAVENOUS 2 G: at 08:17

## 2024-06-06 RX ADMIN — SUGAMMADEX 200 MG: 100 INJECTION, SOLUTION INTRAVENOUS at 17:00

## 2024-06-06 RX ADMIN — SODIUM CHLORIDE 1500 MG: 9 INJECTION, SOLUTION INTRAVENOUS at 21:03

## 2024-06-06 RX ADMIN — PHENYLEPHRINE HYDROCHLORIDE 200 MCG: 10 INJECTION INTRAVENOUS at 08:11

## 2024-06-06 RX ADMIN — OXYCODONE HYDROCHLORIDE 5 MG: 5 TABLET ORAL at 23:05

## 2024-06-06 RX ADMIN — SODIUM CHLORIDE, POTASSIUM CHLORIDE, SODIUM LACTATE AND CALCIUM CHLORIDE 1000 ML: 600; 310; 30; 20 INJECTION, SOLUTION INTRAVENOUS at 23:20

## 2024-06-06 RX ADMIN — SODIUM CHLORIDE 3 G: 900 INJECTION INTRAVENOUS at 12:17

## 2024-06-06 RX ADMIN — SODIUM CHLORIDE, POTASSIUM CHLORIDE, SODIUM LACTATE AND CALCIUM CHLORIDE 100 ML/HR: 600; 310; 30; 20 INJECTION, SOLUTION INTRAVENOUS at 07:17

## 2024-06-06 RX ADMIN — VECURONIUM BROMIDE 5 MG: 1 INJECTION, POWDER, LYOPHILIZED, FOR SOLUTION INTRAVENOUS at 11:41

## 2024-06-06 RX ADMIN — SODIUM CHLORIDE 3.8 UNITS/HR: 9 INJECTION, SOLUTION INTRAVENOUS at 17:56

## 2024-06-06 RX ADMIN — POTASSIUM CHLORIDE AND DEXTROSE MONOHYDRATE 30 ML/HR: 150; 5 INJECTION, SOLUTION INTRAVENOUS at 17:22

## 2024-06-06 RX ADMIN — MIDAZOLAM HYDROCHLORIDE 2 MG: 1 INJECTION, SOLUTION INTRAMUSCULAR; INTRAVENOUS at 12:29

## 2024-06-06 RX ADMIN — VECURONIUM BROMIDE 10 MG: 1 INJECTION, POWDER, LYOPHILIZED, FOR SOLUTION INTRAVENOUS at 09:33

## 2024-06-06 RX ADMIN — SODIUM CHLORIDE 1000 ML: 9 INJECTION, SOLUTION INTRAVENOUS at 18:53

## 2024-06-06 RX ADMIN — Medication 1 APPLICATION: at 18:23

## 2024-06-06 RX ADMIN — ACETAMINOPHEN 1000 MG: 500 TABLET, FILM COATED ORAL at 05:08

## 2024-06-06 RX ADMIN — SODIUM CHLORIDE: 9 INJECTION, SOLUTION INTRAVENOUS at 08:35

## 2024-06-06 RX ADMIN — MIDAZOLAM HYDROCHLORIDE 1 MG: 1 INJECTION, SOLUTION INTRAMUSCULAR; INTRAVENOUS at 13:52

## 2024-06-06 RX ADMIN — SODIUM CHLORIDE, POTASSIUM CHLORIDE, SODIUM LACTATE AND CALCIUM CHLORIDE: 600; 310; 30; 20 INJECTION, SOLUTION INTRAVENOUS at 08:16

## 2024-06-06 RX ADMIN — MIDAZOLAM HYDROCHLORIDE 5 MG: 1 INJECTION, SOLUTION INTRAMUSCULAR; INTRAVENOUS at 07:56

## 2024-06-06 RX ADMIN — SUFENTANIL CITRATE 50 MCG: 50 INJECTION, SOLUTION EPIDURAL; INTRAVENOUS at 15:19

## 2024-06-06 RX ADMIN — SUFENTANIL CITRATE 100 MCG: 50 INJECTION, SOLUTION EPIDURAL; INTRAVENOUS at 07:56

## 2024-06-06 RX ADMIN — SUFENTANIL CITRATE 25 MCG: 50 INJECTION, SOLUTION EPIDURAL; INTRAVENOUS at 14:43

## 2024-06-06 RX ADMIN — PHENYLEPHRINE HYDROCHLORIDE 200 MCG: 10 INJECTION INTRAVENOUS at 07:58

## 2024-06-06 RX ADMIN — LIDOCAINE 2 PATCH: 4 PATCH TOPICAL at 19:42

## 2024-06-06 RX ADMIN — EPHEDRINE SULFATE 15 MG: 50 INJECTION INTRAVENOUS at 07:59

## 2024-06-06 RX ADMIN — SODIUM CHLORIDE 3 G: 900 INJECTION INTRAVENOUS at 08:17

## 2024-06-06 RX ADMIN — SODIUM CHLORIDE, POTASSIUM CHLORIDE, SODIUM LACTATE AND CALCIUM CHLORIDE: 600; 310; 30; 20 INJECTION, SOLUTION INTRAVENOUS at 07:55

## 2024-06-06 RX ADMIN — CALCIUM CHLORIDE 1 G: 100 INJECTION INTRAVENOUS; INTRAVENTRICULAR at 18:22

## 2024-06-06 RX ADMIN — SODIUM CHLORIDE, POTASSIUM CHLORIDE, SODIUM LACTATE AND CALCIUM CHLORIDE 1000 ML: 600; 310; 30; 20 INJECTION, SOLUTION INTRAVENOUS at 07:18

## 2024-06-06 RX ADMIN — SUFENTANIL CITRATE 25 MCG: 50 INJECTION, SOLUTION EPIDURAL; INTRAVENOUS at 17:04

## 2024-06-06 RX ADMIN — FUROSEMIDE 20 MG: 10 INJECTION, SOLUTION INTRAMUSCULAR; INTRAVENOUS at 18:53

## 2024-06-06 RX ADMIN — PREGABALIN 25 MG: 25 CAPSULE ORAL at 05:08

## 2024-06-06 RX ADMIN — DEXMEDETOMIDINE HYDROCHLORIDE 0.8 MCG/KG/HR: 4 INJECTION, SOLUTION INTRAVENOUS at 21:02

## 2024-06-06 RX ADMIN — HEPARIN SODIUM 7000 UNITS: 1000 INJECTION, SOLUTION INTRAVENOUS; SUBCUTANEOUS at 09:48

## 2024-06-06 RX ADMIN — VECURONIUM BROMIDE 10 MG: 1 INJECTION, POWDER, LYOPHILIZED, FOR SOLUTION INTRAVENOUS at 14:05

## 2024-06-06 RX ADMIN — DEXMEDETOMIDINE HYDROCHLORIDE 1 MCG/KG/HR: 4 INJECTION, SOLUTION INTRAVENOUS at 17:30

## 2024-06-06 RX ADMIN — PHENYLEPHRINE HYDROCHLORIDE 100 MCG: 10 INJECTION INTRAVENOUS at 08:03

## 2024-06-06 RX ADMIN — IPRATROPIUM BROMIDE AND ALBUTEROL SULFATE 1.5 ML: .5; 3 SOLUTION RESPIRATORY (INHALATION) at 18:52

## 2024-06-06 RX ADMIN — SUFENTANIL CITRATE 25 MCG: 50 INJECTION, SOLUTION EPIDURAL; INTRAVENOUS at 16:14

## 2024-06-06 RX ADMIN — EPHEDRINE SULFATE 15 MG: 50 INJECTION INTRAVENOUS at 08:07

## 2024-06-06 RX ADMIN — PANTOPRAZOLE SODIUM 40 MG: 40 INJECTION, POWDER, FOR SOLUTION INTRAVENOUS at 18:23

## 2024-06-06 RX ADMIN — INSULIN HUMAN 20 UNITS: 100 INJECTION, SOLUTION PARENTERAL at 18:23

## 2024-06-06 RX ADMIN — DEXTROSE MONOHYDRATE 30 ML/HR: 50 INJECTION, SOLUTION INTRAVENOUS at 17:58

## 2024-06-06 RX ADMIN — PROPOFOL 80 MG: 10 INJECTION, EMULSION INTRAVENOUS at 07:56

## 2024-06-06 RX ADMIN — CHLORHEXIDINE GLUCONATE 0.12% ORAL RINSE 15 ML: 1.2 LIQUID ORAL at 19:43

## 2024-06-06 RX ADMIN — LIDOCAINE HYDROCHLORIDE 100 MG: 20 INJECTION, SOLUTION EPIDURAL; INFILTRATION; INTRACAUDAL; PERINEURAL at 07:56

## 2024-06-06 RX ADMIN — SUFENTANIL CITRATE 25 MCG: 50 INJECTION, SOLUTION EPIDURAL; INTRAVENOUS at 09:33

## 2024-06-06 RX ADMIN — SODIUM CHLORIDE 3 G: 900 INJECTION INTRAVENOUS at 20:00

## 2024-06-06 RX ADMIN — SUFENTANIL CITRATE 25 MCG: 50 INJECTION, SOLUTION EPIDURAL; INTRAVENOUS at 11:41

## 2024-06-06 RX ADMIN — DEXMEDETOMIDINE HYDROCHLORIDE 1.4 MCG/KG/HR: 4 INJECTION, SOLUTION INTRAVENOUS at 19:11

## 2024-06-06 RX ADMIN — ACETAMINOPHEN 1000 MG: 10 INJECTION, SOLUTION INTRAVENOUS at 18:23

## 2024-06-06 RX ADMIN — Medication 1 APPLICATION: at 07:18

## 2024-06-06 RX ADMIN — MIDAZOLAM HYDROCHLORIDE 4 MG: 1 INJECTION, SOLUTION INTRAMUSCULAR; INTRAVENOUS at 14:40

## 2024-06-06 RX ADMIN — DEXTROSE MONOHYDRATE 50 ML: 25 INJECTION, SOLUTION INTRAVENOUS at 18:22

## 2024-06-06 RX ADMIN — SODIUM CHLORIDE, POTASSIUM CHLORIDE, SODIUM LACTATE AND CALCIUM CHLORIDE: 600; 310; 30; 20 INJECTION, SOLUTION INTRAVENOUS at 16:12

## 2024-06-06 RX ADMIN — METOPROLOL TARTRATE 1 MG: 5 INJECTION INTRAVENOUS at 08:16

## 2024-06-06 RX ADMIN — DEXTROSE MONOHYDRATE 30 ML/HR: 50 INJECTION, SOLUTION INTRAVENOUS at 17:59

## 2024-06-06 RX ADMIN — PHENYLEPHRINE HYDROCHLORIDE 0.4 MCG/KG/MIN: 10 INJECTION INTRAVENOUS at 08:07

## 2024-06-06 RX ADMIN — EPHEDRINE SULFATE 10 MG: 50 INJECTION INTRAVENOUS at 08:52

## 2024-06-06 RX ADMIN — ROCURONIUM BROMIDE 100 MG: 10 INJECTION, SOLUTION INTRAVENOUS at 07:56

## 2024-06-06 RX ADMIN — AMINOCAPROIC ACID 5 G: 250 INJECTION, SOLUTION INTRAVENOUS at 08:49

## 2024-06-06 RX ADMIN — DEXMEDETOMIDINE HYDROCHLORIDE 1 MCG/KG/HR: 4 INJECTION, SOLUTION INTRAVENOUS at 15:48

## 2024-06-06 RX ADMIN — MIDAZOLAM 2 MG: 1 INJECTION INTRAMUSCULAR; INTRAVENOUS at 07:17

## 2024-06-06 RX ADMIN — SUFENTANIL CITRATE 25 MCG: 50 INJECTION, SOLUTION EPIDURAL; INTRAVENOUS at 12:29

## 2024-06-06 NOTE — INTERVAL H&P NOTE
H&P reviewed. The patient was examined and there are no changes to the H&P.    Elevated risk candidate but best option for management of his CAD given available options short and long term.    Verbalized understanding of RBA.  Provides consent

## 2024-06-06 NOTE — ANESTHESIA PROCEDURE NOTES
Central Line    Pre-sedation assessment completed: 6/6/2024 7:56 AM    Patient reassessed immediately prior to procedure    Patient location during procedure: OR  Start time: 6/6/2024 7:56 AM  Stop Time:6/6/2024 8:03 AM  Indications: vascular access  Staff  Anesthesiologist: Martin Park MD  Preanesthetic Checklist  Completed: patient identified, IV checked, site marked, risks and benefits discussed, surgical consent, monitors and equipment checked, pre-op evaluation and timeout performed  Central Line Prep  Sterile Tech:cap, gloves, mask, sterile barriers and gown  Prep: chloraprep  no medical exclusion documented for following all elements of maximal sterile barrier technique  Patient monitoring: blood pressure monitoring, continuous pulse oximetry and EKG  Central Line Procedure  Laterality:right  Location:internal jugular  Catheter Type:MAC and Edna-Shirlene  Catheter Size:9 Fr  Guidance:ultrasound guided  PROCEDURE NOTE/ULTRASOUND INTERPRETATION.  Using ultrasound guidance the potential vascular sites for insertion of the catheter were visualized to determine the patency of the vessel to be used for vascular access.  After selecting the appropriate site for insertion, the needle was visualized under ultrasound being inserted into the internal jugular vein, followed by ultrasound confirmation of wire and catheter placement. There were no abnormalities seen on ultrasound; an image was taken; and the patient tolerated the procedure with no complications. Images: still images not obtained  Assessment  Post procedure:occlusive dressing applied, line sutured and biopatch applied  Assessement:blood return through all ports, free fluid flow, chest x-ray ordered and Srinath Test  Complications:no  Patient Tolerance:patient tolerated the procedure well with no apparent complications  Additional Notes  Pcw 55

## 2024-06-06 NOTE — BRIEF OP NOTE
Constantin Seals  6/6/2024    Pre-op Diagnosis:   NSTEMI (non-ST elevated myocardial infarction) [I21.4]  Coronary artery disease involving native coronary artery of native heart with unstable angina pectoris [I25.110]  Obesity, class III  Hypertension       Post-Op Diagnosis Codes:  Same      Procedure(s):  CORONARY ARTERY BYPASS GRAFTING X4 (LIMA/LAD, RSVG/dRCA, RSVG/OM, and RSVG/D)  RIGHT LEG ENDOSCOPIC VEIN HARVEST  STERNALOCK XP STERNAL PLATING  APPLICATION PREVENA INCISION MANAGEMENT SYSTEM        Surgeon(s):  Sean Trejo MD    Anesthesia: General    Staff:   Circulator: Danielle Chan RN  Perfusionist: Callie Martínez CCP  Scrub Person: Ciara Heath; Miguel A Caballero  Assistant: Kun Hussein  Assistant: Kun Hussein      Estimated Blood Loss: CELL SAVER    Urine Voided: 800 mL    Specimens:                Specimens       ID Source Type Tests Collected By Collected At Frozen?    1 Blood, Arterial Line Blood MYOGLOBIN, SERUM   Sean Trejo MD 6/6/24 0949     2 Blood, Arterial Line Blood TYPE AND SCREEN   Sean Trejo MD 6/6/24 1052     3 Blood, Arterial Line Blood CBC (NO DIFF)  FIBRINOGEN  PROTIME-INR   Sean Trejo MD 6/6/24 1514                   Drains:   Closed/Suction Drain Inferior Pleural Bulb 19 Fr. (Active)       Urethral Catheter Other (Comment) 24 Fr. (Active)       Y Chest Tube 1 and 2 1 Right Mediastinal 24 Fr. 2 Left Mediastinal 24 Fr. (Active)       [REMOVED] Urethral Catheter Temperature probe 16 Fr. (Removed)       Findings: SEE OP NOTE        Complications: None immediate     Assistant: Kun Hussein  was responsible for performing the following activities: Retraction, Suction, Suturing, Closing, Placing Dressing, Harvesting of Vessels, and Bypass Grafting and their skilled assistance was necessary for the success of this case.    Sean Trejo MD     Date: 6/6/2024  Time: 16:29 CDT

## 2024-06-06 NOTE — ANESTHESIA PROCEDURE NOTES
Intra-Op Anesthesia DYLAN    Procedure Performed: Intra-Op Anesthesia DYLAN       Start Time:  6/6/2024 8:16 AM       End Time:   6/6/2024 8:20 AM    Preanesthesia Checklist:  Patient identified, IV assessed, risks and benefits discussed, monitors and equipment assessed, procedure being performed at surgeon's request and anesthesia consent obtained.    General Procedure Information  DYLAN Placed for monitoring purposes only -- This is not a diagnostic DYLAN

## 2024-06-06 NOTE — ANESTHESIA PREPROCEDURE EVALUATION
Anesthesia Evaluation     Patient summary reviewed   no history of anesthetic complications:   NPO Solid Status: > 8 hours             Airway   Mallampati: II  TM distance: >3 FB  No difficulty expected  Dental - normal exam     Pulmonary    (+) a smoker (passive) Abstained day of surgery,sleep apnea  (-) asthma  Cardiovascular   Exercise tolerance: good (4-7 METS)    ECG reviewed    (+) hypertension, CAD, angina  (-) past MI, dysrhythmias, cardiac stents, hyperlipidemia    ROS comment:    ·  Left ventricular systolic function is normal. Left ventricular ejection fraction appears to be 61 - 65%.  ·  Left ventricular wall thickness is consistent with mild concentric hypertrophy.  ·  Left ventricular diastolic function was normal.  ·  Normal right ventricular cavity size and systolic function noted.  ·  There is no significant (greater than mild) valvular dysfunction.     Severe multi-vessel coronary artery disease     Neuro/Psych  (-) seizures, TIA, CVA  GI/Hepatic/Renal/Endo    (+) morbid obesity, GERD  (-) liver disease, no renal disease, diabetes    ROS Comment: On metformin for weight loss    Musculoskeletal     Abdominal   (+) obese   Substance History      OB/GYN          Other                      Anesthesia Plan    ASA 4     general, CVL, Olamide and PAC     (Discussed right eye vision compromise and history--unclear risk )  intravenous induction     Anesthetic plan, risks, benefits, and alternatives have been provided, discussed and informed consent has been obtained with: patient.    Use of blood products discussed with patient  Consented to blood products.        CODE STATUS:    Code Status (Patient has no pulse and is not breathing): CPR (Attempt to Resuscitate)  Medical Interventions (Patient has pulse or is breathing): Full Support

## 2024-06-06 NOTE — ANESTHESIA PROCEDURE NOTES
Arterial Line      Patient reassessed immediately prior to procedure    Patient location during procedure: OR   Line placed for hemodynamic monitoring, ABGs/Labs/ISTAT and MD/Surgeon request.  Performed By   CRNA/CAA: Darius Ulrich CRNA   Preanesthetic Checklist  Completed: patient identified, IV checked, site marked, risks and benefits discussed, surgical consent, monitors and equipment checked, pre-op evaluation and timeout performed  Arterial Line Prep    Sterile Tech: cap, gloves and mask  Prep: ChloraPrep  Patient monitoring: blood pressure monitoring and continuous pulse oximetry  Arterial Line Procedure   Laterality:left  Location:  radial artery  Catheter size: 20 G   Guidance: ultrasound guided and palpation technique  Number of attempts: 1  Successful placement: yes Images: still images not obtained  Post Assessment   Dressing Type: secured with tape and wrist guard applied.   Complications no  Circ/Move/Sens Assessment: unchanged.   Patient Tolerance: patient tolerated the procedure well with no apparent complications  Additional Notes  Inserted by Saud Elliott SRNA

## 2024-06-06 NOTE — OP NOTE
COLES CATHETER INSERTION REMOVAL CHANGE  Procedure Note    Constantin Seals  6/6/2024    Pre-op Diagnosis:   Gross hematuria    Post-op Diagnosis:     Same    Procedure(s):  Complex Coles catheter placement  Hand irrigation of the bladder (CPT 39670)    Anesthesia: General    Staff:   Circulator: Danielle Chan RN  Perfusionist: Callie Martínez, CCP  Scrub Person: Ciara Heath; Miguel A Caballero  Assistant: Kun Hussein    Estimated Blood Loss: minimal    Specimens:                Specimens       ID Source Type Tests Collected By Collected At Frozen?    1 Blood, Arterial Line Blood MYOGLOBIN, SERUM   Sean Trejo MD 6/6/24 0949     This specimen was not marked as sent.              Drains: * No LDAs found *    Findings: Gross hematuria    Complications: None    Indications: 44-year-old male with CAD requiring CABG. he was taken to the operating room for the procedure.  A Coles catheter was placed and gross hematuria was noted.  Per report, there was an initial incidence of mild voided hematuria this morning.  No history of hematuria prior to that.    Description of Procedure:     I presented to the room for the consultation.  The patient was asleep, intubated and mechanically ventilated.  A temperature sensing Coles catheter was in place with moderate hematuria noted.  No clots noted.  Under routine sterile conditions, I replaced the Coles catheter with a 24 [place] three-way hematuria catheter without incident.  Prior to inflating the balloon, urine returned.  I filled the balloon with 30 cc of sterile water without difficulty.  The Coles was pulled back into place.  I then irrigated approximately 250 cc of sterile water by hand.  The urine cleared quickly.  No clots noted.  I connected the Coles catheter to gravity drainage and I placed a plug in the inflow port for now.    Disposition: The patient will be turned over to Dr. Trejo his cardiothoracic surgeon for his procedure.  I will assess the patient again  tomorrow morning.    Omari Beaulieu MD     Date: 6/6/2024  Time: 09:55 CDT

## 2024-06-06 NOTE — ANESTHESIA POSTPROCEDURE EVALUATION
"Patient: Constantin Seals    Procedure Summary       Date: 06/06/24 Room / Location:  [facility]  /  [facility]    Anesthesia Start: 0746 Anesthesia Stop: 1713    Procedures:       CORONARY ARTERY BYPASS GRAFTING X4, LEFT INTERNAL MAMMARY ARTERY GRAFT, RIGHT LEG ENDOSCOPIC VEIN HARVEST, STERNAL PLATING, APPLICATION PREVENA, TRANSESOPHAGEAL ECHOCARDIOGRAM (Chest)      COLES CATHETER INSERTION (Urethra) Diagnosis:       NSTEMI (non-ST elevated myocardial infarction)      Coronary artery disease involving native coronary artery of native heart with unstable angina pectoris      (NSTEMI (non-ST elevated myocardial infarction) [I21.4])      (Coronary artery disease involving native coronary artery of native heart with unstable angina pectoris [I25.110])    Surgeons: Sean Trejo MD; Omari Beaulieu MD Provider: Darius Ulrich CRNA    Anesthesia Type: general, CVL, [place], PAC ASA Status: 4            Anesthesia Type: general, CVL, Olamide, PAC    Vitals  Vitals Value Taken Time   /93 06/06/24 1704   Temp     Pulse 97 06/06/24 1713   Resp     SpO2 93 % 06/06/24 1713   Vitals shown include unfiled device data.        Post Anesthesia Care and Evaluation    Patient location during evaluation: ICU  Patient participation: complete - patient cannot participate  Level of consciousness: obtunded/minimal responses  Pain score: 0  Pain management: adequate    Airway patency: patent  Anesthetic complications: No anesthetic complications  PONV Status: none  Cardiovascular status: acceptable and hemodynamically stable  Respiratory status: acceptable, ETT and ventilator  Hydration status: acceptable    Comments: Blood pressure 134/83, pulse 65, temperature 97.8 °F (36.6 °C), temperature source Oral, resp. rate 18, height 195.6 cm (77\"), weight (!) 199 kg (437 lb 12.8 oz), SpO2 97%.      "

## 2024-06-06 NOTE — ANESTHESIA PROCEDURE NOTES
Airway  Urgency: elective    Date/Time: 6/6/2024 7:57 AM  Airway not difficult    General Information and Staff    Patient location during procedure: OR  CRNA/CAA: Darius Ulrich CRNA    Indications and Patient Condition  Indications for airway management: airway protection    Preoxygenated: yes  Mask difficulty assessment: 0 - not attempted    Final Airway Details  Final airway type: endotracheal airway      Successful airway: ETT  Cuffed: yes   Successful intubation technique: direct laryngoscopy  Facilitating devices/methods: intubating stylet  Endotracheal tube insertion site: oral  Blade: Gatica  Blade size: 4  ETT size (mm): 8.0  Cormack-Lehane Classification: grade I - full view of glottis  Placement verified by: chest auscultation and capnometry   Cuff volume (mL): 10  Measured from: teeth  ETT/EBT  to teeth (cm): 23  Number of attempts at approach: 1  Assessment: lips, teeth, and gum same as pre-op and atraumatic intubation

## 2024-06-06 NOTE — PLAN OF CARE
Goal Outcome Evaluation:              Outcome Evaluation: VSS. No c/o pain. Pt NPO for morning CABG. Both CHG showers completed. Around 0400 pt had blood in his urine. Provider notified. Room air.

## 2024-06-07 ENCOUNTER — APPOINTMENT (OUTPATIENT)
Dept: GENERAL RADIOLOGY | Facility: HOSPITAL | Age: 44
End: 2024-06-07
Payer: COMMERCIAL

## 2024-06-07 PROBLEM — R31.0 GROSS HEMATURIA: Status: ACTIVE | Noted: 2024-06-07

## 2024-06-07 PROBLEM — R82.1 MYOGLOBINURIA: Status: ACTIVE | Noted: 2024-06-07

## 2024-06-07 LAB
ALBUMIN SERPL-MCNC: 3.5 G/DL (ref 3.5–5.2)
ANION GAP SERPL CALCULATED.3IONS-SCNC: 6 MMOL/L (ref 5–15)
ANION GAP SERPL CALCULATED.3IONS-SCNC: 8 MMOL/L (ref 5–15)
BASOPHILS # BLD AUTO: 0.06 10*3/MM3 (ref 0–0.2)
BASOPHILS NFR BLD AUTO: 0.4 % (ref 0–1.5)
BUN SERPL-MCNC: 20 MG/DL (ref 6–20)
BUN SERPL-MCNC: 21 MG/DL (ref 6–20)
BUN/CREAT SERPL: 18 (ref 7–25)
BUN/CREAT SERPL: 18.4 (ref 7–25)
CALCIUM SPEC-SCNC: 8.6 MG/DL (ref 8.6–10.5)
CALCIUM SPEC-SCNC: 8.7 MG/DL (ref 8.6–10.5)
CHLORIDE SERPL-SCNC: 101 MMOL/L (ref 98–107)
CHLORIDE SERPL-SCNC: 105 MMOL/L (ref 98–107)
CO2 SERPL-SCNC: 23 MMOL/L (ref 22–29)
CO2 SERPL-SCNC: 26 MMOL/L (ref 22–29)
CREAT SERPL-MCNC: 1.11 MG/DL (ref 0.76–1.27)
CREAT SERPL-MCNC: 1.14 MG/DL (ref 0.76–1.27)
DEPRECATED RDW RBC AUTO: 41.7 FL (ref 37–54)
EGFRCR SERPLBLD CKD-EPI 2021: 81.8 ML/MIN/1.73
EGFRCR SERPLBLD CKD-EPI 2021: 84.5 ML/MIN/1.73
EOSINOPHIL # BLD AUTO: 0.01 10*3/MM3 (ref 0–0.4)
EOSINOPHIL NFR BLD AUTO: 0.1 % (ref 0.3–6.2)
ERYTHROCYTE [DISTWIDTH] IN BLOOD BY AUTOMATED COUNT: 13.4 % (ref 12.3–15.4)
GLUCOSE BLDC GLUCOMTR-MCNC: 127 MG/DL (ref 70–130)
GLUCOSE BLDC GLUCOMTR-MCNC: 132 MG/DL (ref 70–130)
GLUCOSE BLDC GLUCOMTR-MCNC: 132 MG/DL (ref 70–130)
GLUCOSE BLDC GLUCOMTR-MCNC: 134 MG/DL (ref 70–130)
GLUCOSE BLDC GLUCOMTR-MCNC: 140 MG/DL (ref 70–130)
GLUCOSE BLDC GLUCOMTR-MCNC: 140 MG/DL (ref 70–130)
GLUCOSE BLDC GLUCOMTR-MCNC: 141 MG/DL (ref 70–130)
GLUCOSE BLDC GLUCOMTR-MCNC: 153 MG/DL (ref 70–130)
GLUCOSE BLDC GLUCOMTR-MCNC: 170 MG/DL (ref 70–130)
GLUCOSE SERPL-MCNC: 144 MG/DL (ref 65–99)
GLUCOSE SERPL-MCNC: 150 MG/DL (ref 65–99)
HCT VFR BLD AUTO: 41.5 % (ref 37.5–51)
HGB BLD-MCNC: 13.3 G/DL (ref 13–17.7)
IMM GRANULOCYTES # BLD AUTO: 0.08 10*3/MM3 (ref 0–0.05)
IMM GRANULOCYTES NFR BLD AUTO: 0.6 % (ref 0–0.5)
INR PPP: 1.16 (ref 0.91–1.09)
LYMPHOCYTES # BLD AUTO: 0.86 10*3/MM3 (ref 0.7–3.1)
LYMPHOCYTES NFR BLD AUTO: 6 % (ref 19.6–45.3)
MAGNESIUM SERPL-MCNC: 1.9 MG/DL (ref 1.6–2.6)
MCH RBC QN AUTO: 27.3 PG (ref 26.6–33)
MCHC RBC AUTO-ENTMCNC: 32 G/DL (ref 31.5–35.7)
MCV RBC AUTO: 85.2 FL (ref 79–97)
MONOCYTES # BLD AUTO: 1.15 10*3/MM3 (ref 0.1–0.9)
MONOCYTES NFR BLD AUTO: 8 % (ref 5–12)
MYOGLOBIN SERPL-MCNC: 957 NG/ML (ref 28–72)
NEUTROPHILS NFR BLD AUTO: 12.13 10*3/MM3 (ref 1.7–7)
NEUTROPHILS NFR BLD AUTO: 84.9 % (ref 42.7–76)
NRBC BLD AUTO-RTO: 0 /100 WBC (ref 0–0.2)
PHOSPHATE SERPL-MCNC: 4.5 MG/DL (ref 2.5–4.5)
PLATELET # BLD AUTO: 197 10*3/MM3 (ref 140–450)
PMV BLD AUTO: 9.9 FL (ref 6–12)
POTASSIUM SERPL-SCNC: 4.7 MMOL/L (ref 3.5–5.2)
POTASSIUM SERPL-SCNC: 4.9 MMOL/L (ref 3.5–5.2)
PROTHROMBIN TIME: 15.3 SECONDS (ref 11.8–14.8)
QT INTERVAL: 368 MS
QT INTERVAL: 368 MS
QT INTERVAL: 372 MS
QTC INTERVAL: 434 MS
QTC INTERVAL: 445 MS
QTC INTERVAL: 467 MS
RBC # BLD AUTO: 4.87 10*6/MM3 (ref 4.14–5.8)
SODIUM SERPL-SCNC: 133 MMOL/L (ref 136–145)
SODIUM SERPL-SCNC: 136 MMOL/L (ref 136–145)
URINE MYOGLOBIN, QUALITATIVE: POSITIVE
WBC NRBC COR # BLD AUTO: 14.29 10*3/MM3 (ref 3.4–10.8)

## 2024-06-07 PROCEDURE — 97116 GAIT TRAINING THERAPY: CPT

## 2024-06-07 PROCEDURE — 25810000003 SODIUM CHLORIDE 0.9 % SOLUTION: Performed by: NURSE PRACTITIONER

## 2024-06-07 PROCEDURE — 83874 ASSAY OF MYOGLOBIN: CPT | Performed by: NURSE PRACTITIONER

## 2024-06-07 PROCEDURE — 97162 PT EVAL MOD COMPLEX 30 MIN: CPT

## 2024-06-07 PROCEDURE — 99024 POSTOP FOLLOW-UP VISIT: CPT | Performed by: THORACIC SURGERY (CARDIOTHORACIC VASCULAR SURGERY)

## 2024-06-07 PROCEDURE — 25010000002 ENOXAPARIN PER 10 MG: Performed by: THORACIC SURGERY (CARDIOTHORACIC VASCULAR SURGERY)

## 2024-06-07 PROCEDURE — 71045 X-RAY EXAM CHEST 1 VIEW: CPT

## 2024-06-07 PROCEDURE — 93010 ELECTROCARDIOGRAM REPORT: CPT | Performed by: EMERGENCY MEDICINE

## 2024-06-07 PROCEDURE — 82948 REAGENT STRIP/BLOOD GLUCOSE: CPT

## 2024-06-07 PROCEDURE — 94799 UNLISTED PULMONARY SVC/PX: CPT

## 2024-06-07 PROCEDURE — 63710000001 INSULIN LISPRO (HUMAN) PER 5 UNITS: Performed by: THORACIC SURGERY (CARDIOTHORACIC VASCULAR SURGERY)

## 2024-06-07 PROCEDURE — 25810000003 SODIUM CHLORIDE 0.9 % SOLUTION: Performed by: THORACIC SURGERY (CARDIOTHORACIC VASCULAR SURGERY)

## 2024-06-07 PROCEDURE — 25010000002 ACETAMINOPHEN 10 MG/ML SOLUTION: Performed by: THORACIC SURGERY (CARDIOTHORACIC VASCULAR SURGERY)

## 2024-06-07 PROCEDURE — 80069 RENAL FUNCTION PANEL: CPT | Performed by: THORACIC SURGERY (CARDIOTHORACIC VASCULAR SURGERY)

## 2024-06-07 PROCEDURE — 63710000001 INSULIN LISPRO (HUMAN) PER 5 UNITS: Performed by: NURSE PRACTITIONER

## 2024-06-07 PROCEDURE — 25010000002 VANCOMYCIN 10 G RECONSTITUTED SOLUTION: Performed by: THORACIC SURGERY (CARDIOTHORACIC VASCULAR SURGERY)

## 2024-06-07 PROCEDURE — 93005 ELECTROCARDIOGRAM TRACING: CPT | Performed by: THORACIC SURGERY (CARDIOTHORACIC VASCULAR SURGERY)

## 2024-06-07 PROCEDURE — 25010000002 MAGNESIUM SULFATE 4 GM/100ML SOLUTION: Performed by: THORACIC SURGERY (CARDIOTHORACIC VASCULAR SURGERY)

## 2024-06-07 PROCEDURE — 99232 SBSQ HOSP IP/OBS MODERATE 35: CPT | Performed by: UROLOGY

## 2024-06-07 PROCEDURE — 85610 PROTHROMBIN TIME: CPT | Performed by: THORACIC SURGERY (CARDIOTHORACIC VASCULAR SURGERY)

## 2024-06-07 PROCEDURE — 85025 COMPLETE CBC W/AUTO DIFF WBC: CPT | Performed by: THORACIC SURGERY (CARDIOTHORACIC VASCULAR SURGERY)

## 2024-06-07 PROCEDURE — 83735 ASSAY OF MAGNESIUM: CPT | Performed by: THORACIC SURGERY (CARDIOTHORACIC VASCULAR SURGERY)

## 2024-06-07 PROCEDURE — 25010000002 AMIODARONE IN DEXTROSE 5% 360-4.14 MG/200ML-% SOLUTION: Performed by: THORACIC SURGERY (CARDIOTHORACIC VASCULAR SURGERY)

## 2024-06-07 PROCEDURE — 80048 BASIC METABOLIC PNL TOTAL CA: CPT | Performed by: NURSE PRACTITIONER

## 2024-06-07 PROCEDURE — 25010000002 CEFAZOLIN 3 G RECONSTITUTED SOLUTION 1 EACH VIAL: Performed by: THORACIC SURGERY (CARDIOTHORACIC VASCULAR SURGERY)

## 2024-06-07 RX ORDER — IPRATROPIUM BROMIDE AND ALBUTEROL SULFATE 2.5; .5 MG/3ML; MG/3ML
1.5 SOLUTION RESPIRATORY (INHALATION)
Status: DISCONTINUED | OUTPATIENT
Start: 2024-06-07 | End: 2024-06-07 | Stop reason: SDUPTHER

## 2024-06-07 RX ORDER — ENOXAPARIN SODIUM 100 MG/ML
40 INJECTION SUBCUTANEOUS DAILY
Status: DISCONTINUED | OUTPATIENT
Start: 2024-06-07 | End: 2024-06-12 | Stop reason: HOSPADM

## 2024-06-07 RX ORDER — INSULIN LISPRO 100 [IU]/ML
2-9 INJECTION, SOLUTION INTRAVENOUS; SUBCUTANEOUS
Status: DISCONTINUED | OUTPATIENT
Start: 2024-06-07 | End: 2024-06-12 | Stop reason: HOSPADM

## 2024-06-07 RX ORDER — MAGNESIUM SULFATE HEPTAHYDRATE 40 MG/ML
4 INJECTION, SOLUTION INTRAVENOUS ONCE
Status: COMPLETED | OUTPATIENT
Start: 2024-06-07 | End: 2024-06-07

## 2024-06-07 RX ORDER — SODIUM CHLORIDE 9 MG/ML
150 INJECTION, SOLUTION INTRAVENOUS CONTINUOUS
Status: DISCONTINUED | OUTPATIENT
Start: 2024-06-07 | End: 2024-06-08

## 2024-06-07 RX ORDER — OXYCODONE HYDROCHLORIDE 10 MG/1
10 TABLET ORAL EVERY 4 HOURS PRN
Status: DISCONTINUED | OUTPATIENT
Start: 2024-06-07 | End: 2024-06-08

## 2024-06-07 RX ADMIN — ATORVASTATIN CALCIUM 20 MG: 10 TABLET, FILM COATED ORAL at 20:28

## 2024-06-07 RX ADMIN — SODIUM CHLORIDE 1500 MG: 9 INJECTION, SOLUTION INTRAVENOUS at 21:07

## 2024-06-07 RX ADMIN — METOPROLOL TARTRATE 12.5 MG: 25 TABLET, FILM COATED ORAL at 20:28

## 2024-06-07 RX ADMIN — OXYCODONE HYDROCHLORIDE 10 MG: 5 TABLET ORAL at 15:00

## 2024-06-07 RX ADMIN — INSULIN LISPRO 2 UNITS: 100 INJECTION, SOLUTION INTRAVENOUS; SUBCUTANEOUS at 10:48

## 2024-06-07 RX ADMIN — SODIUM CHLORIDE 3 G: 900 INJECTION INTRAVENOUS at 04:19

## 2024-06-07 RX ADMIN — OXYCODONE HYDROCHLORIDE 10 MG: 5 TABLET ORAL at 09:04

## 2024-06-07 RX ADMIN — ACETAMINOPHEN 1000 MG: 10 INJECTION, SOLUTION INTRAVENOUS at 02:15

## 2024-06-07 RX ADMIN — ENOXAPARIN SODIUM 40 MG: 100 INJECTION SUBCUTANEOUS at 08:08

## 2024-06-07 RX ADMIN — Medication 1 APPLICATION: at 17:26

## 2024-06-07 RX ADMIN — OXYCODONE HYDROCHLORIDE 10 MG: 5 TABLET ORAL at 04:57

## 2024-06-07 RX ADMIN — AMIODARONE HYDROCHLORIDE 400 MG: 200 TABLET ORAL at 17:26

## 2024-06-07 RX ADMIN — SODIUM CHLORIDE 150 ML/HR: 9 INJECTION, SOLUTION INTRAVENOUS at 12:30

## 2024-06-07 RX ADMIN — IPRATROPIUM BROMIDE AND ALBUTEROL SULFATE 3 ML: .5; 3 SOLUTION RESPIRATORY (INHALATION) at 10:22

## 2024-06-07 RX ADMIN — AMIODARONE HYDROCHLORIDE 0.5 MG/MIN: 1.8 INJECTION, SOLUTION INTRAVENOUS at 00:29

## 2024-06-07 RX ADMIN — IPRATROPIUM BROMIDE AND ALBUTEROL SULFATE 1.5 ML: .5; 3 SOLUTION RESPIRATORY (INHALATION) at 06:19

## 2024-06-07 RX ADMIN — IPRATROPIUM BROMIDE AND ALBUTEROL SULFATE 3 ML: .5; 3 SOLUTION RESPIRATORY (INHALATION) at 14:46

## 2024-06-07 RX ADMIN — METOPROLOL TARTRATE 12.5 MG: 25 TABLET, FILM COATED ORAL at 08:02

## 2024-06-07 RX ADMIN — PREGABALIN 25 MG: 25 CAPSULE ORAL at 20:28

## 2024-06-07 RX ADMIN — CHLORHEXIDINE GLUCONATE 0.12% ORAL RINSE 15 ML: 1.2 LIQUID ORAL at 17:26

## 2024-06-07 RX ADMIN — CHLORHEXIDINE GLUCONATE 0.12% ORAL RINSE 15 ML: 1.2 LIQUID ORAL at 06:11

## 2024-06-07 RX ADMIN — MAGNESIUM SULFATE HEPTAHYDRATE 4 G: 40 INJECTION, SOLUTION INTRAVENOUS at 04:19

## 2024-06-07 RX ADMIN — PREGABALIN 25 MG: 25 CAPSULE ORAL at 08:02

## 2024-06-07 RX ADMIN — POLYETHYLENE GLYCOL 3350 17 G: 17 POWDER, FOR SOLUTION ORAL at 08:02

## 2024-06-07 RX ADMIN — LIDOCAINE 2 PATCH: 4 PATCH TOPICAL at 08:02

## 2024-06-07 RX ADMIN — SODIUM CHLORIDE 3 G: 900 INJECTION INTRAVENOUS at 12:18

## 2024-06-07 RX ADMIN — Medication: at 06:11

## 2024-06-07 RX ADMIN — PANTOPRAZOLE SODIUM 40 MG: 40 TABLET, DELAYED RELEASE ORAL at 06:11

## 2024-06-07 RX ADMIN — AMIODARONE HYDROCHLORIDE 400 MG: 200 TABLET ORAL at 08:02

## 2024-06-07 RX ADMIN — CHLORHEXIDINE GLUCONATE 1 APPLICATION: 500 CLOTH TOPICAL at 03:15

## 2024-06-07 RX ADMIN — BISACODYL 10 MG: 5 TABLET, COATED ORAL at 08:02

## 2024-06-07 RX ADMIN — ASPIRIN 162 MG: 81 TABLET, CHEWABLE ORAL at 08:02

## 2024-06-07 RX ADMIN — SODIUM CHLORIDE 1500 MG: 9 INJECTION, SOLUTION INTRAVENOUS at 08:09

## 2024-06-07 RX ADMIN — SODIUM CHLORIDE 3 G: 900 INJECTION INTRAVENOUS at 20:28

## 2024-06-07 RX ADMIN — OXYCODONE HYDROCHLORIDE 10 MG: 10 TABLET ORAL at 21:06

## 2024-06-07 RX ADMIN — INSULIN LISPRO 2 UNITS: 100 INJECTION, SOLUTION INTRAVENOUS; SUBCUTANEOUS at 21:06

## 2024-06-07 RX ADMIN — ACETAMINOPHEN 1000 MG: 500 TABLET, FILM COATED ORAL at 17:26

## 2024-06-07 RX ADMIN — OXYCODONE HYDROCHLORIDE 5 MG: 5 TABLET ORAL at 17:42

## 2024-06-07 RX ADMIN — OXYCODONE HYDROCHLORIDE 10 MG: 5 TABLET ORAL at 12:07

## 2024-06-07 RX ADMIN — ACETAMINOPHEN 1000 MG: 10 INJECTION, SOLUTION INTRAVENOUS at 09:52

## 2024-06-07 NOTE — CASE MANAGEMENT/SOCIAL WORK
Continued Stay Note   [facility]     Patient Name: Constantin Seals  MRN: [id number]  Today's Date: 6/7/2024    Admit Date: 5/31/2024    Plan: Home with spouse   Discharge Plan       Row Name 06/07/24 1040       Plan    Plan Home with spouse    Plan Comments Patient in ICU post op.  Patient plans to return home upon discharge.                   Discharge Codes    No documentation.                       CEDRIC Jay

## 2024-06-07 NOTE — PLAN OF CARE
Goal Outcome Evaluation:  Problem: Communication Impairment (Mechanical Ventilation, Invasive)  Goal: Effective Communication  6/6/2024 2300 by Cassy Orr RRT  Outcome: Met  6/6/2024 1900 by Cassy Orr RRT  Outcome: Ongoing, Progressing     Problem: Device-Related Complication Risk (Mechanical Ventilation, Invasive)  Goal: Optimal Device Function  6/6/2024 2300 by Cassy Orr RRT  Outcome: Met  6/6/2024 1900 by Cassy Orr RRT  Outcome: Ongoing, Progressing  Intervention: Optimize Device Care and Function  Recent Flowsheet Documentation  Taken 6/6/2024 1852 by Cassy Orr RRT  Airway Safety Measures:   mask valve resuscitator at bedside   manual resuscitator/mask at bedside   oxygen flowmeter at bedside   suction at bedside     Problem: Inability to Wean (Mechanical Ventilation, Invasive)  Goal: Mechanical Ventilation Liberation  6/6/2024 2300 by Cassy Orr RRT  Outcome: Met  6/6/2024 1900 by Cassy Orr RRT  Outcome: Ongoing, Progressing     Problem: Nutrition Impairment (Mechanical Ventilation, Invasive)  Goal: Optimal Nutrition Delivery  6/6/2024 2300 by Cassy Orr RRT  Outcome: Met  6/6/2024 1900 by Cassy Orr RRT  Outcome: Ongoing, Progressing     Problem: Skin and Tissue Injury (Mechanical Ventilation, Invasive)  Goal: Absence of Device-Related Skin and Tissue Injury  6/6/2024 2300 by Cassy Orr RRT  Outcome: Met  6/6/2024 1900 by Cassy Orr RRT  Outcome: Ongoing, Progressing  Intervention: Maintain Skin and Tissue Health  Recent Flowsheet Documentation  Taken 6/6/2024 1852 by Cassy Orr RRT  Device Skin Pressure Protection: skin-to-skin areas padded     Problem: Ventilator-Induced Lung Injury (Mechanical Ventilation, Invasive)  Goal: Absence of Ventilator-Induced Lung Injury  6/6/2024 2300 by Cassy Orr RRT  Outcome: Met  6/6/2024 1900 by Cassy Orr RRT  Outcome: Ongoing, Progressing  Intervention: Prevent  Ventilator-Associated Pneumonia  Recent Flowsheet Documentation  Taken 6/6/2024 1852 by Cassy Orr RRT  Head of Bed (HOB) Positioning: HOB elevated     Problem: Adult Inpatient Plan of Care  Goal: Absence of Hospital-Acquired Illness or Injury  Intervention: Prevent Skin Injury  Recent Flowsheet Documentation  Taken 6/6/2024 1852 by Cassy Orr RRT  Skin Protection: skin-to-device areas padded     Problem: Vascular Access Protection (Cardiac Catheterization)  Goal: Absence of Vascular Access Complication  Intervention: Prevent Access Site Complications  Recent Flowsheet Documentation  Taken 6/6/2024 1852 by Cassy Orr RRT  Head of Bed (HOB) Positioning: HOB elevated     Problem: Cerebral Tissue Perfusion (Cardiovascular Surgery)  Goal: Optimal Cerebral Tissue Perfusion (Cardiovascular Surgery)  Intervention: Protect and Optimize Cerebral Perfusion  Recent Flowsheet Documentation  Taken 6/6/2024 1852 by Cassy Orr RRT  Head of Bed (HOB) Positioning: HOB elevated     Problem: Respiratory Compromise (Cardiovascular Surgery)  Goal: Effective Oxygenation and Ventilation (Cardiovascular Surgery)  Intervention: Promote Airway Secretion Clearance  Recent Flowsheet Documentation  Taken 6/6/2024 1852 by Cassy Orr RRT  Airway/Ventilation Management: airway patency maintained     Problem: Skin Injury Risk Increased  Goal: Skin Health and Integrity  Intervention: Optimize Skin Protection  Recent Flowsheet Documentation  Taken 6/6/2024 1852 by Cassy Orr RRT  Head of Bed (HOB) Positioning: HOB elevated  Skin Protection: skin-to-device areas padded

## 2024-06-07 NOTE — PLAN OF CARE
Goal Outcome Evaluation:  Problem: Communication Impairment (Mechanical Ventilation, Invasive)  Goal: Effective Communication  Outcome: Ongoing, Progressing     Problem: Device-Related Complication Risk (Mechanical Ventilation, Invasive)  Goal: Optimal Device Function  Outcome: Ongoing, Progressing  Intervention: Optimize Device Care and Function  Recent Flowsheet Documentation  Taken 6/6/2024 1852 by Cassy Orr RRT  Airway Safety Measures:   mask valve resuscitator at bedside   manual resuscitator/mask at bedside   oxygen flowmeter at bedside   suction at bedside     Problem: Inability to Wean (Mechanical Ventilation, Invasive)  Goal: Mechanical Ventilation Liberation  Outcome: Ongoing, Progressing     Problem: Nutrition Impairment (Mechanical Ventilation, Invasive)  Goal: Optimal Nutrition Delivery  Outcome: Ongoing, Progressing     Problem: Skin and Tissue Injury (Mechanical Ventilation, Invasive)  Goal: Absence of Device-Related Skin and Tissue Injury  Outcome: Ongoing, Progressing  Intervention: Maintain Skin and Tissue Health  Recent Flowsheet Documentation  Taken 6/6/2024 1852 by Cassy Orr RRT  Device Skin Pressure Protection: skin-to-skin areas padded     Problem: Ventilator-Induced Lung Injury (Mechanical Ventilation, Invasive)  Goal: Absence of Ventilator-Induced Lung Injury  Outcome: Ongoing, Progressing  Intervention: Prevent Ventilator-Associated Pneumonia  Recent Flowsheet Documentation  Taken 6/6/2024 1852 by Cassy Orr RRT  Head of Bed (HOB) Positioning: HOB elevated     Problem: Adult Inpatient Plan of Care  Goal: Absence of Hospital-Acquired Illness or Injury  Intervention: Prevent Skin Injury  Recent Flowsheet Documentation  Taken 6/6/2024 1852 by Cassy Orr RRT  Skin Protection: skin-to-device areas padded     Problem: Vascular Access Protection (Cardiac Catheterization)  Goal: Absence of Vascular Access Complication  Intervention: Prevent Access Site  Complications  Recent Flowsheet Documentation  Taken 6/6/2024 1852 by Cassy Orr RRT  Head of Bed (HOB) Positioning: HOB elevated     Problem: Cerebral Tissue Perfusion (Cardiovascular Surgery)  Goal: Optimal Cerebral Tissue Perfusion (Cardiovascular Surgery)  Intervention: Protect and Optimize Cerebral Perfusion  Recent Flowsheet Documentation  Taken 6/6/2024 1852 by Cassy Orr RRT  Head of Bed (HOB) Positioning: HOB elevated     Problem: Respiratory Compromise (Cardiovascular Surgery)  Goal: Effective Oxygenation and Ventilation (Cardiovascular Surgery)  Intervention: Promote Airway Secretion Clearance  Recent Flowsheet Documentation  Taken 6/6/2024 1852 by Cassy Orr, RRT  Airway/Ventilation Management: airway patency maintained      RT EQUIPMENT DEVICE RELATED - SKIN ASSESSMENT    Farshad Score:  Farshad Score: 21     RT Medical Equipment/Device:     ETT Vera/Anchorfast    Skin Assessment:      Cheek:  Intact  Neck:  Intact  Lips:  Intact  Mouth:  Intact    Device Skin Pressure Protection:  Skin-to-device areas padded:  Anchorfast    Nurse Notification:  No    Cassy Orr, AMIE

## 2024-06-07 NOTE — NURSING NOTE
[facility]  INPATIENT WOUND & OSTOMY CARE    Today's Date: 06/07/24    Patient Name: Constantin Seals  MRN: [id number]  CSN: [id number]  PCP: Giovani Lujan DO  Attending Provider: Sean Trejo MD  Length of Stay: 7    I placed pressure injury prevention measure orders per protocol due to patient being at risk for skin breakdown and being admitted to the unit. Please reach out to wound care nurse if any skin issues arise.     This document has been electronically signed by Kristen David RN on 6/7/2024 08:28 CDT

## 2024-06-07 NOTE — PLAN OF CARE
Problem: Adult Inpatient Plan of Care  Goal: Plan of Care Review  Outcome: Ongoing, Not Progressing  Goal: Patient-Specific Goal (Individualized)  Outcome: Ongoing, Not Progressing  Goal: Absence of Hospital-Acquired Illness or Injury  Outcome: Ongoing, Not Progressing  Intervention: Identify and Manage Fall Risk  Recent Flowsheet Documentation  Taken 6/7/2024 0600 by Delvin Nunes RN  Safety Promotion/Fall Prevention: safety round/check completed  Taken 6/7/2024 0527 by Delvin Nunes RN  Safety Promotion/Fall Prevention: safety round/check completed  Taken 6/7/2024 0400 by Delvin Nunes RN  Safety Promotion/Fall Prevention: safety round/check completed  Taken 6/7/2024 0300 by Delvin Nunes RN  Safety Promotion/Fall Prevention: safety round/check completed  Taken 6/7/2024 0200 by Delvin Nunes RN  Safety Promotion/Fall Prevention: safety round/check completed  Taken 6/7/2024 0100 by Delvin Nunes RN  Safety Promotion/Fall Prevention: safety round/check completed  Taken 6/6/2024 2200 by Delvin Nunes RN  Safety Promotion/Fall Prevention: safety round/check completed  Taken 6/6/2024 2100 by Delvin Nunes RN  Safety Promotion/Fall Prevention: safety round/check completed  Taken 6/6/2024 2000 by Delvin Nunes RN  Safety Promotion/Fall Prevention: safety round/check completed  Taken 6/6/2024 1900 by Delvin Nunes RN  Safety Promotion/Fall Prevention: safety round/check completed  Intervention: Prevent Skin Injury  Recent Flowsheet Documentation  Taken 6/7/2024 0600 by Delvin Nunes RN  Body Position:   weight shifting   upper extremity elevated  Taken 6/7/2024 0400 by Delvin Nunes RN  Body Position: weight shifting  Taken 6/7/2024 0200 by Delvin Nunes RN  Body Position:   supine   upper extremity elevated  Taken 6/6/2024 2200 by Delvin Nunes RN  Body Position:   supine   upper extremity elevated  Taken 6/6/2024 2000 by Delvin Nunes RN  Body Position:   turned   left   upper extremity  elevated  Intervention: Prevent and Manage VTE (Venous Thromboembolism) Risk  Recent Flowsheet Documentation  Taken 6/7/2024 0600 by Delvin Nunes RN  Activity Management: up in chair  Taken 6/7/2024 0400 by Delvin Nunes RN  Activity Management: up in chair  VTE Prevention/Management:   left   sequential compression devices on  Taken 6/6/2024 2000 by Delvin Nunes RN  Activity Management: bedrest  VTE Prevention/Management:   left   sequential compression devices on  Goal: Optimal Comfort and Wellbeing  Outcome: Ongoing, Not Progressing  Intervention: Monitor Pain and Promote Comfort  Recent Flowsheet Documentation  Taken 6/7/2024 0527 by Delvin Nunes RN  Pain Management Interventions: position adjusted  Taken 6/7/2024 0457 by Delvin Nunes RN  Pain Management Interventions: see MAR  Taken 6/7/2024 0100 by Delvin Nunes RN  Pain Management Interventions: position adjusted  Intervention: Provide Person-Centered Care  Recent Flowsheet Documentation  Taken 6/7/2024 0400 by Delvin Nunes RN  Trust Relationship/Rapport:   care explained   choices provided   questions answered   questions encouraged   thoughts/feelings acknowledged  Taken 6/6/2024 2000 by Delvin Nunes RN  Trust Relationship/Rapport:   care explained   choices provided  Goal: Readiness for Transition of Care  Outcome: Ongoing, Not Progressing     Problem: Hypertension Comorbidity  Goal: Blood Pressure in Desired Range  Outcome: Ongoing, Not Progressing     Problem: Arrhythmia/Dysrhythmia (Cardiac Catheterization)  Goal: Stable Heart Rate and Rhythm  Outcome: Ongoing, Not Progressing     Problem: Bleeding (Cardiac Catheterization)  Goal: Absence of Bleeding  Outcome: Ongoing, Not Progressing     Problem: Contrast-Induced Injury Risk (Cardiac Catheterization)  Goal: Absence of Contrast-Induced Injury  Outcome: Ongoing, Not Progressing     Problem: Embolism (Cardiac Catheterization)  Goal: Absence of Embolism Signs and Symptoms  Outcome:  Ongoing, Not Progressing  Intervention: Prevent or Manage Embolism  Recent Flowsheet Documentation  Taken 6/7/2024 0400 by Delvin Nunes RN  VTE Prevention/Management:   left   sequential compression devices on  Taken 6/6/2024 2000 by Delvin Nunes RN  VTE Prevention/Management:   left   sequential compression devices on     Problem: Ongoing Anesthesia/Sedation Effects (Cardiac Catheterization)  Goal: Anesthesia/Sedation Recovery  Outcome: Ongoing, Not Progressing  Intervention: Optimize Anesthesia Recovery  Recent Flowsheet Documentation  Taken 6/7/2024 0600 by Delvin Nunes RN  Safety Promotion/Fall Prevention: safety round/check completed  Taken 6/7/2024 0527 by Delvin Nunes RN  Safety Promotion/Fall Prevention: safety round/check completed  Taken 6/7/2024 0400 by Delvin Nunes RN  Safety Promotion/Fall Prevention: safety round/check completed  Taken 6/7/2024 0300 by Delvin Nunes RN  Safety Promotion/Fall Prevention: safety round/check completed  Taken 6/7/2024 0200 by Delvin Nunes RN  Safety Promotion/Fall Prevention: safety round/check completed  Taken 6/7/2024 0100 by Delvin Nunes RN  Safety Promotion/Fall Prevention: safety round/check completed  Taken 6/6/2024 2200 by Delvin Nunes RN  Safety Promotion/Fall Prevention: safety round/check completed  Taken 6/6/2024 2100 by Delvin Nunes RN  Safety Promotion/Fall Prevention: safety round/check completed  Taken 6/6/2024 2000 by Delvin Nunes RN  Safety Promotion/Fall Prevention: safety round/check completed  Taken 6/6/2024 1900 by Delvin Nunes RN  Safety Promotion/Fall Prevention: safety round/check completed     Problem: Pain (Cardiac Catheterization)  Goal: Acceptable Pain Control  Outcome: Ongoing, Not Progressing  Intervention: Prevent or Manage Pain  Recent Flowsheet Documentation  Taken 6/7/2024 0527 by Delvin Nunes RN  Pain Management Interventions: position adjusted  Taken 6/7/2024 0457 by Delvin Nunes RN  Pain  Management Interventions: see MAR  Taken 6/7/2024 0100 by Delvin Nunes RN  Pain Management Interventions: position adjusted  Taken 6/6/2024 2000 by Delvin Nunes RN  Diversional Activities: other (see comments)     Problem: Vascular Access Protection (Cardiac Catheterization)  Goal: Absence of Vascular Access Complication  Outcome: Ongoing, Not Progressing  Intervention: Prevent Access Site Complications  Recent Flowsheet Documentation  Taken 6/7/2024 0600 by Delvin Nunes RN  Activity Management: up in chair  Taken 6/7/2024 0400 by Delvin Nunes RN  Activity Management: up in chair  Taken 6/7/2024 0200 by Delvin Nunes RN  Head of Bed (HOB) Positioning: HOB elevated  Taken 6/6/2024 2200 by Delvin Nunes RN  Head of Bed (HOB) Positioning: HOB elevated  Taken 6/6/2024 2000 by Delvin Nunes RN  Activity Management: bedrest  Head of Bed (HOB) Positioning: HOB elevated     Problem: Activity Intolerance (Cardiovascular Surgery)  Goal: Improved Activity Tolerance  Outcome: Ongoing, Not Progressing     Problem: Adjustment to Surgery (Cardiovascular Surgery)  Goal: Optimal Coping with Heart Surgery  Outcome: Ongoing, Not Progressing  Intervention: Support Psychosocial Response to Surgery  Recent Flowsheet Documentation  Taken 6/7/2024 0400 by Delvin Nunes RN  Family/Support System Care: support provided  Taken 6/6/2024 2000 by Delvin Nunes RN  Family/Support System Care: support provided     Problem: Bleeding (Cardiovascular Surgery)  Goal: Bleeding (Cardiovascular Surgery)  Outcome: Ongoing, Not Progressing     Problem: Bowel Motility Impaired (Cardiovascular Surgery)  Goal: Effective Bowel Elimination (Cardiovascular Surgery)  Outcome: Ongoing, Not Progressing     Problem: Cardiac Function Impaired (Cardiovascular Surgery)  Goal: Effective Cardiac Function  Outcome: Ongoing, Not Progressing     Problem: Cerebral Tissue Perfusion (Cardiovascular Surgery)  Goal: Optimal Cerebral Tissue Perfusion  (Cardiovascular Surgery)  Outcome: Ongoing, Not Progressing  Intervention: Protect and Optimize Cerebral Perfusion  Recent Flowsheet Documentation  Taken 6/7/2024 0200 by Delvin Nunes RN  Head of Bed (HOB) Positioning: HOB elevated  Taken 6/6/2024 2200 by Delvin Nunes RN  Head of Bed (HOB) Positioning: HOB elevated  Taken 6/6/2024 2000 by Delvin Nunes RN  Head of Bed (HOB) Positioning: HOB elevated     Problem: Fluid and Electrolyte Imbalance (Cardiovascular Surgery)  Goal: Fluid and Electrolyte Balance (Cardiovascular Surgery)  Outcome: Ongoing, Not Progressing     Problem: Glycemic Control Impaired (Cardiovascular Surgery)  Goal: Blood Glucose Level Within Targeted Range (Cardiovascular Surgery)  Outcome: Ongoing, Not Progressing     Problem: Infection (Cardiovascular Surgery)  Goal: Absence of Infection Signs and Symptoms  Outcome: Ongoing, Not Progressing     Problem: Ongoing Anesthesia Effects (Cardiovascular Surgery)  Goal: Anesthesia/Sedation Recovery  Outcome: Ongoing, Not Progressing  Intervention: Optimize Anesthesia Recovery  Recent Flowsheet Documentation  Taken 6/7/2024 0600 by Delvin Nunes RN  Safety Promotion/Fall Prevention: safety round/check completed  Taken 6/7/2024 0527 by Delvin Nunes RN  Safety Promotion/Fall Prevention: safety round/check completed  Taken 6/7/2024 0400 by Delvin Nunes RN  Safety Promotion/Fall Prevention: safety round/check completed  Taken 6/7/2024 0300 by Delvin Nunes RN  Safety Promotion/Fall Prevention: safety round/check completed  Taken 6/7/2024 0200 by Delvin Nunes RN  Safety Promotion/Fall Prevention: safety round/check completed  Taken 6/7/2024 0100 by Delvin Nunes RN  Safety Promotion/Fall Prevention: safety round/check completed  Taken 6/6/2024 2200 by Delvin Nunes RN  Safety Promotion/Fall Prevention: safety round/check completed  Taken 6/6/2024 2100 by Delvin Nunes RN  Safety Promotion/Fall Prevention: safety round/check  completed  Taken 6/6/2024 2000 by Delvin Nunes RN  Safety Promotion/Fall Prevention: safety round/check completed  Taken 6/6/2024 1900 by Delvin Nunes RN  Safety Promotion/Fall Prevention: safety round/check completed     Problem: Pain (Cardiovascular Surgery)  Goal: Acceptable Pain Control  Outcome: Ongoing, Not Progressing  Intervention: Prevent or Manage Pain  Recent Flowsheet Documentation  Taken 6/7/2024 0527 by Delvin Nunes RN  Pain Management Interventions: position adjusted  Taken 6/7/2024 0457 by Delvin Nunes RN  Pain Management Interventions: see MAR  Taken 6/7/2024 0100 by Delvin Nunes RN  Pain Management Interventions: position adjusted  Taken 6/6/2024 2000 by Delvin Nunes RN  Diversional Activities: other (see comments)     Problem: Postoperative Nausea and Vomiting (Cardiovascular Surgery)  Goal: Nausea and Vomiting Relief (Cardiovascular Surgery)  Outcome: Ongoing, Not Progressing     Problem: Postoperative Urinary Retention (Cardiovascular Surgery)  Goal: Effective Urinary Elimination (Cardiovascular Surgery)  Outcome: Ongoing, Not Progressing     Problem: Respiratory Compromise (Cardiovascular Surgery)  Goal: Effective Oxygenation and Ventilation (Cardiovascular Surgery)  Outcome: Ongoing, Not Progressing     Problem: Fall Injury Risk  Goal: Absence of Fall and Fall-Related Injury  Outcome: Ongoing, Not Progressing  Intervention: Promote Injury-Free Environment  Recent Flowsheet Documentation  Taken 6/7/2024 0600 by Delvin Nunes RN  Safety Promotion/Fall Prevention: safety round/check completed  Taken 6/7/2024 0527 by Delvin Nunes RN  Safety Promotion/Fall Prevention: safety round/check completed  Taken 6/7/2024 0400 by Delvin Nunes RN  Safety Promotion/Fall Prevention: safety round/check completed  Taken 6/7/2024 0300 by Delvin Nunes RN  Safety Promotion/Fall Prevention: safety round/check completed  Taken 6/7/2024 0200 by Delvin Nunes RN  Safety Promotion/Fall  Prevention: safety round/check completed  Taken 6/7/2024 0100 by Delvin Nunes RN  Safety Promotion/Fall Prevention: safety round/check completed  Taken 6/6/2024 2200 by Delvin Nunes RN  Safety Promotion/Fall Prevention: safety round/check completed  Taken 6/6/2024 2100 by Delvin Nunes RN  Safety Promotion/Fall Prevention: safety round/check completed  Taken 6/6/2024 2000 by Delvin Nunes RN  Safety Promotion/Fall Prevention: safety round/check completed  Taken 6/6/2024 1900 by Delvin Nunes RN  Safety Promotion/Fall Prevention: safety round/check completed     Problem: Skin Injury Risk Increased  Goal: Skin Health and Integrity  Outcome: Ongoing, Not Progressing  Intervention: Optimize Skin Protection  Recent Flowsheet Documentation  Taken 6/7/2024 0200 by Delvin Nunes RN  Head of Bed (HOB) Positioning: HOB elevated  Taken 6/6/2024 2200 by Delvin Nunes RN  Head of Bed (HOB) Positioning: HOB elevated  Taken 6/6/2024 2000 by Delvin Nunes RN  Head of Bed (HOB) Positioning: HOB elevated   Goal Outcome Evaluation:

## 2024-06-07 NOTE — PROGRESS NOTES
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 Urine has cleared overnight.    Review of Systems  Pertinent items are noted in HPI, all other systems reviewed and negative     Objective     Vital Signs  Temp:  [98.1 °F (36.7 °C)-99.4 °F (37.4 °C)] 98.8 °F (37.1 °C)  Heart Rate:  [] 88  Resp:  [17-22] 17  BP: ()/() 128/81  FiO2 (%):  [30 %-50 %] 30 %    Physical Exam:  Vera in place. Clear yellow urine.     Data Review:       I have reviewed the following data:    Renal Function Panel (2024 03:11)     CBC & Differential (2024 03:11)     Medication Review:     Current Facility-Administered Medications:     acetaminophen (OFIRMEV) injection 1,000 mg, 1,000 mg, Intravenous, Q8H, Sean Trejo MD, 1,000 mg at 24 0215    acetaminophen (TYLENOL) tablet 1,000 mg, 1,000 mg, Oral, Q6H **OR** acetaminophen (TYLENOL) 160 MG/5ML oral solution 1,000 mg, 1,000 mg, Oral, Q6H **OR** acetaminophen (TYLENOL) suppository 650 mg, 650 mg, Rectal, Q6H, Sean Trejo MD    albuterol (PROVENTIL) nebulizer solution 0.083% 2.5 mg/3mL, 2.5 mg, Nebulization, Q4H PRN, Sean Trejo MD    amiodarone (PACERONE) tablet 400 mg, 400 mg, Oral, BID With Meals, Sean Trejo MD    [] amiodarone 360 mg in 200 mL D5W infusion, 1 mg/min, Intravenous, Continuous, Held at 24 1723 **FOLLOWED BY** amiodarone 360 mg in 200 mL D5W infusion, 0.5 mg/min, Intravenous, Continuous, Sean Trejo MD, Last Rate: 16.67 mL/hr at 24 0029, 0.5 mg/min at 24 0029    aspirin chewable tablet 162 mg, 162 mg, Oral, Once, Sean Trejo MD    [START ON 2024] aspirin EC tablet 81 mg, 81 mg, Oral, Daily, Sean Trejo MD    atorvastatin (LIPITOR) tablet 20 mg, 20 mg, Oral, Nightly, Sean Trejo MD    bisacodyl  (DULCOLAX) EC tablet 10 mg, 10 mg, Oral, BID, Sean Trejo MD    Calcium Replacement - Follow Nurse / BPA Driven Protocol, , Does not apply, PRN, Sean Trejo MD    ceFAZolin 3000 mg IVPB in 100 mL NS (MBP), 3 g, Intravenous, Q8H, Sean Trejo MD, 3 g at 06/07/24 0419    chlorhexidine (PERIDEX) 0.12 % solution 15 mL, 15 mL, Mouth/Throat, Q12H, Sean Trejo MD, 15 mL at 06/07/24 0611    Chlorhexidine Gluconate Cloth 2 % pads 1 Application, 1 Application, Topical, Q24H, Sean Trejo MD, 1 Application at 06/07/24 0315    clevidipine (CLEVIPREX) infusion 0.5 mg/mL, 2-32 mg/hr, Intravenous, Continuous PRN, Sean Trejo MD    clopidogrel (PLAVIX) tablet 75 mg, 75 mg, Oral, Daily, Sean Trejo MD    dexmedetomidine (PRECEDEX) 400 mcg in 100 mL NS infusion, 0.2-1.5 mcg/kg/hr, Intravenous, Titrated, Sean Trejo MD, Stopped at 06/06/24 2205    dextrose (D50W) (25 g/50 mL) IV injection 10-50 mL, 10-50 mL, Intravenous, Q15 Min PRN, Sean Trejo MD, 50 mL at 06/06/24 1822    dextrose (D50W) (25 g/50 mL) IV injection 50 mL, 50 mL, Intravenous, Q1H PRN, Sean Trejo MD    dextrose (D5W) 5 % infusion, 30 mL/hr, Intravenous, Continuous, Sean Trejo MD, Last Rate: 30 mL/hr at 06/06/24 1759, 30 mL/hr at 06/06/24 1759    dextrose (GLUTOSE) oral gel 15 g, 15 g, Oral, Q15 Min PRN, Sean Trejo MD    fentaNYL citrate (PF) (SUBLIMAZE) injection 25 mcg, 25 mcg, Intravenous, Q30 Min PRN **AND** naloxone (NARCAN) injection 0.4 mg, 0.4 mg, Intravenous, Q5 Min PRN, Sean Trejo MD    fentaNYL citrate (PF) (SUBLIMAZE) injection 50 mcg, 50 mcg, Intravenous, Q30 Min PRN, Sean Trejo MD    glucagon (GLUCAGEN) injection 1 mg, 1 mg, Intramuscular, Q15 Min PRN, Sean Trejo MD    Hold All immunizations, , Does not apply, Continuous PRN, Sean Trejo MD    Insulin Lispro (humaLOG) injection 2-9 Units, 2-9 Units, Subcutaneous, 4x Daily AC & at Bedtime,  Sean Trejo MD    ipratropium-albuterol (DUO-NEB) nebulizer solution 1.5 mL, 1.5 mL, Nebulization, 4x Daily - RT, Sean Trejo MD, 1.5 mL at 06/07/24 0619    Lidocaine 4 % 2 patch, 2 patch, Transdermal, Q24H, Sean Trejo MD, 2 patch at 06/06/24 1942    Magnesium Cardiology Dose Replacement - Follow Nurse / BPA Driven Protocol, , Does not apply, PRN, Sean Trejo MD    magnesium sulfate 4g/100mL (PREMIX) infusion, 4 g, Intravenous, Once, Sean Trejo MD, 4 g at 06/07/24 0419    metoprolol tartrate (LOPRESSOR) tablet 12.5 mg, 12.5 mg, Oral, Q12H, Sean Trejo MD    mupirocin (BACTROBAN) 2 % nasal ointment, , Each Nare, Q12H, Sean Trejo MD, Given at 06/07/24 0611    niCARdipine (CARDENE) 25 mg in 250 mL NS infusion kit, 5-15 mg/hr, Intravenous, Continuous PRN, Sean Trejo MD    nitroglycerin (TRIDIL) 200 mcg/ml infusion, 5 mcg/min, Intravenous, Continuous, Sean Trejo MD, Held at 06/06/24 2311    ondansetron (ZOFRAN) injection 4 mg, 4 mg, Intravenous, Q6H PRN, Sean Trejo MD    oxyCODONE (ROXICODONE) immediate release tablet 10 mg, 10 mg, Oral, Q3H PRN, Sean Trejo MD, 10 mg at 06/07/24 0457    oxyCODONE (ROXICODONE) immediate release tablet 5 mg, 5 mg, Oral, Q3H PRN, Sean Trejo MD, 5 mg at 06/06/24 2305    [COMPLETED] pantoprazole (PROTONIX) injection 40 mg, 40 mg, Intravenous, Once, 40 mg at 06/06/24 1823 **FOLLOWED BY** pantoprazole (PROTONIX) EC tablet 40 mg, 40 mg, Oral, QAM, Sean Trejo MD, 40 mg at 06/07/24 0611    phenylephrine (JANETH-SYNEPHRINE) 50 mg in 250 mL NS infusion, 0.5-3 mcg/kg/min, Intravenous, Continuous PRN, Sean Trejo MD    Phosphorus Replacement - Follow Nurse / BPA Driven Protocol, , Does not apply, Neil FRANZ Nicholas M, MD    polyethylene glycol (MIRALAX) packet 17 g, 17 g, Oral, Daily, Sean Trejo MD    Potassium Replacement - Follow Nurse / BPA Driven Protocol, , Does not apply, PRN, Neil,  Sean WILLIAM MD    pregabalin (LYRICA) capsule 25 mg, 25 mg, Oral, Q12H, Sean Trejo MD    propofol (DIPRIVAN) infusion 10 mg/mL 100 mL, 5-50 mcg/kg/min, Intravenous, Continuous PRN, Sean Trejo MD    vancomycin IVPB 1500 mg in 0.9% NaCl (Premix) 500 mL, 1,500 mg, Intravenous, Q12H, Sean Trejo MD, Last Rate: 333.3 mL/hr at 06/06/24 2103, 1,500 mg at 06/06/24 2103    Assessment and Plan:    Gross hematuria: Started yesterday, prior to Vera placement. Reports one previous episode. He will need outpatient hematuria work up. Keep Vera for 3 - 5 days then discontinue. Minimize anticoagulation and antiplatelet if possible for the next week.     URO DISPO: Follow up in outpatient urology in 3-4 weeks. Message sent to .     I discussed the patient's findings and my recommendations with patient    (Please note that portions of this note were completed with a voice recognition program.)    Omari Beaulieu MD  06/07/24  07:30 CDT    Time: Time spent: 20 minutes spent performing evaluation and management, chart review, and discussion with patient, > 50% of time spent in face-to-face encounter

## 2024-06-07 NOTE — PLAN OF CARE
Goal Outcome Evaluation:  Plan of Care Reviewed With: patient        Progress: no change  Outcome Evaluation: VSS. O2 sats WNL on 2L NC. Weaning as patient tolerates. IS~500. Vera in place that was placed by urology, to remain for 3-5 days per urology notes. IVF infusing. Ace wrap removed per orders. MST in place. JOSE drain in place. NO BM yet. Prevena in place. Wires taped and isolated. Aura given for pain.

## 2024-06-07 NOTE — PLAN OF CARE
Goal Outcome Evaluation:  Plan of Care Reviewed With: spouse, caregiver, significant other        Progress: improving  Outcome Evaluation: RDN consult for education for CAD. Pt reports good appetite at this time. Oral intake 25% of breakfast this am.Pt reports he ate eggs and drank coffee and juice this am. Encouraged oral intake with meals as tolerated. Family reports they prepare their food at home more than eating out. Provided pt and spouse Cardiac Nutrition therapy;shopping guide for heart healthy diet and ways to flavor your foods; written information and verbal discussion. RDN name and contact information provided for any additional questions or concerns. Cont to follow for plan of care.

## 2024-06-07 NOTE — THERAPY EVALUATION
[facility] Patient Name: Constantin Seals  : 1980    MRN: [id number]                              Today's Date: 2024       Admit Date: 2024    Visit Dx:     ICD-10-CM ICD-9-CM   1. Chest pain, unspecified type  R07.9 786.50   2. Troponin I above reference range  R79.89 790.6   3. NSTEMI (non-ST elevated myocardial infarction)  I21.4 410.70   4. Coronary artery disease involving native coronary artery of native heart with unstable angina pectoris  I25.110 414.01     411.1   5. Impaired functional mobility and activity tolerance [Z74.09]  Z74.09 V49.89     Patient Active Problem List   Diagnosis    Class 3 severe obesity due to excess calories with serious comorbidity and body mass index (BMI) of 50.0 to 59.9 in adult    Primary hypertension    Gastroesophageal reflux disease without esophagitis    SHERRON (obstructive sleep apnea)    NSTEMI (non-ST elevated myocardial infarction)    Coronary artery disease involving native coronary artery of native heart with unstable angina pectoris    Gross hematuria    Myoglobinuria     Past Medical History:   Diagnosis Date    COVID     GERD (gastroesophageal reflux disease)     Gout     Heartburn     Hypertension     Obesity      Past Surgical History:   Procedure Laterality Date    CARDIAC CATHETERIZATION N/A 2024    Procedure: Left Heart Cath;  Surgeon: Chip Roberts MD;  Location:  [facility];  Service: Cardiology;  Laterality: N/A;    CORONARY ARTERY BYPASS GRAFT N/A 2024    Procedure: CORONARY ARTERY BYPASS GRAFTING X4, LEFT INTERNAL MAMMARY ARTERY GRAFT, RIGHT LEG ENDOSCOPIC VEIN HARVEST, STERNAL PLATING, APPLICATION PREVENA, TRANSESOPHAGEAL ECHOCARDIOGRAM;  Surgeon: Sean Trejo MD;  Location:  [facility] OR;  Service: Cardiothoracic;  Laterality: N/A;    DISTAL BICEPS TENDON REPAIR Right 2022    Procedure: RIGHT DISTAL BICEPS TENDON REPAIR WITH SEMI-T ALLOGRAFT;  Surgeon: Benjamin العراقي MD;  Location:  [facility] OR;  Service:  Orthopedics;  Laterality: Right;    ELBOW PROCEDURE      EYE SURGERY Right 1984    COLES CATHETER N/A 6/6/2024    Procedure: COLES CATHETER INSERTION;  Surgeon: Omari Beaulieu MD;  Location: [facility] OR;  Service: Urology;  Laterality: N/A;      General Information       Row Name 06/07/24 0835          Physical Therapy Time and Intention    Document Type evaluation;other (see comments)  see MAR  -JE     Mode of Treatment physical therapy  -       Row Name 06/07/24 0835          General Information    Patient Profile Reviewed yes  -JE     Prior Level of Function independent:;all household mobility;community mobility;ADL's;home management;driving;shopping;using stairs;work;yard work  -     Existing Precautions/Restrictions fall;sternal;oxygen therapy device and L/min  -     Barriers to Rehab physical barrier  lens implant R eye  -JE       Row Name 06/07/24 0835          Living Environment    People in Home spouse;child(balaji), dependent  -     Name(s) of People in Home spouse - Chrisi and 2 yr old dtr  -       Row Name 06/07/24 0835          Home Main Entrance    Number of Stairs, Main Entrance three  -JE     Stair Railings, Main Entrance none  -JE       Row Name 06/07/24 0835          Stairs Within Home, Primary    Stairs, Within Home, Primary 2 steps to go into the kitchen  -     Number of Stairs, Within Home, Primary two  -JE     Stair Railings, Within Home, Primary none  -JE       Row Name 06/07/24 0835          Cognition    Orientation Status (Cognition) oriented x 4  -JE       Row Name 06/07/24 0835          Safety Issues, Functional Mobility    Safety Issues Affecting Function (Mobility) friction/shear risk;safety precaution awareness  -     Impairments Affecting Function (Mobility) endurance/activity tolerance;pain;sensation/sensory awareness;shortness of breath;strength  -               User Key  (r) = Recorded By, (t) = Taken By, (c) = Cosigned By      Initials Name Provider Type      Leatha Giles, PT Physical Therapist                   Mobility       [facility] Name 06/07/24 0835          Bed Mobility    Comment, (Bed Mobility) sitting up in chair and retuned to sit in chair at end of visit  -JE       Row Name 06/07/24 0835          Sit-Stand Transfer    Sit-Stand [place] (Transfers) contact guard;verbal cues  -JE       Row Name 06/07/24 0835          Gait/Stairs (Locomotion)    [place] Level (Gait) contact guard;verbal cues;1 person to manage equipment  -     Distance in Feet (Gait) 200  -     Comment, (Gait/Stairs) required 3 standing rests  -               User Key  (r) = Recorded By, (t) = Taken By, (c) = Cosigned By      Initials Name Provider Type    Leatha Burgos, PT Physical Therapist                   Obj/Interventions       [facility] Name 06/07/24 0835          Range of Motion Comprehensive    Comment, General Range of Motion AROM all 4 extremities WFLs demonstrated by activity; hx of bicep tendon repair on R per pt/spouse  -JE       Row Name 06/07/24 0835          Strength Comprehensive (MMT)    Comment, General Manual Muscle Testing (MMT) Assessment no formal strength assessment, moves B U/LEs against gravity  -JE       Row Name 06/07/24 0835          Balance    Balance Assessment sitting static balance;sitting dynamic balance;sit to stand dynamic balance;standing static balance;standing dynamic balance  -     Static Sitting Balance independent  -     Dynamic Sitting Balance independent  -     Position, Sitting Balance supported;sitting in chair  -     Sit to Stand Dynamic Balance contact guard;verbal cues  -     Static Standing Balance contact guard  -     Dynamic Standing Balance contact guard;verbal cues  -     Position/Device Used, Standing Balance unsupported  -JE       Row Name 06/07/24 0835          Sensory Assessment (Somatosensory)    Sensory Assessment (Somatosensory) other (see comments)  reports the digits #1-3 were numb after surgery but this  has resolved at this time, however returned at end of visit; nsg notified  -               User Key  (r) = Recorded By, (t) = Taken By, (c) = Cosigned By      Initials Name Provider Type    Leatha Burgos, PT Physical Therapist                   Goals/Plan       Row Name 06/07/24 0835          Bed Mobility Goal 1 (PT)    Activity/Assistive Device (Bed Mobility Goal 1, PT) rolling to left;rolling to right;scooting;sit to supine/supine to sit  -     [place] Level/Cues Needed (Bed Mobility Goal 1, PT) standby assist  -JE     Time Frame (Bed Mobility Goal 1, PT) long term goal (LTG);10 days  -JE     Progress/Outcomes (Bed Mobility Goal 1, PT) goal ongoing  -       Row Name 06/07/24 0835          Transfer Goal 1 (PT)    Activity/Assistive Device (Transfer Goal 1, PT) sit-to-stand/stand-to-sit;bed-to-chair/chair-to-bed  -JE     [place] Level/Cues Needed (Transfer Goal 1, PT) standby assist  -JE     Time Frame (Transfer Goal 1, PT) long term goal (LTG);10 days  -JE     Progress/Outcome (Transfer Goal 1, PT) goal ongoing  -       Row Name 06/07/24 0835          Gait Training Goal 1 (PT)    Activity/Assistive Device (Gait Training Goal 1, PT) gait (walking locomotion);decrease fall risk;diminish gait deviation;improve balance and speed;increase endurance/gait distance;increase energy conservation  -     [place] Level (Gait Training Goal 1, PT) standby assist  -JE     Distance (Gait Training Goal 1, PT) 300 ft w/ less than or equal to 1 standing rest  -JE     Time Frame (Gait Training Goal 1, PT) long term goal (LTG);10 days  -JE     Progress/Outcome (Gait Training Goal 1, PT) goal ongoing  -       Row Name 06/07/24 0835          Stairs Goal 1 (PT)    Activity/Assistive Device (Stairs Goal 1, PT) ascending stairs;descending stairs;step-to-step;decrease fall risk;improve balance and speed  -     [place] Level/Cues Needed (Stairs Goal 1, PT) standby assist  -JE     Number of Stairs (Stairs  Goal 1, PT) 3  -JE     Time Frame (Stairs Goal 1, PT) long term goal (LTG);10 days  -JE     Progress/Outcome (Stairs Goal 1, PT) goal ongoing  -       Row Name 06/07/24 0835          Patient Education Goal (PT)    Activity (Patient Education Goal, PT) I and demonstrates knowledge of deep breathing, warm up/cool down ex, and sternal precautions w/ functional mobility  -     [place]/Cues/Accuracy (Memory Goal 2, PT) demonstrates adequately;independent;verbalizes understanding  -     Time Frame (Patient Education Goal, PT) long term goal (LTG);10 days  -JE     Progress/Outcome (Patient Education Goal, PT) goal ongoing  -       Row Name 06/07/24 0835          Therapy Assessment/Plan (PT)    Planned Therapy Interventions (PT) balance training;bed mobility training;gait training;home exercise program;postural re-education;patient/family education;ROM (range of motion);stair training;transfer training;other (see comments)  safety/falls prevention, sternal precautions  -               User Key  (r) = Recorded By, (t) = Taken By, (c) = Cosigned By      Initials Name Provider Type    Leatha Burgos, PT Physical Therapist                   Clinical Impression       Row Name 06/07/24 0835          Pain    Pretreatment Pain Rating 6/10  -     Posttreatment Pain Rating 6/10  -     Pain Location - chest  -     Pain Intervention(s) Ambulation/increased activity;Repositioned;Rest;Medication (See MAR)  -       Row Name 06/07/24 0835          Plan of Care Review    Plan of Care Reviewed With patient;spouse  -     Progress improving  -     Outcome Evaluation PT anaal completed this date.  Pt pleasant and agreeable to therapy.  Pt oriented X 4 w/ increase c/os chest pain.  Pt education for sternal precautions and improved tech w/ deep breathing.  Pt performs tfers w/ CGA, gait 200 ft w/ CGA and assist to manage equipment.  Pt w/ steady pace requiring 3 standing rest.  Pt will benefit from continued PT  services to improve knowledge, safety awareness, to improve activity tolerance, balance and I w/ functional mobillity.  Anticipate pt to return home at discharge.  Will follow for progress and needs.  -       Row Name 06/07/24 0835          Therapy Assessment/Plan (PT)    Patient/Family Therapy Goals Statement (PT) decrease pain  -     Rehab Potential (PT) good, to achieve stated therapy goals  -     Criteria for Skilled Interventions Met (PT) yes;meets criteria;skilled treatment is necessary  -     Therapy Frequency (PT) 2 times/day  -     Predicted Duration of Therapy Intervention (PT) until discharge or goals achieved  -       Row Name 06/07/24 0835          Vital Signs    Pre Systolic BP Rehab 110  -     Pre Treatment Diastolic BP 94  -     Pretreatment Heart Rate (beats/min) 87  -     Pre SpO2 (%) 96  -     O2 Delivery Pre Treatment nasal cannula  -     Pre Patient Position Sitting  -     Intra Patient Position Standing  -     Post Patient Position Sitting  -       Row Name 06/07/24 0835          Positioning and Restraints    Pre-Treatment Position sitting in chair/recliner  -     Post Treatment Position chair  -     In Chair notified nsg;reclined;call light within reach;encouraged to call for assist;with family/caregiver;LUE elevated;RUE elevated;waffle cushion;legs elevated;patient within staff view  -               User Key  (r) = Recorded By, (t) = Taken By, (c) = Cosigned By      Initials Name Provider Type    Leatha Burgos, PT Physical Therapist                   Outcome Measures       Row Name 06/07/24 0835 06/07/24 0745       How much help from another person do you currently need...    Turning from your back to your side while in flat bed without using bedrails? 2  -JE 2  -SE    Moving from lying on back to sitting on the side of a flat bed without bedrails? 2  -JE 2  -SE    Moving to and from a bed to a chair (including a wheelchair)? 3  -JE 2  -SE    Standing  up from a chair using your arms (e.g., wheelchair, bedside chair)? 3  -JE 3  -SE    Climbing 3-5 steps with a railing? 3  -JE 2  -SE    To walk in hospital room? 3  -JE 2  -SE    AM-PAC 6 Clicks Score (PT) 16  -JE 13  -SE    Highest Level of Mobility Goal 5 --> Static standing  -JE 4 --> Transfer to chair/commode  -SE      Row Name 06/07/24 0728 06/07/24 0400       How much help from another person do you currently need...    Turning from your back to your side while in flat bed without using bedrails? 2  -AB 2  -MK    Moving from lying on back to sitting on the side of a flat bed without bedrails? 2  -AB 2  -MK    Moving to and from a bed to a chair (including a wheelchair)? 2  -AB 2  -MK    Standing up from a chair using your arms (e.g., wheelchair, bedside chair)? 3  -AB 3  -MK    Climbing 3-5 steps with a railing? 2  -AB 2  -MK    To walk in hospital room? 2  -AB 2  -MK    AM-PAC 6 Clicks Score (PT) 13  -AB 13  -MK    Highest Level of Mobility Goal 4 --> Transfer to chair/commode  -AB 4 --> Transfer to chair/commode  -MK      Row Name 06/07/24 0835          Functional Assessment    Outcome Measure Options AM-PAC 6 Clicks Basic Mobility (PT)  -               User Key  (r) = Recorded By, (t) = Taken By, (c) = Cosigned By      Initials Name Provider Type    SE Donis Martinez, RN Registered Nurse    Leatha Burgos, PT Physical Therapist    Amor Aranda, RN Registered Nurse    Delvin Roberto, RN Registered Nurse                                 Physical Therapy Education       Title: PT OT SLP Therapies (Done)       Topic: Physical Therapy (Done)       Point: Mobility training (Done)       Learning Progress Summary             Patient Acceptance, E,TB,D, VU,DU,NR by HALEY at 6/7/2024 9767    Comment: education re: purpose of PT/importance of activity, safety/falls prevention, sternal precautions, improved tech w/ tfers, gait, deep breathing and pacing activity                         Point: Home exercise  program (Done)       Learning Progress Summary             Patient Acceptance, E,TB,D, VU,DU,NR by  at 6/7/2024 1527    Comment: education re: purpose of PT/importance of activity, safety/falls prevention, sternal precautions, improved tech w/ tfers, gait, deep breathing and pacing activity                         Point: Body mechanics (Done)       Learning Progress Summary             Patient Acceptance, E,TB,D, VU,DU,NR by  at 6/7/2024 1527    Comment: education re: purpose of PT/importance of activity, safety/falls prevention, sternal precautions, improved tech w/ tfers, gait, deep breathing and pacing activity                         Point: Precautions (Done)       Learning Progress Summary             Patient Acceptance, E,TB,D, VU,DU,NR by  at 6/7/2024 1527    Comment: education re: purpose of PT/importance of activity, safety/falls prevention, sternal precautions, improved tech w/ tfers, gait, deep breathing and pacing activity                                         User Key       Initials Effective Dates Name Provider Type Discipline     08/02/18 -  Leatha Giles, PT Physical Therapist PT                  PT Recommendation and Plan  Planned Therapy Interventions (PT): balance training, bed mobility training, gait training, home exercise program, postural re-education, patient/family education, ROM (range of motion), stair training, transfer training, other (see comments) (safety/falls prevention, sternal precautions)  Plan of Care Reviewed With: patient, spouse  Progress: improving  Outcome Evaluation: PT eval completed this date.  Pt pleasant and agreeable to therapy.  Pt oriented X 4 w/ increase c/os chest pain.  Pt education for sternal precautions and improved tech w/ deep breathing.  Pt performs tfers w/ CGA, gait 200 ft w/ CGA and assist to manage equipment.  Pt w/ steady pace requiring 3 standing rest.  Pt will benefit from continued PT services to improve knowledge, safety awareness, to  improve activity tolerance, balance and I w/ functional mobillity.  Anticipate pt to return home at discharge.  Will follow for progress and needs.     Time Calculation:         PT Charges       Row Name 06/07/24 0949             Time Calculation    Start Time 0835  -      Stop Time 0949  -      Time Calculation (min) 74 min  -      PT Received On 06/07/24  -      PT Goal Re-Cert Due Date 06/17/24  -                User Key  (r) = Recorded By, (t) = Taken By, (c) = Cosigned By      Initials Name Provider Type    Leatha Burgos, PT Physical Therapist                  Therapy Charges for Today       Code Description Service Date Service Provider Modifiers Qty    94767149499 HC PT EVAL MOD COMPLEXITY 4 6/7/2024 Leatha Giles, PT GP 1    [id number] HC GAIT TRAINING EA 15 MIN 6/7/2024 Leatha Giles, PT GP 1            PT G-Codes  Outcome Measure Options: AM-PAC 6 Clicks Basic Mobility (PT)  AM-PAC 6 Clicks Score (PT): 16  PT Discharge Summary  Anticipated Discharge Disposition (PT): home with assist    Leatha Giles, PT  6/7/2024     Lab Facility: [id number] Lab Facility: [id number]

## 2024-06-07 NOTE — PLAN OF CARE
Goal Outcome Evaluation:  Plan of Care Reviewed With: patient, spouse        Progress: improving  Outcome Evaluation: PT saida completed this date.  Pt pleasant and agreeable to therapy.  Pt oriented X 4 w/ increase c/os chest pain.  Pt education for sternal precautions and improved tech w/ deep breathing.  Pt performs tfers w/ CGA, gait 200 ft w/ CGA and assist to manage equipment.  Pt w/ steady pace requiring 3 standing rest.  Pt will benefit from continued PT services to improve knowledge, safety awareness, to improve activity tolerance, balance and I w/ functional mobillity.  Anticipate pt to return home at discharge.  Will follow for progress and needs.      Anticipated Discharge Disposition (PT): home with assist

## 2024-06-07 NOTE — THERAPY TREATMENT NOTE
Acute Care - Physical Therapy Treatment Note  [facility]     Patient Name: Constantin Seals  : 1980  MRN: [id number]  Today's Date: 2024      Visit Dx:     ICD-10-CM ICD-9-CM   1. Chest pain, unspecified type  R07.9 786.50   2. Troponin I above reference range  R79.89 790.6   3. NSTEMI (non-ST elevated myocardial infarction)  I21.4 410.70   4. Coronary artery disease involving native coronary artery of native heart with unstable angina pectoris  I25.110 414.01     411.1   5. Impaired functional mobility and activity tolerance [Z74.09]  Z74.09 V49.89     Patient Active Problem List   Diagnosis    Class 3 severe obesity due to excess calories with serious comorbidity and body mass index (BMI) of 50.0 to 59.9 in adult    Primary hypertension    Gastroesophageal reflux disease without esophagitis    SHERRON (obstructive sleep apnea)    NSTEMI (non-ST elevated myocardial infarction)    Coronary artery disease involving native coronary artery of native heart with unstable angina pectoris    Gross hematuria    Myoglobinuria     Past Medical History:   Diagnosis Date    COVID     GERD (gastroesophageal reflux disease)     Gout     Heartburn     Hypertension     Obesity      Past Surgical History:   Procedure Laterality Date    CARDIAC CATHETERIZATION N/A 2024    Procedure: Left Heart Cath;  Surgeon: Chip Roberts MD;  Location: [facility] CATH INVASIVE LOCATION;  Service: Cardiology;  Laterality: N/A;    CORONARY ARTERY BYPASS GRAFT N/A 2024    Procedure: CORONARY ARTERY BYPASS GRAFTING X4, LEFT INTERNAL MAMMARY ARTERY GRAFT, RIGHT LEG ENDOSCOPIC VEIN HARVEST, STERNAL PLATING, APPLICATION PREVENA, TRANSESOPHAGEAL ECHOCARDIOGRAM;  Surgeon: Sean Trejo MD;  Location: [facility] OR;  Service: Cardiothoracic;  Laterality: N/A;    DISTAL BICEPS TENDON REPAIR Right 2022    Procedure: RIGHT DISTAL BICEPS TENDON REPAIR WITH SEMI-T ALLOGRAFT;  Surgeon: Benjamin العراقي MD;  Location:  [facility] OR;  Service:  Orthopedics;  Laterality: Right;    ELBOW PROCEDURE      EYE SURGERY Right 1984    COLES CATHETER N/A 6/6/2024    Procedure: COLES CATHETER INSERTION;  Surgeon: Omari Beaulieu MD;  Location:  [facility] OR;  Service: Urology;  Laterality: N/A;     PT Assessment (Last 12 Hours)       PT Evaluation and Treatment       Row Name 06/07/24 1423          Physical Therapy Time and Intention    Subjective Information complains of;pain  -     Document Type therapy note (daily note)  -     Mode of Treatment physical therapy  -       Row Name 06/07/24 1423          General Information    Existing Precautions/Restrictions fall;sternal;oxygen therapy device and L/min  chest tube, prevena  -       Row Name 06/07/24 1423          Pain    Pretreatment Pain Rating 8/10  -     Posttreatment Pain Rating 9/10  -     Pain Location incisional  -     Pain Location - chest  -     Pain Intervention(s) Repositioned;Ambulation/increased activity  -       Row Name 06/07/24 1423          Bed Mobility    Comment, (Bed Mobility) up in chair  -[facility] Name 06/07/24 1423          Sit-Stand Transfer    Sit-Stand [place] (Transfers) verbal cues;contact guard  -[facility] Name 06/07/24 1423          Stand-Sit Transfer    Stand-Sit [place] (Transfers) verbal cues;contact guard  -[facility] Name 06/07/24 1423          Gait/Stairs (Locomotion)    [place] Level (Gait) verbal cues;contact guard  -     Distance in Feet (Gait) 200  3 standing rests  -     Deviations/Abnormal Patterns (Gait) quyen decreased  -       Row Name 06/07/24 1423          Motor Skills    Comments, Therapeutic Exercise warm ups x 10  -       Row Name             Wound 06/06/24 0945 sternal Incision    Wound - Properties Group Placement Date: 06/06/24  - Placement Time: 0945  -LH Present on Original Admission: N  -LH Location: sternal  - Primary Wound Type: Incision  - Additional Comments: mastisol, steri strips, prevena  -     Retired Wound - Properties Group Placement Date: 06/06/24  - Placement Time: 0945  -LH Present on Original Admission: N  -LH Location: sternal  -LH Primary Wound Type: Incision  -LH Additional Comments: mastisol, steri strips, prevena  -LH    Retired Wound - Properties Group Date first assessed: 06/06/24  - Time first assessed: 0945  -LH Present on Original Admission: N  -LH Location: sternal  -LH Primary Wound Type: Incision  -LH Additional Comments: mastisol, steri strips, prevena  -LH      Row Name             Wound Right anterior knee Incision    Wound - Properties Group Side: Right  -LH Orientation: anterior  -LH Location: knee  -LH Primary Wound Type: Incision  -LH Additional Comments: mastisol, steri strips, 4x4, tape ace  -LH    Retired Wound - Properties Group Side: Right  -LH Orientation: anterior  -LH Location: knee  -LH Primary Wound Type: Incision  -LH Additional Comments: mastisol, steri strips, 4x4, tape ace  -LH    Retired Wound - Properties Group Side: Right  -LH Location: knee  -LH Primary Wound Type: Incision  -LH Additional Comments: mastisol, steri strips, 4x4, tape ace  -LH      Row Name             Wound 06/06/24 0945 Right anterior greater trochanter Puncture    Wound - Properties Group Placement Date: 06/06/24  -LH Placement Time: 0945  -LH Present on Original Admission: N  -LH Side: Right  -LH Orientation: anterior  -LH Location: greater trochanter  -LH Primary Wound Type: Puncture  -LH Additional Comments: mastisol, steri strips, 4x4, tape  -LH    Retired Wound - Properties Group Placement Date: 06/06/24  -LH Placement Time: 0945  -LH Present on Original Admission: N  -LH Side: Right  -LH Orientation: anterior  -LH Location: greater trochanter  -LH Primary Wound Type: Puncture  -LH Additional Comments: mastisol, steri strips, 4x4, tape  -LH    Retired Wound - Properties Group Date first assessed: 06/06/24  - Time first assessed: 0945  -LH Present on Original Admission: N  -LH Side:  Right  -LH Location: greater trochanter  -LH Primary Wound Type: Puncture  -LH Additional Comments: mastisol, steri strips, 4x4, tape  -LH      Row Name             Wound 06/06/24 0945 Right distal leg Puncture    Wound - Properties Group Placement Date: 06/06/24  - Placement Time: 0945  -LH Present on Original Admission: N  -LH Side: Right  -LH Orientation: distal  -LH Location: leg  -LH Primary Wound Type: Puncture  -LH Additional Comments: mastisol, steri strips, 4x4, tape, ace  -LH    Retired Wound - Properties Group Placement Date: 06/06/24  - Placement Time: 0945  -LH Present on Original Admission: N  -LH Side: Right  -LH Orientation: distal  -LH Location: leg  -LH Primary Wound Type: Puncture  -LH Additional Comments: mastisol, steri strips, 4x4, tape, ace  -LH    Retired Wound - Properties Group Date first assessed: 06/06/24  - Time first assessed: 0945 -LH Present on Original Admission: N  -LH Side: Right  -LH Location: leg  -LH Primary Wound Type: Puncture  -LH Additional Comments: mastisol, steri strips, 4x4, tape, ace  -LH      Row Name 06/07/24 1423          Positioning and Restraints    Pre-Treatment Position sitting in chair/recliner  -     Post Treatment Position chair  -     In Chair reclined;call light within reach;encouraged to call for assist;with family/caregiver;RUE elevated;LUE elevated  -               User Key  (r) = Recorded By, (t) = Taken By, (c) = Cosigned By      Initials Name Provider Type     Danielle Chan RN Registered Nurse    Esther Beasley, PTA Physical Therapist Assistant                    Physical Therapy Education       Title: PT OT SLP Therapies (Done)       Topic: Physical Therapy (Done)       Point: Mobility training (Done)       Learning Progress Summary             Patient Acceptance, E,TB,D, VU,DU,NR by HALEY at 6/7/2024 0107    Comment: education re: purpose of PT/importance of activity, safety/falls prevention, sternal precautions, improved tech  w/ tfers, gait, deep breathing and pacing activity                         Point: Home exercise program (Done)       Learning Progress Summary             Patient Acceptance, E,TB,D, VU,DU,NR by  at 6/7/2024 1527    Comment: education re: purpose of PT/importance of activity, safety/falls prevention, sternal precautions, improved tech w/ tfers, gait, deep breathing and pacing activity                         Point: Body mechanics (Done)       Learning Progress Summary             Patient Acceptance, E,TB,D, VU,DU,NR by  at 6/7/2024 1527    Comment: education re: purpose of PT/importance of activity, safety/falls prevention, sternal precautions, improved tech w/ tfers, gait, deep breathing and pacing activity                         Point: Precautions (Done)       Learning Progress Summary             Patient Acceptance, E,TB,D, VU,DU,NR by  at 6/7/2024 1527    Comment: education re: purpose of PT/importance of activity, safety/falls prevention, sternal precautions, improved tech w/ tfers, gait, deep breathing and pacing activity                                         User Key       Initials Effective Dates Name Provider Type Discipline     08/02/18 -  Leatha Giles, PT Physical Therapist PT                  PT Recommendation and Plan             Time Calculation:    PT Charges       Row Name 06/07/24 1601 06/07/24 0949          Time Calculation    Start Time 1423  - 0835  -     Stop Time 1447  - 0949  -     Time Calculation (min) 24 min  - 74 min  -     PT Received On 06/07/24  - 06/07/24  -     PT Goal Re-Cert Due Date -- 06/17/24  -        Time Calculation- PT    Total Timed Code Minutes- PT 24 minute(s)  - --        Timed Charges    06530 - Gait Training Minutes  24  - --        Total Minutes    Timed Charges Total Minutes 24  - --      Total Minutes 24  - --               User Key  (r) = Recorded By, (t) = Taken By, (c) = Cosigned By      Initials Name Provider Type      Esther Mccoy, JAYANT Physical Therapist Assistant    Leatha Burgos, PT Physical Therapist                  Therapy Charges for Today       Code Description Service Date Service Provider Modifiers Qty    [id number] HC GAIT TRAINING EA 15 MIN 6/7/2024 Esther Mccoy PTA GP 2            PT G-Codes  Outcome Measure Options: AM-PAC 6 Clicks Basic Mobility (PT)  AM-PAC 6 Clicks Score (PT): 16    Esther Mccoy PTA  6/7/2024

## 2024-06-08 ENCOUNTER — APPOINTMENT (OUTPATIENT)
Dept: GENERAL RADIOLOGY | Facility: HOSPITAL | Age: 44
End: 2024-06-08
Payer: COMMERCIAL

## 2024-06-08 LAB
ALBUMIN SERPL-MCNC: 3.2 G/DL (ref 3.5–5.2)
ANION GAP SERPL CALCULATED.3IONS-SCNC: 6 MMOL/L (ref 5–15)
BASOPHILS # BLD AUTO: 0.06 10*3/MM3 (ref 0–0.2)
BASOPHILS NFR BLD AUTO: 0.4 % (ref 0–1.5)
BUN SERPL-MCNC: 16 MG/DL (ref 6–20)
BUN/CREAT SERPL: 17 (ref 7–25)
CALCIUM SPEC-SCNC: 8.3 MG/DL (ref 8.6–10.5)
CHLORIDE SERPL-SCNC: 100 MMOL/L (ref 98–107)
CO2 SERPL-SCNC: 26 MMOL/L (ref 22–29)
CREAT SERPL-MCNC: 0.94 MG/DL (ref 0.76–1.27)
DEPRECATED RDW RBC AUTO: 44 FL (ref 37–54)
EGFRCR SERPLBLD CKD-EPI 2021: 103.2 ML/MIN/1.73
EOSINOPHIL # BLD AUTO: 0.03 10*3/MM3 (ref 0–0.4)
EOSINOPHIL NFR BLD AUTO: 0.2 % (ref 0.3–6.2)
ERYTHROCYTE [DISTWIDTH] IN BLOOD BY AUTOMATED COUNT: 13.4 % (ref 12.3–15.4)
GLUCOSE BLDC GLUCOMTR-MCNC: 110 MG/DL (ref 70–130)
GLUCOSE BLDC GLUCOMTR-MCNC: 119 MG/DL (ref 70–130)
GLUCOSE BLDC GLUCOMTR-MCNC: 119 MG/DL (ref 70–130)
GLUCOSE BLDC GLUCOMTR-MCNC: 124 MG/DL (ref 70–130)
GLUCOSE SERPL-MCNC: 136 MG/DL (ref 65–99)
HCT VFR BLD AUTO: 35.5 % (ref 37.5–51)
HGB BLD-MCNC: 11.3 G/DL (ref 13–17.7)
IMM GRANULOCYTES # BLD AUTO: 0.07 10*3/MM3 (ref 0–0.05)
IMM GRANULOCYTES NFR BLD AUTO: 0.5 % (ref 0–0.5)
LYMPHOCYTES # BLD AUTO: 1.52 10*3/MM3 (ref 0.7–3.1)
LYMPHOCYTES NFR BLD AUTO: 11.3 % (ref 19.6–45.3)
MAGNESIUM SERPL-MCNC: 1.9 MG/DL (ref 1.6–2.6)
MCH RBC QN AUTO: 28 PG (ref 26.6–33)
MCHC RBC AUTO-ENTMCNC: 31.8 G/DL (ref 31.5–35.7)
MCV RBC AUTO: 88.1 FL (ref 79–97)
MONOCYTES # BLD AUTO: 1.73 10*3/MM3 (ref 0.1–0.9)
MONOCYTES NFR BLD AUTO: 12.8 % (ref 5–12)
MYOGLOBIN SERPL-MCNC: 228 NG/ML (ref 28–72)
NEUTROPHILS NFR BLD AUTO: 10.07 10*3/MM3 (ref 1.7–7)
NEUTROPHILS NFR BLD AUTO: 74.8 % (ref 42.7–76)
NRBC BLD AUTO-RTO: 0 /100 WBC (ref 0–0.2)
PHOSPHATE SERPL-MCNC: 2.8 MG/DL (ref 2.5–4.5)
PLATELET # BLD AUTO: 186 10*3/MM3 (ref 140–450)
PMV BLD AUTO: 10.2 FL (ref 6–12)
POTASSIUM SERPL-SCNC: 4.9 MMOL/L (ref 3.5–5.2)
RBC # BLD AUTO: 4.03 10*6/MM3 (ref 4.14–5.8)
SODIUM SERPL-SCNC: 132 MMOL/L (ref 136–145)
WBC NRBC COR # BLD AUTO: 13.48 10*3/MM3 (ref 3.4–10.8)

## 2024-06-08 PROCEDURE — 80069 RENAL FUNCTION PANEL: CPT | Performed by: NURSE PRACTITIONER

## 2024-06-08 PROCEDURE — 25010000002 CEFAZOLIN 3 G RECONSTITUTED SOLUTION 1 EACH VIAL: Performed by: THORACIC SURGERY (CARDIOTHORACIC VASCULAR SURGERY)

## 2024-06-08 PROCEDURE — 99231 SBSQ HOSP IP/OBS SF/LOW 25: CPT | Performed by: UROLOGY

## 2024-06-08 PROCEDURE — 93005 ELECTROCARDIOGRAM TRACING: CPT | Performed by: THORACIC SURGERY (CARDIOTHORACIC VASCULAR SURGERY)

## 2024-06-08 PROCEDURE — 85025 COMPLETE CBC W/AUTO DIFF WBC: CPT | Performed by: NURSE PRACTITIONER

## 2024-06-08 PROCEDURE — 25810000003 SODIUM CHLORIDE 0.9 % SOLUTION: Performed by: NURSE PRACTITIONER

## 2024-06-08 PROCEDURE — 97116 GAIT TRAINING THERAPY: CPT

## 2024-06-08 PROCEDURE — 99024 POSTOP FOLLOW-UP VISIT: CPT | Performed by: THORACIC SURGERY (CARDIOTHORACIC VASCULAR SURGERY)

## 2024-06-08 PROCEDURE — 71045 X-RAY EXAM CHEST 1 VIEW: CPT

## 2024-06-08 PROCEDURE — 94761 N-INVAS EAR/PLS OXIMETRY MLT: CPT

## 2024-06-08 PROCEDURE — 94799 UNLISTED PULMONARY SVC/PX: CPT

## 2024-06-08 PROCEDURE — 83874 ASSAY OF MYOGLOBIN: CPT | Performed by: NURSE PRACTITIONER

## 2024-06-08 PROCEDURE — 82948 REAGENT STRIP/BLOOD GLUCOSE: CPT

## 2024-06-08 PROCEDURE — 93010 ELECTROCARDIOGRAM REPORT: CPT | Performed by: STUDENT IN AN ORGANIZED HEALTH CARE EDUCATION/TRAINING PROGRAM

## 2024-06-08 PROCEDURE — 83735 ASSAY OF MAGNESIUM: CPT | Performed by: NURSE PRACTITIONER

## 2024-06-08 PROCEDURE — 25010000002 ENOXAPARIN PER 10 MG: Performed by: NURSE PRACTITIONER

## 2024-06-08 RX ORDER — OXYCODONE HYDROCHLORIDE 10 MG/1
10 TABLET ORAL EVERY 4 HOURS PRN
Status: DISCONTINUED | OUTPATIENT
Start: 2024-06-08 | End: 2024-06-10

## 2024-06-08 RX ORDER — METOPROLOL TARTRATE 50 MG/1
25 TABLET, FILM COATED ORAL EVERY 12 HOURS SCHEDULED
Status: DISCONTINUED | OUTPATIENT
Start: 2024-06-08 | End: 2024-06-12 | Stop reason: HOSPADM

## 2024-06-08 RX ADMIN — ACETAMINOPHEN 1000 MG: 500 TABLET, FILM COATED ORAL at 17:26

## 2024-06-08 RX ADMIN — IPRATROPIUM BROMIDE 0.5 MG: 0.5 SOLUTION RESPIRATORY (INHALATION) at 18:19

## 2024-06-08 RX ADMIN — METOPROLOL TARTRATE 12.5 MG: 25 TABLET, FILM COATED ORAL at 10:13

## 2024-06-08 RX ADMIN — LIDOCAINE 2 PATCH: 4 PATCH TOPICAL at 08:00

## 2024-06-08 RX ADMIN — OXYCODONE HYDROCHLORIDE 10 MG: 10 TABLET ORAL at 09:13

## 2024-06-08 RX ADMIN — METOPROLOL TARTRATE 25 MG: 50 TABLET, FILM COATED ORAL at 21:26

## 2024-06-08 RX ADMIN — ENOXAPARIN SODIUM 40 MG: 100 INJECTION SUBCUTANEOUS at 08:01

## 2024-06-08 RX ADMIN — SODIUM CHLORIDE 3 G: 900 INJECTION INTRAVENOUS at 04:44

## 2024-06-08 RX ADMIN — ATORVASTATIN CALCIUM 20 MG: 10 TABLET, FILM COATED ORAL at 21:26

## 2024-06-08 RX ADMIN — METOPROLOL TARTRATE 12.5 MG: 25 TABLET, FILM COATED ORAL at 08:02

## 2024-06-08 RX ADMIN — ACETAMINOPHEN 1000 MG: 500 TABLET, FILM COATED ORAL at 13:22

## 2024-06-08 RX ADMIN — ACETAMINOPHEN 1000 MG: 500 TABLET, FILM COATED ORAL at 05:10

## 2024-06-08 RX ADMIN — PANTOPRAZOLE SODIUM 40 MG: 40 TABLET, DELAYED RELEASE ORAL at 08:02

## 2024-06-08 RX ADMIN — PREGABALIN 25 MG: 25 CAPSULE ORAL at 21:26

## 2024-06-08 RX ADMIN — ASPIRIN 81 MG: 81 TABLET, COATED ORAL at 08:02

## 2024-06-08 RX ADMIN — IPRATROPIUM BROMIDE 0.5 MG: 0.5 SOLUTION RESPIRATORY (INHALATION) at 07:20

## 2024-06-08 RX ADMIN — AMIODARONE HYDROCHLORIDE 400 MG: 200 TABLET ORAL at 17:27

## 2024-06-08 RX ADMIN — BISACODYL 10 MG: 5 TABLET, COATED ORAL at 08:02

## 2024-06-08 RX ADMIN — CHLORHEXIDINE GLUCONATE 1 APPLICATION: 500 CLOTH TOPICAL at 04:44

## 2024-06-08 RX ADMIN — OXYCODONE HYDROCHLORIDE 15 MG: 10 TABLET ORAL at 13:22

## 2024-06-08 RX ADMIN — POLYETHYLENE GLYCOL 3350 17 G: 17 POWDER, FOR SOLUTION ORAL at 08:02

## 2024-06-08 RX ADMIN — IPRATROPIUM BROMIDE 0.5 MG: 0.5 SOLUTION RESPIRATORY (INHALATION) at 15:19

## 2024-06-08 RX ADMIN — ALBUTEROL SULFATE 1.25 MG: 2.5 SOLUTION RESPIRATORY (INHALATION) at 07:20

## 2024-06-08 RX ADMIN — CHLORHEXIDINE GLUCONATE 0.12% ORAL RINSE 15 ML: 1.2 LIQUID ORAL at 17:27

## 2024-06-08 RX ADMIN — PREGABALIN 25 MG: 25 CAPSULE ORAL at 08:02

## 2024-06-08 RX ADMIN — AMIODARONE HYDROCHLORIDE 400 MG: 200 TABLET ORAL at 08:02

## 2024-06-08 RX ADMIN — OXYCODONE HYDROCHLORIDE 15 MG: 10 TABLET ORAL at 21:26

## 2024-06-08 RX ADMIN — SODIUM CHLORIDE 150 ML/HR: 9 INJECTION, SOLUTION INTRAVENOUS at 15:53

## 2024-06-08 RX ADMIN — ALBUTEROL SULFATE 1.25 MG: 2.5 SOLUTION RESPIRATORY (INHALATION) at 18:19

## 2024-06-08 RX ADMIN — OXYCODONE HYDROCHLORIDE 10 MG: 10 TABLET ORAL at 05:10

## 2024-06-08 RX ADMIN — OXYCODONE HYDROCHLORIDE 10 MG: 10 TABLET ORAL at 17:37

## 2024-06-08 RX ADMIN — Medication 1 APPLICATION: at 05:10

## 2024-06-08 RX ADMIN — OXYCODONE HYDROCHLORIDE 10 MG: 10 TABLET ORAL at 01:07

## 2024-06-08 RX ADMIN — ACETAMINOPHEN 1000 MG: 500 TABLET, FILM COATED ORAL at 00:16

## 2024-06-08 RX ADMIN — CHLORHEXIDINE GLUCONATE 0.12% ORAL RINSE 15 ML: 1.2 LIQUID ORAL at 05:10

## 2024-06-08 RX ADMIN — ALBUTEROL SULFATE 1.25 MG: 2.5 SOLUTION RESPIRATORY (INHALATION) at 15:20

## 2024-06-08 NOTE — PLAN OF CARE
Goal Outcome Evaluation:  Plan of Care Reviewed With: patient        Progress: no change  Outcome Evaluation: Pt. agreeable to therapy. He c/o continued increased pain. He was able to stand and ambulate with CGA. He walked 200' with 3 standing rests. Walked on 1L due to O2 sat being 90% at rest. O2 sat was 92% post walk while on 1L. Will continue to work on progressive ambulation.

## 2024-06-08 NOTE — PLAN OF CARE
"Goal Outcome Evaluation:              Outcome Evaluation: VSS, HR S-ST  did get up to 130 briefly when up with PT, chest tube with 40 ml output, JOSE with 10 ml output, BLE edematous, rt leg bruised, edwards continues per Dr. Perkins verbal order to leave for next 3-5 day, urine yellow, pt up to BSC, no BM today, passing \"gas\", pain med increased at better at helping to control his pain, rt side of tongue swollen , no problems with speech or swallow, md aware, will monitor, sternal dressing intact, up in chair all day, IVF continue, safety maintained, on Ra but need O2 at 1L/NC at times, walked in hallway with O2, pt only walked twice today, declined to walk this pm after supper, wanted to wait to walk before getting into bed,                                "

## 2024-06-08 NOTE — THERAPY TREATMENT NOTE
Acute Care - Physical Therapy Treatment Note  [facility]     Patient Name: Constantin Seals  : 1980  MRN: [id number]  Today's Date: 2024      Visit Dx:     ICD-10-CM ICD-9-CM   1. Chest pain, unspecified type  R07.9 786.50   2. Troponin I above reference range  R79.89 790.6   3. NSTEMI (non-ST elevated myocardial infarction)  I21.4 410.70   4. Coronary artery disease involving native coronary artery of native heart with unstable angina pectoris  I25.110 414.01     411.1   5. Impaired functional mobility and activity tolerance [Z74.09]  Z74.09 V49.89     Patient Active Problem List   Diagnosis    Class 3 severe obesity due to excess calories with serious comorbidity and body mass index (BMI) of 50.0 to 59.9 in adult    Primary hypertension    Gastroesophageal reflux disease without esophagitis    SHERRON (obstructive sleep apnea)    NSTEMI (non-ST elevated myocardial infarction)    Coronary artery disease involving native coronary artery of native heart with unstable angina pectoris    Gross hematuria    Myoglobinuria     Past Medical History:   Diagnosis Date    COVID     GERD (gastroesophageal reflux disease)     Gout     Heartburn     Hypertension     Obesity      Past Surgical History:   Procedure Laterality Date    CARDIAC CATHETERIZATION N/A 2024    Procedure: Left Heart Cath;  Surgeon: Chip Roberts MD;  Location: [facility] CATH INVASIVE LOCATION;  Service: Cardiology;  Laterality: N/A;    CORONARY ARTERY BYPASS GRAFT N/A 2024    Procedure: CORONARY ARTERY BYPASS GRAFTING X4, LEFT INTERNAL MAMMARY ARTERY GRAFT, RIGHT LEG ENDOSCOPIC VEIN HARVEST, STERNAL PLATING, APPLICATION PREVENA, TRANSESOPHAGEAL ECHOCARDIOGRAM;  Surgeon: Sean Trejo MD;  Location: [facility] OR;  Service: Cardiothoracic;  Laterality: N/A;    DISTAL BICEPS TENDON REPAIR Right 2022    Procedure: RIGHT DISTAL BICEPS TENDON REPAIR WITH SEMI-T ALLOGRAFT;  Surgeon: Benjamin العراقي MD;  Location:  [facility] OR;  Service:  Orthopedics;  Laterality: Right;    ELBOW PROCEDURE      EYE SURGERY Right 1984    COLES CATHETER N/A 6/6/2024    Procedure: COLES CATHETER INSERTION;  Surgeon: Omari Beaulieu MD;  Location: [facility] OR;  Service: Urology;  Laterality: N/A;     PT Assessment (Last 12 Hours)       PT Evaluation and Treatment       Row Name 06/08/24 1526 06/08/24 1447       Physical Therapy Time and Intention    Subjective Information complains of;pain  - --    Document Type therapy note (daily note)  - therapy note (daily note)  -    Mode of Treatment physical therapy  - --    Session Not Performed -- other (see comments)  -    Comment, Session Not Performed -- pt just had pain medicine. Will check back.  -      Row Name 06/08/24 1000          Physical Therapy Time and Intention    Subjective Information complains of;pain;dizziness  -     Document Type therapy note (daily note)  -     Mode of Treatment physical therapy  -     Comment also c/o swelling and pain in his tongue. Has visible sore on tongue. Pt. and spouse state nsg is aware.  -       Row Name 06/08/24 1526 06/08/24 1000       General Information    Existing Precautions/Restrictions fall;sternal;oxygen therapy device and L/min  chest tube, prevena  - fall;sternal;oxygen therapy device and L/min  chest tube, prevena  -      Row Name 06/08/24 1526 06/08/24 1000       Pain    Pretreatment Pain Rating 2/10  - 8/10  -    Posttreatment Pain Rating 7/10  - 8/10  -    Pain Location incisional  - incisional  -    Pain Location - chest  - chest  -    Pain Intervention(s) Repositioned;Ambulation/increased activity;Medication (See MAR)  - Repositioned;Ambulation/increased activity  -      Row Name 06/08/24 1526 06/08/24 1000       Bed Mobility    Comment, (Bed Mobility) up in chair  - up in chair  -      Row Name 06/08/24 1526 06/08/24 1000       Sit-Stand Transfer    Sit-Stand [place] (Transfers) contact guard;verbal cues  -  verbal cues;contact guard  -MF      Row Name 06/08/24 1526 06/08/24 1000       Stand-Sit Transfer    Stand-Sit [place] (Transfers) contact guard  -MF verbal cues;contact guard  -MF      Row Name 06/08/24 1526 06/08/24 1000       Gait/Stairs (Locomotion)    [place] Level (Gait) contact guard;verbal cues  -MF verbal cues;contact guard  -MF    Distance in Feet (Gait) 300  3 standing rests  -  3 standing rests  -MF    Deviations/Abnormal Patterns (Gait) quyen decreased;stride length decreased  -MF quyen decreased;stride length decreased  -MF      Row Name 06/08/24 1526          Motor Skills    Comments, Therapeutic Exercise warm ups x 10  -MF       Row Name             Wound 06/06/24 0945 sternal Incision    Wound - Properties Group Placement Date: 06/06/24  [facility] Placement Time: 0945 - Present on Original Admission: N  -LH Location: sternal  -LH Primary Wound Type: Incision  -LH Additional Comments: mastisol, steri strips, prevena  -LH    Retired Wound - Properties Group Placement Date: 06/06/24  - Placement Time: 0945 - Present on Original Admission: N  -LH Location: sternal  -LH Primary Wound Type: Incision  -LH Additional Comments: mastisol, steri strips, prevena  -LH    Retired Wound - Properties Group Date first assessed: 06/06/24  - Time first assessed: 0945 - Present on Original Admission: N  -LH Location: sternal  -LH Primary Wound Type: Incision  -LH Additional Comments: mastisol, steri strips, prevena  -LH      Row Name             Wound Right anterior knee Incision    Wound - Properties Group Side: Right  -LH Orientation: anterior  -LH Location: knee  -LH Primary Wound Type: Incision  -LH Additional Comments: mastisol, steri strips, 4x4, tape ace  -LH    Retired Wound - Properties Group Side: Right  -LH Orientation: anterior  -LH Location: knee  -LH Primary Wound Type: Incision  -LH Additional Comments: mastisol, steri strips, 4x4, tape ace  -LH    Retired Wound - Properties  Group Side: Right  -LH Location: knee  -LH Primary Wound Type: Incision  -LH Additional Comments: mastisol, steri strips, 4x4, tape ace  -LH      Row Name             Wound 06/06/24 0945 Right anterior greater trochanter Puncture    Wound - Properties Group Placement Date: 06/06/24  -LH Placement Time: 0945  -LH Present on Original Admission: N  -LH Side: Right  -LH Orientation: anterior  -LH Location: greater trochanter  -LH Primary Wound Type: Puncture  -LH Additional Comments: mastisol, steri strips, 4x4, tape  -LH    Retired Wound - Properties Group Placement Date: 06/06/24  -LH Placement Time: 0945  -LH Present on Original Admission: N  -LH Side: Right  -LH Orientation: anterior  -LH Location: greater trochanter  -LH Primary Wound Type: Puncture  -LH Additional Comments: mastisol, steri strips, 4x4, tape  -LH    Retired Wound - Properties Group Date first assessed: 06/06/24  -LH Time first assessed: 0945  -LH Present on Original Admission: N  -LH Side: Right  -LH Location: greater trochanter  -LH Primary Wound Type: Puncture  -LH Additional Comments: mastisol, steri strips, 4x4, tape  -LH      Row Name             Wound 06/06/24 0945 Right distal leg Puncture    Wound - Properties Group Placement Date: 06/06/24  -LH Placement Time: 0945  -LH Present on Original Admission: N  -LH Side: Right  -LH Orientation: distal  -LH Location: leg  -LH Primary Wound Type: Puncture  -LH Additional Comments: mastisol, steri strips, 4x4, tape, ace  -LH    Retired Wound - Properties Group Placement Date: 06/06/24  -LH Placement Time: 0945  -LH Present on Original Admission: N  -LH Side: Right  -LH Orientation: distal  -LH Location: leg  -LH Primary Wound Type: Puncture  -LH Additional Comments: mastisol, steri strips, 4x4, tape, ace  -LH    Retired Wound - Properties Group Date first assessed: 06/06/24  -LH Time first assessed: 0945  -LH Present on Original Admission: N  -LH Side: Right  -LH Location: leg  -LH Primary Wound  Type: Puncture  - Additional Comments: mastisol, steri strips, 4x4, tape, ace  -LH      Row Name 06/08/24 1000          Plan of Care Review    Plan of Care Reviewed With patient  -MF     Progress no change  -     Outcome Evaluation Pt. agreeable to therapy. He c/o continued increased pain. He was able to stand and ambulate with CGA. He walked 200' with 3 standing rests. Walked on 1L due to O2 sat being 90% at rest. O2 sat was 92% post walk while on 1L. Will continue to work on progressive ambulation.  -       Row Name 06/08/24 1526 06/08/24 1000       Vital Signs    Pre SpO2 (%) 95  -MF 90  -MF    O2 Delivery Pre Treatment room air  -MF room air  -    Intra SpO2 (%) 93  -MF --    O2 Delivery Intra Treatment supplemental O2  1l  -MF supplemental O2  1l  -MF    Post SpO2 (%) 95  -MF 92  -MF    O2 Delivery Post Treatment supplemental O2  1l  -MF supplemental O2  1l  -MF    Pre Patient Position Sitting  - Sitting  -MF    Intra Patient Position Standing  - Standing  -    Post Patient Position Sitting  - Sitting  -      Row Name 06/08/24 1526 06/08/24 1000       Positioning and Restraints    Pre-Treatment Position sitting in chair/recliner  - bedside commode  -    Post Treatment Position chair  - chair  -MF    In Chair reclined;call light within reach;encouraged to call for assist;with family/caregiver  - reclined;call light within reach;encouraged to call for assist;with family/caregiver;notified nsg  -              User Key  (r) = Recorded By, (t) = Taken By, (c) = Cosigned By      Initials Name Provider Type     Danielle Chan RN Registered Nurse    Esther Beasley PTA Physical Therapist Assistant                    Physical Therapy Education       Title: PT OT SLP Therapies (Done)       Topic: Physical Therapy (Done)       Point: Mobility training (Done)       Learning Progress Summary             Patient Acceptance, E,TB,D, VU,DU,NR by HALEY at 6/7/2024 9515    Comment: education  re: purpose of PT/importance of activity, safety/falls prevention, sternal precautions, improved tech w/ tfers, gait, deep breathing and pacing activity                         Point: Home exercise program (Done)       Learning Progress Summary             Patient Acceptance, E,TB,D, VU,DU,NR by  at 6/7/2024 1527    Comment: education re: purpose of PT/importance of activity, safety/falls prevention, sternal precautions, improved tech w/ tfers, gait, deep breathing and pacing activity                         Point: Body mechanics (Done)       Learning Progress Summary             Patient Acceptance, E,TB,D, VU,DU,NR by  at 6/7/2024 1527    Comment: education re: purpose of PT/importance of activity, safety/falls prevention, sternal precautions, improved tech w/ tfers, gait, deep breathing and pacing activity                         Point: Precautions (Done)       Learning Progress Summary             Patient Acceptance, E,TB,D, VU,DU,NR by  at 6/7/2024 1527    Comment: education re: purpose of PT/importance of activity, safety/falls prevention, sternal precautions, improved tech w/ tfers, gait, deep breathing and pacing activity                                         User Key       Initials Effective Dates Name Provider Type Discipline     08/02/18 -  Leatha Giles, PT Physical Therapist PT                  PT Recommendation and Plan     Plan of Care Reviewed With: patient  Progress: no change  Outcome Evaluation: Pt. agreeable to therapy. He c/o continued increased pain. He was able to stand and ambulate with CGA. He walked 200' with 3 standing rests. Walked on 1L due to O2 sat being 90% at rest. O2 sat was 92% post walk while on 1L. Will continue to work on progressive ambulation.   Outcome Measures       Row Name 06/08/24 1000             How much help from another person do you currently need...    Turning from your back to your side while in flat bed without using bedrails? 2  -MF      Moving from  lying on back to sitting on the side of a flat bed without bedrails? 2  -MF      Moving to and from a bed to a chair (including a wheelchair)? 3  -MF      Standing up from a chair using your arms (e.g., wheelchair, bedside chair)? 3  -MF      Climbing 3-5 steps with a railing? 2  -MF      To walk in hospital room? 3  -MF      AM-PAC 6 Clicks Score (PT) 15  -MF      Highest Level of Mobility Goal 4 --> Transfer to chair/commode  -MF         Functional Assessment    Outcome Measure Options AM-PAC 6 Clicks Basic Mobility (PT)  -MF                User Key  (r) = Recorded By, (t) = Taken By, (c) = Cosigned By      Initials Name Provider Type    Esther Beasley PTA Physical Therapist Assistant                     Time Calculation:    PT Charges       Row Name 06/08/24 1629 06/08/24 1201          Time Calculation    Start Time 1526  -MF 1000  -MF     Stop Time 1556  -MF 1023  -MF     Time Calculation (min) 30 min  -MF 23 min  -MF     PT Received On -- 06/08/24  -MF        Time Calculation- PT    Total Timed Code Minutes- PT 30 minute(s)  -MF 23 minute(s)  -MF        Timed Charges    86423 - Gait Training Minutes  30  -MF 23  -MF        Total Minutes    Timed Charges Total Minutes 30  -MF 23  -MF      Total Minutes 30  -MF 23  -MF               User Key  (r) = Recorded By, (t) = Taken By, (c) = Cosigned By      Initials Name Provider Type    Esther Beasley PTA Physical Therapist Assistant                  Therapy Charges for Today       Code Description Service Date Service Provider Modifiers Qty    59779256251 HC GAIT TRAINING EA 15 MIN 6/7/2024 Esther Mccoy, JAYANT GP 2    98993316906 HC GAIT TRAINING EA 15 MIN 6/8/2024 Esther Mccoy, JAYANT GP 2    17844164017 HC GAIT TRAINING EA 15 MIN 6/8/2024 Esther Mccoy, JAYANT GP 2            PT G-Codes  Outcome Measure Options: AM-PAC 6 Clicks Basic Mobility (PT)  AM-PAC 6 Clicks Score (PT): 15    Esther Mccoy PTA  6/8/2024

## 2024-06-08 NOTE — THERAPY TREATMENT NOTE
Acute Care - Physical Therapy Treatment Note  [facility]     Patient Name: Constantin Seals  : 1980  MRN: [id number]  Today's Date: 2024      Visit Dx:     ICD-10-CM ICD-9-CM   1. Chest pain, unspecified type  R07.9 786.50   2. Troponin I above reference range  R79.89 790.6   3. NSTEMI (non-ST elevated myocardial infarction)  I21.4 410.70   4. Coronary artery disease involving native coronary artery of native heart with unstable angina pectoris  I25.110 414.01     411.1   5. Impaired functional mobility and activity tolerance [Z74.09]  Z74.09 V49.89     Patient Active Problem List   Diagnosis    Class 3 severe obesity due to excess calories with serious comorbidity and body mass index (BMI) of 50.0 to 59.9 in adult    Primary hypertension    Gastroesophageal reflux disease without esophagitis    SHERRON (obstructive sleep apnea)    NSTEMI (non-ST elevated myocardial infarction)    Coronary artery disease involving native coronary artery of native heart with unstable angina pectoris    Gross hematuria    Myoglobinuria     Past Medical History:   Diagnosis Date    COVID     GERD (gastroesophageal reflux disease)     Gout     Heartburn     Hypertension     Obesity      Past Surgical History:   Procedure Laterality Date    CARDIAC CATHETERIZATION N/A 2024    Procedure: Left Heart Cath;  Surgeon: Chip Roberts MD;  Location: [facility] CATH INVASIVE LOCATION;  Service: Cardiology;  Laterality: N/A;    CORONARY ARTERY BYPASS GRAFT N/A 2024    Procedure: CORONARY ARTERY BYPASS GRAFTING X4, LEFT INTERNAL MAMMARY ARTERY GRAFT, RIGHT LEG ENDOSCOPIC VEIN HARVEST, STERNAL PLATING, APPLICATION PREVENA, TRANSESOPHAGEAL ECHOCARDIOGRAM;  Surgeon: Sean Trejo MD;  Location: [facility] OR;  Service: Cardiothoracic;  Laterality: N/A;    DISTAL BICEPS TENDON REPAIR Right 2022    Procedure: RIGHT DISTAL BICEPS TENDON REPAIR WITH SEMI-T ALLOGRAFT;  Surgeon: Benjamin العراقي MD;  Location:  [facility] OR;  Service:  Orthopedics;  Laterality: Right;    ELBOW PROCEDURE      EYE SURGERY Right 1984    COLES CATHETER N/A 6/6/2024    Procedure: COLES CATHETER INSERTION;  Surgeon: Omari Beaulieu MD;  Location:  [facility] OR;  Service: Urology;  Laterality: N/A;     PT Assessment (Last 12 Hours)       PT Evaluation and Treatment       [facility] Name 06/08/24 1000          Physical Therapy Time and Intention    Subjective Information complains of;pain;dizziness  -     Document Type therapy note (daily note)  -     Mode of Treatment physical therapy  -     Comment also c/o swelling and pain in his tongue. Has visible sore on tongue. Pt. and spouse state nsg is aware.  -[facility] Name 06/08/24 1000          General Information    Existing Precautions/Restrictions fall;sternal;oxygen therapy device and L/min  chest tube, prevena  -[facility] Name 06/08/24 1000          Pain    Pretreatment Pain Rating 8/10  -     Posttreatment Pain Rating 8/10  -     Pain Location incisional  -     Pain Location - chest  -     Pain Intervention(s) Repositioned;Ambulation/increased activity  -[facility] Name 06/08/24 1000          Bed Mobility    Comment, (Bed Mobility) up in chair  -[facility] Name 06/08/24 1000          Sit-Stand Transfer    Sit-Stand [place] (Transfers) verbal cues;contact guard  -[facility] Name 06/08/24 1000          Stand-Sit Transfer    Stand-Sit [place] (Transfers) verbal cues;contact guard  -[facility] Name 06/08/24 1000          Gait/Stairs (Locomotion)    [place] Level (Gait) verbal cues;contact guard  -     Distance in Feet (Gait) 200  3 standing rests  -     Deviations/Abnormal Patterns (Gait) quyen decreased;stride length decreased  -       Row Name             Wound 06/06/24 0945 sternal Incision    Wound - Properties Group Placement Date: 06/06/24  - Placement Time: 0945  -LH Present on Original Admission: N  -LH Location: sternal  - Primary Wound Type: Incision  -LH  Additional Comments: mastisol, steri strips, prevena  -LH    Retired Wound - Properties Group Placement Date: 06/06/24  - Placement Time: 0945  -LH Present on Original Admission: N  -LH Location: sternal  -LH Primary Wound Type: Incision  -LH Additional Comments: mastisol, steri strips, prevena  -LH    Retired Wound - Properties Group Date first assessed: 06/06/24  - Time first assessed: 0945 -LH Present on Original Admission: N  -LH Location: sternal  -LH Primary Wound Type: Incision  -LH Additional Comments: mastisol, steri strips, prevena  -LH      Row Name             Wound Right anterior knee Incision    Wound - Properties Group Side: Right  -LH Orientation: anterior  -LH Location: knee  -LH Primary Wound Type: Incision  -LH Additional Comments: mastisol, steri strips, 4x4, tape ace  -LH    Retired Wound - Properties Group Side: Right  -LH Orientation: anterior  -LH Location: knee  -LH Primary Wound Type: Incision  -LH Additional Comments: mastisol, steri strips, 4x4, tape ace  -LH    Retired Wound - Properties Group Side: Right  -LH Location: knee  -LH Primary Wound Type: Incision  -LH Additional Comments: mastisol, steri strips, 4x4, tape ace  -LH      Row Name             Wound 06/06/24 0945 Right anterior greater trochanter Puncture    Wound - Properties Group Placement Date: 06/06/24  - Placement Time: 0945  -LH Present on Original Admission: N  -LH Side: Right  -LH Orientation: anterior  -LH Location: greater trochanter  -LH Primary Wound Type: Puncture  -LH Additional Comments: mastisol, steri strips, 4x4, tape  -LH    Retired Wound - Properties Group Placement Date: 06/06/24  - Placement Time: 0945  -LH Present on Original Admission: N  -LH Side: Right  -LH Orientation: anterior  -LH Location: greater trochanter  -LH Primary Wound Type: Puncture  -LH Additional Comments: mastisol, steri strips, 4x4, tape  -LH    Retired Wound - Properties Group Date first assessed: 06/06/24  -LH Time first  assessed: 0945  -LH Present on Original Admission: N  -LH Side: Right  -LH Location: greater trochanter  -LH Primary Wound Type: Puncture  -LH Additional Comments: mastisol, steri strips, 4x4, tape  -LH      Row Name             Wound 06/06/24 0945 Right distal leg Puncture    Wound - Properties Group Placement Date: 06/06/24  - Placement Time: 0945  -LH Present on Original Admission: N  -LH Side: Right  -LH Orientation: distal  -LH Location: leg  -LH Primary Wound Type: Puncture  -LH Additional Comments: mastisol, steri strips, 4x4, tape, ace  -LH    Retired Wound - Properties Group Placement Date: 06/06/24  - Placement Time: 0945  -LH Present on Original Admission: N  -LH Side: Right  -LH Orientation: distal  -LH Location: leg  -LH Primary Wound Type: Puncture  -LH Additional Comments: mastisol, steri strips, 4x4, tape, ace  -LH    Retired Wound - Properties Group Date first assessed: 06/06/24  - Time first assessed: 0945  -LH Present on Original Admission: N  -LH Side: Right  -LH Location: leg  -LH Primary Wound Type: Puncture  -LH Additional Comments: mastisol, steri strips, 4x4, tape, ace  -LH      Row Name 06/08/24 1000          Plan of Care Review    Plan of Care Reviewed With patient  -MF     Progress no change  -     Outcome Evaluation Pt. agreeable to therapy. He c/o continued increased pain. He was able to stand and ambulate with CGA. He walked 200' with 3 standing rests. Walked on 1L due to O2 sat being 90% at rest. O2 sat was 92% post walk while on 1L. Will continue to work on progressive ambulation.  -       Row Name 06/08/24 1000          Vital Signs    Pre SpO2 (%) 90  -MF     O2 Delivery Pre Treatment room air  -MF     O2 Delivery Intra Treatment supplemental O2  1l  -MF     Post SpO2 (%) 92  -MF     O2 Delivery Post Treatment supplemental O2  1l  -MF     Pre Patient Position Sitting  -MF     Intra Patient Position Standing  -MF     Post Patient Position Sitting  -MF       Row Name  06/08/24 1000          Positioning and Restraints    Pre-Treatment Position bedside commode  -MF     Post Treatment Position chair  -MF     In Chair reclined;call light within reach;encouraged to call for assist;with family/caregiver;notified ns  -               User Key  (r) = Recorded By, (t) = Taken By, (c) = Cosigned By      Initials Name Provider Type    Danielle Bazzi RN Registered Nurse    Esther Beasley PTA Physical Therapist Assistant                    Physical Therapy Education       Title: PT OT SLP Therapies (Done)       Topic: Physical Therapy (Done)       Point: Mobility training (Done)       Learning Progress Summary             Patient Acceptance, E,TB,D, VU,DU,NR by  at 6/7/2024 1527    Comment: education re: purpose of PT/importance of activity, safety/falls prevention, sternal precautions, improved tech w/ tfers, gait, deep breathing and pacing activity                         Point: Home exercise program (Done)       Learning Progress Summary             Patient Acceptance, E,TB,D, VU,DU,NR by  at 6/7/2024 1527    Comment: education re: purpose of PT/importance of activity, safety/falls prevention, sternal precautions, improved tech w/ tfers, gait, deep breathing and pacing activity                         Point: Body mechanics (Done)       Learning Progress Summary             Patient Acceptance, E,TB,D, VU,DU,NR by  at 6/7/2024 1527    Comment: education re: purpose of PT/importance of activity, safety/falls prevention, sternal precautions, improved tech w/ tfers, gait, deep breathing and pacing activity                         Point: Precautions (Done)       Learning Progress Summary             Patient Acceptance, E,TB,D, VU,DU,NR by  at 6/7/2024 1527    Comment: education re: purpose of PT/importance of activity, safety/falls prevention, sternal precautions, improved tech w/ tfers, gait, deep breathing and pacing activity                                         User  Key       Initials Effective Dates Name Provider Type Discipline     08/02/18 -  Leatha Giles, PT Physical Therapist PT                  PT Recommendation and Plan     Plan of Care Reviewed With: patient  Progress: no change  Outcome Evaluation: Pt. agreeable to therapy. He c/o continued increased pain. He was able to stand and ambulate with CGA. He walked 200' with 3 standing rests. Walked on 1L due to O2 sat being 90% at rest. O2 sat was 92% post walk while on 1L. Will continue to work on progressive ambulation.   Outcome Measures       Row Name 06/08/24 1000             How much help from another person do you currently need...    Turning from your back to your side while in flat bed without using bedrails? 2  -MF      Moving from lying on back to sitting on the side of a flat bed without bedrails? 2  -MF      Moving to and from a bed to a chair (including a wheelchair)? 3  -MF      Standing up from a chair using your arms (e.g., wheelchair, bedside chair)? 3  -MF      Climbing 3-5 steps with a railing? 2  -MF      To walk in hospital room? 3  -MF      AM-PAC 6 Clicks Score (PT) 15  -MF      Highest Level of Mobility Goal 4 --> Transfer to chair/commode  -MF         Functional Assessment    Outcome Measure Options AM-PAC 6 Clicks Basic Mobility (PT)  -                User Key  (r) = Recorded By, (t) = Taken By, (c) = Cosigned By      Initials Name Provider Type    Esther Beasley, PTA Physical Therapist Assistant                     Time Calculation:    PT Charges       Row Name 06/08/24 1201             Time Calculation    Start Time 1000  -MF      Stop Time 1023  -MF      Time Calculation (min) 23 min  -MF      PT Received On 06/08/24  -MF         Time Calculation- PT    Total Timed Code Minutes- PT 23 minute(s)  -MF         Timed Charges    77314 - Gait Training Minutes  23  -MF         Total Minutes    Timed Charges Total Minutes 23  -MF       Total Minutes 23  -MF                User Key  (r)  = Recorded By, (t) = Taken By, (c) = Cosigned By      Initials Name Provider Type     Esther Mccoy PTA Physical Therapist Assistant                  Therapy Charges for Today       Code Description Service Date Service Provider Modifiers Qty    93189174479 HC GAIT TRAINING EA 15 MIN 6/7/2024 Esther Mccoy PTA GP 2    01202086576 HC GAIT TRAINING EA 15 MIN 6/8/2024 Esther Mccoy PTA GP 2            PT G-Codes  Outcome Measure Options: AM-PAC 6 Clicks Basic Mobility (PT)  AM-PAC 6 Clicks Score (PT): 15    Esther Mccoy PTA  6/8/2024

## 2024-06-08 NOTE — PLAN OF CARE
Problem: Adult Inpatient Plan of Care  Goal: Plan of Care Review  Outcome: Ongoing, Progressing  Goal: Patient-Specific Goal (Individualized)  Outcome: Ongoing, Progressing  Goal: Absence of Hospital-Acquired Illness or Injury  Outcome: Ongoing, Progressing  Intervention: Identify and Manage Fall Risk  Recent Flowsheet Documentation  Taken 6/8/2024 0200 by Salud Gaston RN  Safety Promotion/Fall Prevention: safety round/check completed  Taken 6/8/2024 0000 by Salud Gaston RN  Safety Promotion/Fall Prevention: safety round/check completed  Taken 6/7/2024 2331 by Salud Gaston RN  Safety Promotion/Fall Prevention: safety round/check completed  Taken 6/7/2024 2230 by Salud Gaston RN  Safety Promotion/Fall Prevention: safety round/check completed  Taken 6/7/2024 2000 by Salud Gaston RN  Safety Promotion/Fall Prevention: safety round/check completed  Intervention: Prevent and Manage VTE (Venous Thromboembolism) Risk  Recent Flowsheet Documentation  Taken 6/7/2024 2000 by Salud Gaston RN  Range of Motion: ROM (range of motion) performed  Goal: Optimal Comfort and Wellbeing  Outcome: Ongoing, Progressing  Intervention: Monitor Pain and Promote Comfort  Recent Flowsheet Documentation  Taken 6/8/2024 0107 by Salud Gaston RN  Pain Management Interventions: see MAR  Taken 6/7/2024 2106 by Salud Gaston RN  Pain Management Interventions: see MAR  Taken 6/7/2024 2000 by Salud Gaston RN  Pain Management Interventions:   position adjusted   pillow support provided  Goal: Readiness for Transition of Care  Outcome: Ongoing, Progressing     Problem: Hypertension Comorbidity  Goal: Blood Pressure in Desired Range  Outcome: Ongoing, Progressing     Problem: Arrhythmia/Dysrhythmia (Cardiac Catheterization)  Goal: Stable Heart Rate and Rhythm  Outcome: Ongoing, Progressing     Problem: Bleeding (Cardiac Catheterization)  Goal: Absence of Bleeding  Outcome: Ongoing, Progressing     Problem: Contrast-Induced Injury Risk  (Cardiac Catheterization)  Goal: Absence of Contrast-Induced Injury  Outcome: Ongoing, Progressing     Problem: Embolism (Cardiac Catheterization)  Goal: Absence of Embolism Signs and Symptoms  Outcome: Ongoing, Progressing     Problem: Ongoing Anesthesia/Sedation Effects (Cardiac Catheterization)  Goal: Anesthesia/Sedation Recovery  Outcome: Ongoing, Progressing  Intervention: Optimize Anesthesia Recovery  Recent Flowsheet Documentation  Taken 6/8/2024 0200 by Salud Gaston RN  Safety Promotion/Fall Prevention: safety round/check completed  Taken 6/8/2024 0000 by Salud Gaston RN  Safety Promotion/Fall Prevention: safety round/check completed  Taken 6/7/2024 2331 by Salud Gaston RN  Safety Promotion/Fall Prevention: safety round/check completed  Taken 6/7/2024 2230 by Salud Gaston RN  Safety Promotion/Fall Prevention: safety round/check completed  Taken 6/7/2024 2000 by Salud Gaston RN  Safety Promotion/Fall Prevention: safety round/check completed     Problem: Pain (Cardiac Catheterization)  Goal: Acceptable Pain Control  Outcome: Ongoing, Progressing  Intervention: Prevent or Manage Pain  Recent Flowsheet Documentation  Taken 6/8/2024 0107 by Salud Gaston RN  Pain Management Interventions: see MAR  Taken 6/7/2024 2106 by Salud Gaston RN  Pain Management Interventions: see MAR  Taken 6/7/2024 2000 by Salud Gaston RN  Pain Management Interventions:   position adjusted   pillow support provided     Problem: Vascular Access Protection (Cardiac Catheterization)  Goal: Absence of Vascular Access Complication  Outcome: Ongoing, Progressing     Problem: Activity Intolerance (Cardiovascular Surgery)  Goal: Improved Activity Tolerance  Outcome: Ongoing, Progressing     Problem: Adjustment to Surgery (Cardiovascular Surgery)  Goal: Optimal Coping with Heart Surgery  Outcome: Ongoing, Progressing     Problem: Bleeding (Cardiovascular Surgery)  Goal: Bleeding (Cardiovascular Surgery)  Outcome: Ongoing,  Progressing     Problem: Bowel Motility Impaired (Cardiovascular Surgery)  Goal: Effective Bowel Elimination (Cardiovascular Surgery)  Outcome: Ongoing, Progressing     Problem: Cardiac Function Impaired (Cardiovascular Surgery)  Goal: Effective Cardiac Function  Outcome: Ongoing, Progressing     Problem: Cerebral Tissue Perfusion (Cardiovascular Surgery)  Goal: Optimal Cerebral Tissue Perfusion (Cardiovascular Surgery)  Outcome: Ongoing, Progressing     Problem: Fluid and Electrolyte Imbalance (Cardiovascular Surgery)  Goal: Fluid and Electrolyte Balance (Cardiovascular Surgery)  Outcome: Ongoing, Progressing     Problem: Glycemic Control Impaired (Cardiovascular Surgery)  Goal: Blood Glucose Level Within Targeted Range (Cardiovascular Surgery)  Outcome: Ongoing, Progressing     Problem: Infection (Cardiovascular Surgery)  Goal: Absence of Infection Signs and Symptoms  Outcome: Ongoing, Progressing     Problem: Ongoing Anesthesia Effects (Cardiovascular Surgery)  Goal: Anesthesia/Sedation Recovery  Outcome: Ongoing, Progressing  Intervention: Optimize Anesthesia Recovery  Recent Flowsheet Documentation  Taken 6/8/2024 0200 by Salud Gaston RN  Safety Promotion/Fall Prevention: safety round/check completed  Taken 6/8/2024 0000 by Salud Gaston RN  Safety Promotion/Fall Prevention: safety round/check completed  Taken 6/7/2024 2331 by Salud Gaston RN  Safety Promotion/Fall Prevention: safety round/check completed  Taken 6/7/2024 2230 by Salud Gaston RN  Safety Promotion/Fall Prevention: safety round/check completed  Taken 6/7/2024 2000 by Salud Gaston RN  Safety Promotion/Fall Prevention: safety round/check completed     Problem: Pain (Cardiovascular Surgery)  Goal: Acceptable Pain Control  Outcome: Ongoing, Progressing  Intervention: Prevent or Manage Pain  Recent Flowsheet Documentation  Taken 6/8/2024 0107 by Salud Gaston RN  Pain Management Interventions: see MAR  Taken 6/7/2024 2106 by Salud Gaston  RN  Pain Management Interventions: see MAR  Taken 6/7/2024 2000 by Salud Gaston RN  Pain Management Interventions:   position adjusted   pillow support provided     Problem: Postoperative Nausea and Vomiting (Cardiovascular Surgery)  Goal: Nausea and Vomiting Relief (Cardiovascular Surgery)  Outcome: Ongoing, Progressing     Problem: Postoperative Urinary Retention (Cardiovascular Surgery)  Goal: Effective Urinary Elimination (Cardiovascular Surgery)  Outcome: Ongoing, Progressing     Problem: Respiratory Compromise (Cardiovascular Surgery)  Goal: Effective Oxygenation and Ventilation (Cardiovascular Surgery)  Outcome: Ongoing, Progressing  Intervention: Promote Airway Secretion Clearance  Recent Flowsheet Documentation  Taken 6/7/2024 2000 by Salud Gaston RN  Cough And Deep Breathing: done with encouragement     Problem: Fall Injury Risk  Goal: Absence of Fall and Fall-Related Injury  Outcome: Ongoing, Progressing  Intervention: Promote Injury-Free Environment  Recent Flowsheet Documentation  Taken 6/8/2024 0200 by Salud Gaston RN  Safety Promotion/Fall Prevention: safety round/check completed  Taken 6/8/2024 0000 by Salud Gaston RN  Safety Promotion/Fall Prevention: safety round/check completed  Taken 6/7/2024 2331 by Salud Gaston RN  Safety Promotion/Fall Prevention: safety round/check completed  Taken 6/7/2024 2230 by Salud Gaston RN  Safety Promotion/Fall Prevention: safety round/check completed  Taken 6/7/2024 2000 by Salud Gaston RN  Safety Promotion/Fall Prevention: safety round/check completed     Problem: Skin Injury Risk Increased  Goal: Skin Health and Integrity  Outcome: Ongoing, Progressing   Goal Outcome Evaluation:                   S/ST  per tele. Constant c/o pain throughout the night given prn pain meds per mar. Vss. Will continue to monitor

## 2024-06-08 NOTE — PROGRESS NOTES
"Coronary artery bypass grafting x 4 (LIMA/LAD, RSVG/dRCA, RSVG/OM, and RSVG/D), right leg endoscopic vein harvest, sternalock XP sternal plating, application of Prevena incision management system      POD 2    Transferred to  yesterday afternoon.  Resting in recliner.  Spouse asleep at bedside.  Currently on room air with SpO2 of 92%.  Reports pain not well-controlled, currently 8-1/2 out of 10 and best pain level of 6 postoperatively.  Vera catheter to remain in place until 6/10/2024, gross hematuria has resolved.  Weight is up 2 pounds, remains 4 pounds above baseline.  Continuing with IV fluids and encouraging oral hydration.  Creatinine 0.94 and hemoglobin 11.3.        IV drips: Normal saline at 150 ml per hour  Telemetry: Sinus-sinus tach  bpm  Hemodynamics reviewed, stable    Visit Vitals  /95 (BP Location: Right arm, Patient Position: Lying)   Pulse 91   Temp 98.5 °F (36.9 °C) (Oral)   Resp 18   Ht 195.6 cm (77\")   Wt (!) 204 kg (448 lb 13.7 oz)   SpO2 92%   BMI 53.23 kg/m²   Preoperative weight: 437 pounds    Intake/Output Summary (Last 24 hours) at 6/8/2024 1101  Last data filed at 6/8/2024 0744  Gross per 24 hour   Intake 240 ml   Output 2105 ml   Net -1865 ml     Mediastinal tube output: 40 mL / 24 hours  Left Kam drain: 10 mL / 24 hours  Serosanguineous    Labs:  Lab Results   Component Value Date    WBC 13.48 (H) 06/08/2024    HGB 11.3 (L) 06/08/2024    HCT 35.5 (L) 06/08/2024    MCV 88.1 06/08/2024     06/08/2024      Lab Results   Component Value Date    GLUCOSE 136 (H) 06/08/2024    BUN 16 06/08/2024    CREATININE 0.94 06/08/2024    EGFR 103.2 06/08/2024    BCR 17.0 06/08/2024    K 4.9 06/08/2024    CO2 26.0 06/08/2024    CALCIUM 8.3 (L) 06/08/2024    ALBUMIN 3.2 (L) 06/08/2024    BILITOT 0.6 06/06/2024    AST 74 (H) 06/06/2024     (H) 06/06/2024        Contains abnormal data Myoglobin Screen, Urine - Urine, Clean Catch  Order: [id number]  Status: Final result     "   Specimen Information: Urine, Clean Catch   0 Result Notes      Component  Ref Range & Units 1 d ago   Myoglobin, Qualitative  Negative Positive Abnormal    Resulting Agency  [facility]                      Contains abnormal data Myoglobin, Serum  Order: [id number]  Status: Final result        Specimen Information: Blood   0 Result Notes       Component  Ref Range & Units 1 d ago 2 d ago   Myoglobin  28.0 - 72.0 ng/mL 957.0 High  32.5   Resulting Agency  [facility]            XR Chest 1 View [[id number]] Vaibhav as Reviewed   Order Status: Completed Collected: 06/08/24 0611    Updated: 06/08/24 0617   Narrative:     XR CHEST 1 VW- 6/8/2024 2:45 AM     HISTORY: Post-Op Heart Surgery; R07.9-Chest pain, unspecified;  R79.89-Other specified abnormal findings of blood chemistry;  I21.4-Non-ST elevation (NSTEMI) myocardial infarction;  I25.110-Atherosclerotic heart disease of native coronary artery with  unstable angina pectoris; Z74.09-Other reduced mobility       COMPARISON: Chest x-ray dated 6/7/2024     FINDINGS:  Upright frontal radiograph of the chest was obtained     Postop coronary bypass. Increased chest expansion today with improving  lung aeration. No new consolidation. No pneumothorax. Heart size is  stable. Pulmonary vasculature are nondilated. Vascular sheath was  discontinued.      Impression:     1.  Increased chest expansion with improving lung aeration. Otherwise,  no significant change.  2.  Discontinued vascular sheath.     This report was signed and finalized on 6/8/2024 6:14 AM by Dr Howard Cortez.       My personal interpretation of chest x-ray:  No pneumothorax.  Supportive tubes in place.  Mild bilateral atelectasis.    No change in physical exam from 6/7/2024  Physical Exam:  General: No apparent distress. In good spirits.  Up in the chair.  Mouth: Swelling and erythremia to right tongue, associated with previous ET tube  Cardiovascular: Regular rate and rhythm without murmur, rubs, or  gallops.    Pulmonary: Clear to auscultation bilaterally without wheezing, rubs, or rales.  Chest: Sternotomy incision clean, dry, and intact. Sternum stable. No clicks. Mediastinal tubes x 2 and Kam drain x 1 with dressing clean, dry, and intact.    Abdomen: Soft, nondistended and nontender.  Extremities: Warm, moves all extremities. Saphenectomy site clean, dry, and intact.   : Vera catheter in place, urine clear yellow.  Neurologic: Grossly intact with no focal deficits.       Impression:  Non-STEMI  Coronary artery disease with unstable angina  Class III severe obesity, BMI 52.96  Hypertension  Prediabetes  Obstructive sleep apnea  Gross hematuria with history of hematuria    Plan:   Will check urine and serum myoglobin again today  Keep Vera catheter, will continue to hold Plavix.  Okay for aspirin and Lovenox 40 mg daily VTE prophylaxis-discussed with Dr. Beaulieu.  Encourage pulmonary toilet and ambulation  Routine postcardiac surgery regimen  Increased metoprolol to 25 mg p.o. twice daily   Continue normal saline at 150 mL/h  Increase oxycodone for assistance with pain control      Discussed with patient and nursing    KURT Burrell  6/8/2024 at 11:12 AM

## 2024-06-08 NOTE — PROGRESS NOTES
Urine remains clear.     Vera out Monday.     Follow up in urology in 3-4 weeks to complete hematuria work up. Message sent to .

## 2024-06-09 ENCOUNTER — APPOINTMENT (OUTPATIENT)
Dept: GENERAL RADIOLOGY | Facility: HOSPITAL | Age: 44
End: 2024-06-09
Payer: COMMERCIAL

## 2024-06-09 LAB
ALBUMIN SERPL-MCNC: 3.1 G/DL (ref 3.5–5.2)
ALBUMIN/GLOB SERPL: 1.1 G/DL
ALP SERPL-CCNC: 57 U/L (ref 39–117)
ALT SERPL W P-5'-P-CCNC: 26 U/L (ref 1–41)
ANION GAP SERPL CALCULATED.3IONS-SCNC: 10 MMOL/L (ref 5–15)
AST SERPL-CCNC: 26 U/L (ref 1–40)
BASOPHILS # BLD AUTO: 0.07 10*3/MM3 (ref 0–0.2)
BASOPHILS NFR BLD AUTO: 0.7 % (ref 0–1.5)
BILIRUB SERPL-MCNC: 0.4 MG/DL (ref 0–1.2)
BUN SERPL-MCNC: 13 MG/DL (ref 6–20)
BUN/CREAT SERPL: 16 (ref 7–25)
CALCIUM SPEC-SCNC: 8.3 MG/DL (ref 8.6–10.5)
CHLORIDE SERPL-SCNC: 98 MMOL/L (ref 98–107)
CO2 SERPL-SCNC: 26 MMOL/L (ref 22–29)
CREAT SERPL-MCNC: 0.81 MG/DL (ref 0.76–1.27)
DEPRECATED RDW RBC AUTO: 43.4 FL (ref 37–54)
EGFRCR SERPLBLD CKD-EPI 2021: 112.2 ML/MIN/1.73
EOSINOPHIL # BLD AUTO: 0.12 10*3/MM3 (ref 0–0.4)
EOSINOPHIL NFR BLD AUTO: 1.1 % (ref 0.3–6.2)
ERYTHROCYTE [DISTWIDTH] IN BLOOD BY AUTOMATED COUNT: 13.5 % (ref 12.3–15.4)
GLOBULIN UR ELPH-MCNC: 2.7 GM/DL
GLUCOSE BLDC GLUCOMTR-MCNC: 108 MG/DL (ref 70–130)
GLUCOSE BLDC GLUCOMTR-MCNC: 125 MG/DL (ref 70–130)
GLUCOSE BLDC GLUCOMTR-MCNC: 135 MG/DL (ref 70–130)
GLUCOSE BLDC GLUCOMTR-MCNC: 170 MG/DL (ref 70–130)
GLUCOSE BLDC GLUCOMTR-MCNC: 227 MG/DL (ref 70–130)
GLUCOSE SERPL-MCNC: 126 MG/DL (ref 65–99)
HCT VFR BLD AUTO: 31.3 % (ref 37.5–51)
HGB BLD-MCNC: 9.9 G/DL (ref 13–17.7)
IMM GRANULOCYTES # BLD AUTO: 0.05 10*3/MM3 (ref 0–0.05)
IMM GRANULOCYTES NFR BLD AUTO: 0.5 % (ref 0–0.5)
LYMPHOCYTES # BLD AUTO: 1.56 10*3/MM3 (ref 0.7–3.1)
LYMPHOCYTES NFR BLD AUTO: 14.7 % (ref 19.6–45.3)
MCH RBC QN AUTO: 27.7 PG (ref 26.6–33)
MCHC RBC AUTO-ENTMCNC: 31.6 G/DL (ref 31.5–35.7)
MCV RBC AUTO: 87.7 FL (ref 79–97)
MONOCYTES # BLD AUTO: 1.27 10*3/MM3 (ref 0.1–0.9)
MONOCYTES NFR BLD AUTO: 11.9 % (ref 5–12)
NEUTROPHILS NFR BLD AUTO: 7.56 10*3/MM3 (ref 1.7–7)
NEUTROPHILS NFR BLD AUTO: 71.1 % (ref 42.7–76)
NRBC BLD AUTO-RTO: 0 /100 WBC (ref 0–0.2)
PLATELET # BLD AUTO: 179 10*3/MM3 (ref 140–450)
PMV BLD AUTO: 10.2 FL (ref 6–12)
POTASSIUM SERPL-SCNC: 4.2 MMOL/L (ref 3.5–5.2)
PROT SERPL-MCNC: 5.8 G/DL (ref 6–8.5)
QT INTERVAL: 346 MS
QTC INTERVAL: 448 MS
RBC # BLD AUTO: 3.57 10*6/MM3 (ref 4.14–5.8)
SODIUM SERPL-SCNC: 134 MMOL/L (ref 136–145)
WBC NRBC COR # BLD AUTO: 10.63 10*3/MM3 (ref 3.4–10.8)

## 2024-06-09 PROCEDURE — 94799 UNLISTED PULMONARY SVC/PX: CPT

## 2024-06-09 PROCEDURE — 99024 POSTOP FOLLOW-UP VISIT: CPT | Performed by: THORACIC SURGERY (CARDIOTHORACIC VASCULAR SURGERY)

## 2024-06-09 PROCEDURE — 80053 COMPREHEN METABOLIC PANEL: CPT | Performed by: NURSE PRACTITIONER

## 2024-06-09 PROCEDURE — 71046 X-RAY EXAM CHEST 2 VIEWS: CPT

## 2024-06-09 PROCEDURE — 63710000001 INSULIN LISPRO (HUMAN) PER 5 UNITS: Performed by: NURSE PRACTITIONER

## 2024-06-09 PROCEDURE — 85025 COMPLETE CBC W/AUTO DIFF WBC: CPT | Performed by: NURSE PRACTITIONER

## 2024-06-09 PROCEDURE — 94664 DEMO&/EVAL PT USE INHALER: CPT

## 2024-06-09 PROCEDURE — 25010000002 ENOXAPARIN PER 10 MG: Performed by: NURSE PRACTITIONER

## 2024-06-09 PROCEDURE — 94761 N-INVAS EAR/PLS OXIMETRY MLT: CPT

## 2024-06-09 PROCEDURE — 25010000002 FUROSEMIDE PER 20 MG: Performed by: NURSE PRACTITIONER

## 2024-06-09 PROCEDURE — 97116 GAIT TRAINING THERAPY: CPT

## 2024-06-09 PROCEDURE — 82948 REAGENT STRIP/BLOOD GLUCOSE: CPT

## 2024-06-09 RX ORDER — FUROSEMIDE 10 MG/ML
20 INJECTION INTRAMUSCULAR; INTRAVENOUS
Status: DISCONTINUED | OUTPATIENT
Start: 2024-06-09 | End: 2024-06-10

## 2024-06-09 RX ORDER — DIPHENHYDRAMINE HYDROCHLORIDE AND LIDOCAINE HYDROCHLORIDE AND ALUMINUM HYDROXIDE AND MAGNESIUM HYDRO
5 KIT EVERY 6 HOURS SCHEDULED
Status: DISCONTINUED | OUTPATIENT
Start: 2024-06-09 | End: 2024-06-12 | Stop reason: HOSPADM

## 2024-06-09 RX ORDER — POTASSIUM CHLORIDE 750 MG/1
20 CAPSULE, EXTENDED RELEASE ORAL 2 TIMES DAILY WITH MEALS
Status: DISCONTINUED | OUTPATIENT
Start: 2024-06-09 | End: 2024-06-10

## 2024-06-09 RX ADMIN — POTASSIUM CHLORIDE 20 MEQ: 750 CAPSULE, EXTENDED RELEASE ORAL at 09:34

## 2024-06-09 RX ADMIN — OXYCODONE HYDROCHLORIDE 15 MG: 10 TABLET ORAL at 16:08

## 2024-06-09 RX ADMIN — IPRATROPIUM BROMIDE 0.5 MG: 0.5 SOLUTION RESPIRATORY (INHALATION) at 14:13

## 2024-06-09 RX ADMIN — ALBUTEROL SULFATE 1.25 MG: 2.5 SOLUTION RESPIRATORY (INHALATION) at 19:08

## 2024-06-09 RX ADMIN — IPRATROPIUM BROMIDE 0.5 MG: 0.5 SOLUTION RESPIRATORY (INHALATION) at 19:08

## 2024-06-09 RX ADMIN — LIDOCAINE 2 PATCH: 4 PATCH TOPICAL at 08:06

## 2024-06-09 RX ADMIN — METOPROLOL TARTRATE 25 MG: 50 TABLET, FILM COATED ORAL at 20:03

## 2024-06-09 RX ADMIN — ENOXAPARIN SODIUM 40 MG: 100 INJECTION SUBCUTANEOUS at 08:05

## 2024-06-09 RX ADMIN — ALBUTEROL SULFATE 1.25 MG: 2.5 SOLUTION RESPIRATORY (INHALATION) at 14:13

## 2024-06-09 RX ADMIN — PREGABALIN 25 MG: 25 CAPSULE ORAL at 08:05

## 2024-06-09 RX ADMIN — OXYCODONE HYDROCHLORIDE 10 MG: 10 TABLET ORAL at 20:03

## 2024-06-09 RX ADMIN — AMIODARONE HYDROCHLORIDE 400 MG: 200 TABLET ORAL at 08:05

## 2024-06-09 RX ADMIN — ACETAMINOPHEN 1000 MG: 500 TABLET, FILM COATED ORAL at 17:31

## 2024-06-09 RX ADMIN — BISACODYL 10 MG: 5 TABLET, COATED ORAL at 08:05

## 2024-06-09 RX ADMIN — AMIODARONE HYDROCHLORIDE 400 MG: 200 TABLET ORAL at 17:32

## 2024-06-09 RX ADMIN — INSULIN LISPRO 2 UNITS: 100 INJECTION, SOLUTION INTRAVENOUS; SUBCUTANEOUS at 18:05

## 2024-06-09 RX ADMIN — CHLORHEXIDINE GLUCONATE 0.12% ORAL RINSE 15 ML: 1.2 LIQUID ORAL at 17:32

## 2024-06-09 RX ADMIN — FUROSEMIDE 20 MG: 10 INJECTION, SOLUTION INTRAMUSCULAR; INTRAVENOUS at 09:34

## 2024-06-09 RX ADMIN — METOPROLOL TARTRATE 25 MG: 50 TABLET, FILM COATED ORAL at 08:05

## 2024-06-09 RX ADMIN — ATORVASTATIN CALCIUM 20 MG: 10 TABLET, FILM COATED ORAL at 20:04

## 2024-06-09 RX ADMIN — ASPIRIN 81 MG: 81 TABLET, COATED ORAL at 08:05

## 2024-06-09 RX ADMIN — DIPHENHYDRAMINE HYDROCHLORIDE AND LIDOCAINE HYDROCHLORIDE AND ALUMINUM HYDROXIDE AND MAGNESIUM HYDRO 5 ML: KIT at 13:09

## 2024-06-09 RX ADMIN — OXYCODONE HYDROCHLORIDE 15 MG: 10 TABLET ORAL at 08:03

## 2024-06-09 RX ADMIN — OXYCODONE HYDROCHLORIDE 15 MG: 10 TABLET ORAL at 02:18

## 2024-06-09 RX ADMIN — PREGABALIN 25 MG: 25 CAPSULE ORAL at 20:03

## 2024-06-09 RX ADMIN — POLYETHYLENE GLYCOL 3350 17 G: 17 POWDER, FOR SOLUTION ORAL at 08:03

## 2024-06-09 RX ADMIN — POTASSIUM CHLORIDE 20 MEQ: 750 CAPSULE, EXTENDED RELEASE ORAL at 17:31

## 2024-06-09 RX ADMIN — FUROSEMIDE 20 MG: 10 INJECTION, SOLUTION INTRAMUSCULAR; INTRAVENOUS at 17:32

## 2024-06-09 RX ADMIN — DIPHENHYDRAMINE HYDROCHLORIDE AND LIDOCAINE HYDROCHLORIDE AND ALUMINUM HYDROXIDE AND MAGNESIUM HYDRO 5 ML: KIT at 23:45

## 2024-06-09 RX ADMIN — DIPHENHYDRAMINE HYDROCHLORIDE AND LIDOCAINE HYDROCHLORIDE AND ALUMINUM HYDROXIDE AND MAGNESIUM HYDRO 5 ML: KIT at 17:32

## 2024-06-09 RX ADMIN — ALBUTEROL SULFATE 1.25 MG: 2.5 SOLUTION RESPIRATORY (INHALATION) at 06:29

## 2024-06-09 RX ADMIN — PANTOPRAZOLE SODIUM 40 MG: 40 TABLET, DELAYED RELEASE ORAL at 08:05

## 2024-06-09 RX ADMIN — IPRATROPIUM BROMIDE 0.5 MG: 0.5 SOLUTION RESPIRATORY (INHALATION) at 06:29

## 2024-06-09 RX ADMIN — ACETAMINOPHEN 1000 MG: 500 TABLET, FILM COATED ORAL at 11:46

## 2024-06-09 RX ADMIN — CHLORHEXIDINE GLUCONATE 1 APPLICATION: 500 CLOTH TOPICAL at 03:12

## 2024-06-09 NOTE — PLAN OF CARE
Goal Outcome Evaluation:  Plan of Care Reviewed With: patient        Progress: improving  Outcome Evaluation: Pt. agreeable to therapy. He is able to stand with SBA. WAlked 400' with 3 standing rests, CGA x 1. O2 sats remained in the 90's 93-95% while on RA. He required MOD assist to lift legs into bed. Pt. does not normally sleep in bed at home. Will continue to work on progressive ambulation and independence.

## 2024-06-09 NOTE — THERAPY TREATMENT NOTE
Acute Care - Physical Therapy Treatment Note  [facility]     Patient Name: Constantin Seals  : 1980  MRN: [id number]  Today's Date: 2024      Visit Dx:     ICD-10-CM ICD-9-CM   1. Chest pain, unspecified type  R07.9 786.50   2. Troponin I above reference range  R79.89 790.6   3. NSTEMI (non-ST elevated myocardial infarction)  I21.4 410.70   4. Coronary artery disease involving native coronary artery of native heart with unstable angina pectoris  I25.110 414.01     411.1   5. Impaired functional mobility and activity tolerance [Z74.09]  Z74.09 V49.89     Patient Active Problem List   Diagnosis    Class 3 severe obesity due to excess calories with serious comorbidity and body mass index (BMI) of 50.0 to 59.9 in adult    Primary hypertension    Gastroesophageal reflux disease without esophagitis    SHERRON (obstructive sleep apnea)    NSTEMI (non-ST elevated myocardial infarction)    Coronary artery disease involving native coronary artery of native heart with unstable angina pectoris    Gross hematuria    Myoglobinuria     Past Medical History:   Diagnosis Date    COVID     GERD (gastroesophageal reflux disease)     Gout     Heartburn     Hypertension     Obesity      Past Surgical History:   Procedure Laterality Date    CARDIAC CATHETERIZATION N/A 2024    Procedure: Left Heart Cath;  Surgeon: Chip Roberts MD;  Location: [facility] CATH INVASIVE LOCATION;  Service: Cardiology;  Laterality: N/A;    CORONARY ARTERY BYPASS GRAFT N/A 2024    Procedure: CORONARY ARTERY BYPASS GRAFTING X4, LEFT INTERNAL MAMMARY ARTERY GRAFT, RIGHT LEG ENDOSCOPIC VEIN HARVEST, STERNAL PLATING, APPLICATION PREVENA, TRANSESOPHAGEAL ECHOCARDIOGRAM;  Surgeon: Sean Trejo MD;  Location: [facility] OR;  Service: Cardiothoracic;  Laterality: N/A;    DISTAL BICEPS TENDON REPAIR Right 2022    Procedure: RIGHT DISTAL BICEPS TENDON REPAIR WITH SEMI-T ALLOGRAFT;  Surgeon: Benjamin العراقي MD;  Location:  [facility];  Service:  Orthopedics;  Laterality: Right;    ELBOW PROCEDURE      EYE SURGERY Right 1984    COLES CATHETER N/A 6/6/2024    Procedure: COLES CATHETER INSERTION;  Surgeon: Omari Beaulieu MD;  Location:  [facility] OR;  Service: Urology;  Laterality: N/A;     PT Assessment (Last 12 Hours)       PT Evaluation and Treatment       [facility] Name 06/09/24 0955          Physical Therapy Time and Intention    Subjective Information complains of;pain;fatigue  -     Document Type therapy note (daily note)  -     Mode of Treatment physical therapy  -     Comment continues to c/o tongue soreness-nsg aware  -[facility] Name 06/09/24 0955          General Information    Existing Precautions/Restrictions fall;sternal;oxygen therapy device and L/min  chest tube, prevena  -[facility] Name 06/09/24 0955          Pain    Pretreatment Pain Rating 5/10  -     Posttreatment Pain Rating 5/10  -     Pain Location incisional  -     Pain Location - chest  -     Pre/Posttreatment Pain Comment rates R knee/leg pain 7/10  -     Pain Intervention(s) Repositioned;Ambulation/increased activity;Medication (See MAR)  -       Row Name 06/09/24 0955          Bed Mobility    Scooting/Bridging Powell (Bed Mobility) dependent (less than 25% patient effort);2 person assist  -     Sit-Supine Powell (Bed Mobility) verbal cues;moderate assist (50% patient effort)  -     Comment, (Bed Mobility) assisted with lifting legs in bed.  -[facility] Name 06/09/24 0955          Transfers    Comment, (Transfers) stood x 2  -[facility] Name 06/09/24 0955          Sit-Stand Transfer    Sit-Stand Powell (Transfers) standby assist  -       Row Name 06/09/24 0955          Stand-Sit Transfer    Stand-Sit Powell (Transfers) standby assist  -[facility] Name 06/09/24 0955          Gait/Stairs (Locomotion)    Powell Level (Gait) contact guard  -     Distance in Feet (Gait) 400  3 standing rests  -     Deviations/Abnormal  Patterns (Gait) quyen decreased;stride length decreased  -       Row Name             Wound 06/06/24 0945 sternal Incision    Wound - Properties Group Placement Date: 06/06/24  - Placement Time: 0945 -LH Present on Original Admission: N  -LH Location: sternal  -LH Primary Wound Type: Incision  -LH Additional Comments: mastisol, steri strips, prevena  -LH    Retired Wound - Properties Group Placement Date: 06/06/24  - Placement Time: 0945 -LH Present on Original Admission: N  -LH Location: sternal  -LH Primary Wound Type: Incision  -LH Additional Comments: mastisol, steri strips, prevena  -LH    Retired Wound - Properties Group Date first assessed: 06/06/24  - Time first assessed: 0945 -LH Present on Original Admission: N  -LH Location: sternal  -LH Primary Wound Type: Incision  -LH Additional Comments: mastisol, steri strips, prevena  -LH      Row Name             Wound Right anterior knee Incision    Wound - Properties Group Side: Right  -LH Orientation: anterior  -LH Location: knee  -LH Primary Wound Type: Incision  -LH Additional Comments: mastisol, steri strips, 4x4, tape ace  -LH    Retired Wound - Properties Group Side: Right  -LH Orientation: anterior  -LH Location: knee  -LH Primary Wound Type: Incision  -LH Additional Comments: mastisol, steri strips, 4x4, tape ace  -LH    Retired Wound - Properties Group Side: Right  -LH Location: knee  -LH Primary Wound Type: Incision  -LH Additional Comments: mastisol, steri strips, 4x4, tape ace  -LH      Row Name             Wound 06/06/24 0945 Right anterior greater trochanter Puncture    Wound - Properties Group Placement Date: 06/06/24  - Placement Time: 0945 -LH Present on Original Admission: N  -LH Side: Right  -LH Orientation: anterior  -LH Location: greater trochanter  -LH Primary Wound Type: Puncture  -LH Additional Comments: mastisol, steri strips, 4x4, tape  -LH    Retired Wound - Properties Group Placement Date: 06/06/24  - Placement Time:  0945  -LH Present on Original Admission: N  -LH Side: Right  -LH Orientation: anterior  -LH Location: greater trochanter  -LH Primary Wound Type: Puncture  -LH Additional Comments: mastisol, steri strips, 4x4, tape  -LH    Retired Wound - Properties Group Date first assessed: 06/06/24 - Time first assessed: 0945 -LH Present on Original Admission: N  -LH Side: Right  -LH Location: greater trochanter  -LH Primary Wound Type: Puncture  -LH Additional Comments: mastisol, steri strips, 4x4, tape  -LH      Row Name             Wound 06/06/24 0945 Right distal leg Puncture    Wound - Properties Group Placement Date: 06/06/24  - Placement Time: 0945 -LH Present on Original Admission: N  -LH Side: Right  -LH Orientation: distal  -LH Location: leg  -LH Primary Wound Type: Puncture  -LH Additional Comments: mastisol, steri strips, 4x4, tape, ace  -LH    Retired Wound - Properties Group Placement Date: 06/06/24  - Placement Time: 0945 -LH Present on Original Admission: N  -LH Side: Right  -LH Orientation: distal  -LH Location: leg  -LH Primary Wound Type: Puncture  -LH Additional Comments: mastisol, steri strips, 4x4, tape, ace  -LH    Retired Wound - Properties Group Date first assessed: 06/06/24  - Time first assessed: 0945 -LH Present on Original Admission: N  -LH Side: Right  -LH Location: leg  -LH Primary Wound Type: Puncture  -LH Additional Comments: mastisol, steri strips, 4x4, tape, ace  -LH      Row Name 06/09/24 0955          Plan of Care Review    Plan of Care Reviewed With patient  -     Progress improving  -     Outcome Evaluation Pt. agreeable to therapy. He is able to stand with SBA. WAlked 400' with 3 standing rests, CGA x 1. O2 sats remained in the 90's 93-95% while on RA. He required MOD assist to lift legs into bed. Pt. does not normally sleep in bed at home. Will continue to work on progressive ambulation and independence.  -       Row Name 06/09/24 0955          Vital Signs    Pre SpO2  (%) 93  -MF     O2 Delivery Pre Treatment room air  -MF     Intra SpO2 (%) 93  -MF     O2 Delivery Intra Treatment room air  -MF     Post SpO2 (%) 95  -MF     O2 Delivery Post Treatment room air  -MF     Pre Patient Position Sitting  -MF     Intra Patient Position Standing  -MF     Post Patient Position Sitting  -MF       Row Name 06/09/24 0955          Positioning and Restraints    Pre-Treatment Position sitting in chair/recliner  -MF     Post Treatment Position bed  -MF     In Bed fowlers;call light within reach;encouraged to call for assist;with family/caregiver;with nsg;side rails up x2;RUE elevated;LUE elevated  -MF               User Key  (r) = Recorded By, (t) = Taken By, (c) = Cosigned By      Initials Name Provider Type    Danielle Bazzi, RN Registered Nurse    Esther Beasley, PTA Physical Therapist Assistant                    Physical Therapy Education       Title: PT OT SLP Therapies (Done)       Topic: Physical Therapy (Done)       Point: Mobility training (Done)       Learning Progress Summary             Patient Acceptance, E,TB,D, ODILIA,CHERI,NR by  at 6/7/2024 1527    Comment: education re: purpose of PT/importance of activity, safety/falls prevention, sternal precautions, improved tech w/ tfers, gait, deep breathing and pacing activity                         Point: Home exercise program (Done)       Learning Progress Summary             Patient Acceptance, E,TB,D, ODILIA,CHERI,NR by  at 6/7/2024 1527    Comment: education re: purpose of PT/importance of activity, safety/falls prevention, sternal precautions, improved tech w/ tfers, gait, deep breathing and pacing activity                         Point: Body mechanics (Done)       Learning Progress Summary             Patient Acceptance, E,TB,D, ODILIA,DU,NR by  at 6/7/2024 1527    Comment: education re: purpose of PT/importance of activity, safety/falls prevention, sternal precautions, improved tech w/ tfers, gait, deep breathing and pacing  activity                         Point: Precautions (Done)       Learning Progress Summary             Patient Acceptance, E,TB,D, VU,DU,NR by HALEY at 6/7/2024 0847    Comment: education re: purpose of PT/importance of activity, safety/falls prevention, sternal precautions, improved tech w/ tfers, gait, deep breathing and pacing activity                                         User Key       Initials Effective Dates Name Provider Type Discipline     08/02/18 -  Leatha Giles, PT Physical Therapist PT                  PT Recommendation and Plan     Plan of Care Reviewed With: patient  Progress: improving  Outcome Evaluation: Pt. agreeable to therapy. He is able to stand with SBA. WAlked 400' with 3 standing rests, CGA x 1. O2 sats remained in the 90's 93-95% while on RA. He required MOD assist to lift legs into bed. Pt. does not normally sleep in bed at home. Will continue to work on progressive ambulation and independence.   Outcome Measures       Row Name 06/09/24 0955 06/08/24 1000          How much help from another person do you currently need...    Turning from your back to your side while in flat bed without using bedrails? 2  -MF 2  -MF     Moving from lying on back to sitting on the side of a flat bed without bedrails? 2  -MF 2  -MF     Moving to and from a bed to a chair (including a wheelchair)? 3  -MF 3  -MF     Standing up from a chair using your arms (e.g., wheelchair, bedside chair)? 3  -MF 3  -MF     Climbing 3-5 steps with a railing? 2  -MF 2  -MF     To walk in hospital room? 3  -MF 3  -MF     AM-PAC 6 Clicks Score (PT) 15  -MF 15  -MF     Highest Level of Mobility Goal 4 --> Transfer to chair/commode  -MF 4 --> Transfer to chair/commode  -MF        Functional Assessment    Outcome Measure Options AM-PAC 6 Clicks Basic Mobility (PT)  -MF AM-PAC 6 Clicks Basic Mobility (PT)  -               User Key  (r) = Recorded By, (t) = Taken By, (c) = Cosigned By      Initials Name Provider Type    MF  Esther Mccoy PTA Physical Therapist Assistant                     Time Calculation:    PT Charges       Row Name 06/09/24 1145             Time Calculation    Start Time 0955  -MF      Stop Time 1025  -MF      Time Calculation (min) 30 min  -MF      PT Received On 06/09/24  -MF         Time Calculation- PT    Total Timed Code Minutes- PT 30 minute(s)  -MF         Timed Charges    86611 - Gait Training Minutes  30  -MF         Total Minutes    Timed Charges Total Minutes 30  -MF       Total Minutes 30  -MF                User Key  (r) = Recorded By, (t) = Taken By, (c) = Cosigned By      Initials Name Provider Type    Esther Beasley PTA Physical Therapist Assistant                  Therapy Charges for Today       Code Description Service Date Service Provider Modifiers Qty    86099295032 HC GAIT TRAINING EA 15 MIN 6/8/2024 Esther Mccoy, JAYANT GP 2    11268744798 HC GAIT TRAINING EA 15 MIN 6/8/2024 Esther Mccoy, JAYANT GP 2    66004273962 HC GAIT TRAINING EA 15 MIN 6/9/2024 Esther Mccoy, JAYANT GP 2            PT G-Codes  Outcome Measure Options: AM-PAC 6 Clicks Basic Mobility (PT)  AM-PAC 6 Clicks Score (PT): 15    Esther Mccoy PTA  6/9/2024

## 2024-06-09 NOTE — PLAN OF CARE
Goal Outcome Evaluation:  Plan of Care Reviewed With: patient           Outcome Evaluation: VSS, HR NSR 84-99, has been on RA all day so far, pain better controlled, took pain meds less frequently, MST and Kam drain removed today, tongue continues swelled, miracle mw ordered, continues to have edwards to BSD with yellow urine, started on lasix today, good urine output, had large loose bm, midsternal inc with prevena intact, rt leg icisions intact, rt leg bruised and edematous, pt completed walks x 4 today

## 2024-06-09 NOTE — PROGRESS NOTES
"Coronary artery bypass grafting x 4 (LIMA/LAD, RSVG/dRCA, RSVG/OM, and RSVG/D), right leg endoscopic vein harvest, sternalock XP sternal plating, application of Prevena incision management system      POD 3    Up and ambulating well in the jolly with physical therapy.  Reports pain is overall controlled. Creatinine 0.81, potassium 4.2, hemoglobin 9.9, hematocrit 31.3 and a platelet count of 179,000. Remains on room air with SpO2 of 95%.  Vera catheter to remain in place until tomorrow, 6/10/2024.  Weight is up 4 pounds and 11 pounds above baseline.  Has had a large bowel movement.  Continues to complain of tongue pain.          IV drips: None  Telemetry: Sinus-sinus tach 88-97 bpm  Hemodynamics reviewed, stable    Visit Vitals  /63 (BP Location: Left arm, Patient Position: Sitting)   Pulse 88   Temp 98.1 °F (36.7 °C) (Oral)   Resp 18   Ht 195.6 cm (77\")   Wt (!) 207 kg (457 lb 6.4 oz)   SpO2 95%   BMI 54.24 kg/m²   Preoperative weight: 4476 pounds    Intake/Output Summary (Last 24 hours) at 6/9/2024 0805  Last data filed at 6/9/2024 0600  Gross per 24 hour   Intake --   Output 1680 ml   Net -1680 ml     Mediastinal tube output: 100 mL / 24 hours  Left Kam drain: 5 mL / 24 hours  Serosanguineous    Labs:  Lab Results   Component Value Date    WBC 10.63 06/09/2024    HGB 9.9 (L) 06/09/2024    HCT 31.3 (L) 06/09/2024    MCV 87.7 06/09/2024     06/09/2024      Lab Results   Component Value Date    GLUCOSE 136 (H) 06/08/2024    BUN 16 06/08/2024    CREATININE 0.94 06/08/2024    EGFR 103.2 06/08/2024    BCR 17.0 06/08/2024    K 4.9 06/08/2024    CO2 26.0 06/08/2024    CALCIUM 8.3 (L) 06/08/2024    ALBUMIN 3.2 (L) 06/08/2024    BILITOT 0.6 06/06/2024    AST 74 (H) 06/06/2024     (H) 06/06/2024      Myoglobin Screen, Urine - Urine, Clean Catch    Specimen Information: Urine, Clean Catch   0 Result Notes      Component  Ref Range & Units 2 d ago/6/7/2024   Myoglobin, Qualitative  Negative Positive " Abnormal    Resulting Agency  [facility]              Specimen Collected: 06/07/24 09:58 CDT Last Resulted: 06/07/24 10:27 CDT                          Myoglobin, Serum  Specimen Information: Blood   0 Result Notes        Component  Ref Range & Units 1 d ago  6/8/2024 2 d ago  6/7/2024 3 d ago   Myoglobin  28.0 - 72.0 ng/mL 228.0 High  957.0 High  32.5   Resulting Agency  [facility]              XR Chest PA & Lateral [[id number]] Vaibhav as Reviewed   Order Status: Completed Collected: 06/09/24 0623    Updated: 06/09/24 0628   Narrative:     XR CHEST 1 VW- 6/8/2024 2:45 AM     HISTORY: Post-Op Heart Surgery; R07.9-Chest pain, unspecified;  R79.89-Other specified abnormal findings of blood chemistry;  I21.4-Non-ST elevation (NSTEMI) myocardial infarction;  I25.110-Atherosclerotic heart disease of native coronary artery with  unstable angina pectoris; Z74.09-Other reduced mobility       COMPARISON: Chest x-ray dated 6/8/2024     FINDINGS:  Upright frontal radiograph of the chest was obtained     Postop coronary bypass. Improving aeration in the left base in  particular. No new consolidation. No pneumothorax. Heart size is stable.  Pulmonary vasculature are nondilated. Vascular sheath was discontinued.      Impression:     1. Postop coronary bypass. Improving aeration in the left lung base in  particular. Otherwise stable.           My personal interpretation of chest x-ray:  No pneumothorax.  Chest tubes in place.  Stable with no significant change.    No change in physical exam from 6/8/2024  Physical Exam:  General: No apparent distress. In good spirits.  .  Mouth: Swelling and erythremia to right tongue, associated with previous ET tube  Cardiovascular: Regular rate and rhythm without murmur, rubs, or gallops.    Pulmonary: Clear to auscultation bilaterally without wheezing, rubs, or rales.  Chest: Sternotomy incision clean, dry, and intact. Sternum stable. No clicks. Mediastinal tubes x 2 and Kam  drain x 1 with dressing clean, dry, and intact.    Abdomen: Soft, nondistended and nontender.  Extremities: Warm, moves all extremities. Saphenectomy site clean, dry, and intact.   : Vera catheter in place, urine clear yellow.  Neurologic: Grossly intact with no focal deficits.       Impression:  Non-STEMI  Coronary artery disease with unstable angina  Class III severe obesity, BMI 52.96  Hypertension  Prediabetes  Obstructive sleep apnea  Gross hematuria with history of hematuria    Plan:   Keep Vera catheter until 6/10/2024 per urology recommendations, will continue to hold Plavix.  Okay for aspirin and Lovenox 40 mg daily VTE prophylaxis-discussed with Dr. Beaulieu.  Encourage pulmonary toilet and ambulation  Routine postcardiac surgery regimen  Remove mediastinal and pleural drains.  Will add miracle mouthwash to assist with tongue pain      Discussed with patient and nursing    KURT Burrell  6/9/2024 at 12:18 PM

## 2024-06-09 NOTE — THERAPY TREATMENT NOTE
Acute Care - Physical Therapy Treatment Note  [facility]     Patient Name: Constantin Seals  : 1980  MRN: [id number]  Today's Date: 2024      Visit Dx:     ICD-10-CM ICD-9-CM   1. Chest pain, unspecified type  R07.9 786.50   2. Troponin I above reference range  R79.89 790.6   3. NSTEMI (non-ST elevated myocardial infarction)  I21.4 410.70   4. Coronary artery disease involving native coronary artery of native heart with unstable angina pectoris  I25.110 414.01     411.1   5. Impaired functional mobility and activity tolerance [Z74.09]  Z74.09 V49.89     Patient Active Problem List   Diagnosis    Class 3 severe obesity due to excess calories with serious comorbidity and body mass index (BMI) of 50.0 to 59.9 in adult    Primary hypertension    Gastroesophageal reflux disease without esophagitis    SHERRON (obstructive sleep apnea)    NSTEMI (non-ST elevated myocardial infarction)    Coronary artery disease involving native coronary artery of native heart with unstable angina pectoris    Gross hematuria    Myoglobinuria     Past Medical History:   Diagnosis Date    COVID     GERD (gastroesophageal reflux disease)     Gout     Heartburn     Hypertension     Obesity      Past Surgical History:   Procedure Laterality Date    CARDIAC CATHETERIZATION N/A 2024    Procedure: Left Heart Cath;  Surgeon: Chip Roberts MD;  Location: [facility] CATH INVASIVE LOCATION;  Service: Cardiology;  Laterality: N/A;    CORONARY ARTERY BYPASS GRAFT N/A 2024    Procedure: CORONARY ARTERY BYPASS GRAFTING X4, LEFT INTERNAL MAMMARY ARTERY GRAFT, RIGHT LEG ENDOSCOPIC VEIN HARVEST, STERNAL PLATING, APPLICATION PREVENA, TRANSESOPHAGEAL ECHOCARDIOGRAM;  Surgeon: Sean Trejo MD;  Location: [facility] OR;  Service: Cardiothoracic;  Laterality: N/A;    DISTAL BICEPS TENDON REPAIR Right 2022    Procedure: RIGHT DISTAL BICEPS TENDON REPAIR WITH SEMI-T ALLOGRAFT;  Surgeon: Benjamin العراقي MD;  Location:  [facility] OR;  Service:  Orthopedics;  Laterality: Right;    ELBOW PROCEDURE      EYE SURGERY Right 1984    COLES CATHETER N/A 6/6/2024    Procedure: COLES CATHETER INSERTION;  Surgeon: Omari Beaulieu MD;  Location: [facility] OR;  Service: Urology;  Laterality: N/A;     PT Assessment (Last 12 Hours)       PT Evaluation and Treatment       [facility] Name 06/09/24 1456 06/09/24 0955       Physical Therapy Time and Intention    Subjective Information complains of;weakness;fatigue  - complains of;pain;fatigue  -    Document Type therapy note (daily note)  - therapy note (daily note)  -    Mode of Treatment physical therapy  - physical therapy  -    Comment -- continues to c/o tongue soreness-nsg aware  -      Row Name 06/09/24 1456 06/09/24 0955       General Information    Existing Precautions/Restrictions fall;sternal;oxygen therapy device and L/min  prevena  - fall;sternal;oxygen therapy device and L/min  chest tube, prevena  -[facility] Name 06/09/24 1456 06/09/24 0955       Pain    Pretreatment Pain Rating 5/10  - 5/10  -    Posttreatment Pain Rating 5/10  - 5/10  -    Pain Location incisional  - incisional  -    Pain Location - chest  - chest  -    Pre/Posttreatment Pain Comment -- rates R knee/leg pain 7/10  -    Pain Intervention(s) Repositioned;Ambulation/increased activity  - Repositioned;Ambulation/increased activity;Medication (See MAR)  -      Row Name 06/09/24 1456 06/09/24 0955       Bed Mobility    Scooting/Bridging [place] (Bed Mobility) -- dependent (less than 25% patient effort);2 person assist  -    Sit-Supine [place] (Bed Mobility) -- verbal cues;moderate assist (50% patient effort)  -    Comment, (Bed Mobility) chair  - assisted with lifting legs in bed.  -      Row Name 06/09/24 0955          Transfers    Comment, (Transfers) stood x 2  -[facility] Name 06/09/24 1456 06/09/24 0955       Sit-Stand Transfer    Sit-Stand [place] (Transfers) standby assist  -  standby assist  -MF      Row Name 06/09/24 1456 06/09/24 0955       Stand-Sit Transfer    Stand-Sit [place] (Transfers) standby assist  - standby assist  -      Row Name 06/09/24 1456 06/09/24 0955       Gait/Stairs (Locomotion)    [place] Level (Gait) contact guard  - contact guard  -    Distance in Feet (Gait) 450  3 standing rest  - 400  3 standing rests  -    Deviations/Abnormal Patterns (Gait) quyen decreased;stride length decreased  - quyen decreased;stride length decreased  -      Row Name 06/09/24 1456          Motor Skills    Comments, Therapeutic Exercise warm up/cool down x 20 reps  -       Row Name             Wound 06/06/24 0945 sternal Incision    Wound - Properties Group Placement Date: 06/06/24  - Placement Time: 0945 - Present on Original Admission: N  -LH Location: sternal  -LH Primary Wound Type: Incision  -LH Additional Comments: mastisol, steri strips, prevena  -LH    Retired Wound - Properties Group Placement Date: 06/06/24  - Placement Time: 0945 - Present on Original Admission: N  -LH Location: sternal  -LH Primary Wound Type: Incision  -LH Additional Comments: mastisol, steri strips, prevena  -LH    Retired Wound - Properties Group Date first assessed: 06/06/24  - Time first assessed: 0945 -LH Present on Original Admission: N  -LH Location: sternal  -LH Primary Wound Type: Incision  -LH Additional Comments: mastisol, steri strips, prevena  -      Row Name             Wound Right anterior knee Incision    Wound - Properties Group Side: Right  -LH Orientation: anterior  -LH Location: knee  -LH Primary Wound Type: Incision  -LH Additional Comments: mastisol, steri strips, 4x4, tape ace  -LH    Retired Wound - Properties Group Side: Right  -LH Orientation: anterior  -LH Location: knee  -LH Primary Wound Type: Incision  -LH Additional Comments: mastisol, steri strips, 4x4, tape ace  -LH    Retired Wound - Properties Group Side: Right  -LH Location:  knee  -LH Primary Wound Type: Incision  -LH Additional Comments: mastisol, steri strips, 4x4, tape ace  -LH      Row Name             Wound 06/06/24 0945 Right anterior greater trochanter Puncture    Wound - Properties Group Placement Date: 06/06/24  -LH Placement Time: 0945  -LH Present on Original Admission: N  -LH Side: Right  -LH Orientation: anterior  -LH Location: greater trochanter  -LH Primary Wound Type: Puncture  -LH Additional Comments: mastisol, steri strips, 4x4, tape  -LH    Retired Wound - Properties Group Placement Date: 06/06/24  -LH Placement Time: 0945  -LH Present on Original Admission: N  -LH Side: Right  -LH Orientation: anterior  -LH Location: greater trochanter  -LH Primary Wound Type: Puncture  -LH Additional Comments: mastisol, steri strips, 4x4, tape  -LH    Retired Wound - Properties Group Date first assessed: 06/06/24  -LH Time first assessed: 0945  -LH Present on Original Admission: N  -LH Side: Right  -LH Location: greater trochanter  -LH Primary Wound Type: Puncture  -LH Additional Comments: mastisol, steri strips, 4x4, tape  -LH      Row Name             Wound 06/06/24 0945 Right distal leg Puncture    Wound - Properties Group Placement Date: 06/06/24  -LH Placement Time: 0945  -LH Present on Original Admission: N  -LH Side: Right  -LH Orientation: distal  -LH Location: leg  -LH Primary Wound Type: Puncture  -LH Additional Comments: mastisol, steri strips, 4x4, tape, ace  -LH    Retired Wound - Properties Group Placement Date: 06/06/24  -LH Placement Time: 0945  -LH Present on Original Admission: N  -LH Side: Right  -LH Orientation: distal  -LH Location: leg  -LH Primary Wound Type: Puncture  -LH Additional Comments: mastisol, steri strips, 4x4, tape, ace  -LH    Retired Wound - Properties Group Date first assessed: 06/06/24  -LH Time first assessed: 0945  -LH Present on Original Admission: N  -LH Side: Right  -LH Location: leg  -LH Primary Wound Type: Puncture  -LH Additional  Comments: mastisol, steri strips, 4x4, tape, ace  -      Row Name 06/09/24 0955          Plan of Care Review    Plan of Care Reviewed With patient  -MF     Progress improving  -     Outcome Evaluation Pt. agreeable to therapy. He is able to stand with SBA. WAlked 400' with 3 standing rests, CGA x 1. O2 sats remained in the 90's 93-95% while on RA. He required MOD assist to lift legs into bed. Pt. does not normally sleep in bed at home. Will continue to work on progressive ambulation and independence.  -       Row Name 06/09/24 0955          Vital Signs    Pre SpO2 (%) 93  -MF     O2 Delivery Pre Treatment room air  -MF     Intra SpO2 (%) 93  -MF     O2 Delivery Intra Treatment room air  -MF     Post SpO2 (%) 95  -MF     O2 Delivery Post Treatment room air  -MF     Pre Patient Position Sitting  -     Intra Patient Position Standing  -     Post Patient Position Sitting  -       Row Name 06/09/24 1456 06/09/24 0955       Positioning and Restraints    Pre-Treatment Position sitting in chair/recliner  - sitting in chair/recliner  -    Post Treatment Position chair  - bed  -    In Bed -- fowlers;call light within reach;encouraged to call for assist;with family/caregiver;with nsg;side rails up x2;RUE elevated;LUE elevated  -    In Chair sitting;call light within reach;encouraged to call for assist;with family/caregiver  - --              User Key  (r) = Recorded By, (t) = Taken By, (c) = Cosigned By      Initials Name Provider Type     Tasneem Covington PTA Physical Therapist Assistant     Danielle Chan, RN Registered Nurse     Esther Mccoy PTA Physical Therapist Assistant                    Physical Therapy Education       Title: PT OT SLP Therapies (Done)       Topic: Physical Therapy (Done)       Point: Mobility training (Done)       Learning Progress Summary             Patient Acceptance, E,TB,D, VU,DU,NR by  at 6/7/2024 3317    Comment: education re: purpose of PT/importance of  activity, safety/falls prevention, sternal precautions, improved tech w/ tfers, gait, deep breathing and pacing activity                         Point: Home exercise program (Done)       Learning Progress Summary             Patient Acceptance, E,TB,D, VU,DU,NR by  at 6/7/2024 1527    Comment: education re: purpose of PT/importance of activity, safety/falls prevention, sternal precautions, improved tech w/ tfers, gait, deep breathing and pacing activity                         Point: Body mechanics (Done)       Learning Progress Summary             Patient Acceptance, E,TB,D, ODILIA,CHERI,NR by  at 6/7/2024 1527    Comment: education re: purpose of PT/importance of activity, safety/falls prevention, sternal precautions, improved tech w/ tfers, gait, deep breathing and pacing activity                         Point: Precautions (Done)       Learning Progress Summary             Patient Acceptance, E,TB,D, ODILIA,DU,NR by  at 6/7/2024 1527    Comment: education re: purpose of PT/importance of activity, safety/falls prevention, sternal precautions, improved tech w/ tfers, gait, deep breathing and pacing activity                                         User Key       Initials Effective Dates Name Provider Type Discipline     08/02/18 -  Leatha Giles, PT Physical Therapist PT                  PT Recommendation and Plan         Outcome Measures       Row Name 06/09/24 0955 06/08/24 1000          How much help from another person do you currently need...    Turning from your back to your side while in flat bed without using bedrails? 2  -MF 2  -MF     Moving from lying on back to sitting on the side of a flat bed without bedrails? 2  -MF 2  -MF     Moving to and from a bed to a chair (including a wheelchair)? 3  -MF 3  -MF     Standing up from a chair using your arms (e.g., wheelchair, bedside chair)? 3  -MF 3  -MF     Climbing 3-5 steps with a railing? 2  -MF 2  -MF     To walk in hospital room? 3  -MF 3  -MF     AM-PAC 6  Clicks Score (PT) 15  -MF 15  -MF     Highest Level of Mobility Goal 4 --> Transfer to chair/commode  -MF 4 --> Transfer to chair/commode  -MF        Functional Assessment    Outcome Measure Options AM-PAC 6 Clicks Basic Mobility (PT)  -MF AM-PAC 6 Clicks Basic Mobility (PT)  -               User Key  (r) = Recorded By, (t) = Taken By, (c) = Cosigned By      Initials Name Provider Type     Esther Mccoy PTA Physical Therapist Assistant                     Time Calculation:    PT Charges       Row Name 06/09/24 1530 06/09/24 1145          Time Calculation    Start Time 1456  -AH 0955  -     Stop Time 1526  -AH 1025  -     Time Calculation (min) 30 min  -AH 30 min  -     PT Received On -- 06/09/24  -        Time Calculation- PT    Total Timed Code Minutes- PT 30 minute(s)  - 30 minute(s)  -        Timed Charges    07294 - Gait Training Minutes  30  -AH 30  -MF        Total Minutes    Timed Charges Total Minutes 30  -AH 30  -MF      Total Minutes 30  -AH 30  -MF               User Key  (r) = Recorded By, (t) = Taken By, (c) = Cosigned By      Initials Name Provider Type     Tasneem Covington PTA Physical Therapist Assistant     Esther Mccoy PTA Physical Therapist Assistant                  Therapy Charges for Today       Code Description Service Date Service Provider Modifiers Qty    [id number] HC GAIT TRAINING EA 15 MIN 6/9/2024 Tasneem Covington PTA GP 2            PT G-Codes  Outcome Measure Options: AM-PAC 6 Clicks Basic Mobility (PT)  AM-PAC 6 Clicks Score (PT): 15    Tasneem Covington PTA  6/9/2024

## 2024-06-09 NOTE — OP NOTE
Patient Name:  Constantin Seals  YOB: 1980    Date of Procedure:  6/6/2024    Preoperative Diagnosis:   1.  NSTEMI (non-ST elevated myocardial infarction) [I21.4]  Coronary artery disease involving native coronary artery of native heart with unstable angina pectoris [I25.110]  2.  Obesity, class III  3.  Hypertension    Postoperative Diagnosis:    Same    Procedures Performed:  1. Coronary artery bypass grafting-4 vessel (left internal mammary artery/left anterior descending, reverse saphenous vein graft/distal right coronary artery, reverse saphenous vein graft/obtuse marginal, and reverse saphenous vein graft/diagonal artery)  2. Right endoscopic vein harvest of leg proper and limited distal thigh harvest   3. Sternalock XP sternal plating  4. Application of Prevena Incision management system    Surgeon:  Sean Trejo MD  Assistants:  Assistant: Kun Hussein  Anesthesia Staff:  CRNA: Darius Ulrich CRNA; Martin Park MD  Anesthesia Type:  General    Estimated Blood Loss:  Minimal (Cell Saver)  Drains:  1. 19 Qatari Kam drain-left pleural space  2. 24 Qatari Kam drains-anterior and posterior mediastinum    Specimens:  None        Operative Findings:  Excellent arterial conduit. Good venous conduit. The LAD at site of grafting measured 2.4 mm in size and had mild atherosclerotic disease burden. Post bypass grafting had excellent arterial runoff.  The OM artery measured 2.2 mm in size and had mild atherosclerotic disease burden.  Post bypass grafting had excellent arterial runoff. The diagonal artery at site of grafting measured 2  mm in size and had mild atherosclerotic disease burden. Post bypass grafting had excellent arterial runoff.The dRCA 2.2 mm in size and had excellent arterial runoff with moderate atherosclerotic disease burden.  Transesophageal echocardiography salient findings include improved left ventricular function with no significant valvular dysfunction.        Total aortic cross-clamp time: 137 minutes  Total cardiac bypass time: 179 minutes     Operative description in detail:  The patient was taken to the operative suite where he  was placed in a supine position.  Induction of general anesthesia and placement of a single-lumen endotracheal tube was performed without remark.  Appropriate arterial and venous access was established without remark.  Through the previously placed right internal jugular central venous line, a pulmonary artery catheter was floated into position.  No significant blood noted in the urine.  I did speak with Dr. Sullivan who did present and evaluate further with ultimately placing a three-way Vera catheter.  Elected to proceed forward still as the urine was clearing and plan for postoperative evaluation.  The patient was then prepped and draped in the usual and sterile surgical fashion.  A timeout was performed.  Perioperative antibiotics were administered. Beta blocker was given.    A two team approach was utilized to harvest both the left internal mammary artery and the right greater saphenous vein.   Briefly the right greater saphenous vein was taken at the level of the knee medially and taken in a prograde fashion for an anticipated length of vein to the ankle.  Blunt dissection was performed and each branch was taken using the [employer] device.  A counter stab incision was made with both proximal and distal control obtained.  The vein was extracted from a hemostatic tunnel.  Additional vein was taken from the initial incision site for the anticipated delay to distal thigh in a mirrored technique. The leg was closed in a layered fashion with Vicryl suture.  The vein was prepared without remark.      While the vein was being harvested, median sternotomy was performed by me. Pericardial fat in midline from the level of the innominate vein to the level of the diaphragm was divided in midline.  A Rultract device was used to expose the  posterior sternal table.  The left internal mammary artery was taken down using a standard pedicle technique with a combination of electrocautery and/or clips to control all side branches.  At that time, the patient was systemically anticoagulated with IV heparin.  A 19 Persian Kam drain was placed in the left pleural space.  After suitable time of circulating heparin, clips were placed doubly onto the mammary artery distally and it was divided proximal to the previously placed clips.  It had excellent arterial inflow.  The mammary artery was controlled distally.  The mammary artery harvest bed was hemostatic.  The Rultract device was removed from the sterile field and a [place] retractor was used for exposure.  The mammary artery was prepared for bypass grafting and deemed excellent.  A pericardial well was created. I elected to cannulate the heart centrally accessing distally the ascending aorta and the right atrial appendage.  Each cannula was placed in continuity with the appropriate pump line. Retrograde autologous prime was completed as indicated.  A combined cardioplegia/aortic root vent set was secured with a horizontal mattress 4-0 Prolene suture.  With an appropriate ACT and all in readiness, cardiopulmonary bypass was commenced.  I dissected out the obtuse marginal, dRCA, D, and the LAD for suitable sites for bypass grafting.  With that, I proceeded to apply the aortic cross-clamp and administered cardioplegia utilizing a warm induction strategy.  Upon achieving electrical-mechanical arrest, cold blood potassium cardioplegia was administered to a total standard dose.  We did implement systemic hypothermia mild and applied topical hypothermia to the ventricle.  At appropriate intervals, doses of cardioplegia were administered throughout the conduct of bypass grafting.     I directed my attention towards the dRCA.  A coronary arteriotomy was made and augmented to size with Light scissors.  It is as per  operative findings.  The anastomosis was constructed in an end-to-side orientation with running 7-0 Prolene suture.  With that its proximal anastomosis was  constructed following aortotomy with 11 blade and augmented size with 4 mm arterial punch  subsequently.  This was constructed in a side aorta end vein graft fashion with running 6-0 Prolene suture. An AC  was placed.  The graft was assessed for lay which was excellent. It is without tension or torsion.     To that end I directed my attention towards the obtuse marginal.  A coronary arteriotomy was made and augmented to size with Light scissors.  It is as per operative findings.  The anastomosis was constructed in an end-to-side orientation with running 7-0 Prolene suture.  With that its proximal anastomosis was constructed following aortotomy with 11 blade and augmented size with 4 mm arterial punch subsequently.  This was constructed in a side aorta end vein graft fashion with running 6-0 Prolene suture. An AC  was placed.  The graft was assessed for lay which was excellent.  It is without tension or torsion.     To that end I directed my attention towards the diagonal artery.  A coronary arteriotomy was made and augmented to size with Light scissors.  It is as per operative findings.  The anastomosis was constructed in an end-to-side orientation with running 7-0 Prolene suture.  With that its proximal anastomosis was constructed following aortotomy with 11 blade and augmented size with 4 mm arterial punch subsequently.  This was constructed in a side aorta end vein graft fashion with running 6-0 Prolene suture. An AC  was placed.  The graft was assessed for lay which was excellent.  It is without tension or torsion.     During a dose of cardioplegia, a pericardial slit left lateral was made while dividing associate pericardiophrenic fat and vasculature while being mindful of the lay of the phrenic nerve.  As such I proceeded to obtain  control proximally onto the mammary artery and spatulated it distally.  I grafted this onto the LAD following coronary arteriotomy using previous described techniques.  The anastomosis was constructed in an end-to-side orientation with running 7-0 Prolene suture.  It is as per operative findings and the anastomosis was hemostatic.  I did tack the mammary artery pedicle to the anterior aspect heart with 6-0 Prolene sutures.    With that being accomplished, a terminal hotshot was administered.  The patient was placed in Trendelenburg position.  Upon completion of terminal hotshot and placement of temporary epicardial pacing wires, with the aortic vent on high and pump flows diminished, the aortic cross-clamp was released.  With that, full support was implemented.  A perfusable rhythm was obtained after cardioversion.  A nonworking beating phase was implemented.  Ventilation restored.  I surveyed each graft and each anastomosis was hemostatic and had excellent lay.  With all in readiness, the heart was allowed to fill.  De-airing maneuvers were performed as guided by transesophageal echocardiography.  With that the heart was decompressed and we removed the aortic root vent/cardioplegia cannula set.  Its associated pursestring suture was tied securely and this was reinforced as per matter of routine. The table was now placed in neutral position.  With all in readiness, we proceeded to wean from and separate from cardiopulmonary bypass.  I did decannulate the venous line and snared down its associated pursestring suture.  Systemic intravenous protamine was administered.  All associated blood volume was returned to the patient. With continued good hemodynamics, I decannulated the arterial line and tied down its associated pursestring sutures.  At this time I tied down the previously snared pursestring suture.  The mediastinum was drained with 24 Portuguese Kam drains placed anteriorly and posteriorly.  I surveyed the chest  and hemostasis was pristine.  With that I impregnated the sternal edges with vancomycin, thrombin, and Gelfoam paste.  The sternum was reapproximated with stainless sterile wires placed in an interrupted fashion inferiorly and was reapproximated with the sternalock XP sternal plating system.  Please see the implant record for the utilized plates and screws.  In layers anatomically the soft tissue planes were reapproximated. Instruments, sharps, and sponge counts were reported as correct.  The Prevena incision management system was applied to the sternotomy incision.         Complications: None     Disposition: Transferred to ICU in stable and guarded condition.    Sean Trejo MD

## 2024-06-09 NOTE — PLAN OF CARE
Problem: Adult Inpatient Plan of Care  Goal: Plan of Care Review  Outcome: Ongoing, Progressing  Goal: Patient-Specific Goal (Individualized)  Outcome: Ongoing, Progressing  Goal: Absence of Hospital-Acquired Illness or Injury  Outcome: Ongoing, Progressing  Intervention: Identify and Manage Fall Risk  Recent Flowsheet Documentation  Taken 6/9/2024 0200 by Salud Gaston RN  Safety Promotion/Fall Prevention: safety round/check completed  Taken 6/9/2024 0000 by Salud Gaston RN  Safety Promotion/Fall Prevention: safety round/check completed  Taken 6/8/2024 2200 by Salud Gaston RN  Safety Promotion/Fall Prevention: safety round/check completed  Taken 6/8/2024 2126 by Salud Gaston RN  Safety Promotion/Fall Prevention: safety round/check completed  Taken 6/8/2024 2000 by Salud Gaston RN  Safety Promotion/Fall Prevention: safety round/check completed  Intervention: Prevent Skin Injury  Recent Flowsheet Documentation  Taken 6/8/2024 2000 by Salud Gaston RN  Body Position: position changed independently  Intervention: Prevent and Manage VTE (Venous Thromboembolism) Risk  Recent Flowsheet Documentation  Taken 6/8/2024 2126 by Salud Gaston RN  Activity Management: (pt refused to walk given current pain level) up in chair  Taken 6/8/2024 2000 by Salud Gaston RN  VTE Prevention/Management: (lovenox) other (see comments)  Range of Motion: ROM (range of motion) performed  Goal: Optimal Comfort and Wellbeing  Outcome: Ongoing, Progressing  Intervention: Monitor Pain and Promote Comfort  Recent Flowsheet Documentation  Taken 6/9/2024 0218 by Salud Gaston RN  Pain Management Interventions: see MAR  Taken 6/8/2024 2126 by Salud Gaston RN  Pain Management Interventions: see MAR  Taken 6/8/2024 2000 by Salud Gaston RN  Pain Management Interventions: position adjusted  Goal: Readiness for Transition of Care  Outcome: Ongoing, Progressing     Problem: Hypertension Comorbidity  Goal: Blood Pressure in Desired  Range  Outcome: Ongoing, Progressing     Problem: Arrhythmia/Dysrhythmia (Cardiac Catheterization)  Goal: Stable Heart Rate and Rhythm  Outcome: Ongoing, Progressing     Problem: Bleeding (Cardiac Catheterization)  Goal: Absence of Bleeding  Outcome: Ongoing, Progressing     Problem: Contrast-Induced Injury Risk (Cardiac Catheterization)  Goal: Absence of Contrast-Induced Injury  Outcome: Ongoing, Progressing     Problem: Embolism (Cardiac Catheterization)  Goal: Absence of Embolism Signs and Symptoms  Outcome: Ongoing, Progressing  Intervention: Prevent or Manage Embolism  Recent Flowsheet Documentation  Taken 6/8/2024 2000 by Salud Gaston RN  VTE Prevention/Management: (lovenox) other (see comments)     Problem: Ongoing Anesthesia/Sedation Effects (Cardiac Catheterization)  Goal: Anesthesia/Sedation Recovery  Outcome: Ongoing, Progressing  Intervention: Optimize Anesthesia Recovery  Recent Flowsheet Documentation  Taken 6/9/2024 0200 by Salud Gaston RN  Safety Promotion/Fall Prevention: safety round/check completed  Taken 6/9/2024 0000 by Salud Gaston RN  Safety Promotion/Fall Prevention: safety round/check completed  Taken 6/8/2024 2200 by Salud Gaston RN  Safety Promotion/Fall Prevention: safety round/check completed  Taken 6/8/2024 2126 by Salud Gaston RN  Safety Promotion/Fall Prevention: safety round/check completed  Taken 6/8/2024 2000 by Salud Gaston RN  Safety Promotion/Fall Prevention: safety round/check completed     Problem: Pain (Cardiac Catheterization)  Goal: Acceptable Pain Control  Outcome: Ongoing, Progressing  Intervention: Prevent or Manage Pain  Recent Flowsheet Documentation  Taken 6/9/2024 0218 by Salud Gaston RN  Pain Management Interventions: see MAR  Taken 6/8/2024 2126 by Salud Gaston RN  Pain Management Interventions: see MAR  Taken 6/8/2024 2000 by Salud Gaston RN  Pain Management Interventions: position adjusted     Problem: Vascular Access Protection (Cardiac  Catheterization)  Goal: Absence of Vascular Access Complication  Outcome: Ongoing, Progressing  Intervention: Prevent Access Site Complications  Recent Flowsheet Documentation  Taken 6/8/2024 2126 by Salud Gaston, RN  Activity Management: (pt refused to walk given current pain level) up in chair     Problem: Activity Intolerance (Cardiovascular Surgery)  Goal: Improved Activity Tolerance  Outcome: Ongoing, Progressing     Problem: Adjustment to Surgery (Cardiovascular Surgery)  Goal: Optimal Coping with Heart Surgery  Outcome: Ongoing, Progressing     Problem: Bleeding (Cardiovascular Surgery)  Goal: Bleeding (Cardiovascular Surgery)  Outcome: Ongoing, Progressing     Problem: Bowel Motility Impaired (Cardiovascular Surgery)  Goal: Effective Bowel Elimination (Cardiovascular Surgery)  Outcome: Ongoing, Progressing     Problem: Cardiac Function Impaired (Cardiovascular Surgery)  Goal: Effective Cardiac Function  Outcome: Ongoing, Progressing     Problem: Cerebral Tissue Perfusion (Cardiovascular Surgery)  Goal: Optimal Cerebral Tissue Perfusion (Cardiovascular Surgery)  Outcome: Ongoing, Progressing     Problem: Fluid and Electrolyte Imbalance (Cardiovascular Surgery)  Goal: Fluid and Electrolyte Balance (Cardiovascular Surgery)  Outcome: Ongoing, Progressing     Problem: Glycemic Control Impaired (Cardiovascular Surgery)  Goal: Blood Glucose Level Within Targeted Range (Cardiovascular Surgery)  Outcome: Ongoing, Progressing     Problem: Infection (Cardiovascular Surgery)  Goal: Absence of Infection Signs and Symptoms  Outcome: Ongoing, Progressing     Problem: Ongoing Anesthesia Effects (Cardiovascular Surgery)  Goal: Anesthesia/Sedation Recovery  Outcome: Ongoing, Progressing  Intervention: Optimize Anesthesia Recovery  Recent Flowsheet Documentation  Taken 6/9/2024 0200 by Salud Gaston, RN  Safety Promotion/Fall Prevention: safety round/check completed  Taken 6/9/2024 0000 by Salud Gaston, RN  Safety  Promotion/Fall Prevention: safety round/check completed  Taken 6/8/2024 2200 by Salud Gaston RN  Safety Promotion/Fall Prevention: safety round/check completed  Taken 6/8/2024 2126 by Salud Gaston RN  Safety Promotion/Fall Prevention: safety round/check completed  Taken 6/8/2024 2000 by Salud Gaston RN  Safety Promotion/Fall Prevention: safety round/check completed     Problem: Pain (Cardiovascular Surgery)  Goal: Acceptable Pain Control  Outcome: Ongoing, Progressing  Intervention: Prevent or Manage Pain  Recent Flowsheet Documentation  Taken 6/9/2024 0218 by Salud Gaston RN  Pain Management Interventions: see MAR  Taken 6/8/2024 2126 by Salud Gaston RN  Pain Management Interventions: see MAR  Taken 6/8/2024 2000 by Salud Gaston RN  Pain Management Interventions: position adjusted     Problem: Postoperative Nausea and Vomiting (Cardiovascular Surgery)  Goal: Nausea and Vomiting Relief (Cardiovascular Surgery)  Outcome: Ongoing, Progressing     Problem: Postoperative Urinary Retention (Cardiovascular Surgery)  Goal: Effective Urinary Elimination (Cardiovascular Surgery)  Outcome: Ongoing, Progressing     Problem: Respiratory Compromise (Cardiovascular Surgery)  Goal: Effective Oxygenation and Ventilation (Cardiovascular Surgery)  Outcome: Ongoing, Progressing  Intervention: Promote Airway Secretion Clearance  Recent Flowsheet Documentation  Taken 6/8/2024 2000 by Salud Gaston RN  Cough And Deep Breathing: done independently per patient     Problem: Fall Injury Risk  Goal: Absence of Fall and Fall-Related Injury  Outcome: Ongoing, Progressing  Intervention: Promote Injury-Free Environment  Recent Flowsheet Documentation  Taken 6/9/2024 0200 by Salud Gaston RN  Safety Promotion/Fall Prevention: safety round/check completed  Taken 6/9/2024 0000 by Salud Gaston RN  Safety Promotion/Fall Prevention: safety round/check completed  Taken 6/8/2024 2200 by Salud Gaston RN  Safety Promotion/Fall Prevention:  safety round/check completed  Taken 6/8/2024 2126 by Salud Gaston, RN  Safety Promotion/Fall Prevention: safety round/check completed  Taken 6/8/2024 2000 by Salud Gaston, RN  Safety Promotion/Fall Prevention: safety round/check completed     Problem: Skin Injury Risk Increased  Goal: Skin Health and Integrity  Outcome: Ongoing, Progressing   Goal Outcome Evaluation:            S 85 -96 per tele. C/o chest incisional pain throughout the night, given prn pain med. Vss. Walked and taken to XRAY this AM. Will continue to monitor

## 2024-06-10 LAB
ANION GAP SERPL CALCULATED.3IONS-SCNC: 7 MMOL/L (ref 5–15)
BH BB BLOOD EXPIRATION DATE: NORMAL
BH BB BLOOD EXPIRATION DATE: NORMAL
BH BB BLOOD TYPE BARCODE: 6200
BH BB BLOOD TYPE BARCODE: 6200
BH BB DISPENSE STATUS: NORMAL
BH BB DISPENSE STATUS: NORMAL
BH BB PRODUCT CODE: NORMAL
BH BB PRODUCT CODE: NORMAL
BH BB UNIT NUMBER: NORMAL
BH BB UNIT NUMBER: NORMAL
BUN SERPL-MCNC: 14 MG/DL (ref 6–20)
BUN/CREAT SERPL: 17.5 (ref 7–25)
CALCIUM SPEC-SCNC: 8.4 MG/DL (ref 8.6–10.5)
CHLORIDE SERPL-SCNC: 100 MMOL/L (ref 98–107)
CO2 SERPL-SCNC: 30 MMOL/L (ref 22–29)
CREAT SERPL-MCNC: 0.8 MG/DL (ref 0.76–1.27)
CROSSMATCH INTERPRETATION: NORMAL
CROSSMATCH INTERPRETATION: NORMAL
DEPRECATED RDW RBC AUTO: 44.4 FL (ref 37–54)
EGFRCR SERPLBLD CKD-EPI 2021: 112.6 ML/MIN/1.73
ERYTHROCYTE [DISTWIDTH] IN BLOOD BY AUTOMATED COUNT: 13.6 % (ref 12.3–15.4)
GLUCOSE BLDC GLUCOMTR-MCNC: 110 MG/DL (ref 70–130)
GLUCOSE BLDC GLUCOMTR-MCNC: 125 MG/DL (ref 70–130)
GLUCOSE BLDC GLUCOMTR-MCNC: 131 MG/DL (ref 70–130)
GLUCOSE BLDC GLUCOMTR-MCNC: 137 MG/DL (ref 70–130)
GLUCOSE SERPL-MCNC: 114 MG/DL (ref 65–99)
HCT VFR BLD AUTO: 29.9 % (ref 37.5–51)
HGB BLD-MCNC: 9.4 G/DL (ref 13–17.7)
MCH RBC QN AUTO: 27.9 PG (ref 26.6–33)
MCHC RBC AUTO-ENTMCNC: 31.4 G/DL (ref 31.5–35.7)
MCV RBC AUTO: 88.7 FL (ref 79–97)
MYOGLOBIN UR-MCNC: 2 NG/ML (ref 0–13)
PLATELET # BLD AUTO: 235 10*3/MM3 (ref 140–450)
PMV BLD AUTO: 10.1 FL (ref 6–12)
POTASSIUM SERPL-SCNC: 4.1 MMOL/L (ref 3.5–5.2)
RBC # BLD AUTO: 3.37 10*6/MM3 (ref 4.14–5.8)
SODIUM SERPL-SCNC: 137 MMOL/L (ref 136–145)
UNIT  ABO: NORMAL
UNIT  ABO: NORMAL
UNIT  RH: NORMAL
UNIT  RH: NORMAL
WBC NRBC COR # BLD AUTO: 7.16 10*3/MM3 (ref 3.4–10.8)

## 2024-06-10 PROCEDURE — 99024 POSTOP FOLLOW-UP VISIT: CPT | Performed by: THORACIC SURGERY (CARDIOTHORACIC VASCULAR SURGERY)

## 2024-06-10 PROCEDURE — 25010000002 FUROSEMIDE PER 20 MG: Performed by: NURSE PRACTITIONER

## 2024-06-10 PROCEDURE — 94664 DEMO&/EVAL PT USE INHALER: CPT

## 2024-06-10 PROCEDURE — 97116 GAIT TRAINING THERAPY: CPT

## 2024-06-10 PROCEDURE — 25010000002 ENOXAPARIN PER 10 MG: Performed by: NURSE PRACTITIONER

## 2024-06-10 PROCEDURE — 82948 REAGENT STRIP/BLOOD GLUCOSE: CPT

## 2024-06-10 PROCEDURE — 94799 UNLISTED PULMONARY SVC/PX: CPT

## 2024-06-10 PROCEDURE — 80048 BASIC METABOLIC PNL TOTAL CA: CPT | Performed by: NURSE PRACTITIONER

## 2024-06-10 PROCEDURE — 85027 COMPLETE CBC AUTOMATED: CPT | Performed by: NURSE PRACTITIONER

## 2024-06-10 RX ORDER — OXYCODONE HYDROCHLORIDE 5 MG/1
5 TABLET ORAL EVERY 4 HOURS PRN
Status: DISCONTINUED | OUTPATIENT
Start: 2024-06-10 | End: 2024-06-10

## 2024-06-10 RX ORDER — POTASSIUM CHLORIDE 750 MG/1
20 CAPSULE, EXTENDED RELEASE ORAL
Status: DISCONTINUED | OUTPATIENT
Start: 2024-06-10 | End: 2024-06-12 | Stop reason: HOSPADM

## 2024-06-10 RX ORDER — OXYCODONE HYDROCHLORIDE 10 MG/1
10 TABLET ORAL EVERY 8 HOURS PRN
Status: DISCONTINUED | OUTPATIENT
Start: 2024-06-10 | End: 2024-06-12 | Stop reason: HOSPADM

## 2024-06-10 RX ORDER — FUROSEMIDE 10 MG/ML
20 INJECTION INTRAMUSCULAR; INTRAVENOUS
Status: DISCONTINUED | OUTPATIENT
Start: 2024-06-10 | End: 2024-06-12 | Stop reason: HOSPADM

## 2024-06-10 RX ORDER — OXYCODONE HYDROCHLORIDE 5 MG/1
5 TABLET ORAL EVERY 4 HOURS PRN
Status: DISCONTINUED | OUTPATIENT
Start: 2024-06-10 | End: 2024-06-12 | Stop reason: HOSPADM

## 2024-06-10 RX ADMIN — POTASSIUM CHLORIDE 20 MEQ: 750 CAPSULE, EXTENDED RELEASE ORAL at 11:14

## 2024-06-10 RX ADMIN — POTASSIUM CHLORIDE 20 MEQ: 750 CAPSULE, EXTENDED RELEASE ORAL at 08:03

## 2024-06-10 RX ADMIN — PREGABALIN 25 MG: 25 CAPSULE ORAL at 20:23

## 2024-06-10 RX ADMIN — IPRATROPIUM BROMIDE 0.5 MG: 0.5 SOLUTION RESPIRATORY (INHALATION) at 13:58

## 2024-06-10 RX ADMIN — OXYCODONE HYDROCHLORIDE 15 MG: 10 TABLET ORAL at 05:31

## 2024-06-10 RX ADMIN — FUROSEMIDE 20 MG: 10 INJECTION, SOLUTION INTRAMUSCULAR; INTRAVENOUS at 07:56

## 2024-06-10 RX ADMIN — AMIODARONE HYDROCHLORIDE 400 MG: 200 TABLET ORAL at 17:59

## 2024-06-10 RX ADMIN — LIDOCAINE 2 PATCH: 4 PATCH TOPICAL at 08:07

## 2024-06-10 RX ADMIN — POTASSIUM CHLORIDE 20 MEQ: 750 CAPSULE, EXTENDED RELEASE ORAL at 17:59

## 2024-06-10 RX ADMIN — OXYCODONE HYDROCHLORIDE 10 MG: 10 TABLET ORAL at 18:45

## 2024-06-10 RX ADMIN — OXYCODONE HYDROCHLORIDE 5 MG: 5 TABLET ORAL at 14:29

## 2024-06-10 RX ADMIN — ATORVASTATIN CALCIUM 20 MG: 10 TABLET, FILM COATED ORAL at 20:23

## 2024-06-10 RX ADMIN — PANTOPRAZOLE SODIUM 40 MG: 40 TABLET, DELAYED RELEASE ORAL at 08:04

## 2024-06-10 RX ADMIN — ACETAMINOPHEN 1000 MG: 500 TABLET, FILM COATED ORAL at 11:14

## 2024-06-10 RX ADMIN — ALBUTEROL SULFATE 1.25 MG: 2.5 SOLUTION RESPIRATORY (INHALATION) at 05:42

## 2024-06-10 RX ADMIN — ASPIRIN 81 MG: 81 TABLET, COATED ORAL at 08:03

## 2024-06-10 RX ADMIN — IPRATROPIUM BROMIDE 0.5 MG: 0.5 SOLUTION RESPIRATORY (INHALATION) at 05:42

## 2024-06-10 RX ADMIN — FUROSEMIDE 20 MG: 10 INJECTION, SOLUTION INTRAMUSCULAR; INTRAVENOUS at 11:14

## 2024-06-10 RX ADMIN — ACETAMINOPHEN 1000 MG: 500 TABLET, FILM COATED ORAL at 17:59

## 2024-06-10 RX ADMIN — METOPROLOL TARTRATE 25 MG: 50 TABLET, FILM COATED ORAL at 20:23

## 2024-06-10 RX ADMIN — ENOXAPARIN SODIUM 40 MG: 100 INJECTION SUBCUTANEOUS at 08:04

## 2024-06-10 RX ADMIN — METOPROLOL TARTRATE 25 MG: 50 TABLET, FILM COATED ORAL at 08:04

## 2024-06-10 RX ADMIN — PREGABALIN 25 MG: 25 CAPSULE ORAL at 08:03

## 2024-06-10 RX ADMIN — OXYCODONE HYDROCHLORIDE 15 MG: 10 TABLET ORAL at 00:02

## 2024-06-10 RX ADMIN — DIPHENHYDRAMINE HYDROCHLORIDE AND LIDOCAINE HYDROCHLORIDE AND ALUMINUM HYDROXIDE AND MAGNESIUM HYDRO 5 ML: KIT at 05:34

## 2024-06-10 RX ADMIN — AMIODARONE HYDROCHLORIDE 400 MG: 200 TABLET ORAL at 08:04

## 2024-06-10 RX ADMIN — DIPHENHYDRAMINE HYDROCHLORIDE AND LIDOCAINE HYDROCHLORIDE AND ALUMINUM HYDROXIDE AND MAGNESIUM HYDRO 5 ML: KIT at 23:44

## 2024-06-10 RX ADMIN — ACETAMINOPHEN 1000 MG: 500 TABLET, FILM COATED ORAL at 05:31

## 2024-06-10 RX ADMIN — FUROSEMIDE 20 MG: 10 INJECTION, SOLUTION INTRAMUSCULAR; INTRAVENOUS at 18:45

## 2024-06-10 RX ADMIN — CHLORHEXIDINE GLUCONATE 0.12% ORAL RINSE 15 ML: 1.2 LIQUID ORAL at 05:32

## 2024-06-10 RX ADMIN — ALBUTEROL SULFATE 1.25 MG: 2.5 SOLUTION RESPIRATORY (INHALATION) at 13:58

## 2024-06-10 RX ADMIN — ACETAMINOPHEN 1000 MG: 500 TABLET, FILM COATED ORAL at 23:43

## 2024-06-10 RX ADMIN — DIPHENHYDRAMINE HYDROCHLORIDE AND LIDOCAINE HYDROCHLORIDE AND ALUMINUM HYDROXIDE AND MAGNESIUM HYDRO 5 ML: KIT at 17:59

## 2024-06-10 NOTE — THERAPY TREATMENT NOTE
Acute Care - Physical Therapy Treatment Note  [facility]     Patient Name: Constantin Seals  : 1980  MRN: [id number]  Today's Date: 6/10/2024      Visit Dx:     ICD-10-CM ICD-9-CM   1. Chest pain, unspecified type  R07.9 786.50   2. Troponin I above reference range  R79.89 790.6   3. NSTEMI (non-ST elevated myocardial infarction)  I21.4 410.70   4. Coronary artery disease involving native coronary artery of native heart with unstable angina pectoris  I25.110 414.01     411.1   5. Impaired functional mobility and activity tolerance [Z74.09]  Z74.09 V49.89     Patient Active Problem List   Diagnosis    Class 3 severe obesity due to excess calories with serious comorbidity and body mass index (BMI) of 50.0 to 59.9 in adult    Primary hypertension    Gastroesophageal reflux disease without esophagitis    SHERRON (obstructive sleep apnea)    NSTEMI (non-ST elevated myocardial infarction)    Coronary artery disease involving native coronary artery of native heart with unstable angina pectoris    Gross hematuria    Myoglobinuria     Past Medical History:   Diagnosis Date    COVID     GERD (gastroesophageal reflux disease)     Gout     Heartburn     Hypertension     Obesity      Past Surgical History:   Procedure Laterality Date    CARDIAC CATHETERIZATION N/A 2024    Procedure: Left Heart Cath;  Surgeon: Chip Roberts MD;  Location: [facility] CATH INVASIVE LOCATION;  Service: Cardiology;  Laterality: N/A;    CORONARY ARTERY BYPASS GRAFT N/A 2024    Procedure: CORONARY ARTERY BYPASS GRAFTING X4, LEFT INTERNAL MAMMARY ARTERY GRAFT, RIGHT LEG ENDOSCOPIC VEIN HARVEST, STERNAL PLATING, APPLICATION PREVENA, TRANSESOPHAGEAL ECHOCARDIOGRAM;  Surgeon: Sean Trejo MD;  Location: [facility] OR;  Service: Cardiothoracic;  Laterality: N/A;    DISTAL BICEPS TENDON REPAIR Right 2022    Procedure: RIGHT DISTAL BICEPS TENDON REPAIR WITH SEMI-T ALLOGRAFT;  Surgeon: Benjamin العراقي MD;  Location:  [facility] OR;   Service: Orthopedics;  Laterality: Right;    ELBOW PROCEDURE      EYE SURGERY Right 1984    COLES CATHETER N/A 6/6/2024    Procedure: COLES CATHETER INSERTION;  Surgeon: Omari Beaulieu MD;  Location:  [facility] OR;  Service: Urology;  Laterality: N/A;     PT Assessment (Last 12 Hours)       PT Evaluation and Treatment       Row Name 06/10/24 1458 06/10/24 0953       Physical Therapy Time and Intention    Subjective Information no complaints  - complains of;pain  -    Document Type therapy note (daily note)  - therapy note (daily note)  -    Mode of Treatment physical therapy  - physical therapy  -      Row Name 06/10/24 1458 06/10/24 0953       General Information    Existing Precautions/Restrictions fall;sternal  prevena  - fall;sternal  prevena  -      Row Name 06/10/24 1458 06/10/24 0953       Pain    Pretreatment Pain Rating 2/10  - 2/10  -    Posttreatment Pain Rating 4/10  - 4/10  -    Pain Location incisional  - incisional  -    Pain Location - chest  - chest  -    Pain Intervention(s) Repositioned;Ambulation/increased activity  - Repositioned;Ambulation/increased activity  -      Row Name 06/10/24 1458 06/10/24 0953       Bed Mobility    Bed Mobility supine-sit;sit-supine  - --    Supine-Sit [place] (Bed Mobility) --  chair  - --    Sit-Supine [place] (Bed Mobility) minimum assist (75% patient effort);verbal cues  min assist with BLE  - --    Comment, (Bed Mobility) -- chair  -      Row Name 06/10/24 1458 06/10/24 0953       Sit-Stand Transfer    Sit-Stand [place] (Transfers) standby assist  - standby assist  -      Row Name 06/10/24 1458 06/10/24 0953       Stand-Sit Transfer    Stand-Sit [place] (Transfers) standby assist  - standby assist  -      Row Name 06/10/24 1458 06/10/24 0953       Gait/Stairs (Locomotion)    [place] Level (Gait) standby assist  - standby assist  -    Distance in Feet (Gait) 525  3 standing rest  -  525  2 standing rests  -MF    Deviations/Abnormal Patterns (Gait) quyen decreased  - quyen decreased  -MF    [place] Level (Stairs) contact guard;verbal cues  -AH --    Handrail Location (Stairs) right side (ascending);right side (descending)  -AH --    Number of Steps (Stairs) 9  -AH --    Ascending Technique (Stairs) step-over-step  -AH --    Descending Technique (Stairs) step-over-step  -AH --      Row Name 06/10/24 0953          Motor Skills    Comments, Therapeutic Exercise ankle pumps x 15  -MF       Row Name             Wound 06/06/24 0945 sternal Incision    Wound - Properties Group Placement Date: 06/06/24  - Placement Time: 0945 - Present on Original Admission: N  -LH Location: sternal  -LH Primary Wound Type: Incision  -LH Additional Comments: mastisol, steri strips, prevena  -LH    Retired Wound - Properties Group Placement Date: 06/06/24  - Placement Time: 0945 - Present on Original Admission: N  -LH Location: sternal  -LH Primary Wound Type: Incision  -LH Additional Comments: mastisol, steri strips, prevena  -LH    Retired Wound - Properties Group Date first assessed: 06/06/24  - Time first assessed: 0945 - Present on Original Admission: N  -LH Location: sternal  -LH Primary Wound Type: Incision  -LH Additional Comments: mastisol, steri strips, prevena  -LH      Row Name             Wound Right anterior knee Incision    Wound - Properties Group Side: Right  -LH Orientation: anterior  -LH Location: knee  -LH Primary Wound Type: Incision  -LH Additional Comments: mastisol, steri strips, 4x4, tape ace  -LH    Retired Wound - Properties Group Side: Right  -LH Orientation: anterior  -LH Location: knee  -LH Primary Wound Type: Incision  -LH Additional Comments: mastisol, steri strips, 4x4, tape ace  -LH    Retired Wound - Properties Group Side: Right  -LH Location: knee  -LH Primary Wound Type: Incision  -LH Additional Comments: mastisol, steri strips, 4x4, tape ace  -LH      Row  Name             Wound 06/06/24 0945 Right anterior greater trochanter Puncture    Wound - Properties Group Placement Date: 06/06/24  - Placement Time: 0945  -LH Present on Original Admission: N  -LH Side: Right  -LH Orientation: anterior  -LH Location: greater trochanter  -LH Primary Wound Type: Puncture  -LH Additional Comments: mastisol, steri strips, 4x4, tape  -LH    Retired Wound - Properties Group Placement Date: 06/06/24  -LH Placement Time: 0945  -LH Present on Original Admission: N  -LH Side: Right  -LH Orientation: anterior  -LH Location: greater trochanter  -LH Primary Wound Type: Puncture  -LH Additional Comments: mastisol, steri strips, 4x4, tape  -LH    Retired Wound - Properties Group Date first assessed: 06/06/24  -LH Time first assessed: 0945  -LH Present on Original Admission: N  -LH Side: Right  -LH Location: greater trochanter  -LH Primary Wound Type: Puncture  -LH Additional Comments: mastisol, steri strips, 4x4, tape  -LH      Row Name             Wound 06/06/24 0945 Right distal leg Puncture    Wound - Properties Group Placement Date: 06/06/24  - Placement Time: 0945  -LH Present on Original Admission: N  -LH Side: Right  -LH Orientation: distal  -LH Location: leg  -LH Primary Wound Type: Puncture  -LH Additional Comments: mastisol, steri strips, 4x4, tape, ace  -LH    Retired Wound - Properties Group Placement Date: 06/06/24  - Placement Time: 0945  -LH Present on Original Admission: N  -LH Side: Right  -LH Orientation: distal  -LH Location: leg  -LH Primary Wound Type: Puncture  -LH Additional Comments: mastisol, steri strips, 4x4, tape, ace  -LH    Retired Wound - Properties Group Date first assessed: 06/06/24  - Time first assessed: 0945  -LH Present on Original Admission: N  -LH Side: Right  -LH Location: leg  -LH Primary Wound Type: Puncture  -LH Additional Comments: mastisol, steri strips, 4x4, tape, ace  -LH      Row Name 06/10/24 0953          Plan of Care Review    Plan of  Care Reviewed With patient  -MF     Progress improving  -     Outcome Evaluation Pt. agreeable to therapy and continues to do well with activity. He was able to stand and ambulate with SBA. He walked 525' with 2 standing rests. Pt's O2 sat was 95% while on RA with activity. Will continue to work on independence.  -       Row Name 06/10/24 0953          Vital Signs    Pre SpO2 (%) 95  -MF     O2 Delivery Pre Treatment room air  -MF     Intra SpO2 (%) 94  -MF     O2 Delivery Intra Treatment room air  -MF     Post SpO2 (%) 95  -MF     O2 Delivery Post Treatment room air  -MF     Pre Patient Position Sitting  -MF     Intra Patient Position Standing  -MF     Post Patient Position Sitting  -MF       Row Name 06/10/24 1458 06/10/24 0953       Positioning and Restraints    Pre-Treatment Position sitting in chair/recliner  - sitting in chair/recliner  -MF    Post Treatment Position bed  -AH chair  -MF    In Bed fowlers;call light within reach;encouraged to call for assist  - --    In Chair -- sitting;call light within reach;encouraged to call for assist;RUE elevated;LUE elevated  -              User Key  (r) = Recorded By, (t) = Taken By, (c) = Cosigned By      Initials Name Provider Type    Tasneem Grace PTA Physical Therapist Assistant    Danielle Bazzi RN Registered Nurse    Esther Beasley PTA Physical Therapist Assistant                    Physical Therapy Education       Title: PT OT SLP Therapies (Done)       Topic: Physical Therapy (Done)       Point: Mobility training (Done)       Learning Progress Summary             Patient Acceptance, E,ASHLEY,ESTRADA, CHERI HARTLEY,NR by  at 6/7/2024 4943    Comment: education re: purpose of PT/importance of activity, safety/falls prevention, sternal precautions, improved tech w/ tfers, gait, deep breathing and pacing activity                         Point: Home exercise program (Done)       Learning Progress Summary             Patient Acceptance, E,ESTRADA RAMIREZ, ODILIA,CHERI,NR  by HALEY at 6/7/2024 1527    Comment: education re: purpose of PT/importance of activity, safety/falls prevention, sternal precautions, improved tech w/ tfers, gait, deep breathing and pacing activity                         Point: Body mechanics (Done)       Learning Progress Summary             Patient Acceptance, E,TB,D, VU,DU,NR by HALEY at 6/7/2024 1527    Comment: education re: purpose of PT/importance of activity, safety/falls prevention, sternal precautions, improved tech w/ tfers, gait, deep breathing and pacing activity                         Point: Precautions (Done)       Learning Progress Summary             Patient Acceptance, E,TB,D, VU,DU,NR by HALEY at 6/7/2024 1527    Comment: education re: purpose of PT/importance of activity, safety/falls prevention, sternal precautions, improved tech w/ tfers, gait, deep breathing and pacing activity                                         User Key       Initials Effective Dates Name Provider Type Discipline     08/02/18 -  Leatha Giles, PT Physical Therapist PT                  PT Recommendation and Plan         Outcome Measures       Row Name 06/10/24 0953 06/09/24 0955 06/08/24 1000       How much help from another person do you currently need...    Turning from your back to your side while in flat bed without using bedrails? 2  -MF 2  -MF 2  -MF    Moving from lying on back to sitting on the side of a flat bed without bedrails? 2  -MF 2  -MF 2  -MF    Moving to and from a bed to a chair (including a wheelchair)? 3  -MF 3  -MF 3  -MF    Standing up from a chair using your arms (e.g., wheelchair, bedside chair)? 3  -MF 3  -MF 3  -MF    Climbing 3-5 steps with a railing? 2  -MF 2  -MF 2  -MF    To walk in hospital room? 3  -MF 3  -MF 3  -MF    AM-PAC 6 Clicks Score (PT) 15  -MF 15  -MF 15  -MF    Highest Level of Mobility Goal 4 --> Transfer to chair/commode  -MF 4 --> Transfer to chair/commode  -MF 4 --> Transfer to chair/commode  -MF       Functional Assessment     Outcome Measure Options AM-PAC 6 Clicks Basic Mobility (PT)  -MF AM-PAC 6 Clicks Basic Mobility (PT)  -MF AM-PAC 6 Clicks Basic Mobility (PT)  -              User Key  (r) = Recorded By, (t) = Taken By, (c) = Cosigned By      Initials Name Provider Type     Esther Mccoy PTA Physical Therapist Assistant                     Time Calculation:    PT Charges       Row Name 06/10/24 1522 06/10/24 1031          Time Calculation    Start Time 1458  - 0953  -     Stop Time 1523  - 1031  -     Time Calculation (min) 25 min  - 38 min  -     PT Received On -- 06/10/24  -        Time Calculation- PT    Total Timed Code Minutes- PT 25 minute(s)  - 38 minute(s)  -        Timed Charges    48778 - Gait Training Minutes  25  - 38  -        Total Minutes    Timed Charges Total Minutes 25  - 38  -      Total Minutes 25  - 38  -               User Key  (r) = Recorded By, (t) = Taken By, (c) = Cosigned By      Initials Name Provider Type    Tasneem Grace PTA Physical Therapist Assistant     Esther Mccoy PTA Physical Therapist Assistant                  Therapy Charges for Today       Code Description Service Date Service Provider Modifiers Qty    14874205446 HC GAIT TRAINING EA 15 MIN 6/9/2024 Tasneem Covington PTA GP 2    80786933158 HC GAIT TRAINING EA 15 MIN 6/10/2024 Tasneem Covington PTA GP 2            PT G-Codes  Outcome Measure Options: AM-PAC 6 Clicks Basic Mobility (PT)  AM-PAC 6 Clicks Score (PT): 15    Tasneem Covington PTA  6/10/2024

## 2024-06-10 NOTE — THERAPY TREATMENT NOTE
Acute Care - Physical Therapy Treatment Note  [facility]     Patient Name: Constantin Seals  : 1980  MRN: [id number]  Today's Date: 6/10/2024      Visit Dx:     ICD-10-CM ICD-9-CM   1. Chest pain, unspecified type  R07.9 786.50   2. Troponin I above reference range  R79.89 790.6   3. NSTEMI (non-ST elevated myocardial infarction)  I21.4 410.70   4. Coronary artery disease involving native coronary artery of native heart with unstable angina pectoris  I25.110 414.01     411.1   5. Impaired functional mobility and activity tolerance [Z74.09]  Z74.09 V49.89     Patient Active Problem List   Diagnosis    Class 3 severe obesity due to excess calories with serious comorbidity and body mass index (BMI) of 50.0 to 59.9 in adult    Primary hypertension    Gastroesophageal reflux disease without esophagitis    SHERRON (obstructive sleep apnea)    NSTEMI (non-ST elevated myocardial infarction)    Coronary artery disease involving native coronary artery of native heart with unstable angina pectoris    Gross hematuria    Myoglobinuria     Past Medical History:   Diagnosis Date    COVID     GERD (gastroesophageal reflux disease)     Gout     Heartburn     Hypertension     Obesity      Past Surgical History:   Procedure Laterality Date    CARDIAC CATHETERIZATION N/A 2024    Procedure: Left Heart Cath;  Surgeon: Chip Roberts MD;  Location: [facility] CATH INVASIVE LOCATION;  Service: Cardiology;  Laterality: N/A;    CORONARY ARTERY BYPASS GRAFT N/A 2024    Procedure: CORONARY ARTERY BYPASS GRAFTING X4, LEFT INTERNAL MAMMARY ARTERY GRAFT, RIGHT LEG ENDOSCOPIC VEIN HARVEST, STERNAL PLATING, APPLICATION PREVENA, TRANSESOPHAGEAL ECHOCARDIOGRAM;  Surgeon: Sean Trejo MD;  Location: [facility] OR;  Service: Cardiothoracic;  Laterality: N/A;    DISTAL BICEPS TENDON REPAIR Right 2022    Procedure: RIGHT DISTAL BICEPS TENDON REPAIR WITH SEMI-T ALLOGRAFT;  Surgeon: Benjamin العراقي MD;  Location:  [facility] OR;   Service: Orthopedics;  Laterality: Right;    ELBOW PROCEDURE      EYE SURGERY Right 1984    COLES CATHETER N/A 6/6/2024    Procedure: COLES CATHETER INSERTION;  Surgeon: Omari Beaulieu MD;  Location:  [facility] OR;  Service: Urology;  Laterality: N/A;     PT Assessment (Last 12 Hours)       PT Evaluation and Treatment       Row Name 06/10/24 0953          Physical Therapy Time and Intention    Subjective Information complains of;pain  -     Document Type therapy note (daily note)  -     Mode of Treatment physical therapy  -       Row Name 06/10/24 0953          General Information    Existing Precautions/Restrictions fall;sternal  prevena  -       Row Name 06/10/24 0953          Pain    Pretreatment Pain Rating 2/10  -     Posttreatment Pain Rating 4/10  -     Pain Location incisional  -     Pain Location - chest  -     Pain Intervention(s) Repositioned;Ambulation/increased activity  -       Row Name 06/10/24 0953          Bed Mobility    Comment, (Bed Mobility) chair  -       Row Name 06/10/24 0953          Sit-Stand Transfer    Sit-Stand [place] (Transfers) standby assist  -       Row Name 06/10/24 0953          Stand-Sit Transfer    Stand-Sit [place] (Transfers) standby assist  -       Row Name 06/10/24 0953          Gait/Stairs (Locomotion)    [place] Level (Gait) standby assist  -     Distance in Feet (Gait) 525  2 standing rests  -     Deviations/Abnormal Patterns (Gait) quyen decreased  -       Row Name 06/10/24 0953          Motor Skills    Comments, Therapeutic Exercise ankle pumps x 15  -       Row Name             Wound 06/06/24 0945 sternal Incision    Wound - Properties Group Placement Date: 06/06/24  [facility] [place] Placement Time: 0945 -LH Present on Original Admission: N  -LH Location: sternal  - Primary Wound Type: Incision  - Additional Comments: mastisol, steri strips, prevena  -    Retired Wound - Properties Group Placement Date: 06/06/24  [facility] [place]  Placement Time: 0945 -LH Present on Original Admission: N  -LH Location: sternal  -LH Primary Wound Type: Incision  -LH Additional Comments: mastisol, steri strips, prevena  -LH    Retired Wound - Properties Group Date first assessed: 06/06/24  - Time first assessed: 0945 -LH Present on Original Admission: N  -LH Location: sternal  -LH Primary Wound Type: Incision  -LH Additional Comments: mastisol, steri strips, prevena  -LH      Row Name             Wound Right anterior knee Incision    Wound - Properties Group Side: Right  -LH Orientation: anterior  -LH Location: knee  -LH Primary Wound Type: Incision  -LH Additional Comments: mastisol, steri strips, 4x4, tape ace  -LH    Retired Wound - Properties Group Side: Right  -LH Orientation: anterior  -LH Location: knee  -LH Primary Wound Type: Incision  -LH Additional Comments: mastisol, steri strips, 4x4, tape ace  -LH    Retired Wound - Properties Group Side: Right  -LH Location: knee  -LH Primary Wound Type: Incision  -LH Additional Comments: mastisol, steri strips, 4x4, tape ace  -LH      Row Name             Wound 06/06/24 0945 Right anterior greater trochanter Puncture    Wound - Properties Group Placement Date: 06/06/24  - Placement Time: 0945 -LH Present on Original Admission: N  -LH Side: Right  -LH Orientation: anterior  -LH Location: greater trochanter  -LH Primary Wound Type: Puncture  -LH Additional Comments: mastisol, steri strips, 4x4, tape  -LH    Retired Wound - Properties Group Placement Date: 06/06/24  - Placement Time: 0945  -LH Present on Original Admission: N  -LH Side: Right  -LH Orientation: anterior  -LH Location: greater trochanter  -LH Primary Wound Type: Puncture  -LH Additional Comments: mastisol, steri strips, 4x4, tape  -LH    Retired Wound - Properties Group Date first assessed: 06/06/24  - Time first assessed: 0945 -LH Present on Original Admission: N  -LH Side: Right  -LH Location: greater trochanter  -LH Primary Wound Type:  Puncture  -LH Additional Comments: mastisol, steri strips, 4x4, tape  -LH      Row Name             Wound 06/06/24 0945 Right distal leg Puncture    Wound - Properties Group Placement Date: 06/06/24  - Placement Time: 0945 -LH Present on Original Admission: N  -LH Side: Right  -LH Orientation: distal  -LH Location: leg  -LH Primary Wound Type: Puncture  -LH Additional Comments: mastisol, steri strips, 4x4, tape, ace  -LH    Retired Wound - Properties Group Placement Date: 06/06/24  - Placement Time: 0945 -LH Present on Original Admission: N  -LH Side: Right  -LH Orientation: distal  -LH Location: leg  -LH Primary Wound Type: Puncture  -LH Additional Comments: mastisol, steri strips, 4x4, tape, ace  -LH    Retired Wound - Properties Group Date first assessed: 06/06/24 - Time first assessed: 0945 -LH Present on Original Admission: N  -LH Side: Right  -LH Location: leg  -LH Primary Wound Type: Puncture  -LH Additional Comments: mastisol, steri strips, 4x4, tape, ace  -LH      Row Name 06/10/24 0953          Plan of Care Review    Plan of Care Reviewed With patient  -MF     Progress improving  -MF     Outcome Evaluation Pt. agreeable to therapy and continues to do well with activity. He was able to stand and ambulate with SBA. He walked 525' with 2 standing rests. Pt's O2 sat was 95% while on RA with activity. Will continue to work on independence.  -       Row Name 06/10/24 0953          Vital Signs    Pre SpO2 (%) 95  -MF     O2 Delivery Pre Treatment room air  -MF     Intra SpO2 (%) 94  -MF     O2 Delivery Intra Treatment room air  -MF     Post SpO2 (%) 95  -MF     O2 Delivery Post Treatment room air  -MF     Pre Patient Position Sitting  -MF     Intra Patient Position Standing  -MF     Post Patient Position Sitting  -MF       Row Name 06/10/24 0953          Positioning and Restraints    Pre-Treatment Position sitting in chair/recliner  -MF     Post Treatment Position chair  -MF     In Chair sitting;call  light within reach;encouraged to call for assist;RUE elevated;LUE elevated  -               User Key  (r) = Recorded By, (t) = Taken By, (c) = Cosigned By      Initials Name Provider Type    Danielle Bazzi, RN Registered Nurse    Esther Beasley, PTA Physical Therapist Assistant                    Physical Therapy Education       Title: PT OT SLP Therapies (Done)       Topic: Physical Therapy (Done)       Point: Mobility training (Done)       Learning Progress Summary             Patient Acceptance, E,TB,D, VU,DU,NR by  at 6/7/2024 1527    Comment: education re: purpose of PT/importance of activity, safety/falls prevention, sternal precautions, improved tech w/ tfers, gait, deep breathing and pacing activity                         Point: Home exercise program (Done)       Learning Progress Summary             Patient Acceptance, E,TB,D, VU,DU,NR by  at 6/7/2024 1527    Comment: education re: purpose of PT/importance of activity, safety/falls prevention, sternal precautions, improved tech w/ tfers, gait, deep breathing and pacing activity                         Point: Body mechanics (Done)       Learning Progress Summary             Patient Acceptance, E,TB,D, VU,DU,NR by  at 6/7/2024 1527    Comment: education re: purpose of PT/importance of activity, safety/falls prevention, sternal precautions, improved tech w/ tfers, gait, deep breathing and pacing activity                         Point: Precautions (Done)       Learning Progress Summary             Patient Acceptance, E,TB,D, VU,DU,NR by  at 6/7/2024 1527    Comment: education re: purpose of PT/importance of activity, safety/falls prevention, sternal precautions, improved tech w/ tfers, gait, deep breathing and pacing activity                                         User Key       Initials Effective Dates Name Provider Type Discipline     08/02/18 -  Leatha Giles, PT Physical Therapist PT                  PT Recommendation and Plan      Plan of Care Reviewed With: patient  Progress: improving  Outcome Evaluation: Pt. agreeable to therapy and continues to do well with activity. He was able to stand and ambulate with SBA. He walked 525' with 2 standing rests. Pt's O2 sat was 95% while on RA with activity. Will continue to work on independence.   Outcome Measures       Row Name 06/10/24 0953 06/09/24 0955 06/08/24 1000       How much help from another person do you currently need...    Turning from your back to your side while in flat bed without using bedrails? 2  -MF 2  -MF 2  -MF    Moving from lying on back to sitting on the side of a flat bed without bedrails? 2  -MF 2  -MF 2  -MF    Moving to and from a bed to a chair (including a wheelchair)? 3  -MF 3  -MF 3  -MF    Standing up from a chair using your arms (e.g., wheelchair, bedside chair)? 3  -MF 3  -MF 3  -MF    Climbing 3-5 steps with a railing? 2  -MF 2  -MF 2  -MF    To walk in hospital room? 3  -MF 3  -MF 3  -MF    AM-PAC 6 Clicks Score (PT) 15  -MF 15  -MF 15  -MF    Highest Level of Mobility Goal 4 --> Transfer to chair/commode  -MF 4 --> Transfer to chair/commode  -MF 4 --> Transfer to chair/commode  -MF       Functional Assessment    Outcome Measure Options AM-PAC 6 Clicks Basic Mobility (PT)  -MF AM-PAC 6 Clicks Basic Mobility (PT)  -MF AM-PAC 6 Clicks Basic Mobility (PT)  -MF              User Key  (r) = Recorded By, (t) = Taken By, (c) = Cosigned By      Initials Name Provider Type    Esther Beasley PTA Physical Therapist Assistant                     Time Calculation:    PT Charges       Row Name 06/10/24 1031             Time Calculation    Start Time 0953  -MF      Stop Time 1031  -MF      Time Calculation (min) 38 min  -MF      PT Received On 06/10/24  -MF         Time Calculation- PT    Total Timed Code Minutes- PT 38 minute(s)  -MF         Timed Charges    62019 - Gait Training Minutes  38  -MF         Total Minutes    Timed Charges Total Minutes 38  -MF        Total Minutes 38  -MF                User Key  (r) = Recorded By, (t) = Taken By, (c) = Cosigned By      Initials Name Provider Type    Esther Beasley PTA Physical Therapist Assistant                  Therapy Charges for Today       Code Description Service Date Service Provider Modifiers Qty    78952683990 HC GAIT TRAINING EA 15 MIN 6/9/2024 Esther Mccoy PTA GP 2    48064844833 HC GAIT TRAINING EA 15 MIN 6/10/2024 Esther Mccoy PTA GP 3            PT G-Codes  Outcome Measure Options: AM-PAC 6 Clicks Basic Mobility (PT)  AM-PAC 6 Clicks Score (PT): 15    Esther Mccoy PTA  6/10/2024

## 2024-06-10 NOTE — PLAN OF CARE
Goal Outcome Evaluation:  Plan of Care Reviewed With: patient        Progress: improving  Outcome Evaluation: Pt. agreeable to therapy and continues to do well with activity. He was able to stand and ambulate with SBA. He walked 525' with 2 standing rests. Pt's O2 sat was 95% while on RA with activity. Will continue to work on independence.

## 2024-06-10 NOTE — PROGRESS NOTES
"Coronary artery bypass grafting x 4 (LIMA/LAD, RSVG/dRCA, RSVG/OM, and RSVG/D), right leg endoscopic vein harvest, sternalock XP sternal plating, application of Prevena incision management system    POD 4    Sitting up in the chair.  On room air.  Reports he did not sleep well last night.  He was seeing people in his room and describes hallucinations.  He thought he was dreaming.  Weight is down 3 pounds in the last 24 hours, he remains 7 pounds above baseline weight.  Walking over 200 feet with PT.  Vera catheter in place.    IV drips: None  Telemetry: Sinus-sinus tachycardia  bpm    Visit Vitals  /63 (BP Location: Right arm, Patient Position: Sitting)   Pulse 73   Temp 97.2 °F (36.2 °C) (Oral)   Resp 16   Ht 195.6 cm (77\")   Wt (!) 206 kg (454 lb 12.8 oz)   SpO2 95%   BMI 53.93 kg/m²   Preoperative weight: 447 pounds    Intake/Output Summary (Last 24 hours) at 6/10/2024 1024  Last data filed at 6/10/2024 0907  Gross per 24 hour   Intake 1430 ml   Output 4250 ml   Net -2820 ml     Labs:  Lab Results   Component Value Date    WBC 7.16 06/10/2024    HGB 9.4 (L) 06/10/2024    HCT 29.9 (L) 06/10/2024    MCV 88.7 06/10/2024     06/10/2024      Lab Results   Component Value Date    GLUCOSE 114 (H) 06/10/2024    BUN 14 06/10/2024    CREATININE 0.80 06/10/2024    EGFR 112.6 06/10/2024    BCR 17.5 06/10/2024    K 4.1 06/10/2024    CO2 30.0 (H) 06/10/2024    CALCIUM 8.4 (L) 06/10/2024    ALBUMIN 3.1 (L) 06/09/2024    BILITOT 0.4 06/09/2024    AST 26 06/09/2024    ALT 26 06/09/2024      Myoglobin Screen, Urine - Urine, Clean Catch    Specimen Information: Urine, Clean Catch   0 Result Notes      Component  Ref Range & Units 2 d ago/6/7/2024   Myoglobin, Qualitative  Negative Positive Abnormal    Resulting Agency  [facility]              Specimen Collected: 06/07/24 09:58 CDT Last Resulted: 06/07/24 10:27 CDT         Myoglobin, Serum  Specimen Information: Blood   0 Result Notes        Component  Ref Range & " Units 1 d ago  6/8/2024 2 d ago  6/7/2024 3 d ago   Myoglobin  28.0 - 72.0 ng/mL 228.0 High  957.0 High  32.5   [facility]ing [facility]  [facility]           No chest x ray today    Physical Exam:  General: No apparent distress. In good spirits.  Up in the chair.   Mouth: Swelling and erythema to right tongue, associated with previous ET tube.  Cardiovascular: Regular rate and rhythm without murmur, rubs, or gallops.    Pulmonary: Clear to auscultation bilaterally without wheezing, rubs, or rales.  Chest: Sternotomy incision clean, dry, and intact. Sternum stable. No clicks. Prevena in place.   Abdomen: Soft, nondistended and nontender.  Extremities: Warm, moves all extremities. Saphenectomy site clean, dry, and intact. Peripheral edema.   : Vera catheter in place, urine clear yellow.  Neurologic: Grossly intact with no focal deficits.       Impression:  Non-STEMI  Coronary artery disease with unstable angina  Class III severe obesity, BMI 52.96  Hypertension  Prediabetes  Obstructive sleep apnea  Gross hematuria with history of hematuria    Plan:   DC Vera catheter today as long as okay with urology services  Intensify diuresis  De-escalate narcotics.  Discussed this is likely contributor to hallucinations last night.  Plavix remains on hold due to hematuria.   Encourage pulmonary toilet and ambulation  Routine postcardiac surgery regimen  Anticipate discharge home tomorrow   Discussed with patient and nursing

## 2024-06-10 NOTE — CASE MANAGEMENT/SOCIAL WORK
Continued Stay Note   [place]     Patient Name: Constantin Seals  MRN: [id number]  Today's Date: 6/10/2024    Admit Date: 5/31/2024    Plan: Home   Discharge Plan       Row Name 06/10/24 1251       Plan    Plan Home    Final Discharge Disposition Code 01 - home or self-care    Final Note Chart reviewed. Patient was able to walk 525ft today with PTA. Anticipate discharge home tomorrow. No discharge needs identified.               CATRACHO Teran

## 2024-06-10 NOTE — PLAN OF CARE
Problem: Adult Inpatient Plan of Care  Goal: Plan of Care Review  Outcome: Ongoing, Progressing  Goal: Patient-Specific Goal (Individualized)  Outcome: Ongoing, Progressing  Goal: Absence of Hospital-Acquired Illness or Injury  Outcome: Ongoing, Progressing  Intervention: Identify and Manage Fall Risk  Recent Flowsheet Documentation  Taken 6/10/2024 0400 by Salud Gaston RN  Safety Promotion/Fall Prevention: safety round/check completed  Taken 6/10/2024 0200 by Salud Gaston RN  Safety Promotion/Fall Prevention: safety round/check completed  Taken 6/10/2024 0032 by Salud Gaston RN  Safety Promotion/Fall Prevention: safety round/check completed  Taken 6/10/2024 0002 by Salud Gaston RN  Safety Promotion/Fall Prevention: safety round/check completed  Taken 6/9/2024 2200 by Salud Gaston RN  Safety Promotion/Fall Prevention: safety round/check completed  Taken 6/9/2024 2100 by Salud Gaston RN  Safety Promotion/Fall Prevention: safety round/check completed  Taken 6/9/2024 2000 by Salud Gaston RN  Safety Promotion/Fall Prevention: safety round/check completed  Intervention: Prevent Skin Injury  Recent Flowsheet Documentation  Taken 6/9/2024 2000 by aSlud Gaston RN  Body Position: position changed independently  Intervention: Prevent and Manage VTE (Venous Thromboembolism) Risk  Recent Flowsheet Documentation  Taken 6/9/2024 2000 by Salud Gaston RN  VTE Prevention/Management: (see mar) other (see comments)  Range of Motion: ROM (range of motion) performed  Goal: Optimal Comfort and Wellbeing  Outcome: Ongoing, Progressing  Intervention: Monitor Pain and Promote Comfort  Recent Flowsheet Documentation  Taken 6/10/2024 0002 by Salud Gaston RN  Pain Management Interventions: see MAR  Goal: Readiness for Transition of Care  Outcome: Ongoing, Progressing     Problem: Hypertension Comorbidity  Goal: Blood Pressure in Desired Range  Outcome: Ongoing, Progressing     Problem: Arrhythmia/Dysrhythmia (Cardiac  Catheterization)  Goal: Stable Heart Rate and Rhythm  Outcome: Ongoing, Progressing     Problem: Bleeding (Cardiac Catheterization)  Goal: Absence of Bleeding  Outcome: Ongoing, Progressing     Problem: Contrast-Induced Injury Risk (Cardiac Catheterization)  Goal: Absence of Contrast-Induced Injury  Outcome: Ongoing, Progressing     Problem: Embolism (Cardiac Catheterization)  Goal: Absence of Embolism Signs and Symptoms  Outcome: Ongoing, Progressing  Intervention: Prevent or Manage Embolism  Recent Flowsheet Documentation  Taken 6/9/2024 2000 by Salud Gaston RN  VTE Prevention/Management: (see mar) other (see comments)     Problem: Ongoing Anesthesia/Sedation Effects (Cardiac Catheterization)  Goal: Anesthesia/Sedation Recovery  Outcome: Ongoing, Progressing  Intervention: Optimize Anesthesia Recovery  Recent Flowsheet Documentation  Taken 6/10/2024 0400 by Salud Gaston RN  Safety Promotion/Fall Prevention: safety round/check completed  Taken 6/10/2024 0200 by Salud Gaston RN  Safety Promotion/Fall Prevention: safety round/check completed  Taken 6/10/2024 0032 by Salud Gaston RN  Safety Promotion/Fall Prevention: safety round/check completed  Taken 6/10/2024 0002 by Salud Gaston RN  Safety Promotion/Fall Prevention: safety round/check completed  Taken 6/9/2024 2200 by Salud Gaston RN  Safety Promotion/Fall Prevention: safety round/check completed  Taken 6/9/2024 2100 by Salud Gaston RN  Safety Promotion/Fall Prevention: safety round/check completed  Taken 6/9/2024 2000 by Salud Gaston RN  Patient Tolerance (IS): fair  Safety Promotion/Fall Prevention: safety round/check completed     Problem: Pain (Cardiac Catheterization)  Goal: Acceptable Pain Control  Outcome: Ongoing, Progressing  Intervention: Prevent or Manage Pain  Recent Flowsheet Documentation  Taken 6/10/2024 0002 by Salud Gaston RN  Pain Management Interventions: see MAR     Problem: Vascular Access Protection (Cardiac  Catheterization)  Goal: Absence of Vascular Access Complication  Outcome: Ongoing, Progressing     Problem: Activity Intolerance (Cardiovascular Surgery)  Goal: Improved Activity Tolerance  Outcome: Ongoing, Progressing     Problem: Adjustment to Surgery (Cardiovascular Surgery)  Goal: Optimal Coping with Heart Surgery  Outcome: Ongoing, Progressing     Problem: Bleeding (Cardiovascular Surgery)  Goal: Bleeding (Cardiovascular Surgery)  Outcome: Ongoing, Progressing     Problem: Bowel Motility Impaired (Cardiovascular Surgery)  Goal: Effective Bowel Elimination (Cardiovascular Surgery)  Outcome: Ongoing, Progressing     Problem: Cardiac Function Impaired (Cardiovascular Surgery)  Goal: Effective Cardiac Function  Outcome: Ongoing, Progressing     Problem: Cerebral Tissue Perfusion (Cardiovascular Surgery)  Goal: Optimal Cerebral Tissue Perfusion (Cardiovascular Surgery)  Outcome: Ongoing, Progressing     Problem: Fluid and Electrolyte Imbalance (Cardiovascular Surgery)  Goal: Fluid and Electrolyte Balance (Cardiovascular Surgery)  Outcome: Ongoing, Progressing     Problem: Glycemic Control Impaired (Cardiovascular Surgery)  Goal: Blood Glucose Level Within Targeted Range (Cardiovascular Surgery)  Outcome: Ongoing, Progressing     Problem: Infection (Cardiovascular Surgery)  Goal: Absence of Infection Signs and Symptoms  Outcome: Ongoing, Progressing     Problem: Ongoing Anesthesia Effects (Cardiovascular Surgery)  Goal: Anesthesia/Sedation Recovery  Outcome: Ongoing, Progressing  Intervention: Optimize Anesthesia Recovery  Recent Flowsheet Documentation  Taken 6/10/2024 0400 by Salud Gaston RN  Safety Promotion/Fall Prevention: safety round/check completed  Taken 6/10/2024 0200 by Salud Gaston RN  Safety Promotion/Fall Prevention: safety round/check completed  Taken 6/10/2024 0032 by Salud Gaston RN  Safety Promotion/Fall Prevention: safety round/check completed  Taken 6/10/2024 0002 by Salud Gaston,  RN  Safety Promotion/Fall Prevention: safety round/check completed  Taken 6/9/2024 2200 by Salud Gaston RN  Safety Promotion/Fall Prevention: safety round/check completed  Taken 6/9/2024 2100 by Salud Gaston RN  Safety Promotion/Fall Prevention: safety round/check completed  Taken 6/9/2024 2000 by Salud Gaston RN  Patient Tolerance (IS): fair  Safety Promotion/Fall Prevention: safety round/check completed     Problem: Pain (Cardiovascular Surgery)  Goal: Acceptable Pain Control  Outcome: Ongoing, Progressing  Intervention: Prevent or Manage Pain  Recent Flowsheet Documentation  Taken 6/10/2024 0002 by Salud Gaston RN  Pain Management Interventions: see MAR     Problem: Postoperative Nausea and Vomiting (Cardiovascular Surgery)  Goal: Nausea and Vomiting Relief (Cardiovascular Surgery)  Outcome: Ongoing, Progressing     Problem: Postoperative Urinary Retention (Cardiovascular Surgery)  Goal: Effective Urinary Elimination (Cardiovascular Surgery)  Outcome: Ongoing, Progressing     Problem: Respiratory Compromise (Cardiovascular Surgery)  Goal: Effective Oxygenation and Ventilation (Cardiovascular Surgery)  Outcome: Ongoing, Progressing  Intervention: Promote Airway Secretion Clearance  Recent Flowsheet Documentation  Taken 6/9/2024 2000 by Salud Gaston RN  Patient Tolerance (IS): fair  Cough And Deep Breathing: done with encouragement     Problem: Fall Injury Risk  Goal: Absence of Fall and Fall-Related Injury  Outcome: Ongoing, Progressing  Intervention: Promote Injury-Free Environment  Recent Flowsheet Documentation  Taken 6/10/2024 0400 by Salud Gaston RN  Safety Promotion/Fall Prevention: safety round/check completed  Taken 6/10/2024 0200 by Salud Gaston RN  Safety Promotion/Fall Prevention: safety round/check completed  Taken 6/10/2024 0032 by Salud Gaston RN  Safety Promotion/Fall Prevention: safety round/check completed  Taken 6/10/2024 0002 by Salud Gaston RN  Safety Promotion/Fall  Prevention: safety round/check completed  Taken 6/9/2024 2200 by Salud Gaston, RN  Safety Promotion/Fall Prevention: safety round/check completed  Taken 6/9/2024 2100 by Salud Gaston, RN  Safety Promotion/Fall Prevention: safety round/check completed  Taken 6/9/2024 2000 by Salud Gaston, RN  Safety Promotion/Fall Prevention: safety round/check completed     Problem: Skin Injury Risk Increased  Goal: Skin Health and Integrity  Outcome: Ongoing, Progressing   Goal Outcome Evaluation:         S  per tele.

## 2024-06-11 ENCOUNTER — APPOINTMENT (OUTPATIENT)
Dept: ULTRASOUND IMAGING | Facility: HOSPITAL | Age: 44
End: 2024-06-11
Payer: COMMERCIAL

## 2024-06-11 ENCOUNTER — APPOINTMENT (OUTPATIENT)
Dept: GENERAL RADIOLOGY | Facility: HOSPITAL | Age: 44
End: 2024-06-11
Payer: COMMERCIAL

## 2024-06-11 ENCOUNTER — APPOINTMENT (OUTPATIENT)
Dept: CT IMAGING | Facility: HOSPITAL | Age: 44
End: 2024-06-11
Payer: COMMERCIAL

## 2024-06-11 LAB
ANION GAP SERPL CALCULATED.3IONS-SCNC: 7 MMOL/L (ref 5–15)
BUN SERPL-MCNC: 21 MG/DL (ref 6–20)
BUN/CREAT SERPL: 22.6 (ref 7–25)
CALCIUM SPEC-SCNC: 8.6 MG/DL (ref 8.6–10.5)
CHLORIDE SERPL-SCNC: 97 MMOL/L (ref 98–107)
CO2 SERPL-SCNC: 31 MMOL/L (ref 22–29)
CREAT SERPL-MCNC: 0.93 MG/DL (ref 0.76–1.27)
DEPRECATED RDW RBC AUTO: 43.4 FL (ref 37–54)
EGFRCR SERPLBLD CKD-EPI 2021: 104.5 ML/MIN/1.73
ERYTHROCYTE [DISTWIDTH] IN BLOOD BY AUTOMATED COUNT: 13.5 % (ref 12.3–15.4)
GLUCOSE BLDC GLUCOMTR-MCNC: 145 MG/DL (ref 70–130)
GLUCOSE BLDC GLUCOMTR-MCNC: 149 MG/DL (ref 70–130)
GLUCOSE BLDC GLUCOMTR-MCNC: 157 MG/DL (ref 70–130)
GLUCOSE BLDC GLUCOMTR-MCNC: 82 MG/DL (ref 70–130)
GLUCOSE SERPL-MCNC: 119 MG/DL (ref 65–99)
HCT VFR BLD AUTO: 29.5 % (ref 37.5–51)
HGB BLD-MCNC: 9.3 G/DL (ref 13–17.7)
MCH RBC QN AUTO: 27.5 PG (ref 26.6–33)
MCHC RBC AUTO-ENTMCNC: 31.5 G/DL (ref 31.5–35.7)
MCV RBC AUTO: 87.3 FL (ref 79–97)
PLATELET # BLD AUTO: 280 10*3/MM3 (ref 140–450)
PMV BLD AUTO: 9.6 FL (ref 6–12)
POTASSIUM SERPL-SCNC: 4.5 MMOL/L (ref 3.5–5.2)
RBC # BLD AUTO: 3.38 10*6/MM3 (ref 4.14–5.8)
SODIUM SERPL-SCNC: 135 MMOL/L (ref 136–145)
WBC NRBC COR # BLD AUTO: 7.85 10*3/MM3 (ref 3.4–10.8)

## 2024-06-11 PROCEDURE — 25010000002 FUROSEMIDE PER 20 MG: Performed by: NURSE PRACTITIONER

## 2024-06-11 PROCEDURE — 71046 X-RAY EXAM CHEST 2 VIEWS: CPT

## 2024-06-11 PROCEDURE — 97116 GAIT TRAINING THERAPY: CPT

## 2024-06-11 PROCEDURE — 80048 BASIC METABOLIC PNL TOTAL CA: CPT | Performed by: NURSE PRACTITIONER

## 2024-06-11 PROCEDURE — 25010000002 ENOXAPARIN PER 10 MG: Performed by: NURSE PRACTITIONER

## 2024-06-11 PROCEDURE — 94664 DEMO&/EVAL PT USE INHALER: CPT

## 2024-06-11 PROCEDURE — 74176 CT ABD & PELVIS W/O CONTRAST: CPT

## 2024-06-11 PROCEDURE — 63710000001 INSULIN LISPRO (HUMAN) PER 5 UNITS: Performed by: NURSE PRACTITIONER

## 2024-06-11 PROCEDURE — 94799 UNLISTED PULMONARY SVC/PX: CPT

## 2024-06-11 PROCEDURE — 99024 POSTOP FOLLOW-UP VISIT: CPT

## 2024-06-11 PROCEDURE — 76870 US EXAM SCROTUM: CPT

## 2024-06-11 PROCEDURE — 82948 REAGENT STRIP/BLOOD GLUCOSE: CPT

## 2024-06-11 PROCEDURE — 25010000002 KETOROLAC TROMETHAMINE PER 15 MG: Performed by: NURSE PRACTITIONER

## 2024-06-11 PROCEDURE — 85027 COMPLETE CBC AUTOMATED: CPT | Performed by: NURSE PRACTITIONER

## 2024-06-11 RX ORDER — KETOROLAC TROMETHAMINE 30 MG/ML
30 INJECTION, SOLUTION INTRAMUSCULAR; INTRAVENOUS EVERY 6 HOURS PRN
Status: DISCONTINUED | OUTPATIENT
Start: 2024-06-11 | End: 2024-06-12 | Stop reason: HOSPADM

## 2024-06-11 RX ADMIN — IPRATROPIUM BROMIDE 0.5 MG: 0.5 SOLUTION RESPIRATORY (INHALATION) at 06:31

## 2024-06-11 RX ADMIN — ASPIRIN 81 MG: 81 TABLET, COATED ORAL at 08:01

## 2024-06-11 RX ADMIN — PANTOPRAZOLE SODIUM 40 MG: 40 TABLET, DELAYED RELEASE ORAL at 05:10

## 2024-06-11 RX ADMIN — ALBUTEROL SULFATE 1.25 MG: 2.5 SOLUTION RESPIRATORY (INHALATION) at 06:31

## 2024-06-11 RX ADMIN — METOPROLOL TARTRATE 25 MG: 50 TABLET, FILM COATED ORAL at 20:35

## 2024-06-11 RX ADMIN — KETOROLAC TROMETHAMINE 30 MG: 30 INJECTION, SOLUTION INTRAMUSCULAR; INTRAVENOUS at 16:40

## 2024-06-11 RX ADMIN — POTASSIUM CHLORIDE 20 MEQ: 750 CAPSULE, EXTENDED RELEASE ORAL at 08:01

## 2024-06-11 RX ADMIN — ACETAMINOPHEN 1000 MG: 500 TABLET, FILM COATED ORAL at 23:10

## 2024-06-11 RX ADMIN — DIPHENHYDRAMINE HYDROCHLORIDE AND LIDOCAINE HYDROCHLORIDE AND ALUMINUM HYDROXIDE AND MAGNESIUM HYDRO 5 ML: KIT at 12:40

## 2024-06-11 RX ADMIN — IPRATROPIUM BROMIDE 0.5 MG: 0.5 SOLUTION RESPIRATORY (INHALATION) at 20:12

## 2024-06-11 RX ADMIN — ACETAMINOPHEN 1000 MG: 500 TABLET, FILM COATED ORAL at 17:42

## 2024-06-11 RX ADMIN — METOPROLOL TARTRATE 25 MG: 50 TABLET, FILM COATED ORAL at 08:01

## 2024-06-11 RX ADMIN — ACETAMINOPHEN 1000 MG: 500 TABLET, FILM COATED ORAL at 12:36

## 2024-06-11 RX ADMIN — ALBUTEROL SULFATE 1.25 MG: 2.5 SOLUTION RESPIRATORY (INHALATION) at 13:26

## 2024-06-11 RX ADMIN — POTASSIUM CHLORIDE 20 MEQ: 750 CAPSULE, EXTENDED RELEASE ORAL at 12:36

## 2024-06-11 RX ADMIN — FUROSEMIDE 20 MG: 10 INJECTION, SOLUTION INTRAMUSCULAR; INTRAVENOUS at 08:00

## 2024-06-11 RX ADMIN — AMIODARONE HYDROCHLORIDE 400 MG: 200 TABLET ORAL at 17:42

## 2024-06-11 RX ADMIN — FUROSEMIDE 20 MG: 10 INJECTION, SOLUTION INTRAMUSCULAR; INTRAVENOUS at 12:37

## 2024-06-11 RX ADMIN — OXYCODONE HYDROCHLORIDE 5 MG: 5 TABLET ORAL at 16:39

## 2024-06-11 RX ADMIN — ACETAMINOPHEN 1000 MG: 500 TABLET, FILM COATED ORAL at 05:09

## 2024-06-11 RX ADMIN — PREGABALIN 25 MG: 25 CAPSULE ORAL at 08:01

## 2024-06-11 RX ADMIN — POTASSIUM CHLORIDE 20 MEQ: 750 CAPSULE, EXTENDED RELEASE ORAL at 17:42

## 2024-06-11 RX ADMIN — AMIODARONE HYDROCHLORIDE 400 MG: 200 TABLET ORAL at 08:01

## 2024-06-11 RX ADMIN — PREGABALIN 25 MG: 25 CAPSULE ORAL at 20:34

## 2024-06-11 RX ADMIN — ENOXAPARIN SODIUM 40 MG: 100 INJECTION SUBCUTANEOUS at 08:00

## 2024-06-11 RX ADMIN — FUROSEMIDE 20 MG: 10 INJECTION, SOLUTION INTRAMUSCULAR; INTRAVENOUS at 18:37

## 2024-06-11 RX ADMIN — INSULIN LISPRO 2 UNITS: 100 INJECTION, SOLUTION INTRAVENOUS; SUBCUTANEOUS at 08:03

## 2024-06-11 RX ADMIN — ATORVASTATIN CALCIUM 20 MG: 10 TABLET, FILM COATED ORAL at 20:34

## 2024-06-11 RX ADMIN — ALBUTEROL SULFATE 1.25 MG: 2.5 SOLUTION RESPIRATORY (INHALATION) at 20:12

## 2024-06-11 RX ADMIN — IPRATROPIUM BROMIDE 0.5 MG: 0.5 SOLUTION RESPIRATORY (INHALATION) at 13:26

## 2024-06-11 RX ADMIN — OXYCODONE HYDROCHLORIDE 10 MG: 10 TABLET ORAL at 12:44

## 2024-06-11 RX ADMIN — LIDOCAINE 2 PATCH: 4 PATCH TOPICAL at 08:01

## 2024-06-11 RX ADMIN — OXYCODONE HYDROCHLORIDE 5 MG: 5 TABLET ORAL at 06:09

## 2024-06-11 NOTE — CASE MANAGEMENT/SOCIAL WORK
Continued Stay Note  [facility]     Patient Name: Constantin Seals  MRN: [id number]  Today's Date: 6/11/2024    Admit Date: 5/31/2024    Plan: Home   Discharge Plan       Row Name 06/11/24 1537       Plan    Plan Home    Final Discharge Disposition Code 01 - home or self-care    Final Note [facility] cannot take patient. As previously noted, [facility] cannot take. [facility] also cannot take, did not mention in previous note as this agency cannot take any new home care referrals. These three are only three home health agencies that service [facility]. Patient will not have home health.      Row Name 06/11/24 1432       Plan    Plan Home    Plan Comments [facility] will not accept patient. No word yet from [facility].                    Expected Discharge Date and Time       Expected Discharge Date Expected Discharge Time    Jun 11, 2024         CATRACHO Teran

## 2024-06-11 NOTE — CASE MANAGEMENT/SOCIAL WORK
"Continued Stay Note  [facility]     Patient Name: Constantin Seals  MRN: [id number]  Today's Date: 6/11/2024    Admit Date: 5/31/2024    Plan: Home   Discharge Plan       Row Name 06/11/24 1119       Plan    Plan Home    Plan Comments Checking with [facility] and [facility] to see if they are able to take referral. CHI spoke to intake at [facility] and they will review referral faxed. CHI left a message with Linda at [facility]. Waiting on call back from both.      Row Name 06/11/24 1048       Plan    Plan Home    Final Discharge Disposition Code 01 - home or self-care    Final Note Received consult: \"Patient needs include lifted toilet seat and shower chair.\", neither of these items are covered by insurance. Patient will need to purchase privately after discharge, no MD order will be needed.           CATRACHO Teran    "

## 2024-06-11 NOTE — PLAN OF CARE
Goal Outcome Evaluation:   Patient stood and sat with supervision. Ambulated 500' with supervision and 1 standing rest. Reviewed sternal restrictions and advancing walking post D/C.

## 2024-06-11 NOTE — PLAN OF CARE
Goal Outcome Evaluation:  Plan of Care Reviewed With: patient           Outcome Evaluation: VSS, on RA, prevena wound vac dc'd and sternal incision now with steristrips, prn pain meds for occasional c/o of chest/incisional pain, on RA, plans for dc today on hold, pt delveloped left lower back/left flank pain that radiated to left scrotum,  rating 9/10, Neil Flanagan aware, prn oxycodone and now toradol given, pain improved, urology consulted to eval for possible kidney stone, pt down for CT abd pelvis, now off floor for US scrotum, safety maintained

## 2024-06-11 NOTE — DISCHARGE SUMMARY
[facility] Cardiothoracic Surgery  DISCHARGE SUMMARY        Date of Admission: 5/31/2024  Date of Discharge:  6/13/2024  Primary Care Physician: Giovani Lujan,     Discharge Diagnoses:  Active Hospital Problems    Diagnosis     **NSTEMI (non-ST elevated myocardial infarction)     Gross hematuria     Myoglobinuria     Coronary artery disease involving native coronary artery of native heart with unstable angina pectoris     Class 3 severe obesity due to excess calories with serious comorbidity and body mass index (BMI) of 50.0 to 59.9 in adult     Primary hypertension     Gastroesophageal reflux disease without esophagitis     SHERRON (obstructive sleep apnea)      Procedures Performed:   Coronary artery bypass grafting-4 vessel (left internal mammary artery/left anterior descending, reverse saphenous vein graft/distal right coronary artery, reverse saphenous vein graft/obtuse marginal, and reverse saphenous vein graft/diagonal artery), right endoscopic vein harvest of leg proper and limited distal thigh harvest, sternal lock XP sternal plating, application of Prevena incision management by Dr. Trejo on 6/6/2024.    HPI:  Mr. Seals is a 44-year-old male with a history of prediabetes, hypertension, tobacco use, morbid obesity, sedentary lifestyle, previous COVID 19 infection, gout, and a family history of heart disease presents with chest pain for couple days prior to hospitalization.  Wife thought this was similar to his reflux disease symptoms he did have recurrent symptoms that ultimately he decided present to [facility] for further evaluation.  He did rule in for a non-STEMI.  Further workup identified multivessel coronary disease. He is currently taking Ozempic for weight loss and reports losing 70 pounds thus far. With the aforementioned I have been asked to evaluate for consideration of coronary artery bypass grafting.    Hospital Course: Mr. Seals was admitted to cardiothoracic  surgery services on the day of surgery.  Please see separate operative note by Dr. Trejo for details of the surgery.  While having his Vera placed for surgery audrey blood was noted in his urine.  He was evaluated by Dr. Beaulieu with urology.  A three-way catheter was placed.  Urine cleared up overnight with no further episodes of hematuria during this hospital stay.  Following surgery he was taken to the intensive care unit.  He was hemodynamically intact.  He was extubated overnight to 3 L/min nasal cannula.  He was neurologically intact.  He was up to the chair and working with physical therapy.  Oxygen was weaned off and oxygen saturation was acceptable on room air.  He was transferred to  on postop day 2 for ongoing recovery and care.  The rest of his hospital stay was remarkable for pulmonary toilet, ambulation and diuresis.  Mediastinal chest tubes and pleural drain were removed without remark.  Vera catheter was removed without difficulty.  Pacing wires were removed without remark.  Patient was ready for discharge on postop day 5 and discharge planning was in place but the patient developed groin and scrotal pain.  An ultrasound of the scrotum and testicles was performed and unremarkable.  Patient later passed a large blood clot in his urine and pain was relieved.  He was seen by urology today and cleared for discharge. He meets criteria to be discharged home on postop day 6.  He is agreeable to be discharged home with family.    Condition on Discharge:  Neurologically intact and has good pain control.  He is eating well and has demonstrable good bowel function.  The sternum is stable without clicks and the saphenectomy incisions are healing nicely.  The heart is in  normal sinus rhythm.  He has met all physical therapy criteria and verbalizes understanding of sternotomy precautions.   He verbalizes understanding of a separately handed out cardiac surgery handout.       Discharge Disposition:  Home or  Self Care [1]    Discharge Medications:     Discharge Medications        New Medications        Instructions Start Date   aspirin 81 MG EC tablet   81 mg, Oral, Daily      atorvastatin 40 MG tablet  Commonly known as: LIPITOR   40 mg, Oral, Nightly      clopidogrel 75 MG tablet  Commonly known as: PLAVIX   75 mg, Oral, Daily      First Mouthwash (Magic Mouthwash) suspension   5 mL, Swish & Spit, Every 6 Hours      furosemide 40 MG tablet  Commonly known as: Lasix   40 mg, Oral, Daily      lisinopril 2.5 MG tablet  Commonly known as: PRINIVIL,ZESTRIL   2.5 mg, Oral, Daily      metoprolol tartrate 25 MG tablet  Commonly known as: LOPRESSOR   25 mg, Oral, Every 12 Hours Scheduled      oxyCODONE-acetaminophen 5-325 MG per tablet  Commonly known as: Percocet   1 tablet, Oral, Every 6 Hours PRN      pantoprazole 40 MG EC tablet  Commonly known as: PROTONIX   40 mg, Oral, Daily      potassium chloride 10 MEQ CR capsule  Commonly known as: MICRO-K   20 mEq, Oral, Daily             Continue These Medications        Instructions Start Date   colchicine 0.6 MG tablet   0.6 mg, Oral, Daily PRN      Ozempic (2 MG/DOSE) 8 MG/3ML solution pen-injector  Generic drug: Semaglutide (2 MG/DOSE)   2 mg, Subcutaneous, Weekly             Stop These Medications      amLODIPine-benazepril 10-40 MG per capsule  Commonly known as: LOTREL     diclofenac 75 MG EC tablet  Commonly known as: VOLTAREN     ZANTAC PO              Discharge Diet: No concentrated sweets diet with an effort to maintain acceptable glycemic control.      Discharge Care Plan / Instructions:   Please see the separately handed out cardiac surgery handout     Activity at Discharge:   No heavy lifting greater than 5 pounds or a gallon of milk while maintaining sternal precautions.  Constantin Seals has been instructed on an exercise  regiment as detailed in a handed out cardiac surgery handout.    Follow-up Appointments: Constantin Seals  is requested to see Giovani Lujan,  DO within 1-2 weeks from time of discharge, to see Veronica HICKS in 1 week, to follow-up with Dr. Trejo in 4 weeks, to follow-up with Dr. Roberts of the cardiology service in 6 weeks, and to follow-up with Dr. Beaulieu in 3-4 weeks.

## 2024-06-11 NOTE — PLAN OF CARE
Problem: Adult Inpatient Plan of Care  Goal: Plan of Care Review  Outcome: Ongoing, Progressing  Flowsheets (Taken 6/11/2024 0326)  Progress: no change  Plan of Care Reviewed With: patient  Outcome Evaluation: Patient has no c/o pain. Up with stand by assist. Possible d/c home today. VSS. Safety maintained. Will notify MD of any changes.

## 2024-06-11 NOTE — DISCHARGE PLACEMENT REQUEST
"    ArnelWilbur jeter (44 y.o. Male)       Date of Birth   1980    Social Security Number       Address   [address] [address]    Home Phone   [phone]    MRN   [id number]       [facility]    Marital Status                               Admission Date   5/31/24    Admission Type   Emergency    Admitting Provider   Chip Roberts MD    Attending Provider   Sean Trejo MD    Department, Room/Bed   [address], 430/1       Discharge Date       Discharge Disposition       Discharge Destination                                 Attending Provider: Sean Trejo MD    Allergies: Jardiance [Empagliflozin]    Isolation: None   Infection: None   Code Status: CPR    Ht: 195.6 cm (77\")   Wt: 204 kg (450 lb 12.8 oz)    Admission Cmt: None   Principal Problem: NSTEMI (non-ST elevated myocardial infarction) [I21.4]                   Active Insurance as of 5/31/2024       Primary Coverage       Payor Plan Insurance Group Employer/Plan Group    [facility] [id number]       Payor Plan Address Payor Plan Phone Number Payor Plan Fax Number Effective Dates    [address] [address]   9/1/2023 - None Entered    [facility] [id number]         Subscriber Name Subscriber Birth Date Member ID       WILBUR SEALS ALINE 1980 [id number]                     Emergency Contacts        (Rel.) Home Phone Work Phone Mobile Phone    Selma Seals (Significant Other) [phone] -- --    CK LAU (Sister) -- -- [phone]              Insurance Information                  [facility] Phone: --    Subscriber: Wilbur Seals Subscriber#: [id number]    Group#: [id number] Precert#: --             History & Physical        Sean Trejo MD at 06/05/24 0822            H&P reviewed. The patient was examined and there are no changes to the H&P.    Elevated risk candidate but best option for management of his CAD " given available options short and long term.    Verbalized understanding of RBA.  Provides consent        Electronically signed by Sean Trejo MD at 06/06/24 7433   Source Note: H&P (View-Only)          History and Physical   Cardiothoracic Surgeon     Chief Complaint   Patient presents with    Chest Pain         Subjective    History of Present Illness    Mr. Seals is a 44-year-old male with salient past medical history of prediabetes, hypertension, tobacco use, morbid obesity, sedentary lifestyle, previous COVID 19 infection, gout, and a family history of heart disease presents with chest pain for couple days prior to hospitalization.  Wife thought this was similar to his reflux disease symptoms he did have recurrent symptoms that ultimately he decided present to [facility] for further evaluation.  He did rule in for a non-STEMI.  Further workup identified multivessel coronary disease.  He is currently taking Ozempic for weight loss and reports losing 70 pounds thus far.  His last dose of Ozempic was taken last Tuesday night.  With the aforementioned I have been asked to evaluate for consideration of coronary artery bypass grafting.  He is chest pain-free.      Review of Systems   Constitutional:  Positive for activity change and fatigue. Negative for appetite change, chills, diaphoresis, fever and unexpected weight change.   HENT:  Negative for dental problem, hearing loss, nosebleeds, sore throat, trouble swallowing and voice change.    Eyes:  Negative for photophobia, redness and visual disturbance.   Respiratory:  Positive for shortness of breath. Negative for apnea, cough, chest tightness, wheezing and stridor.    Cardiovascular:  Positive for chest pain and leg swelling (Right greater than left). Negative for palpitations.   Gastrointestinal:  Negative for abdominal distention, abdominal pain, blood in stool, constipation, diarrhea, nausea and vomiting.   Endocrine: Negative for cold  intolerance, heat intolerance, polyphagia and polyuria.   Genitourinary:  Negative for decreased urine volume, difficulty urinating, dysuria, flank pain, frequency, hematuria and urgency.   Musculoskeletal:  Negative for arthralgias, back pain, gait problem, joint swelling, myalgias and neck pain.   Skin:  Negative for pallor, rash and wound.   Allergic/Immunologic: Negative for immunocompromised state.   Neurological:  Negative for dizziness, tremors, seizures, syncope, speech difficulty, weakness, light-headedness, numbness and headaches.   Hematological:  Does not bruise/bleed easily.   Psychiatric/Behavioral:  Negative for confusion, sleep disturbance and suicidal ideas. The patient is not nervous/anxious.           Past Medical History:   Diagnosis Date    COVID     GERD (gastroesophageal reflux disease)     Gout     Heartburn     Hypertension     Obesity      Past Surgical History:   Procedure Laterality Date    DISTAL BICEPS TENDON REPAIR Right 6/7/2022    Procedure: RIGHT DISTAL BICEPS TENDON REPAIR WITH SEMI-T ALLOGRAFT;  Surgeon: Benjamin العراقي MD;  Location: [facility];  Service: Orthopedics;  Laterality: Right;    ELBOW PROCEDURE      EYE SURGERY Right 1984     Family History   Problem Relation Age of Onset    Diabetes Father     Hypertension Father     Heart disease Father      Social History     Tobacco Use    Smoking status: Never    Smokeless tobacco: Current     Types: Chew   Vaping Use    Vaping status: Never Used   Substance Use Topics    Alcohol use: No    Drug use: Defer     Medications Prior to Admission   Medication Sig Dispense Refill Last Dose    amLODIPine-benazepril (LOTREL) 10-40 MG per capsule Take 1 capsule by mouth Daily.   5/30/2024    colchicine 0.6 MG tablet Take 1 tablet by mouth Daily As Needed (gout flare up).       Ozempic, 2 MG/DOSE, 8 MG/3ML solution pen-injector Inject 2 mg under the skin into the appropriate area as directed 1 (One) Time Per Week.   5/30/2024     diclofenac (VOLTAREN) 75 MG EC tablet Take 1 tablet by mouth 2 (Two) Times a Day As Needed.       raNITIdine HCl (ZANTAC PO) Take 1 tablet by mouth Daily As Needed.   More than a month     Allergies:  Patient has no known allergies.    Objective     Vital Signs  Temp:  [97.5 °F (36.4 °C)-98.4 °F (36.9 °C)] 98 °F (36.7 °C)  Heart Rate:  [62-80] 62  Resp:  [15-79] 16  BP: ()/(53-89) 152/89    Physical Exam  Constitutional:       Appearance: He is well-developed. He is obese.   HENT:      Head: Normocephalic and atraumatic.   Eyes:      Pupils: Pupils are equal, round, and reactive to light.   Neck:      Thyroid: No thyromegaly.      Vascular: No JVD.      Trachea: No tracheal deviation.   Cardiovascular:      Rate and Rhythm: Normal rate and regular rhythm.      Heart sounds: Normal heart sounds. No murmur heard.     No friction rub. No gallop.   Pulmonary:      Effort: Pulmonary effort is normal. No respiratory distress.      Breath sounds: Normal breath sounds. No wheezing or rales.   Chest:      Chest wall: No tenderness.   Abdominal:      General: There is no distension.      Palpations: Abdomen is soft.      Tenderness: There is no abdominal tenderness.   Musculoskeletal:         General: Normal range of motion.      Cervical back: Normal range of motion and neck supple.      Right lower leg: Edema present.      Left lower leg: Edema present.   Lymphadenopathy:      Cervical: No cervical adenopathy.   Skin:     General: Skin is warm and dry.   Neurological:      Mental Status: He is alert and oriented to person, place, and time.      Cranial Nerves: No cranial nerve deficit.         Results Review:   Cardiac Catheterization/Vascular Study    Result Date: 5/31/2024  Narrative: Date: 5/31/2024  Procedures: 1. Selective coronary angiography 2. Monitoring of conscious sedation  Indication: NSTEMI  Attending: Chip Roberts  Risks, benefits, and alternatives discussed with the patient and/or family.  Plan is for  moderate sedation, and the patient agrees to proceed with the procedure.  An immediate assessment was done prior to the administration of moderate sedation. Procedure Description: The patient was brought to the cardiac catheterization lab in a fasting state. Informed consent was obtained after explaining the risks, benefits, alternatives of the procedure. The patient was sterilely prepped and draped for right radial artery access usual fashion by the cath lab staff. Time out was taken to confirm the correct patient, site, procedure. The area over the right radial artery was anesthetized with 1% lidocaine. A micropuncture needle was used to puncture the right radial artery resulting bright red pulsatile arterial blood flow. Following this a 5 Italian glide sheath was placed and the sheath was aspirated and flushed. Next, a 5 Luxembourgish Zoya diagnostic catheter was advanced under fluoroscopic guidance into the ascending aorta and selectively engaged into the right coronary artery.  A cineogram was obtained via power injection of contrast in Maltese projection.  The catheter was unable to engage the left main coronary artery and was exchanged for a 5 Italian JL 3.5 diagnostic coronary catheter.  This was selectively engaged into the left main coronary artery under fluoroscopic guidance.  Multiple cineograms were obtained via power injection of contrast in multiple orthogonal views.  The catheter was then removed and the sheath was aspirated and flushed.  A TR band was then placed and adequate hemostasis was obtained.  The patient was then transferred to cardiac observation unit in stable condition.  There is no early complications noted.  I supervised the administration of conscious sedation by nursing staff throughout the case.  First dose was given at 15:02 and the end of my face-to-face encounter was at 15:23, accounting for a total of 21 minutes of supervision.  During the case, continuous pulse oximetry, heart rate, blood  pressure, and patient status were monitored. Total contrast: 98 ml Fluoro time: 4 minutes Radiation: 395 mGy Estimated Blood Loss: < 20 ml Specimens: None Complications: None  Findings: Selective coronary angiography: 1. Left main: The left main bifurcates into the LAD and left circumflex arteries.  There is mild distal narrowing. 2. Left anterior descending artery: The LAD runs in the interventricular groove giving off 1 significant bifurcating diagonal and multiple septal perforators before wrapping around the apex as an apical recurrent branch.  There is a severe 70 to 80% stenosis in the proximal vessel. 3. Left circumflex artery: The left circumflex is nondominant for posterior circulation.  It gives off 1 small proximal obtuse marginal branch and a second moderate-sized distal branch.  Obtuse marginal branches.  There is a moderate to severe 60 to 70% stenosis in the proximal portion of the larger OM. 4. Right coronary artery: The RCA is dominant for posterior circulation giving rise to PDA and right PL branches.  The RCA is chronically totally occluded proximally.  The PDA and right PL branches fill via left to right collaterals.  Impression: 1.  Severe multi-vessel coronary artery disease as described above.  Plan: -CT surgery consult for evaluation of CABG. -Admit to telemetry. -Check echo -Optimize medical therapy -Routine post-cath care and TR band removal. Chip Roberts MD    XR Chest 1 View    Result Date: 5/31/2024  Narrative: EXAM: XR CHEST 1 VW-  DATE: 5/31/2024 7:13 AM  HISTORY: Chest Pain Protocol   COMPARISON: None available.  TECHNIQUE:  Frontal view(s) of the chest submitted.  FINDINGS:  No pneumothorax, pleural effusion or focal consolidation. Cardiac mediastinal silhouette within normal limits. No acute bony finding.       Impression: 1. No acute cardiopulmonary findings.  This report was signed and finalized on 5/31/2024 8:25 AM by Kehinde Rios.            I reviewed the patient's new  clinical results.  Discussed with patient and family(wife, mother and father)      Assessment & Plan      NSTEMI (non-ST elevated myocardial infarction)    Class 3 severe obesity due to excess calories with serious comorbidity and body mass index (BMI) of 50.0 to 59.9 in adult    Primary hypertension    Gastroesophageal reflux disease without esophagitis    SHERRON (obstructive sleep apnea)    Coronary artery disease involving native coronary artery of native heart with unstable angina pectoris  Prediabetes  Use of Ozempic      I discussed the patients findings and my recommendations with patient and family.  We discussed the options for treatment of coronary artery disease to include medical therapy, coronary stenting, and surgical revascularization.  We discussed the pros and cons of each option and how it pertains to the current case.  The STS Risk score was calculated using the STS Risk Calculator and discussed with the patient with available data.  Coronary artery bypass grafting is best option given patient's findings and risk factors.  We discussed the operative conduct and expected hospital and outpatient recovery.  Risks were discussed to include but not limited to bleeding, infection, stroke, heart attack, need for additional procedures, anesthesia risk, organ dysfunction and/or failure, prolonged mechanical ventilation, prolonged ICU stay, chronic pain syndromes, sternal nonunion, and/or death.  It is acknowledged that he has significant morbid obesity with a BMI of 53 and his weight we will have anticipated difficulty providing potentially full support while on cardiopulmonary bypass which may result in increased morbidity and mortality.  In the setting of an MI this only further increases his risk.  While he will be considered a moderate to high risk candidate for surgical revascularization given his age, high function, previous set of efforts to improve his overall wellbeing the benefits of CABG in a  prediabetic outweigh anticipated risks.    We discussed the need to utilize all medical treatments additionally prescribed post surgery.       We discussed that he will require his beard to be trimmed given the length it exists at this time he is agreeable to do so  The patient and family understands the risks, benefits, and alternatives and he wishes to proceed forward with surgery.    I favor CABG with sternal plating/Prevena application end of next week.  As he did use Ozempic on Tuesday evening it is recommended to prevent time for washout of Ozempic.  1 to 2 weeks is the current consideration of time.  As he remains chest pain-free we will shoot for a timeline between 1 to 2 weeks from last use of Ozempic    Sean Trejo MD  06/01/24  14:01 CDT    Electronically signed by Sean Trejo MD at 06/02/24 3402                 Sean Trejo MD at 06/01/24 3923          History and Physical   Cardiothoracic Surgeon     Chief Complaint   Patient presents with    Chest Pain         Subjective    History of Present Illness    Mr. Seals is a 44-year-old male with salient past medical history of prediabetes, hypertension, tobacco use, morbid obesity, sedentary lifestyle, previous COVID 19 infection, gout, and a family history of heart disease presents with chest pain for couple days prior to hospitalization.  Wife thought this was similar to his reflux disease symptoms he did have recurrent symptoms that ultimately he decided present to [facility] for further evaluation.  He did rule in for a non-STEMI.  Further workup identified multivessel coronary disease.  He is currently taking Ozempic for weight loss and reports losing 70 pounds thus far.  His last dose of Ozempic was taken last Tuesday night.  With the aforementioned I have been asked to evaluate for consideration of coronary artery bypass grafting.  He is chest pain-free.      Review of Systems   Constitutional:  Positive for activity change  and fatigue. Negative for appetite change, chills, diaphoresis, fever and unexpected weight change.   HENT:  Negative for dental problem, hearing loss, nosebleeds, sore throat, trouble swallowing and voice change.    Eyes:  Negative for photophobia, redness and visual disturbance.   Respiratory:  Positive for shortness of breath. Negative for apnea, cough, chest tightness, wheezing and stridor.    Cardiovascular:  Positive for chest pain and leg swelling (Right greater than left). Negative for palpitations.   Gastrointestinal:  Negative for abdominal distention, abdominal pain, blood in stool, constipation, diarrhea, nausea and vomiting.   Endocrine: Negative for cold intolerance, heat intolerance, polyphagia and polyuria.   Genitourinary:  Negative for decreased urine volume, difficulty urinating, dysuria, flank pain, frequency, hematuria and urgency.   Musculoskeletal:  Negative for arthralgias, back pain, gait problem, joint swelling, myalgias and neck pain.   Skin:  Negative for pallor, rash and wound.   Allergic/Immunologic: Negative for immunocompromised state.   Neurological:  Negative for dizziness, tremors, seizures, syncope, speech difficulty, weakness, light-headedness, numbness and headaches.   Hematological:  Does not bruise/bleed easily.   Psychiatric/Behavioral:  Negative for confusion, sleep disturbance and suicidal ideas. The patient is not nervous/anxious.           Past Medical History:   Diagnosis Date    COVID     GERD (gastroesophageal reflux disease)     Gout     Heartburn     Hypertension     Obesity      Past Surgical History:   Procedure Laterality Date    DISTAL BICEPS TENDON REPAIR Right 6/7/2022    Procedure: RIGHT DISTAL BICEPS TENDON REPAIR WITH SEMI-T ALLOGRAFT;  Surgeon: Benjamin العراقي MD;  Location: [facility];  Service: Orthopedics;  Laterality: Right;    ELBOW PROCEDURE      EYE SURGERY Right 1984     Family History   Problem Relation Age of Onset    Diabetes Father      Hypertension Father     Heart disease Father      Social History     Tobacco Use    Smoking status: Never    Smokeless tobacco: Current     Types: Chew   Vaping Use    Vaping status: Never Used   Substance Use Topics    Alcohol use: No    Drug use: Defer     Medications Prior to Admission   Medication Sig Dispense Refill Last Dose    amLODIPine-benazepril (LOTREL) 10-40 MG per capsule Take 1 capsule by mouth Daily.   5/30/2024    colchicine 0.6 MG tablet Take 1 tablet by mouth Daily As Needed (gout flare up).       Ozempic, 2 MG/DOSE, 8 MG/3ML solution pen-injector Inject 2 mg under the skin into the appropriate area as directed 1 (One) Time Per Week.   5/30/2024    diclofenac (VOLTAREN) 75 MG EC tablet Take 1 tablet by mouth 2 (Two) Times a Day As Needed.       raNITIdine HCl (ZANTAC PO) Take 1 tablet by mouth Daily As Needed.   More than a month     Allergies:  Patient has no known allergies.    Objective     Vital Signs  Temp:  [97.5 °F (36.4 °C)-98.4 °F (36.9 °C)] 98 °F (36.7 °C)  Heart Rate:  [62-80] 62  Resp:  [15-79] 16  BP: ()/(53-89) 152/89    Physical Exam  Constitutional:       Appearance: He is well-developed. He is obese.   HENT:      Head: Normocephalic and atraumatic.   Eyes:      Pupils: Pupils are equal, round, and reactive to light.   Neck:      Thyroid: No thyromegaly.      Vascular: No JVD.      Trachea: No tracheal deviation.   Cardiovascular:      Rate and Rhythm: Normal rate and regular rhythm.      Heart sounds: Normal heart sounds. No murmur heard.     No friction rub. No gallop.   Pulmonary:      Effort: Pulmonary effort is normal. No respiratory distress.      Breath sounds: Normal breath sounds. No wheezing or rales.   Chest:      Chest wall: No tenderness.   Abdominal:      General: There is no distension.      Palpations: Abdomen is soft.      Tenderness: There is no abdominal tenderness.   Musculoskeletal:         General: Normal range of motion.      Cervical back: Normal range  of motion and neck supple.      Right lower leg: Edema present.      Left lower leg: Edema present.   Lymphadenopathy:      Cervical: No cervical adenopathy.   Skin:     General: Skin is warm and dry.   Neurological:      Mental Status: He is alert and oriented to person, place, and time.      Cranial Nerves: No cranial nerve deficit.         Results Review:   Cardiac Catheterization/Vascular Study    Result Date: 5/31/2024  Narrative: Date: 5/31/2024  Procedures: 1. Selective coronary angiography 2. Monitoring of conscious sedation  Indication: NSTEMI  Attending: Chip Roberts  Risks, benefits, and alternatives discussed with the patient and/or family.  Plan is for moderate sedation, and the patient agrees to proceed with the procedure.  An immediate assessment was done prior to the administration of moderate sedation. Procedure Description: The patient was brought to the cardiac catheterization lab in a fasting state. Informed consent was obtained after explaining the risks, benefits, alternatives of the procedure. The patient was sterilely prepped and draped for right radial artery access usual fashion by the cath lab staff. Time out was taken to confirm the correct patient, site, procedure. The area over the right radial artery was anesthetized with 1% lidocaine. A micropuncture needle was used to puncture the right radial artery resulting bright red pulsatile arterial blood flow. Following this a 5 Lithuanian glide sheath was placed and the sheath was aspirated and flushed. Next, a 5 Dutch Zoya diagnostic catheter was advanced under fluoroscopic guidance into the ascending aorta and selectively engaged into the right coronary artery.  A cineogram was obtained via power injection of contrast in Croatian projection.  The catheter was unable to engage the left main coronary artery and was exchanged for a 5 Lithuanian JL 3.5 diagnostic coronary catheter.  This was selectively engaged into the left main coronary artery under  fluoroscopic guidance.  Multiple cineograms were obtained via power injection of contrast in multiple orthogonal views.  The catheter was then removed and the sheath was aspirated and flushed.  A TR band was then placed and adequate hemostasis was obtained.  The patient was then transferred to cardiac observation unit in stable condition.  There is no early complications noted.  I supervised the administration of conscious sedation by nursing staff throughout the case.  First dose was given at 15:02 and the end of my face-to-face encounter was at 15:23, accounting for a total of 21 minutes of supervision.  During the case, continuous pulse oximetry, heart rate, blood pressure, and patient status were monitored. Total contrast: 98 ml Fluoro time: 4 minutes Radiation: 395 mGy Estimated Blood Loss: < 20 ml Specimens: None Complications: None  Findings: Selective coronary angiography: 1. Left main: The left main bifurcates into the LAD and left circumflex arteries.  There is mild distal narrowing. 2. Left anterior descending artery: The LAD runs in the interventricular groove giving off 1 significant bifurcating diagonal and multiple septal perforators before wrapping around the apex as an apical recurrent branch.  There is a severe 70 to 80% stenosis in the proximal vessel. 3. Left circumflex artery: The left circumflex is nondominant for posterior circulation.  It gives off 1 small proximal obtuse marginal branch and a second moderate-sized distal branch.  Obtuse marginal branches.  There is a moderate to severe 60 to 70% stenosis in the proximal portion of the larger OM. 4. Right coronary artery: The RCA is dominant for posterior circulation giving rise to PDA and right PL branches.  The RCA is chronically totally occluded proximally.  The PDA and right PL branches fill via left to right collaterals.  Impression: 1.  Severe multi-vessel coronary artery disease as described above.  Plan: -CT surgery consult for  evaluation of CABG. -Admit to telemetry. -Check echo -Optimize medical therapy -Routine post-cath care and TR band removal. Chip Roberts MD    XR Chest 1 View    Result Date: 5/31/2024  Narrative: EXAM: XR CHEST 1 VW-  DATE: 5/31/2024 7:13 AM  HISTORY: Chest Pain Protocol   COMPARISON: None available.  TECHNIQUE:  Frontal view(s) of the chest submitted.  FINDINGS:  No pneumothorax, pleural effusion or focal consolidation. Cardiac mediastinal silhouette within normal limits. No acute bony finding.       Impression: 1. No acute cardiopulmonary findings.  This report was signed and finalized on 5/31/2024 8:25 AM by Kehinde Rios.            I reviewed the patient's new clinical results.  Discussed with patient and family(wife, mother and father)      Assessment & Plan      NSTEMI (non-ST elevated myocardial infarction)    Class 3 severe obesity due to excess calories with serious comorbidity and body mass index (BMI) of 50.0 to 59.9 in adult    Primary hypertension    Gastroesophageal reflux disease without esophagitis    SHERRON (obstructive sleep apnea)    Coronary artery disease involving native coronary artery of native heart with unstable angina pectoris  Prediabetes  Use of Ozempic      I discussed the patients findings and my recommendations with patient and family.  We discussed the options for treatment of coronary artery disease to include medical therapy, coronary stenting, and surgical revascularization.  We discussed the pros and cons of each option and how it pertains to the current case.  The STS Risk score was calculated using the STS Risk Calculator and discussed with the patient with available data.  Coronary artery bypass grafting is best option given patient's findings and risk factors.  We discussed the operative conduct and expected hospital and outpatient recovery.  Risks were discussed to include but not limited to bleeding, infection, stroke, heart attack, need for additional procedures,  anesthesia risk, organ dysfunction and/or failure, prolonged mechanical ventilation, prolonged ICU stay, chronic pain syndromes, sternal nonunion, and/or death.  It is acknowledged that he has significant morbid obesity with a BMI of 53 and his weight we will have anticipated difficulty providing potentially full support while on cardiopulmonary bypass which may result in increased morbidity and mortality.  In the setting of an MI this only further increases his risk.  While he will be considered a moderate to high risk candidate for surgical revascularization given his age, high function, previous set of efforts to improve his overall wellbeing the benefits of CABG in a prediabetic outweigh anticipated risks.    We discussed the need to utilize all medical treatments additionally prescribed post surgery.       We discussed that he will require his beard to be trimmed given the length it exists at this time he is agreeable to do so  The patient and family understands the risks, benefits, and alternatives and he wishes to proceed forward with surgery.    I favor CABG with sternal plating/Prevena application end of next week.  As he did use Ozempic on Tuesday evening it is recommended to prevent time for washout of Ozempic.  1 to 2 weeks is the current consideration of time.  As he remains chest pain-free we will shoot for a timeline between 1 to 2 weeks from last use of Ozempic    Sean Trejo MD  06/01/24  14:01 CDT    Electronically signed by Sean Trejo MD at 06/02/24 2447       Cihp Roberts MD at 05/31/24 3930          Chief Complaint: chest tightness    HPI: Mr Seals is a 43-year-old male with significant past medical history of morbid obesity, hypertension, prediabetes, GERD, tobacco use, and obstructive sleep apnea.  He started having chest tightness 2 days ago.  He reported as a squeezing sensation in the center of his chest that reminded him a little bit of his acid reflux.  This  eventually resolved on its own.  He went up to the fire station and got an EKG done.  Last night the symptoms returned and he ultimately came to [facility] for evaluation this morning.  Initial troponin was elevated at 655 with repeat at 740.  He is not currently having any chest tightness or other symptoms.  He denies any palpitations, dizziness, syncope, PND, orthopnea, fever, or chills.  He is taking Ozempic for weight loss and reports losing about 70 pounds to date.      Patient Active Problem List   Diagnosis    Class 3 severe obesity due to excess calories with serious comorbidity and body mass index (BMI) of 60.0 to 69.9 in adult    Hypertension    Gastroesophageal reflux disease without esophagitis    SHERRON (obstructive sleep apnea)    Traumatic rupture of distal biceps tendon, right, initial encounter       Past Medical History:   Diagnosis Date    COVID     GERD (gastroesophageal reflux disease)     Gout     Heartburn     Hypertension     Obesity      Past Surgical History:   Procedure Laterality Date    DISTAL BICEPS TENDON REPAIR Right 6/7/2022    Procedure: RIGHT DISTAL BICEPS TENDON REPAIR WITH SEMI-T ALLOGRAFT;  Surgeon: Benjamin العراقي MD;  Location: [facility];  Service: Orthopedics;  Laterality: Right;    ELBOW PROCEDURE      EYE SURGERY Right 1984       The following portions of the patient's history were reviewed and updated as appropriate: allergies, current medications, past family history, past medical history, past social history, past surgical history, and problem list.    Social History     Socioeconomic History    Marital status:    Tobacco Use    Smoking status: Never    Smokeless tobacco: Current     Types: Chew   Vaping Use    Vaping status: Never Used   Substance and Sexual Activity    Alcohol use: No    Drug use: Defer       Family History   Problem Relation Age of Onset    Diabetes Father     Hypertension Father     Heart disease Father        Review of  "Systems: A 12 system review of systems was completed and is negative except stated in HPI.     Objective  /95   Pulse 70   Temp 98.2 °F (36.8 °C)   Resp 22   Ht 195.6 cm (77\")   Wt (!) 204 kg (449 lb)   SpO2 100%   BMI 53.24 kg/m²     Physical Exam:  Constitutional:       Appearance: Well-developed. Morbidly obese.   Eyes:      Conjunctiva/sclera: Conjunctivae normal.      Pupils: Pupils are equal, round, and reactive to light.   HENT:      Head: Normocephalic and atraumatic.   Neck:      Thyroid: No thyromegaly.      Vascular: No JVD.   Pulmonary:      Effort: Pulmonary effort is normal.      Breath sounds: Normal breath sounds. No wheezing. No rales.   Cardiovascular:      Normal rate. Regular rhythm.      Murmurs: There is no murmur.   Edema:     Peripheral edema absent.   Abdominal:      General: Bowel sounds are normal. There is no distension.      Palpations: Abdomen is soft.      Tenderness: There is no abdominal tenderness.   Musculoskeletal: Normal range of motion.      Cervical back: Normal range of motion and neck supple. Skin:     General: Skin is warm and dry.      Findings: No rash.   Neurological:      Mental Status: Alert and oriented to person, place, and time.      Coordination: Coordination normal.   Psychiatric:         Behavior: Behavior normal.         Lab Results   Component Value Date    TROPONINT 740 (C) 05/31/2024     Lab Results   Component Value Date    GLUCOSE 101 (H) 05/31/2024    CALCIUM 9.1 05/31/2024     05/31/2024    K 4.2 05/31/2024    CO2 27.0 05/31/2024     05/31/2024    BUN 11 05/31/2024    CREATININE 0.87 05/31/2024    BCR 12.6 05/31/2024    ANIONGAP 8.0 05/31/2024     Lab Results   Component Value Date    WBC 7.75 05/31/2024    HGB 15.2 05/31/2024    HCT 46.9 05/31/2024    MCV 85.0 05/31/2024     05/31/2024     Lab Results   Component Value Date    CHOL 176 05/31/2024     Lab Results   Component Value Date    TRIG 89 05/31/2024     Lab Results " "  Component Value Date    HDL 38 (L) 05/31/2024     No components found for: \"LDLCALC\"  Lab Results   Component Value Date     (H) 05/31/2024           Assessment:  1.  Non-STEMI: Initial troponin 655 with repeat at 740.  Currently chest pain-free.  No acute EKG changes.  2.  Morbid obesity: Body mass index is 53.24 kg/m².   3.  Essential hypertension  4.  Mixed hyperlipidemia: Lipid panel today shows mildly elevated LDL with low HDL.  5.  Prediabetes  6.  Obstructive sleep apnea    Plan:  -Urgent cardiac catheterization today.  -Patient given aspirin and Lovenox in the emergency room.  -Start high intensity statin.  -Further management following heart catheterization.    Electronically signed by Chip Roberts MD at 05/31/24 1132       Referral Orders (last 24 hours) (24h ago, onward)       Start     Ordered    06/11/24 0000  Ambulatory Referral to Home Health (Hospital)        Question Answer Comment   Face to Face Visit Date: 6/11/2024    Follow-up provider for Plan of Care? I will be treating the patient on an ongoing basis.  Please send me the Plan of Care for signature.    Follow-up provider: VALENTINA ARANGO    Reason/Clinical Findings cad, morbid obesity, sternal restrictions    Describe mobility limitations that make leaving home difficult: cad, morbid obesity, sternal restrictions    Nursing/Therapeutic Services Requested Skilled Nursing    Nursing/Therapeutic Services Requested Physical Therapy    Skilled nursing orders: Post CABG care    PT orders: Home safety assessment    PT orders: Transfer training    Frequency: 1 Week 1        06/11/24 1101                  "

## 2024-06-11 NOTE — PROGRESS NOTES
"Coronary artery bypass grafting x 4 (LIMA/LAD, RSVG/dRCA, RSVG/OM, and RSVG/D), right leg endoscopic vein harvest, sternalock XP sternal plating, application of Prevena incision management system    POD 5    Sitting up in the chair.  On room air.  Pain is well-controlled.  Weight is down 4 pounds over the last 24 hours and he is 3 pounds above his baseline weight. Walking over 200 feet with PT. Vera catheter removed yesterday, voiding without difficulty.  (+) BM.    IV drips: None  Telemetry: Sinus-sinus tachycardia  bpm    Visit Vitals  /66 (BP Location: Right arm, Patient Position: Sitting)   Pulse 76   Temp 98.2 °F (36.8 °C) (Oral)   Resp 16   Ht 195.6 cm (77\")   Wt (!) 204 kg (450 lb 12.8 oz)   SpO2 94%   BMI 53.46 kg/m²   Preoperative weight: 447 pounds    Intake/Output Summary (Last 24 hours) at 6/11/2024 0944  Last data filed at 6/11/2024 0900  Gross per 24 hour   Intake 480 ml   Output 6400 ml   Net -5920 ml     Labs:  Lab Results   Component Value Date    WBC 7.85 06/11/2024    HGB 9.3 (L) 06/11/2024    HCT 29.5 (L) 06/11/2024    MCV 87.3 06/11/2024     06/11/2024      Lab Results   Component Value Date    GLUCOSE 119 (H) 06/11/2024    BUN 21 (H) 06/11/2024    CREATININE 0.93 06/11/2024    EGFR 104.5 06/11/2024    BCR 22.6 06/11/2024    K 4.5 06/11/2024    CO2 31.0 (H) 06/11/2024    CALCIUM 8.6 06/11/2024    ALBUMIN 3.1 (L) 06/09/2024    BILITOT 0.4 06/09/2024    AST 26 06/09/2024    ALT 26 06/09/2024      Myoglobin Screen, Urine - Urine, Clean Catch    Specimen Information: Urine, Clean Catch   0 Result Notes      Component  Ref Range & Units 2 d ago/6/7/2024   Myoglobin, Qualitative  Negative Positive Abnormal    Resulting Agency  [facility]              Specimen Collected: 06/07/24 09:58 CDT Last Resulted: 06/07/24 10:27 CDT         Myoglobin, Serum  Specimen Information: Blood   0 Result Notes        Component  Ref Range & Units 1 d ago  6/8/2024 2 d ago  6/7/2024 3 d ago "   Myoglobin  28.0 - 72.0 ng/mL 228.0 High  957.0 High  32.5   [facility]ing Agency  [facility]        Imaging:    Chest x-ray: Improved lung expansion, small bibasilar effusions, no visible pneumothorax.    Physical Exam:  General: No apparent distress. In good spirits.  Up in the chair.   Mouth: Swelling and erythema to right tongue improving, associated with previous ET tube.  Cardiovascular: Regular rate and rhythm without murmur, rubs, or gallops.    Pulmonary: Clear to auscultation bilaterally without wheezing, rubs, or rales.  Chest: Sternotomy incision clean, dry, and intact. Sternum stable. No clicks. Prevena in place.   Abdomen: Soft, nondistended and nontender.  Extremities: Warm, moves all extremities. Saphenectomy site clean, dry, and intact. Peripheral edema.   : Vera catheter in place, urine clear yellow.  Neurologic: Grossly intact with no focal deficits.       Impression:  Non-STEMI  Coronary artery disease with unstable angina  Class III severe obesity, BMI 52.96  Hypertension  Prediabetes  Obstructive sleep apnea  Gross hematuria with history of hematuria    Plan:    Encourage pulmonary toilet and ambulation  Routine postcardiac surgery regimen  Dr. Trejo discussed the need for further beard trimming, patient is agreeable and wife is to bring trimmers  Discharge home later today.  Follow-up with KURT Melendez 1 week after discharge and Dr. Trejo 4-6 weeks after discharge.   Consult case management for home health evaluation.   Discussed with patient and nursing    KURT Carson

## 2024-06-11 NOTE — CASE MANAGEMENT/SOCIAL WORK
Continued Stay Note   [facility]     Patient Name: Constantin Seals  MRN: [id number]  Today's Date: 6/11/2024    Admit Date: 5/31/2024    Plan: Home   Discharge Plan       Row Name 06/11/24 1432       Plan    Plan Home    Plan Comments [facility] will not accept patient. No word yet from [facility].           CATRACHO Teran

## 2024-06-12 ENCOUNTER — READMISSION MANAGEMENT (OUTPATIENT)
Dept: CALL CENTER | Facility: HOSPITAL | Age: 44
End: 2024-06-12
Payer: COMMERCIAL

## 2024-06-12 VITALS
OXYGEN SATURATION: 98 % | BODY MASS INDEX: 37.19 KG/M2 | RESPIRATION RATE: 18 BRPM | TEMPERATURE: 98.1 F | WEIGHT: 315 LBS | SYSTOLIC BLOOD PRESSURE: 142 MMHG | HEART RATE: 84 BPM | HEIGHT: 77 IN | DIASTOLIC BLOOD PRESSURE: 60 MMHG

## 2024-06-12 LAB
ANION GAP SERPL CALCULATED.3IONS-SCNC: 10 MMOL/L (ref 5–15)
BUN SERPL-MCNC: 23 MG/DL (ref 6–20)
BUN/CREAT SERPL: 21.5 (ref 7–25)
CALCIUM SPEC-SCNC: 8.8 MG/DL (ref 8.6–10.5)
CHLORIDE SERPL-SCNC: 98 MMOL/L (ref 98–107)
CO2 SERPL-SCNC: 29 MMOL/L (ref 22–29)
CREAT SERPL-MCNC: 1.07 MG/DL (ref 0.76–1.27)
DEPRECATED RDW RBC AUTO: 43.1 FL (ref 37–54)
EGFRCR SERPLBLD CKD-EPI 2021: 88.3 ML/MIN/1.73
ERYTHROCYTE [DISTWIDTH] IN BLOOD BY AUTOMATED COUNT: 13.6 % (ref 12.3–15.4)
GLUCOSE BLDC GLUCOMTR-MCNC: 114 MG/DL (ref 70–130)
GLUCOSE BLDC GLUCOMTR-MCNC: 135 MG/DL (ref 70–130)
GLUCOSE SERPL-MCNC: 135 MG/DL (ref 65–99)
HCT VFR BLD AUTO: 31.8 % (ref 37.5–51)
HGB BLD-MCNC: 10 G/DL (ref 13–17.7)
MCH RBC QN AUTO: 27.3 PG (ref 26.6–33)
MCHC RBC AUTO-ENTMCNC: 31.4 G/DL (ref 31.5–35.7)
MCV RBC AUTO: 86.9 FL (ref 79–97)
PLATELET # BLD AUTO: 378 10*3/MM3 (ref 140–450)
PMV BLD AUTO: 9.5 FL (ref 6–12)
POTASSIUM SERPL-SCNC: 4.5 MMOL/L (ref 3.5–5.2)
RBC # BLD AUTO: 3.66 10*6/MM3 (ref 4.14–5.8)
SODIUM SERPL-SCNC: 137 MMOL/L (ref 136–145)
WBC NRBC COR # BLD AUTO: 7.18 10*3/MM3 (ref 3.4–10.8)

## 2024-06-12 PROCEDURE — 99231 SBSQ HOSP IP/OBS SF/LOW 25: CPT | Performed by: UROLOGY

## 2024-06-12 PROCEDURE — 94799 UNLISTED PULMONARY SVC/PX: CPT

## 2024-06-12 PROCEDURE — 80048 BASIC METABOLIC PNL TOTAL CA: CPT | Performed by: NURSE PRACTITIONER

## 2024-06-12 PROCEDURE — 85027 COMPLETE CBC AUTOMATED: CPT | Performed by: NURSE PRACTITIONER

## 2024-06-12 PROCEDURE — 25010000002 FUROSEMIDE PER 20 MG: Performed by: NURSE PRACTITIONER

## 2024-06-12 PROCEDURE — 25010000002 ENOXAPARIN PER 10 MG: Performed by: NURSE PRACTITIONER

## 2024-06-12 PROCEDURE — 99024 POSTOP FOLLOW-UP VISIT: CPT | Performed by: THORACIC SURGERY (CARDIOTHORACIC VASCULAR SURGERY)

## 2024-06-12 PROCEDURE — 82948 REAGENT STRIP/BLOOD GLUCOSE: CPT

## 2024-06-12 RX ORDER — LISINOPRIL 2.5 MG/1
2.5 TABLET ORAL DAILY
Qty: 30 TABLET | Refills: 0 | Status: SHIPPED | OUTPATIENT
Start: 2024-06-12

## 2024-06-12 RX ORDER — DIPHENHYDRAMINE HYDROCHLORIDE AND LIDOCAINE HYDROCHLORIDE AND ALUMINUM HYDROXIDE AND MAGNESIUM HYDRO
5 KIT EVERY 6 HOURS
Qty: 200 ML | Refills: 0 | Status: SHIPPED | OUTPATIENT
Start: 2024-06-12

## 2024-06-12 RX ORDER — ASPIRIN 81 MG/1
81 TABLET ORAL DAILY
Qty: 30 TABLET | Refills: 2 | Status: SHIPPED | OUTPATIENT
Start: 2024-06-12

## 2024-06-12 RX ORDER — OXYCODONE HYDROCHLORIDE AND ACETAMINOPHEN 5; 325 MG/1; MG/1
1 TABLET ORAL EVERY 6 HOURS PRN
Qty: 10 TABLET | Refills: 0 | Status: SHIPPED | OUTPATIENT
Start: 2024-06-12 | End: 2024-06-19 | Stop reason: SDUPTHER

## 2024-06-12 RX ORDER — PANTOPRAZOLE SODIUM 40 MG/1
40 TABLET, DELAYED RELEASE ORAL DAILY
Qty: 30 TABLET | Refills: 0 | Status: SHIPPED | OUTPATIENT
Start: 2024-06-12

## 2024-06-12 RX ORDER — POTASSIUM CHLORIDE 750 MG/1
20 CAPSULE, EXTENDED RELEASE ORAL DAILY
Qty: 14 CAPSULE | Refills: 0 | Status: SHIPPED | OUTPATIENT
Start: 2024-06-12

## 2024-06-12 RX ORDER — ATORVASTATIN CALCIUM 40 MG/1
40 TABLET, FILM COATED ORAL NIGHTLY
Qty: 30 TABLET | Refills: 2 | Status: SHIPPED | OUTPATIENT
Start: 2024-06-12

## 2024-06-12 RX ORDER — FUROSEMIDE 40 MG/1
40 TABLET ORAL DAILY
Qty: 7 TABLET | Refills: 0 | Status: SHIPPED | OUTPATIENT
Start: 2024-06-12

## 2024-06-12 RX ORDER — CLOPIDOGREL BISULFATE 75 MG/1
75 TABLET ORAL DAILY
Qty: 30 TABLET | Refills: 2 | Status: SHIPPED | OUTPATIENT
Start: 2024-06-12

## 2024-06-12 RX ADMIN — ASPIRIN 81 MG: 81 TABLET, COATED ORAL at 09:22

## 2024-06-12 RX ADMIN — IPRATROPIUM BROMIDE 0.5 MG: 0.5 SOLUTION RESPIRATORY (INHALATION) at 14:19

## 2024-06-12 RX ADMIN — DIPHENHYDRAMINE HYDROCHLORIDE AND LIDOCAINE HYDROCHLORIDE AND ALUMINUM HYDROXIDE AND MAGNESIUM HYDRO 5 ML: KIT at 05:39

## 2024-06-12 RX ADMIN — ALBUTEROL SULFATE 1.25 MG: 2.5 SOLUTION RESPIRATORY (INHALATION) at 14:19

## 2024-06-12 RX ADMIN — ENOXAPARIN SODIUM 40 MG: 100 INJECTION SUBCUTANEOUS at 09:22

## 2024-06-12 RX ADMIN — OXYCODONE HYDROCHLORIDE 5 MG: 5 TABLET ORAL at 11:09

## 2024-06-12 RX ADMIN — IPRATROPIUM BROMIDE 0.5 MG: 0.5 SOLUTION RESPIRATORY (INHALATION) at 06:44

## 2024-06-12 RX ADMIN — AMIODARONE HYDROCHLORIDE 400 MG: 200 TABLET ORAL at 17:07

## 2024-06-12 RX ADMIN — AMIODARONE HYDROCHLORIDE 400 MG: 200 TABLET ORAL at 09:22

## 2024-06-12 RX ADMIN — ALBUTEROL SULFATE 1.25 MG: 2.5 SOLUTION RESPIRATORY (INHALATION) at 06:44

## 2024-06-12 RX ADMIN — POTASSIUM CHLORIDE 20 MEQ: 750 CAPSULE, EXTENDED RELEASE ORAL at 09:22

## 2024-06-12 RX ADMIN — FUROSEMIDE 20 MG: 10 INJECTION, SOLUTION INTRAMUSCULAR; INTRAVENOUS at 09:22

## 2024-06-12 RX ADMIN — FUROSEMIDE 20 MG: 10 INJECTION, SOLUTION INTRAMUSCULAR; INTRAVENOUS at 12:14

## 2024-06-12 RX ADMIN — OXYCODONE HYDROCHLORIDE 10 MG: 10 TABLET ORAL at 05:42

## 2024-06-12 RX ADMIN — ACETAMINOPHEN 1000 MG: 500 TABLET, FILM COATED ORAL at 05:33

## 2024-06-12 RX ADMIN — ACETAMINOPHEN 1000 MG: 500 TABLET, FILM COATED ORAL at 17:09

## 2024-06-12 RX ADMIN — POTASSIUM CHLORIDE 20 MEQ: 750 CAPSULE, EXTENDED RELEASE ORAL at 12:14

## 2024-06-12 RX ADMIN — PANTOPRAZOLE SODIUM 40 MG: 40 TABLET, DELAYED RELEASE ORAL at 05:34

## 2024-06-12 RX ADMIN — PREGABALIN 25 MG: 25 CAPSULE ORAL at 09:22

## 2024-06-12 RX ADMIN — METOPROLOL TARTRATE 25 MG: 50 TABLET, FILM COATED ORAL at 09:27

## 2024-06-12 NOTE — CASE MANAGEMENT/SOCIAL WORK
Continued Stay Note   [facility]     Patient Name: Constantin Seals  MRN: [id number]  Today's Date: 6/12/2024    Admit Date: 5/31/2024    Plan: Home   Discharge Plan       Row Name 06/12/24 1549       Plan    Plan Home    Patient/Family in Agreement with Plan yes    Provided Post Acute Provider List? Yes    Post Acute Provider List DME Supplier    Final Discharge Disposition Code 01 - home or self-care    Final Note BSC ordered from [facility]; they will bring to pt's room shortly.                   Discharge Codes    No documentation.                 Expected Discharge Date and Time       Expected Discharge Date Expected Discharge Time    Jun 12, 2024               CEDRIC Partida

## 2024-06-12 NOTE — DISCHARGE INSTRUCTIONS
Someone from the [facility] will be calling to check on you during your recovery.

## 2024-06-12 NOTE — PROGRESS NOTES
"Coronary artery bypass grafting x 4 (LIMA/LAD, RSVG/dRCA, RSVG/OM, and RSVG/D), right leg endoscopic vein harvest, sternalock XP sternal plating, application of Prevena incision management system    POD 6    Up in the chair.  On room air.  Reports he feels significantly better this morning and overnight since passing a small blood clot in urine.    CT scan of the abdomen and pelvis and ultrasound of scrotum and testicles reviewed.  He has been seen by urology, Dr. Fonseca this morning.  He has good pain control.  Weight is down 3 pounds and he is at preoperative weight.  He is hopeful for discharge home today.  Social work notes reviewed: No home health agency agreeable to accept patient.    IV drips: None  Telemetry: Sinus-sinus tachycardia 76-82 bpm    Visit Vitals  /65 (BP Location: Left arm, Patient Position: Lying)   Pulse 79   Temp 98 °F (36.7 °C) (Oral)   Resp 18   Ht 195.6 cm (77\")   Wt (!) 203 kg (447 lb)   SpO2 96%   BMI 53.01 kg/m²   Preoperative weight: 447 pounds    Intake/Output Summary (Last 24 hours) at 6/12/2024 0939  Last data filed at 6/12/2024 0900  Gross per 24 hour   Intake 1740 ml   Output 3415 ml   Net -1675 ml     Labs:  Lab Results   Component Value Date    WBC 7.85 06/11/2024    HGB 9.3 (L) 06/11/2024    HCT 29.5 (L) 06/11/2024    MCV 87.3 06/11/2024     06/11/2024      Lab Results   Component Value Date    GLUCOSE 119 (H) 06/11/2024    BUN 21 (H) 06/11/2024    CREATININE 0.93 06/11/2024    EGFR 104.5 06/11/2024    BCR 22.6 06/11/2024    K 4.5 06/11/2024    CO2 31.0 (H) 06/11/2024    CALCIUM 8.6 06/11/2024    ALBUMIN 3.1 (L) 06/09/2024    BILITOT 0.4 06/09/2024    AST 26 06/09/2024    ALT 26 06/09/2024      Myoglobin Screen, Urine - Urine, Clean Catch    Specimen Information: Urine, Clean Catch   0 Result Notes      Component  Ref Range & Units 2 d ago/6/7/2024   Myoglobin, Qualitative  Negative Positive Abnormal    Resulting Agency  [facility]              Specimen Collected: " 06/07/24 09:58 CDT Last Resulted: 06/07/24 10:27 CDT         Myoglobin, Serum  Specimen Information: Blood   0 Result Notes        Component  Ref Range & Units 1 d ago  6/8/2024 2 d ago  6/7/2024 3 d ago   Myoglobin  28.0 - 72.0 ng/mL 228.0 High  957.0 High  32.5   Resulting Agency  [facility]  [facility]        Imaging:  CT ABDOMEN PELVIS WO CONTRAST- 6/11/2024 3:55 PM     HISTORY: FLANK PAIN RULE OUT URETERAL STONE; R07.9-Chest pain,  unspecified; R79.89-Other specified abnormal findings of blood  chemistry; I21.4-Non-ST elevation (NSTEMI) myocardial infarction;  I25.110-Atherosclerotic heart disease of native coronary artery with  unstable angina pectoris; Z74.09-Other reduced mobility      COMPARISON: Abdominal CT dated 5/16/2022     DOSE LENGTH PRODUCT: 2672.58 mGy.cm. Automated exposure control was also  utilized to decrease patient radiation dose.     TECHNIQUE: Noncontrast images of the abdomen and pelvis obtained without  oral contrast. Multiplanar reformatted images were provided for review.     FINDINGS:     LOWER CHEST: Bilateral small layering pleural effusions. Previous  coronary bypass.     LIVER: Liver appears grossly normal without contrast.     BILIARY SYSTEM: The gallbladder is unremarkable. No intrahepatic or  extrahepatic ductal dilatation.     PANCREAS: Pancreas appears grossly normal without contrast.     SPLEEN: Unremarkable.     ADRENALS: Unremarkable.     KIDNEYS: No hydronephrosis identified. There is a prominent left  perinephric stranding. No urolithiasis identified.     RETROPERITONEUM: Left perinephric stranding as described above. No  adenopathy or hemorrhage.     GI TRACT: Stomach is nondistended. Small bowel loops are nondilated.  There are a few scattered diverticula along the descending and sigmoid  colon. No acute diverticulitis. The appendix is visualized and  unremarkable.     OTHER: There is no mesenteric mass, lymphadenopathy, free air or free  fluid. Lumbar facet  arthropathy. No acute bony abnormality.  Fat-containing periumbilical hernia.     PELVIS: No mass lesion, fluid collection or significant lymphadenopathy  is seen in the pelvis. Urinary bladder is decompressed.     IMPRESSION:     1.  Left periureteral stranding could be secondary to a recently passed  renal stone or perhaps a left upper tract infection. No stone visualized  at this time. No hydronephrosis.     This report was signed and finalized on 6/11/2024 5:09 PM by Dr Howard Cortez.    US SCROTUM AND TESTICLES- 6/11/2024 6:16 PM     REASON FOR EXAM: left testicle pain; R07.9-Chest pain, unspecified;  R79.89-Other specified abnormal findings of blood chemistry;  I21.4-Non-ST elevation (NSTEMI) myocardial infarction;  I25.110-Atherosclerotic heart disease of native coronary artery with  unstable angina pectoris; Z74.09-Other reduced mobility       COMPARISON: None      TECHNIQUE: Multiple longitudinal and real-time ultrasound images of the  scrotum are obtained with limited Doppler analysis of the bilateral  testicles. Ultrasound images and report are stored in the PACS per  institutional protocol.     FINDINGS:       RIGHT TESTICLE: 5.8 x 2.3 x 3.5 cm. Normal in size and echogenicity  without focal mass or abnormality. Normal arterial and venous flow.     RIGHT EPIDIDYMIS: 1 cm epididymal head cyst. Otherwise unremarkable.     LEFT TESTICLE: 5.5 x 2.9 x 3.7 cm. Normal in size and echogenicity  without focal mass or abnormality. Normal arterial and venous flow.     LEFT EPIDIDYMIS: Normal in size and echogenicity.     SCROTAL SPACE: Normal physiologic fluid surrounds bilateral testicles.     IMPRESSION:  1. No testicular mass, torsion or epididymoorchitis.  2. Incidentally noted 1 cm right epididymal head cyst. Otherwise  unremarkable.        This report was signed and finalized on 6/11/2024 7:38 PM by Dr Howard Cortez.    Physical Exam:  General: No apparent distress. In good spirits.  Up in the chair.   Mouth:  Swelling and erythema to right tongue improving, associated with previous ET tube.  Cardiovascular: Regular rate and rhythm without murmur, rubs, or gallops.    Pulmonary: Clear to auscultation bilaterally without wheezing, rubs, or rales.  Chest: Sternotomy incision clean, dry, and intact. Sternum stable. No clicks. .   Abdomen: Soft, nondistended and nontender. Obese.   Extremities: Warm, moves all extremities. Saphenectomy site clean, dry, and intact. Peripheral edema.   Neurologic: Grossly intact with no focal deficits.       Impression:  Non-STEMI  Coronary artery disease with unstable angina  Class III severe obesity, BMI 52.96  Hypertension  Prediabetes  Obstructive sleep apnea  Gross hematuria with history of hematuria  Left flank and testicular pain now resolved    Plan:    CBC and BMP today-DC as long as labs are stable  Appreciate urology services evaluation-likely missed/passed kidney stone.  Left flank and testicular pain has resolved.  He will keep follow-up with urology services for evaluation of hematuria.  Continue IV diuresis this morning  Encourage pulmonary toilet and ambulation  Routine postcardiac surgery regimen  He will discharge home later today.  No home health agency will accept patient.  Discussed with patient and nursing

## 2024-06-12 NOTE — DISCHARGE PLACEMENT REQUEST
"From:  Gayla Nixon, BSW  [phone]    Wilbur Flores (44 y.o. Male)       Date of Birth   1980    Social Security Number       Address   [address] [place] [address]    Home Phone   [phone]    MRN   [id number]       [employer]   [employer]    Marital Status                               Admission Date   5/31/24    Admission Type   Emergency    Admitting Provider   Chip Roberts MD    Attending Provider   Sean Trejo MD    Department, Room/Bed   [facility] 4B, 430/1       Discharge Date       Discharge Disposition       Discharge Destination                                 Attending Provider: Sean Trejo MD    Allergies: Jardiance [Empagliflozin]    Isolation: None   Infection: None   Code Status: CPR    Ht: 195.6 cm (77\")   Wt: 203 kg (447 lb)    Admission Cmt: None   Principal Problem: NSTEMI (non-ST elevated myocardial infarction) [I21.4]                   Active Insurance as of 5/31/2024       Primary Coverage       Payor Plan Insurance Group Employer/Plan Group    [facility] [id number]       Payor Plan Address Payor Plan Phone Number Payor Plan Fax Number Effective Dates    [address] [address]   9/1/2023 - None Entered    [place] [id number]         Subscriber Name Subscriber Birth Date Member ID       WILBUR FLORES 1980 [id number]                     Emergency Contacts        (Rel.) Home Phone Work Phone Mobile Phone    Selma Flores (Significant Other) [phone] -- --    CK LAU (Sister) -- -- [phone]                 History & Physical        Sean Trejo MD at 06/05/24 0822            H&P reviewed. The patient was examined and there are no changes to the H&P.    Elevated risk candidate but best option for management of his CAD given available options short and long term.    Verbalized understanding of RBA.  Provides consent        Electronically signed by Sean Trejo MD at " 06/06/24 0743   Source Note: H&P (View-Only)          History and Physical   Cardiothoracic Surgeon     Chief Complaint   Patient presents with    Chest Pain         Subjective    History of Present Illness    Mr. Seals is a 44-year-old male with salient past medical history of prediabetes, hypertension, tobacco use, morbid obesity, sedentary lifestyle, previous COVID 19 infection, gout, and a family history of heart disease presents with chest pain for couple days prior to hospitalization.  Wife thought this was similar to his reflux disease symptoms he did have recurrent symptoms that ultimately he decided present to [facility] for further evaluation.  He did rule in for a non-STEMI.  Further workup identified multivessel coronary disease.  He is currently taking Ozempic for weight loss and reports losing 70 pounds thus far.  His last dose of Ozempic was taken last Tuesday night.  With the aforementioned I have been asked to evaluate for consideration of coronary artery bypass grafting.  He is chest pain-free.      Review of Systems   Constitutional:  Positive for activity change and fatigue. Negative for appetite change, chills, diaphoresis, fever and unexpected weight change.   HENT:  Negative for dental problem, hearing loss, nosebleeds, sore throat, trouble swallowing and voice change.    Eyes:  Negative for photophobia, redness and visual disturbance.   Respiratory:  Positive for shortness of breath. Negative for apnea, cough, chest tightness, wheezing and stridor.    Cardiovascular:  Positive for chest pain and leg swelling (Right greater than left). Negative for palpitations.   Gastrointestinal:  Negative for abdominal distention, abdominal pain, blood in stool, constipation, diarrhea, nausea and vomiting.   Endocrine: Negative for cold intolerance, heat intolerance, polyphagia and polyuria.   Genitourinary:  Negative for decreased urine volume, difficulty urinating, dysuria, flank pain, frequency,  hematuria and urgency.   Musculoskeletal:  Negative for arthralgias, back pain, gait problem, joint swelling, myalgias and neck pain.   Skin:  Negative for pallor, rash and wound.   Allergic/Immunologic: Negative for immunocompromised state.   Neurological:  Negative for dizziness, tremors, seizures, syncope, speech difficulty, weakness, light-headedness, numbness and headaches.   Hematological:  Does not bruise/bleed easily.   Psychiatric/Behavioral:  Negative for confusion, sleep disturbance and suicidal ideas. The patient is not nervous/anxious.           Past Medical History:   Diagnosis Date    COVID     GERD (gastroesophageal reflux disease)     Gout     Heartburn     Hypertension     Obesity      Past Surgical History:   Procedure Laterality Date    DISTAL BICEPS TENDON REPAIR Right 6/7/2022    Procedure: RIGHT DISTAL BICEPS TENDON REPAIR WITH SEMI-T ALLOGRAFT;  Surgeon: Benjamin العراقي MD;  Location: [facility] OR;  Service: Orthopedics;  Laterality: Right;    ELBOW PROCEDURE      EYE SURGERY Right 1984     Family History   Problem Relation Age of Onset    Diabetes Father     Hypertension Father     Heart disease Father      Social History     Tobacco Use    Smoking status: Never    Smokeless tobacco: Current     Types: Chew   Vaping Use    Vaping status: Never Used   Substance Use Topics    Alcohol use: No    Drug use: Defer     Medications Prior to Admission   Medication Sig Dispense Refill Last Dose    amLODIPine-benazepril (LOTREL) 10-40 MG per capsule Take 1 capsule by mouth Daily.   5/30/2024    colchicine 0.6 MG tablet Take 1 tablet by mouth Daily As Needed (gout flare up).       Ozempic, 2 MG/DOSE, 8 MG/3ML solution pen-injector Inject 2 mg under the skin into the appropriate area as directed 1 (One) Time Per Week.   5/30/2024    diclofenac (VOLTAREN) 75 MG EC tablet Take 1 tablet by mouth 2 (Two) Times a Day As Needed.       raNITIdine HCl (ZANTAC PO) Take 1 tablet by mouth Daily As Needed.    More than a month     Allergies:  Patient has no known allergies.    Objective     Vital Signs  Temp:  [97.5 °F (36.4 °C)-98.4 °F (36.9 °C)] 98 °F (36.7 °C)  Heart Rate:  [62-80] 62  Resp:  [15-79] 16  BP: ()/(53-89) 152/89    Physical Exam  Constitutional:       Appearance: He is well-developed. He is obese.   HENT:      Head: Normocephalic and atraumatic.   Eyes:      Pupils: Pupils are equal, round, and reactive to light.   Neck:      Thyroid: No thyromegaly.      Vascular: No JVD.      Trachea: No tracheal deviation.   Cardiovascular:      Rate and Rhythm: Normal rate and regular rhythm.      Heart sounds: Normal heart sounds. No murmur heard.     No friction rub. No gallop.   Pulmonary:      Effort: Pulmonary effort is normal. No respiratory distress.      Breath sounds: Normal breath sounds. No wheezing or rales.   Chest:      Chest wall: No tenderness.   Abdominal:      General: There is no distension.      Palpations: Abdomen is soft.      Tenderness: There is no abdominal tenderness.   Musculoskeletal:         General: Normal range of motion.      Cervical back: Normal range of motion and neck supple.      Right lower leg: Edema present.      Left lower leg: Edema present.   Lymphadenopathy:      Cervical: No cervical adenopathy.   Skin:     General: Skin is warm and dry.   Neurological:      Mental Status: He is alert and oriented to person, place, and time.      Cranial Nerves: No cranial nerve deficit.         Results Review:   Cardiac Catheterization/Vascular Study    Result Date: 5/31/2024  Narrative: Date: 5/31/2024  Procedures: 1. Selective coronary angiography 2. Monitoring of conscious sedation  Indication: NSTEMI  Attending: Chip Roberts  Risks, benefits, and alternatives discussed with the patient and/or family.  Plan is for moderate sedation, and the patient agrees to proceed with the procedure.  An immediate assessment was done prior to the administration of moderate sedation. Procedure  Description: The patient was brought to the cardiac catheterization lab in a fasting state. Informed consent was obtained after explaining the risks, benefits, alternatives of the procedure. The patient was sterilely prepped and draped for right radial artery access usual fashion by the cath lab staff. Time out was taken to confirm the correct patient, site, procedure. The area over the right radial artery was anesthetized with 1% lidocaine. A micropuncture needle was used to puncture the right radial artery resulting bright red pulsatile arterial blood flow. Following this a 5 Greenlandic glide sheath was placed and the sheath was aspirated and flushed. Next, a 5 Maltese Zoya diagnostic catheter was advanced under fluoroscopic guidance into the ascending aorta and selectively engaged into the right coronary artery.  A cineogram was obtained via power injection of contrast in Hebrew projection.  The catheter was unable to engage the left main coronary artery and was exchanged for a 5 Greenlandic JL 3.5 diagnostic coronary catheter.  This was selectively engaged into the left main coronary artery under fluoroscopic guidance.  Multiple cineograms were obtained via power injection of contrast in multiple orthogonal views.  The catheter was then removed and the sheath was aspirated and flushed.  A TR band was then placed and adequate hemostasis was obtained.  The patient was then transferred to cardiac observation unit in stable condition.  There is no early complications noted.  I supervised the administration of conscious sedation by nursing staff throughout the case.  First dose was given at 15:02 and the end of my face-to-face encounter was at 15:23, accounting for a total of 21 minutes of supervision.  During the case, continuous pulse oximetry, heart rate, blood pressure, and patient status were monitored. Total contrast: 98 ml Fluoro time: 4 minutes Radiation: 395 mGy Estimated Blood Loss: < 20 ml Specimens: None  Complications: None  Findings: Selective coronary angiography: 1. Left main: The left main bifurcates into the LAD and left circumflex arteries.  There is mild distal narrowing. 2. Left anterior descending artery: The LAD runs in the interventricular groove giving off 1 significant bifurcating diagonal and multiple septal perforators before wrapping around the apex as an apical recurrent branch.  There is a severe 70 to 80% stenosis in the proximal vessel. 3. Left circumflex artery: The left circumflex is nondominant for posterior circulation.  It gives off 1 small proximal obtuse marginal branch and a second moderate-sized distal branch.  Obtuse marginal branches.  There is a moderate to severe 60 to 70% stenosis in the proximal portion of the larger OM. 4. Right coronary artery: The RCA is dominant for posterior circulation giving rise to PDA and right PL branches.  The RCA is chronically totally occluded proximally.  The PDA and right PL branches fill via left to right collaterals.  Impression: 1.  Severe multi-vessel coronary artery disease as described above.  Plan: -CT surgery consult for evaluation of CABG. -Admit to telemetry. -Check echo -Optimize medical therapy -Routine post-cath care and TR band removal. Chip Roberts MD    XR Chest 1 View    Result Date: 5/31/2024  Narrative: EXAM: XR CHEST 1 VW-  DATE: 5/31/2024 7:13 AM  HISTORY: Chest Pain Protocol   COMPARISON: None available.  TECHNIQUE:  Frontal view(s) of the chest submitted.  FINDINGS:  No pneumothorax, pleural effusion or focal consolidation. Cardiac mediastinal silhouette within normal limits. No acute bony finding.       Impression: 1. No acute cardiopulmonary findings.  This report was signed and finalized on 5/31/2024 8:25 AM by Kehinde Rios.            I reviewed the patient's new clinical results.  Discussed with patient and family(wife, mother and father)      Assessment & Plan      NSTEMI (non-ST elevated myocardial infarction)     Class 3 severe obesity due to excess calories with serious comorbidity and body mass index (BMI) of 50.0 to 59.9 in adult    Primary hypertension    Gastroesophageal reflux disease without esophagitis    SHERRON (obstructive sleep apnea)    Coronary artery disease involving native coronary artery of native heart with unstable angina pectoris  Prediabetes  Use of Ozempic      I discussed the patients findings and my recommendations with patient and family.  We discussed the options for treatment of coronary artery disease to include medical therapy, coronary stenting, and surgical revascularization.  We discussed the pros and cons of each option and how it pertains to the current case.  The STS Risk score was calculated using the STS Risk Calculator and discussed with the patient with available data.  Coronary artery bypass grafting is best option given patient's findings and risk factors.  We discussed the operative conduct and expected hospital and outpatient recovery.  Risks were discussed to include but not limited to bleeding, infection, stroke, heart attack, need for additional procedures, anesthesia risk, organ dysfunction and/or failure, prolonged mechanical ventilation, prolonged ICU stay, chronic pain syndromes, sternal nonunion, and/or death.  It is acknowledged that he has significant morbid obesity with a BMI of 53 and his weight we will have anticipated difficulty providing potentially full support while on cardiopulmonary bypass which may result in increased morbidity and mortality.  In the setting of an MI this only further increases his risk.  While he will be considered a moderate to high risk candidate for surgical revascularization given his age, high function, previous set of efforts to improve his overall wellbeing the benefits of CABG in a prediabetic outweigh anticipated risks.    We discussed the need to utilize all medical treatments additionally prescribed post surgery.       We discussed that  he will require his beard to be trimmed given the length it exists at this time he is agreeable to do so  The patient and family understands the risks, benefits, and alternatives and he wishes to proceed forward with surgery.    I favor CABG with sternal plating/Prevena application end of next week.  As he did use Ozempic on Tuesday evening it is recommended to prevent time for washout of Ozempic.  1 to 2 weeks is the current consideration of time.  As he remains chest pain-free we will shoot for a timeline between 1 to 2 weeks from last use of Ozempic    Sean Trejo MD  06/01/24  14:01 CDT    Electronically signed by Sean Trejo MD at 06/02/24 0911                 Sean Trejo MD at 06/01/24 1984          History and Physical   Cardiothoracic Surgeon     Chief Complaint   Patient presents with    Chest Pain         Subjective    History of Present Illness    Mr. Seals is a 44-year-old male with salient past medical history of prediabetes, hypertension, tobacco use, morbid obesity, sedentary lifestyle, previous COVID 19 infection, gout, and a family history of heart disease presents with chest pain for couple days prior to hospitalization.  Wife thought this was similar to his reflux disease symptoms he did have recurrent symptoms that ultimately he decided present to [facility] for further evaluation.  He did rule in for a non-STEMI.  Further workup identified multivessel coronary disease.  He is currently taking Ozempic for weight loss and reports losing 70 pounds thus far.  His last dose of Ozempic was taken last Tuesday night.  With the aforementioned I have been asked to evaluate for consideration of coronary artery bypass grafting.  He is chest pain-free.      Review of Systems   Constitutional:  Positive for activity change and fatigue. Negative for appetite change, chills, diaphoresis, fever and unexpected weight change.   HENT:  Negative for dental problem, hearing loss,  nosebleeds, sore throat, trouble swallowing and voice change.    Eyes:  Negative for photophobia, redness and visual disturbance.   Respiratory:  Positive for shortness of breath. Negative for apnea, cough, chest tightness, wheezing and stridor.    Cardiovascular:  Positive for chest pain and leg swelling (Right greater than left). Negative for palpitations.   Gastrointestinal:  Negative for abdominal distention, abdominal pain, blood in stool, constipation, diarrhea, nausea and vomiting.   Endocrine: Negative for cold intolerance, heat intolerance, polyphagia and polyuria.   Genitourinary:  Negative for decreased urine volume, difficulty urinating, dysuria, flank pain, frequency, hematuria and urgency.   Musculoskeletal:  Negative for arthralgias, back pain, gait problem, joint swelling, myalgias and neck pain.   Skin:  Negative for pallor, rash and wound.   Allergic/Immunologic: Negative for immunocompromised state.   Neurological:  Negative for dizziness, tremors, seizures, syncope, speech difficulty, weakness, light-headedness, numbness and headaches.   Hematological:  Does not bruise/bleed easily.   Psychiatric/Behavioral:  Negative for confusion, sleep disturbance and suicidal ideas. The patient is not nervous/anxious.           Past Medical History:   Diagnosis Date    COVID     GERD (gastroesophageal reflux disease)     Gout     Heartburn     Hypertension     Obesity      Past Surgical History:   Procedure Laterality Date    DISTAL BICEPS TENDON REPAIR Right 6/7/2022    Procedure: RIGHT DISTAL BICEPS TENDON REPAIR WITH SEMI-T ALLOGRAFT;  Surgeon: Benjamin العراقي MD;  Location: [facility];  Service: Orthopedics;  Laterality: Right;    ELBOW PROCEDURE      EYE SURGERY Right 1984     Family History   Problem Relation Age of Onset    Diabetes Father     Hypertension Father     Heart disease Father      Social History     Tobacco Use    Smoking status: Never    Smokeless tobacco: Current     Types: Chew    Vaping Use    Vaping status: Never Used   Substance Use Topics    Alcohol use: No    Drug use: Defer     Medications Prior to Admission   Medication Sig Dispense Refill Last Dose    amLODIPine-benazepril (LOTREL) 10-40 MG per capsule Take 1 capsule by mouth Daily.   5/30/2024    colchicine 0.6 MG tablet Take 1 tablet by mouth Daily As Needed (gout flare up).       Ozempic, 2 MG/DOSE, 8 MG/3ML solution pen-injector Inject 2 mg under the skin into the appropriate area as directed 1 (One) Time Per Week.   5/30/2024    diclofenac (VOLTAREN) 75 MG EC tablet Take 1 tablet by mouth 2 (Two) Times a Day As Needed.       raNITIdine HCl (ZANTAC PO) Take 1 tablet by mouth Daily As Needed.   More than a month     Allergies:  Patient has no known allergies.    Objective     Vital Signs  Temp:  [97.5 °F (36.4 °C)-98.4 °F (36.9 °C)] 98 °F (36.7 °C)  Heart Rate:  [62-80] 62  Resp:  [15-79] 16  BP: ()/(53-89) 152/89    Physical Exam  Constitutional:       Appearance: He is well-developed. He is obese.   HENT:      Head: Normocephalic and atraumatic.   Eyes:      Pupils: Pupils are equal, round, and reactive to light.   Neck:      Thyroid: No thyromegaly.      Vascular: No JVD.      Trachea: No tracheal deviation.   Cardiovascular:      Rate and Rhythm: Normal rate and regular rhythm.      Heart sounds: Normal heart sounds. No murmur heard.     No friction rub. No gallop.   Pulmonary:      Effort: Pulmonary effort is normal. No respiratory distress.      Breath sounds: Normal breath sounds. No wheezing or rales.   Chest:      Chest wall: No tenderness.   Abdominal:      General: There is no distension.      Palpations: Abdomen is soft.      Tenderness: There is no abdominal tenderness.   Musculoskeletal:         General: Normal range of motion.      Cervical back: Normal range of motion and neck supple.      Right lower leg: Edema present.      Left lower leg: Edema present.   Lymphadenopathy:      Cervical: No cervical  adenopathy.   Skin:     General: Skin is warm and dry.   Neurological:      Mental Status: He is alert and oriented to person, place, and time.      Cranial Nerves: No cranial nerve deficit.         Results Review:   Cardiac Catheterization/Vascular Study    Result Date: 5/31/2024  Narrative: Date: 5/31/2024  Procedures: 1. Selective coronary angiography 2. Monitoring of conscious sedation  Indication: NSTEMI  Attending: Chip Roberts  Risks, benefits, and alternatives discussed with the patient and/or family.  Plan is for moderate sedation, and the patient agrees to proceed with the procedure.  An immediate assessment was done prior to the administration of moderate sedation. Procedure Description: The patient was brought to the cardiac catheterization lab in a fasting state. Informed consent was obtained after explaining the risks, benefits, alternatives of the procedure. The patient was sterilely prepped and draped for right radial artery access usual fashion by the cath lab staff. Time out was taken to confirm the correct patient, site, procedure. The area over the right radial artery was anesthetized with 1% lidocaine. A micropuncture needle was used to puncture the right radial artery resulting bright red pulsatile arterial blood flow. Following this a 5 Northern Irish glide sheath was placed and the sheath was aspirated and flushed. Next, a 5 Armenian Zoya diagnostic catheter was advanced under fluoroscopic guidance into the ascending aorta and selectively engaged into the right coronary artery.  A cineogram was obtained via power injection of contrast in ALTHEA projection.  The catheter was unable to engage the left main coronary artery and was exchanged for a 5 Northern Irish JL 3.5 diagnostic coronary catheter.  This was selectively engaged into the left main coronary artery under fluoroscopic guidance.  Multiple cineograms were obtained via power injection of contrast in multiple orthogonal views.  The catheter was then removed  and the sheath was aspirated and flushed.  A TR band was then placed and adequate hemostasis was obtained.  The patient was then transferred to cardiac observation unit in stable condition.  There is no early complications noted.  I supervised the administration of conscious sedation by nursing staff throughout the case.  First dose was given at 15:02 and the end of my face-to-face encounter was at 15:23, accounting for a total of 21 minutes of supervision.  During the case, continuous pulse oximetry, heart rate, blood pressure, and patient status were monitored. Total contrast: 98 ml Fluoro time: 4 minutes Radiation: 395 mGy Estimated Blood Loss: < 20 ml Specimens: None Complications: None  Findings: Selective coronary angiography: 1. Left main: The left main bifurcates into the LAD and left circumflex arteries.  There is mild distal narrowing. 2. Left anterior descending artery: The LAD runs in the interventricular groove giving off 1 significant bifurcating diagonal and multiple septal perforators before wrapping around the apex as an apical recurrent branch.  There is a severe 70 to 80% stenosis in the proximal vessel. 3. Left circumflex artery: The left circumflex is nondominant for posterior circulation.  It gives off 1 small proximal obtuse marginal branch and a second moderate-sized distal branch.  Obtuse marginal branches.  There is a moderate to severe 60 to 70% stenosis in the proximal portion of the larger OM. 4. Right coronary artery: The RCA is dominant for posterior circulation giving rise to PDA and right PL branches.  The RCA is chronically totally occluded proximally.  The PDA and right PL branches fill via left to right collaterals.  Impression: 1.  Severe multi-vessel coronary artery disease as described above.  Plan: -CT surgery consult for evaluation of CABG. -Admit to telemetry. -Check echo -Optimize medical therapy -Routine post-cath care and TR band removal. Chip Roberts MD    XR Chest 1  View    Result Date: 5/31/2024  Narrative: EXAM: XR CHEST 1 VW-  DATE: 5/31/2024 7:13 AM  HISTORY: Chest Pain Protocol   COMPARISON: None available.  TECHNIQUE:  Frontal view(s) of the chest submitted.  FINDINGS:  No pneumothorax, pleural effusion or focal consolidation. Cardiac mediastinal silhouette within normal limits. No acute bony finding.       Impression: 1. No acute cardiopulmonary findings.  This report was signed and finalized on 5/31/2024 8:25 AM by Kehinde Rios.            I reviewed the patient's new clinical results.  Discussed with patient and family(wife, mother and father)      Assessment & Plan      NSTEMI (non-ST elevated myocardial infarction)    Class 3 severe obesity due to excess calories with serious comorbidity and body mass index (BMI) of 50.0 to 59.9 in adult    Primary hypertension    Gastroesophageal reflux disease without esophagitis    SHERRON (obstructive sleep apnea)    Coronary artery disease involving native coronary artery of native heart with unstable angina pectoris  Prediabetes  Use of Ozempic      I discussed the patients findings and my recommendations with patient and family.  We discussed the options for treatment of coronary artery disease to include medical therapy, coronary stenting, and surgical revascularization.  We discussed the pros and cons of each option and how it pertains to the current case.  The STS Risk score was calculated using the STS Risk Calculator and discussed with the patient with available data.  Coronary artery bypass grafting is best option given patient's findings and risk factors.  We discussed the operative conduct and expected hospital and outpatient recovery.  Risks were discussed to include but not limited to bleeding, infection, stroke, heart attack, need for additional procedures, anesthesia risk, organ dysfunction and/or failure, prolonged mechanical ventilation, prolonged ICU stay, chronic pain syndromes, sternal nonunion, and/or death.   It is acknowledged that he has significant morbid obesity with a BMI of 53 and his weight we will have anticipated difficulty providing potentially full support while on cardiopulmonary bypass which may result in increased morbidity and mortality.  In the setting of an MI this only further increases his risk.  While he will be considered a moderate to high risk candidate for surgical revascularization given his age, high function, previous set of efforts to improve his overall wellbeing the benefits of CABG in a prediabetic outweigh anticipated risks.    We discussed the need to utilize all medical treatments additionally prescribed post surgery.       We discussed that he will require his beard to be trimmed given the length it exists at this time he is agreeable to do so  The patient and family understands the risks, benefits, and alternatives and he wishes to proceed forward with surgery.    I favor CABG with sternal plating/Prevena application end of next week.  As he did use Ozempic on Tuesday evening it is recommended to prevent time for washout of Ozempic.  1 to 2 weeks is the current consideration of time.  As he remains chest pain-free we will shoot for a timeline between 1 to 2 weeks from last use of Ozempic    Sean Trejo MD  06/01/24  14:01 CDT    Electronically signed by Sean Trejo MD at 06/02/24 0905       Chip Roberts MD at 05/31/24 1120          Chief Complaint: chest tightness    HPI: Mr Seals is a 43-year-old male with significant past medical history of morbid obesity, hypertension, prediabetes, GERD, tobacco use, and obstructive sleep apnea.  He started having chest tightness 2 days ago.  He reported as a squeezing sensation in the center of his chest that reminded him a little bit of his acid reflux.  This eventually resolved on its own.  He went up to the fire station and got an EKG done.  Last night the symptoms returned and he ultimately came to [facility] for  evaluation this morning.  Initial troponin was elevated at 655 with repeat at 740.  He is not currently having any chest tightness or other symptoms.  He denies any palpitations, dizziness, syncope, PND, orthopnea, fever, or chills.  He is taking Ozempic for weight loss and reports losing about 70 pounds to date.      Patient Active Problem List   Diagnosis    Class 3 severe obesity due to excess calories with serious comorbidity and body mass index (BMI) of 60.0 to 69.9 in adult    Hypertension    Gastroesophageal reflux disease without esophagitis    SHERRON (obstructive sleep apnea)    Traumatic rupture of distal biceps tendon, right, initial encounter       Past Medical History:   Diagnosis Date    COVID     GERD (gastroesophageal reflux disease)     Gout     Heartburn     Hypertension     Obesity      Past Surgical History:   Procedure Laterality Date    DISTAL BICEPS TENDON REPAIR Right 6/7/2022    Procedure: RIGHT DISTAL BICEPS TENDON REPAIR WITH SEMI-T ALLOGRAFT;  Surgeon: Benjamin العراقي MD;  Location: [facility];  Service: Orthopedics;  Laterality: Right;    ELBOW PROCEDURE      EYE SURGERY Right 1984       The following portions of the patient's history were reviewed and updated as appropriate: allergies, current medications, past family history, past medical history, past social history, past surgical history, and problem list.    Social History     Socioeconomic History    Marital status:    Tobacco Use    Smoking status: Never    Smokeless tobacco: Current     Types: Chew   Vaping Use    Vaping status: Never Used   Substance and Sexual Activity    Alcohol use: No    Drug use: Defer       Family History   Problem Relation Age of Onset    Diabetes Father     Hypertension Father     Heart disease Father        Review of Systems: A 12 system review of systems was completed and is negative except stated in HPI.     Objective  /95   Pulse 70   Temp 98.2 °F (36.8 °C)   Resp 22   Ht 195.6 cm  "(77\")   Wt (!) 204 kg (449 lb)   SpO2 100%   BMI 53.24 kg/m²     Physical Exam:  Constitutional:       Appearance: Well-developed. Morbidly obese.   Eyes:      Conjunctiva/sclera: Conjunctivae normal.      Pupils: Pupils are equal, round, and reactive to light.   HENT:      Head: Normocephalic and atraumatic.   Neck:      Thyroid: No thyromegaly.      Vascular: No JVD.   Pulmonary:      Effort: Pulmonary effort is normal.      Breath sounds: Normal breath sounds. No wheezing. No rales.   Cardiovascular:      Normal rate. Regular rhythm.      Murmurs: There is no murmur.   Edema:     Peripheral edema absent.   Abdominal:      General: Bowel sounds are normal. There is no distension.      Palpations: Abdomen is soft.      Tenderness: There is no abdominal tenderness.   Musculoskeletal: Normal range of motion.      Cervical back: Normal range of motion and neck supple. Skin:     General: Skin is warm and dry.      Findings: No rash.   Neurological:      Mental Status: Alert and oriented to person, place, and time.      Coordination: Coordination normal.   Psychiatric:         Behavior: Behavior normal.         Lab Results   Component Value Date    TROPONINT 740 (C) 05/31/2024     Lab Results   Component Value Date    GLUCOSE 101 (H) 05/31/2024    CALCIUM 9.1 05/31/2024     05/31/2024    K 4.2 05/31/2024    CO2 27.0 05/31/2024     05/31/2024    BUN 11 05/31/2024    CREATININE 0.87 05/31/2024    BCR 12.6 05/31/2024    ANIONGAP 8.0 05/31/2024     Lab Results   Component Value Date    WBC 7.75 05/31/2024    HGB 15.2 05/31/2024    HCT 46.9 05/31/2024    MCV 85.0 05/31/2024     05/31/2024     Lab Results   Component Value Date    CHOL 176 05/31/2024     Lab Results   Component Value Date    TRIG 89 05/31/2024     Lab Results   Component Value Date    HDL 38 (L) 05/31/2024     No components found for: \"LDLCALC\"  Lab Results   Component Value Date     (H) 05/31/2024           Assessment:  1.  " Non-STEMI: Initial troponin 655 with repeat at 740.  Currently chest pain-free.  No acute EKG changes.  2.  Morbid obesity: Body mass index is 53.24 kg/m².   3.  Essential hypertension  4.  Mixed hyperlipidemia: Lipid panel today shows mildly elevated LDL with low HDL.  5.  Prediabetes  6.  Obstructive sleep apnea    Plan:  -Urgent cardiac catheterization today.  -Patient given aspirin and Lovenox in the emergency room.  -Start high intensity statin.  -Further management following heart catheterization.    Electronically signed by Chip Roberts MD at 05/31/24 1132          Operative/Procedure Notes (most recent note)        Sean Trejo MD at 06/06/24 0905          Patient Name:  Constantin Seals  YOB: 1980    Date of Procedure:  6/6/2024    Preoperative Diagnosis:   1.  NSTEMI (non-ST elevated myocardial infarction) [I21.4]  Coronary artery disease involving native coronary artery of native heart with unstable angina pectoris [I25.110]  2.  Obesity, class III  3.  Hypertension    Postoperative Diagnosis:    Same    Procedures Performed:  1. Coronary artery bypass grafting-4 vessel (left internal mammary artery/left anterior descending, reverse saphenous vein graft/distal right coronary artery, reverse saphenous vein graft/obtuse marginal, and reverse saphenous vein graft/diagonal artery)  2. Right endoscopic vein harvest of leg proper and limited distal thigh harvest   3. Sternalock XP sternal plating  4. Application of Prevena Incision management system    Surgeon:  Sean Trejo MD  Assistants:  Assistant: Kun Hussein  Anesthesia Staff:  CRNA: Darius Ulrich CRNA; Martin Park MD  Anesthesia Type:  General    Estimated Blood Loss:  Minimal (Cell Saver)  Drains:  1. 19 [place] Kam drain-left pleural space  2. 24 [place] Kam drains-anterior and posterior mediastinum    Specimens:  None        Operative Findings:  Excellent arterial conduit. Good venous conduit. The LAD  at site of grafting measured 2.4 mm in size and had mild atherosclerotic disease burden. Post bypass grafting had excellent arterial runoff.  The OM artery measured 2.2 mm in size and had mild atherosclerotic disease burden.  Post bypass grafting had excellent arterial runoff. The diagonal artery at site of grafting measured 2  mm in size and had mild atherosclerotic disease burden. Post bypass grafting had excellent arterial runoff.The dRCA 2.2 mm in size and had excellent arterial runoff with moderate atherosclerotic disease burden.  Transesophageal echocardiography salient findings include improved left ventricular function with no significant valvular dysfunction.       Total aortic cross-clamp time: 137 minutes  Total cardiac bypass time: 179 minutes     Operative description in detail:  The patient was taken to the operative suite where he  was placed in a supine position.  Induction of general anesthesia and placement of a single-lumen endotracheal tube was performed without remark.  Appropriate arterial and venous access was established without remark.  Through the previously placed right internal jugular central venous line, a pulmonary artery catheter was floated into position.  No significant blood noted in the urine.  I did speak with Dr. Sullivan who did present and evaluate further with ultimately placing a three-way Vera catheter.  Elected to proceed forward still as the urine was clearing and plan for postoperative evaluation.  The patient was then prepped and draped in the usual and sterile surgical fashion.  A timeout was performed.  Perioperative antibiotics were administered. Beta blocker was given.    A two team approach was utilized to harvest both the left internal mammary artery and the right greater saphenous vein.   Briefly the right greater saphenous vein was taken at the level of the knee medially and taken in a prograde fashion for an anticipated length of vein to the ankle.  Blunt  dissection was performed and each branch was taken using the [employer] device.  A counter stab incision was made with both proximal and distal control obtained.  The vein was extracted from a hemostatic tunnel.  Additional vein was taken from the initial incision site for the anticipated delay to distal thigh in a mirrored technique. The leg was closed in a layered fashion with Vicryl suture.  The vein was prepared without remark.      While the vein was being harvested, median sternotomy was performed by me. Pericardial fat in midline from the level of the innominate vein to the level of the diaphragm was divided in midline.  A Rultract device was used to expose the posterior sternal table.  The left internal mammary artery was taken down using a standard pedicle technique with a combination of electrocautery and/or clips to control all side branches.  At that time, the patient was systemically anticoagulated with IV heparin.  A 19 Nigerian Kam drain was placed in the left pleural space.  After suitable time of circulating heparin, clips were placed doubly onto the mammary artery distally and it was divided proximal to the previously placed clips.  It had excellent arterial inflow.  The mammary artery was controlled distally.  The mammary artery harvest bed was hemostatic.  The Rultract device was removed from the sterile field and a Perry retractor was used for exposure.  The mammary artery was prepared for bypass grafting and deemed excellent.  A pericardial well was created. I elected to cannulate the heart centrally accessing distally the ascending aorta and the right atrial appendage.  Each cannula was placed in continuity with the appropriate pump line. Retrograde autologous prime was completed as indicated.  A combined cardioplegia/aortic root vent set was secured with a horizontal mattress 4-0 Prolene suture.  With an appropriate ACT and all in readiness, cardiopulmonary bypass was commenced.  I dissected  out the obtuse marginal, dRCA, D, and the LAD for suitable sites for bypass grafting.  With that, I proceeded to apply the aortic cross-clamp and administered cardioplegia utilizing a warm induction strategy.  Upon achieving electrical-mechanical arrest, cold blood potassium cardioplegia was administered to a total standard dose.  We did implement systemic hypothermia mild and applied topical hypothermia to the ventricle.  At appropriate intervals, doses of cardioplegia were administered throughout the conduct of bypass grafting.     I directed my attention towards the dRCA.  A coronary arteriotomy was made and augmented to size with Light scissors.  It is as per operative findings.  The anastomosis was constructed in an end-to-side orientation with running 7-0 Prolene suture.  With that its proximal anastomosis was  constructed following aortotomy with 11 blade and augmented size with 4 mm arterial punch  subsequently.  This was constructed in a side aorta end vein graft fashion with running 6-0 Prolene suture. An AC  was placed.  The graft was assessed for lay which was excellent. It is without tension or torsion.     To that end I directed my attention towards the obtuse marginal.  A coronary arteriotomy was made and augmented to size with Light scissors.  It is as per operative findings.  The anastomosis was constructed in an end-to-side orientation with running 7-0 Prolene suture.  With that its proximal anastomosis was constructed following aortotomy with 11 blade and augmented size with 4 mm arterial punch subsequently.  This was constructed in a side aorta end vein graft fashion with running 6-0 Prolene suture. An AC  was placed.  The graft was assessed for lay which was excellent.  It is without tension or torsion.     To that end I directed my attention towards the diagonal artery.  A coronary arteriotomy was made and augmented to size with Light scissors.  It is as per operative findings.   The anastomosis was constructed in an end-to-side orientation with running 7-0 Prolene suture.  With that its proximal anastomosis was constructed following aortotomy with 11 blade and augmented size with 4 mm arterial punch subsequently.  This was constructed in a side aorta end vein graft fashion with running 6-0 Prolene suture. An AC  was placed.  The graft was assessed for lay which was excellent.  It is without tension or torsion.     During a dose of cardioplegia, a pericardial slit left lateral was made while dividing associate pericardiophrenic fat and vasculature while being mindful of the lay of the phrenic nerve.  As such I proceeded to obtain control proximally onto the mammary artery and spatulated it distally.  I grafted this onto the LAD following coronary arteriotomy using previous described techniques.  The anastomosis was constructed in an end-to-side orientation with running 7-0 Prolene suture.  It is as per operative findings and the anastomosis was hemostatic.  I did tack the mammary artery pedicle to the anterior aspect heart with 6-0 Prolene sutures.    With that being accomplished, a terminal hotshot was administered.  The patient was placed in Trendelenburg position.  Upon completion of terminal hotshot and placement of temporary epicardial pacing wires, with the aortic vent on high and pump flows diminished, the aortic cross-clamp was released.  With that, full support was implemented.  A perfusable rhythm was obtained after cardioversion.  A nonworking beating phase was implemented.  Ventilation restored.  I surveyed each graft and each anastomosis was hemostatic and had excellent lay.  With all in readiness, the heart was allowed to fill.  De-airing maneuvers were performed as guided by transesophageal echocardiography.  With that the heart was decompressed and we removed the aortic root vent/cardioplegia cannula set.  Its associated pursestring suture was tied securely and this  was reinforced as per matter of routine. The table was now placed in neutral position.  With all in readiness, we proceeded to wean from and separate from cardiopulmonary bypass.  I did decannulate the venous line and snared down its associated pursestring suture.  Systemic intravenous protamine was administered.  All associated blood volume was returned to the patient. With continued good hemodynamics, I decannulated the arterial line and tied down its associated pursestring sutures.  At this time I tied down the previously snared pursestring suture.  The mediastinum was drained with 24 Icelandic Kam drains placed anteriorly and posteriorly.  I surveyed the chest and hemostasis was pristine.  With that I impregnated the sternal edges with vancomycin, thrombin, and Gelfoam paste.  The sternum was reapproximated with stainless sterile wires placed in an interrupted fashion inferiorly and was reapproximated with the sternalock XP sternal plating system.  Please see the implant record for the utilized plates and screws.  In layers anatomically the soft tissue planes were reapproximated. Instruments, sharps, and sponge counts were reported as correct.  The Prevena incision management system was applied to the sternotomy incision.         Complications: None     Disposition: Transferred to ICU in stable and guarded condition.    Sean Trejo MD      Electronically signed by Sean Trejo MD at 06/08/24 9508          Physician Progress Notes (most recent note)        Vreonica Hardin, APRN at 06/12/24 0909          Coronary artery bypass grafting x 4 (LIMA/LAD, RSVG/dRCA, RSVG/OM, and RSVG/D), right leg endoscopic vein harvest, sternalock XP sternal plating, application of Prevena incision management system    POD 6    Up in the chair.  On room air.  Reports he feels significantly better this morning and overnight since passing a small blood clot in urine.    CT scan of the abdomen and pelvis and ultrasound of  "scrotum and testicles reviewed.  He has been seen by urology, Dr. Fonseca this morning.  He has good pain control.  Weight is down 3 pounds and he is at preoperative weight.  He is hopeful for discharge home today.  Social work notes reviewed: No home health agency agreeable to accept patient.    IV drips: None  Telemetry: Sinus-sinus tachycardia 76-82 bpm    Visit Vitals  /65 (BP Location: Left arm, Patient Position: Lying)   Pulse 79   Temp 98 °F (36.7 °C) (Oral)   Resp 18   Ht 195.6 cm (77\")   Wt (!) 203 kg (447 lb)   SpO2 96%   BMI 53.01 kg/m²   Preoperative weight: 447 pounds    Intake/Output Summary (Last 24 hours) at 6/12/2024 0939  Last data filed at 6/12/2024 0900  Gross per 24 hour   Intake 1740 ml   Output 3415 ml   Net -1675 ml     Labs:  Lab Results   Component Value Date    WBC 7.85 06/11/2024    HGB 9.3 (L) 06/11/2024    HCT 29.5 (L) 06/11/2024    MCV 87.3 06/11/2024     06/11/2024      Lab Results   Component Value Date    GLUCOSE 119 (H) 06/11/2024    BUN 21 (H) 06/11/2024    CREATININE 0.93 06/11/2024    EGFR 104.5 06/11/2024    BCR 22.6 06/11/2024    K 4.5 06/11/2024    CO2 31.0 (H) 06/11/2024    CALCIUM 8.6 06/11/2024    ALBUMIN 3.1 (L) 06/09/2024    BILITOT 0.4 06/09/2024    AST 26 06/09/2024    ALT 26 06/09/2024      Myoglobin Screen, Urine - Urine, Clean Catch    Specimen Information: Urine, Clean Catch   0 Result Notes      Component  Ref Range & Units 2 d ago/6/7/2024   Myoglobin, Qualitative  Negative Positive Abnormal    Resulting Agency  [facility]              Specimen Collected: 06/07/24 09:58 CDT Last Resulted: 06/07/24 10:27 CDT         Myoglobin, Serum  Specimen Information: Blood   0 Result Notes        Component  Ref Range & Units 1 d ago  6/8/2024 2 d ago  6/7/2024 3 d ago   Myoglobin  28.0 - 72.0 ng/mL 228.0 High  957.0 High  32.5   Resulting Agency  [facility]  [facility]        Imaging:  CT ABDOMEN PELVIS WO CONTRAST- 6/11/2024 3:55 PM     HISTORY: FLANK PAIN " RULE OUT URETERAL STONE; R07.9-Chest pain,  unspecified; R79.89-Other specified abnormal findings of blood  chemistry; I21.4-Non-ST elevation (NSTEMI) myocardial infarction;  I25.110-Atherosclerotic heart disease of native coronary artery with  unstable angina pectoris; Z74.09-Other reduced mobility      COMPARISON: Abdominal CT dated 5/16/2022     DOSE LENGTH PRODUCT: 2672.58 mGy.cm. Automated exposure control was also  utilized to decrease patient radiation dose.     TECHNIQUE: Noncontrast images of the abdomen and pelvis obtained without  oral contrast. Multiplanar reformatted images were provided for review.     FINDINGS:     LOWER CHEST: Bilateral small layering pleural effusions. Previous  coronary bypass.     LIVER: Liver appears grossly normal without contrast.     BILIARY SYSTEM: The gallbladder is unremarkable. No intrahepatic or  extrahepatic ductal dilatation.     PANCREAS: Pancreas appears grossly normal without contrast.     SPLEEN: Unremarkable.     ADRENALS: Unremarkable.     KIDNEYS: No hydronephrosis identified. There is a prominent left  perinephric stranding. No urolithiasis identified.     RETROPERITONEUM: Left perinephric stranding as described above. No  adenopathy or hemorrhage.     GI TRACT: Stomach is nondistended. Small bowel loops are nondilated.  There are a few scattered diverticula along the descending and sigmoid  colon. No acute diverticulitis. The appendix is visualized and  unremarkable.     OTHER: There is no mesenteric mass, lymphadenopathy, free air or free  fluid. Lumbar facet arthropathy. No acute bony abnormality.  Fat-containing periumbilical hernia.     PELVIS: No mass lesion, fluid collection or significant lymphadenopathy  is seen in the pelvis. Urinary bladder is decompressed.     IMPRESSION:     1.  Left periureteral stranding could be secondary to a recently passed  renal stone or perhaps a left upper tract infection. No stone visualized  at this time. No  hydronephrosis.     This report was signed and finalized on 6/11/2024 5:09 PM by Dr Howard Cortez.    US SCROTUM AND TESTICLES- 6/11/2024 6:16 PM     REASON FOR EXAM: left testicle pain; R07.9-Chest pain, unspecified;  R79.89-Other specified abnormal findings of blood chemistry;  I21.4-Non-ST elevation (NSTEMI) myocardial infarction;  I25.110-Atherosclerotic heart disease of native coronary artery with  unstable angina pectoris; Z74.09-Other reduced mobility       COMPARISON: None      TECHNIQUE: Multiple longitudinal and real-time ultrasound images of the  scrotum are obtained with limited Doppler analysis of the bilateral  testicles. Ultrasound images and report are stored in the PACS per  institutional protocol.     FINDINGS:       RIGHT TESTICLE: 5.8 x 2.3 x 3.5 cm. Normal in size and echogenicity  without focal mass or abnormality. Normal arterial and venous flow.     RIGHT EPIDIDYMIS: 1 cm epididymal head cyst. Otherwise unremarkable.     LEFT TESTICLE: 5.5 x 2.9 x 3.7 cm. Normal in size and echogenicity  without focal mass or abnormality. Normal arterial and venous flow.     LEFT EPIDIDYMIS: Normal in size and echogenicity.     SCROTAL SPACE: Normal physiologic fluid surrounds bilateral testicles.     IMPRESSION:  1. No testicular mass, torsion or epididymoorchitis.  2. Incidentally noted 1 cm right epididymal head cyst. Otherwise  unremarkable.        This report was signed and finalized on 6/11/2024 7:38 PM by Dr Howard Cortez.    Physical Exam:  General: No apparent distress. In good spirits.  Up in the chair.   Mouth: Swelling and erythema to right tongue improving, associated with previous ET tube.  Cardiovascular: Regular rate and rhythm without murmur, rubs, or gallops.    Pulmonary: Clear to auscultation bilaterally without wheezing, rubs, or rales.  Chest: Sternotomy incision clean, dry, and intact. Sternum stable. No clicks. .   Abdomen: Soft, nondistended and nontender. Obese.   Extremities: Warm,  moves all extremities. Saphenectomy site clean, dry, and intact. Peripheral edema.   Neurologic: Grossly intact with no focal deficits.       Impression:  Non-STEMI  Coronary artery disease with unstable angina  Class III severe obesity, BMI 52.96  Hypertension  Prediabetes  Obstructive sleep apnea  Gross hematuria with history of hematuria  Left flank and testicular pain now resolved    Plan:    CBC and BMP today-DC as long as labs are stable  Appreciate urology services evaluation-likely missed/passed kidney stone.  Left flank and testicular pain has resolved.  He will keep follow-up with urology services for evaluation of hematuria.  Continue IV diuresis this morning  Encourage pulmonary toilet and ambulation  Routine postcardiac surgery regimen  He will discharge home later today.  No home health agency will accept patient.  Discussed with patient and nursing          Electronically signed by Veronica Hardin APRN at 06/12/24 0975          Consult Notes (most recent note)        Sean Trejo MD at 06/01/24 1401        Consult Orders    1. Inpatient Cardiothoracic Surgery Consult [[id number]] ordered by Chip Roberts MD at 05/31/24 1807                 History and Physical   Cardiothoracic Surgeon     Chief Complaint   Patient presents with    Chest Pain         Subjective     History of Present Illness    Mr. Seals is a 44-year-old male with salient past medical history of prediabetes, hypertension, tobacco use, morbid obesity, sedentary lifestyle, previous COVID 19 infection, gout, and a family history of heart disease presents with chest pain for couple days prior to hospitalization.  Wife thought this was similar to his reflux disease symptoms he did have recurrent symptoms that ultimately he decided present to [facility] for further evaluation.  He did rule in for a non-STEMI.  Further workup identified multivessel coronary disease.  He is currently taking Ozempic for weight loss and reports  losing 70 pounds thus far.  His last dose of Ozempic was taken last Tuesday night.  With the aforementioned I have been asked to evaluate for consideration of coronary artery bypass grafting.  He is chest pain-free.      Review of Systems   Constitutional:  Positive for activity change and fatigue. Negative for appetite change, chills, diaphoresis, fever and unexpected weight change.   HENT:  Negative for dental problem, hearing loss, nosebleeds, sore throat, trouble swallowing and voice change.    Eyes:  Negative for photophobia, redness and visual disturbance.   Respiratory:  Positive for shortness of breath. Negative for apnea, cough, chest tightness, wheezing and stridor.    Cardiovascular:  Positive for chest pain and leg swelling (Right greater than left). Negative for palpitations.   Gastrointestinal:  Negative for abdominal distention, abdominal pain, blood in stool, constipation, diarrhea, nausea and vomiting.   Endocrine: Negative for cold intolerance, heat intolerance, polyphagia and polyuria.   Genitourinary:  Negative for decreased urine volume, difficulty urinating, dysuria, flank pain, frequency, hematuria and urgency.   Musculoskeletal:  Negative for arthralgias, back pain, gait problem, joint swelling, myalgias and neck pain.   Skin:  Negative for pallor, rash and wound.   Allergic/Immunologic: Negative for immunocompromised state.   Neurological:  Negative for dizziness, tremors, seizures, syncope, speech difficulty, weakness, light-headedness, numbness and headaches.   Hematological:  Does not bruise/bleed easily.   Psychiatric/Behavioral:  Negative for confusion, sleep disturbance and suicidal ideas. The patient is not nervous/anxious.           Past Medical History:   Diagnosis Date    COVID     GERD (gastroesophageal reflux disease)     Gout     Heartburn     Hypertension     Obesity      Past Surgical History:   Procedure Laterality Date    DISTAL BICEPS TENDON REPAIR Right 6/7/2022     Procedure: RIGHT DISTAL BICEPS TENDON REPAIR WITH SEMI-T ALLOGRAFT;  Surgeon: Benjamin العراقي MD;  Location:  [facility] OR;  Service: Orthopedics;  Laterality: Right;    ELBOW PROCEDURE      EYE SURGERY Right 1984     Family History   Problem Relation Age of Onset    Diabetes Father     Hypertension Father     Heart disease Father      Social History     Tobacco Use    Smoking status: Never    Smokeless tobacco: Current     Types: Chew   Vaping Use    Vaping status: Never Used   Substance Use Topics    Alcohol use: No    Drug use: Defer     Medications Prior to Admission   Medication Sig Dispense Refill Last Dose    amLODIPine-benazepril (LOTREL) 10-40 MG per capsule Take 1 capsule by mouth Daily.   5/30/2024    colchicine 0.6 MG tablet Take 1 tablet by mouth Daily As Needed (gout flare up).       Ozempic, 2 MG/DOSE, 8 MG/3ML solution pen-injector Inject 2 mg under the skin into the appropriate area as directed 1 (One) Time Per Week.   5/30/2024    diclofenac (VOLTAREN) 75 MG EC tablet Take 1 tablet by mouth 2 (Two) Times a Day As Needed.       raNITIdine HCl (ZANTAC PO) Take 1 tablet by mouth Daily As Needed.   More than a month     Allergies:  Patient has no known allergies.    Objective      Vital Signs  Temp:  [97.5 °F (36.4 °C)-98.4 °F (36.9 °C)] 98 °F (36.7 °C)  Heart Rate:  [62-80] 62  Resp:  [15-79] 16  BP: ()/(53-89) 152/89    Physical Exam  Constitutional:       Appearance: He is well-developed. He is obese.   HENT:      Head: Normocephalic and atraumatic.   Eyes:      Pupils: Pupils are equal, round, and reactive to light.   Neck:      Thyroid: No thyromegaly.      Vascular: No JVD.      Trachea: No tracheal deviation.   Cardiovascular:      Rate and Rhythm: Normal rate and regular rhythm.      Heart sounds: Normal heart sounds. No murmur heard.     No friction rub. No gallop.   Pulmonary:      Effort: Pulmonary effort is normal. No respiratory distress.      Breath sounds: Normal breath sounds.  No wheezing or rales.   Chest:      Chest wall: No tenderness.   Abdominal:      General: There is no distension.      Palpations: Abdomen is soft.      Tenderness: There is no abdominal tenderness.   Musculoskeletal:         General: Normal range of motion.      Cervical back: Normal range of motion and neck supple.      Right lower leg: Edema present.      Left lower leg: Edema present.   Lymphadenopathy:      Cervical: No cervical adenopathy.   Skin:     General: Skin is warm and dry.   Neurological:      Mental Status: He is alert and oriented to person, place, and time.      Cranial Nerves: No cranial nerve deficit.         Results Review:   Cardiac Catheterization/Vascular Study    Result Date: 5/31/2024  Narrative: Date: 5/31/2024  Procedures: 1. Selective coronary angiography 2. Monitoring of conscious sedation  Indication: NSTEMI  Attending: Chip Roberts  Risks, benefits, and alternatives discussed with the patient and/or family.  Plan is for moderate sedation, and the patient agrees to proceed with the procedure.  An immediate assessment was done prior to the administration of moderate sedation. Procedure Description: The patient was brought to the cardiac catheterization lab in a fasting state. Informed consent was obtained after explaining the risks, benefits, alternatives of the procedure. The patient was sterilely prepped and draped for right radial artery access usual fashion by the cath lab staff. Time out was taken to confirm the correct patient, site, procedure. The area over the right radial artery was anesthetized with 1% lidocaine. A micropuncture needle was used to puncture the right radial artery resulting bright red pulsatile arterial blood flow. Following this a 5 Yi glide sheath was placed and the sheath was aspirated and flushed. Next, a 5 [place] Zoya diagnostic catheter was advanced under fluoroscopic guidance into the ascending aorta and selectively engaged into the right coronary  artery.  A cineogram was obtained via power injection of contrast in [place] projection.  The catheter was unable to engage the left main coronary artery and was exchanged for a 5 Divehi JL 3.5 diagnostic coronary catheter.  This was selectively engaged into the left main coronary artery under fluoroscopic guidance.  Multiple cineograms were obtained via power injection of contrast in multiple orthogonal views.  The catheter was then removed and the sheath was aspirated and flushed.  A TR band was then placed and adequate hemostasis was obtained.  The patient was then transferred to cardiac observation unit in stable condition.  There is no early complications noted.  I supervised the administration of conscious sedation by nursing staff throughout the case.  First dose was given at 15:02 and the end of my face-to-face encounter was at 15:23, accounting for a total of 21 minutes of supervision.  During the case, continuous pulse oximetry, heart rate, blood pressure, and patient status were monitored. Total contrast: 98 ml Fluoro time: 4 minutes Radiation: 395 mGy Estimated Blood Loss: < 20 ml Specimens: None Complications: None  Findings: Selective coronary angiography: 1. Left main: The left main bifurcates into the LAD and left circumflex arteries.  There is mild distal narrowing. 2. Left anterior descending artery: The LAD runs in the interventricular groove giving off 1 significant bifurcating diagonal and multiple septal perforators before wrapping around the apex as an apical recurrent branch.  There is a severe 70 to 80% stenosis in the proximal vessel. 3. Left circumflex artery: The left circumflex is nondominant for posterior circulation.  It gives off 1 small proximal obtuse marginal branch and a second moderate-sized distal branch.  Obtuse marginal branches.  There is a moderate to severe 60 to 70% stenosis in the proximal portion of the larger OM. 4. Right coronary artery: The RCA is dominant for posterior  circulation giving rise to PDA and right PL branches.  The RCA is chronically totally occluded proximally.  The PDA and right PL branches fill via left to right collaterals.  Impression: 1.  Severe multi-vessel coronary artery disease as described above.  Plan: -CT surgery consult for evaluation of CABG. -Admit to telemetry. -Check echo -Optimize medical therapy -Routine post-cath care and TR band removal. Chip Roberts MD    XR Chest 1 View    Result Date: 5/31/2024  Narrative: EXAM: XR CHEST 1 VW-  DATE: 5/31/2024 7:13 AM  HISTORY: Chest Pain Protocol   COMPARISON: None available.  TECHNIQUE:  Frontal view(s) of the chest submitted.  FINDINGS:  No pneumothorax, pleural effusion or focal consolidation. Cardiac mediastinal silhouette within normal limits. No acute bony finding.       Impression: 1. No acute cardiopulmonary findings.  This report was signed and finalized on 5/31/2024 8:25 AM by Kehinde Rios.            I reviewed the patient's new clinical results.  Discussed with patient and family(wife, mother and father)      Assessment & Plan       NSTEMI (non-ST elevated myocardial infarction)    Class 3 severe obesity due to excess calories with serious comorbidity and body mass index (BMI) of 50.0 to 59.9 in adult    Primary hypertension    Gastroesophageal reflux disease without esophagitis    SHERRON (obstructive sleep apnea)    Coronary artery disease involving native coronary artery of native heart with unstable angina pectoris  Prediabetes  Use of Ozempic      I discussed the patients findings and my recommendations with patient and family.  We discussed the options for treatment of coronary artery disease to include medical therapy, coronary stenting, and surgical revascularization.  We discussed the pros and cons of each option and how it pertains to the current case.  The STS Risk score was calculated using the STS Risk Calculator and discussed with the patient with available data.  Coronary artery  bypass grafting is best option given patient's findings and risk factors.  We discussed the operative conduct and expected hospital and outpatient recovery.  Risks were discussed to include but not limited to bleeding, infection, stroke, heart attack, need for additional procedures, anesthesia risk, organ dysfunction and/or failure, prolonged mechanical ventilation, prolonged ICU stay, chronic pain syndromes, sternal nonunion, and/or death.  It is acknowledged that he has significant morbid obesity with a BMI of 53 and his weight we will have anticipated difficulty providing potentially full support while on cardiopulmonary bypass which may result in increased morbidity and mortality.  In the setting of an MI this only further increases his risk.  While he will be considered a moderate to high risk candidate for surgical revascularization given his age, high function, previous set of efforts to improve his overall wellbeing the benefits of CABG in a prediabetic outweigh anticipated risks.    We discussed the need to utilize all medical treatments additionally prescribed post surgery.       We discussed that he will require his beard to be trimmed given the length it exists at this time he is agreeable to do so  The patient and family understands the risks, benefits, and alternatives and he wishes to proceed forward with surgery.    I favor CABG with sternal plating/Prevena application end of next week.  As he did use Ozempic on Tuesday evening it is recommended to prevent time for washout of Ozempic.  1 to 2 weeks is the current consideration of time.  As he remains chest pain-free we will shoot for a timeline between 1 to 2 weeks from last use of Ozempic    Sean Trejo MD  06/01/24  14:01 CDT    Electronically signed by Sean Trejo MD at 06/02/24 0972

## 2024-06-12 NOTE — PROGRESS NOTES
"Urology  Length of Stay: 12  Patient Care Team:  Giovani Lujan DO as PCP - General (Internal Medicine)    Chief Complaint: Left flank pain concern for kidney stone    Subjective     Interval History:   Patient feels better today.  He states he passed \"2 small stones and a clot\".  CT abdomen pelvis without contrast done yesterday reviewed by me and discussed with the patient shows left periureteral stranding consistent with recently passed stone, otherwise normal kidneys without stone or hydronephrosis.  He is voiding without hematuria.    Review of Systems:   Review of Systems    Objective       Intake/Output Summary (Last 24 hours) at 6/12/2024 0857  Last data filed at 6/12/2024 0444  Gross per 24 hour   Intake 900 ml   Output 3465 ml   Net -2565 ml       Vital Signs  Temp:  [97.6 °F (36.4 °C)-98.2 °F (36.8 °C)] 98 °F (36.7 °C)  Heart Rate:  [76-87] 79  Resp:  [16-20] 18  BP: (123-158)/(59-84) 123/65    Physical Exam:  Physical Exam     Results Review:       I reviewed the patient's new clinical results.  Lab Results (last 24 hours)       Procedure Component Value Units Date/Time    Basic Metabolic Panel [[id number]] Collected: 06/12/24 0846    Specimen: Blood Updated: 06/12/24 0853    CBC (No Diff) [[id number]] Collected: 06/12/24 0846    Specimen: Blood Updated: 06/12/24 0853    POC Glucose Once [[id number]]  (Abnormal) Collected: 06/12/24 0716    Specimen: Blood Updated: 06/12/24 0727     Glucose 135 mg/dL      Comment: : [id number] [facility] ID: [id number]       POC Glucose Once [[id number]]  (Abnormal) Collected: 06/11/24 1933    Specimen: Blood Updated: 06/11/24 1944     Glucose 149 mg/dL      Comment: : [id number] AMELIA[facility]Ricardo [facility] ID: [id number]       POC Glucose Once [[id number]]  (Abnormal) Collected: 06/11/24 1756    Specimen: Blood Updated: 06/11/24 1806     Glucose 145 mg/dL      Comment: : [id number] Tonya [facility] ID: [id number]       POC Glucose Once [[id number]]  (Normal) " Collected: 06/11/24 1102    Specimen: Blood Updated: 06/11/24 1117     Glucose 82 mg/dL      Comment: : [id number] Marion [facility] ID: [id number]             Imaging Results (Last 24 Hours)       Procedure Component Value Units Date/Time    US Scrotum & Testicles [[id number]] Collected: 06/11/24 1935     Updated: 06/11/24 1941    Narrative:      US SCROTUM AND TESTICLES- 6/11/2024 6:16 PM     REASON FOR EXAM: left testicle pain; R07.9-Chest pain, unspecified;  R79.89-Other specified abnormal findings of blood chemistry;  I21.4-Non-ST elevation (NSTEMI) myocardial infarction;  I25.110-Atherosclerotic heart disease of native coronary artery with  unstable angina pectoris; Z74.09-Other reduced mobility       COMPARISON: None      TECHNIQUE: Multiple longitudinal and real-time ultrasound images of the  scrotum are obtained with limited Doppler analysis of the bilateral  testicles. Ultrasound images and report are stored in the PACS per  institutional protocol.     FINDINGS:       RIGHT TESTICLE: 5.8 x 2.3 x 3.5 cm. Normal in size and echogenicity  without focal mass or abnormality. Normal arterial and venous flow.     RIGHT EPIDIDYMIS: 1 cm epididymal head cyst. Otherwise unremarkable.     LEFT TESTICLE: 5.5 x 2.9 x 3.7 cm. Normal in size and echogenicity  without focal mass or abnormality. Normal arterial and venous flow.     LEFT EPIDIDYMIS: Normal in size and echogenicity.     SCROTAL SPACE: Normal physiologic fluid surrounds bilateral testicles.       Impression:      1. No testicular mass, torsion or epididymoorchitis.  2. Incidentally noted 1 cm right epididymal head cyst. Otherwise  unremarkable.        This report was signed and finalized on 6/11/2024 7:38 PM by Dr Howard Cortez.       CT Abdomen Pelvis Without Contrast [[id number]] Collected: 06/11/24 1704     Updated: 06/11/24 1712    Narrative:      CT ABDOMEN PELVIS WO CONTRAST- 6/11/2024 3:55 PM     HISTORY: FLANK PAIN RULE OUT URETERAL STONE; R07.9-Chest  pain,  unspecified; R79.89-Other specified abnormal findings of blood  chemistry; I21.4-Non-ST elevation (NSTEMI) myocardial infarction;  I25.110-Atherosclerotic heart disease of native coronary artery with  unstable angina pectoris; Z74.09-Other reduced mobility      COMPARISON: Abdominal CT dated 5/16/2022     DOSE LENGTH PRODUCT: 2672.58 mGy.cm. Automated exposure control was also  utilized to decrease patient radiation dose.     TECHNIQUE: Noncontrast images of the abdomen and pelvis obtained without  oral contrast. Multiplanar reformatted images were provided for review.     FINDINGS:     LOWER CHEST: Bilateral small layering pleural effusions. Previous  coronary bypass.     LIVER: Liver appears grossly normal without contrast.     BILIARY SYSTEM: The gallbladder is unremarkable. No intrahepatic or  extrahepatic ductal dilatation.     PANCREAS: Pancreas appears grossly normal without contrast.     SPLEEN: Unremarkable.     ADRENALS: Unremarkable.     KIDNEYS: No hydronephrosis identified. There is a prominent left  perinephric stranding. No urolithiasis identified.     RETROPERITONEUM: Left perinephric stranding as described above. No  adenopathy or hemorrhage.     GI TRACT: Stomach is nondistended. Small bowel loops are nondilated.  There are a few scattered diverticula along the descending and sigmoid  colon. No acute diverticulitis. The appendix is visualized and  unremarkable.     OTHER: There is no mesenteric mass, lymphadenopathy, free air or free  fluid. Lumbar facet arthropathy. No acute bony abnormality.  Fat-containing periumbilical hernia.     PELVIS: No mass lesion, fluid collection or significant lymphadenopathy  is seen in the pelvis. Urinary bladder is decompressed.       Impression:         1.  Left periureteral stranding could be secondary to a recently passed  renal stone or perhaps a left upper tract infection. No stone visualized  at this time. No hydronephrosis.     This report was signed  and finalized on 6/11/2024 5:09 PM by Dr Howard Cortez.               Medication Review:     Current Facility-Administered Medications:     acetaminophen (TYLENOL) tablet 1,000 mg, 1,000 mg, Oral, Q6H, 1,000 mg at 06/12/24 0533 **OR** acetaminophen (TYLENOL) 160 MG/5ML oral solution 1,000 mg, 1,000 mg, Oral, Q6H **OR** acetaminophen (TYLENOL) suppository 650 mg, 650 mg, Rectal, Q6H, Veronica Hardin APRN    albuterol (PROVENTIL) nebulizer solution 0.5% 2.5 mg/0.5mL, 1.25 mg, Nebulization, TID - RT, 1.25 mg at 06/12/24 0644 **AND** ipratropium (ATROVENT) nebulizer solution 0.5 mg, 0.5 mg, Nebulization, TID - RT, Sean Trejo MD, 0.5 mg at 06/12/24 0644    amiodarone (PACERONE) tablet 400 mg, 400 mg, Oral, BID With Meals, Veronica Hardin APRN, 400 mg at 06/11/24 1742    aspirin EC tablet 81 mg, 81 mg, Oral, Daily, Veronica Hardin APRN, 81 mg at 06/11/24 0801    atorvastatin (LIPITOR) tablet 20 mg, 20 mg, Oral, Nightly, Veronica Hardin APRN, 20 mg at 06/11/24 2034    bisacodyl (DULCOLAX) EC tablet 10 mg, 10 mg, Oral, BID, Veronica Hardin APRN, 10 mg at 06/09/24 0805    dextrose (D50W) (25 g/50 mL) IV injection 10-50 mL, 10-50 mL, Intravenous, Q15 Min PRN, Veronica Hardin APRN, 50 mL at 06/06/24 1822    dextrose (D50W) (25 g/50 mL) IV injection 50 mL, 50 mL, Intravenous, Q1H PRN, Veronica Hardin APRN    dextrose (GLUTOSE) oral gel 15 g, 15 g, Oral, Q15 Min PRN, Hardin, Veronica Jessica, APRN    Enoxaparin Sodium (LOVENOX) syringe 40 mg, 40 mg, Subcutaneous, Daily, Veronica Hardin APRN, 40 mg at 06/11/24 0800    First Mouthwash (Magic Mouthwash) 5 mL, 5 mL, Swish & Spit, Q6H, Lana Avila APRN, 5 mL at 06/12/24 0539    furosemide (LASIX) injection 20 mg, 20 mg, Intravenous, TID AC, Veronica Hardin APRN, 20 mg at 06/11/24 1837    glucagon (GLUCAGEN) injection 1 mg, 1 mg, Intramuscular, Q15 Min PRN, Veronica Hardin APRN    Hold All immunizations, , Does not apply, Continuous PRN,  Veronica Hardin APRN    Insulin Lispro (humaLOG) injection 2-9 Units, 2-9 Units, Subcutaneous, 4x Daily AC & at Bedtime, Veronica Hardin APRN, 2 Units at 06/11/24 0803    ketorolac (TORADOL) injection 30 mg, 30 mg, Intravenous, Q6H PRN, Veronica Hardin APRN, 30 mg at 06/11/24 1640    Lidocaine 4 % 2 patch, 2 patch, Transdermal, Q24H, Veronica Hardin APRN, 2 patch at 06/11/24 0801    metoprolol tartrate (LOPRESSOR) tablet 25 mg, 25 mg, Oral, Q12H, Lana Avila APRN, 25 mg at 06/11/24 2035    ondansetron (ZOFRAN) injection 4 mg, 4 mg, Intravenous, Q6H PRN, Veronica Hardin APRN    oxyCODONE (ROXICODONE) immediate release tablet 10 mg, 10 mg, Oral, Q8H PRN, Veronica Hardin APRN, 10 mg at 06/12/24 0542    oxyCODONE (ROXICODONE) immediate release tablet 5 mg, 5 mg, Oral, Q4H PRN, Sean Trejo MD, 5 mg at 06/11/24 1639    [COMPLETED] pantoprazole (PROTONIX) injection 40 mg, 40 mg, Intravenous, Once, 40 mg at 06/06/24 1823 **FOLLOWED BY** pantoprazole (PROTONIX) EC tablet 40 mg, 40 mg, Oral, QAM, Sean Trejo MD, 40 mg at 06/12/24 0534    polyethylene glycol (MIRALAX) packet 17 g, 17 g, Oral, Daily, Veronica Hardin APRN, 17 g at 06/09/24 0803    potassium chloride (MICRO-K/KLOR-CON) CR capsule, 20 mEq, Oral, TID With Meals, Veronica Hardin APRN, 20 mEq at 06/11/24 1742    pregabalin (LYRICA) capsule 25 mg, 25 mg, Oral, Q12H, Veronica Hardin APRN, 25 mg at 06/11/24 2034    Assessment/Plan:   No evidence for kidney or ureteral stone.  Likely passed/missed stone.  Okay to discharge from urology standpoint.  He was counseled to keep his follow-up appointment on 7/2/24 to around outpatient hematuria evaluation with CT urogram and cystoscopy.          (Please note that portions of this note were completed with a voice recognition program.)  Aleksandr Fonseca MD  06/12/24  08:57 CDT

## 2024-06-12 NOTE — PLAN OF CARE
Goal Outcome Evaluation:  Plan of Care Reviewed With: patient    Problem: Adult Inpatient Plan of Care  Goal: Plan of Care Review  Outcome: Ongoing, Progressing  Flowsheets (Taken 6/12/2024 7620)  Progress: improving  Plan of Care Reviewed With: patient  Outcome Evaluation: Patient c/o incisional pain, prn medication given with relief. Patient passed in urine small blood clot. Patient has had no c/o flank pain or pain with urination. Possible d/c home today. VSS. Safety maintained. Will notify MD of any changes.

## 2024-06-13 ENCOUNTER — TELEPHONE (OUTPATIENT)
Dept: CARDIAC SURGERY | Facility: CLINIC | Age: 44
End: 2024-06-13
Payer: COMMERCIAL

## 2024-06-13 NOTE — OUTREACH NOTE
Prep Survey      Flowsheet Row Responses   [facility] facility patient discharged from? [facility]   Is LACE score < 7 ? No   Eligibility Readm Mgmt   Discharge diagnosis NSTEMI, CAD, CABG x 4 using right leg vein harvest, sternal plating   Does the patient have one of the following disease processes/diagnoses(primary or secondary)? Acute MI (STEMI,NSTEMI)   Does the patient have Home health ordered? No   Is there a DME ordered? Yes   What DME was ordered? BSC from [employer]   Prep survey completed? Yes            Neeru Brooks Registered Nurse

## 2024-06-13 NOTE — THERAPY DISCHARGE NOTE
Acute Care - Physical Therapy Discharge Summary  [facility]       Patient Name: Constantin Seals  : 1980  MRN: [id number]    Today's Date: 2024                 Admit Date: 2024      PT Recommendation and Plan    Visit Dx:    ICD-10-CM ICD-9-CM   1. S/P CABG (coronary artery bypass graft)  Z95.1 V45.81   2. Chest pain, unspecified type  R07.9 786.50   3. Troponin I above reference range  R79.89 790.6   4. NSTEMI (non-ST elevated myocardial infarction)  I21.4 410.70   5. Coronary artery disease involving native coronary artery of native heart with unstable angina pectoris  I25.110 414.01     411.1   6. Impaired functional mobility and activity tolerance [Z74.09]  Z74.09 V49.89        Outcome Measures       Row Name 24 1200             How much help from another person do you currently need...    Turning from your back to your side while in flat bed without using bedrails? 3  -JOSE      Moving from lying on back to sitting on the side of a flat bed without bedrails? 3  -JOSE      Moving to and from a bed to a chair (including a wheelchair)? 4  -JOSE      Standing up from a chair using your arms (e.g., wheelchair, bedside chair)? 4  no arms  -JOSE      Climbing 3-5 steps with a railing? 4  -JOSE      To walk in hospital room? 4  -JOSE      AM-PAC 6 Clicks Score (PT) 22  -JOSE      Highest Level of Mobility Goal 7 --> Walk 25 feet or more  -JOSE                User Key  (r) = Recorded By, (t) = Taken By, (c) = Cosigned By      Initials Name Provider Type    Rema Ellis, PTA Physical Therapist Assistant                         PT Rehab Goals       Row Name 24 1602             Bed Mobility Goal 1 (PT)    Activity/Assistive Device (Bed Mobility Goal 1, PT) rolling to left;rolling to right;scooting;sit to supine/supine to sit  -BRENDA      [place] Level/Cues Needed (Bed Mobility Goal 1, PT) standby assist  -BRENDA      Time Frame (Bed Mobility Goal 1, PT) long term goal (LTG);10 days  -BRENDA       Progress/Outcomes (Bed Mobility Goal 1, PT) goal not met  -BRENDA         Transfer Goal 1 (PT)    Activity/Assistive Device (Transfer Goal 1, PT) sit-to-stand/stand-to-sit;bed-to-chair/chair-to-bed  -BRENDA      [place] Level/Cues Needed (Transfer Goal 1, PT) standby assist  -BRENDA      Time Frame (Transfer Goal 1, PT) long term goal (LTG);10 days  -BRENDA      Progress/Outcome (Transfer Goal 1, PT) goal not met  -BRENDA         Gait Training Goal 1 (PT)    Activity/Assistive Device (Gait Training Goal 1, PT) gait (walking locomotion);decrease fall risk;diminish gait deviation;improve balance and speed;increase endurance/gait distance;increase energy conservation  -BRENDA      [place] Level (Gait Training Goal 1, PT) standby assist  -BRENDA      Distance (Gait Training Goal 1, PT) 300 ft w/ less than or equal to 1 standing rest  -BRENDA      Time Frame (Gait Training Goal 1, PT) long term goal (LTG);10 days  -BRENDA      Progress/Outcome (Gait Training Goal 1, PT) goal not met  -BRENDA         Stairs Goal 1 (PT)    Activity/Assistive Device (Stairs Goal 1, PT) ascending stairs;descending stairs;step-to-step;decrease fall risk;improve balance and speed  -BRENDA      [place] Level/Cues Needed (Stairs Goal 1, PT) standby assist  -BRENDA      Number of Stairs (Stairs Goal 1, PT) 3  -BRENDA      Time Frame (Stairs Goal 1, PT) long term goal (LTG);10 days  -BRENDA      Progress/Outcome (Stairs Goal 1, PT) goal not met  -BRENDA         Patient Education Goal (PT)    Activity (Patient Education Goal, PT) I and demonstrates knowledge of deep breathing, warm up/cool down ex, and sternal precautions w/ functional mobility  -BRENDA      [place]/Cues/Accuracy (Memory Goal 2, PT) demonstrates adequately;independent;verbalizes understanding  -BRENDA      Time Frame (Patient Education Goal, PT) long term goal (LTG);10 days  -BRENDA      Progress/Outcome (Patient Education Goal, PT) goal not met  -BRENDA                User Key  (r) = Recorded By, (t) = Taken By, (c) = Cosigned By       Initials Name Provider Type Discipline    Blaine Lauren, PTA Physical Therapist Assistant PT                        PT Discharge Summary  Anticipated Discharge Disposition (PT): home  Reason for Discharge: Discharge from facility  Outcomes Achieved: Refer to plan of care for updates on goals achieved  Discharge Destination: Home      Blaine Ruiz PTA   6/13/2024

## 2024-06-13 NOTE — TELEPHONE ENCOUNTER
Caller: Constantin Seals    Relationship: Self    Best call back number [phone]    What form or medical record are you requesting: TIME OFF FORM  PT EMPLOYER NEEDING DAYS OFF    Who is requesting this form or medical record from you: AYALA NOLAND    How would you like to receive the form or medical records (pick-up, mail, fax): FAX  If fax, what is the fax number: [phone]  ATTN NANCY HOLDEN    Timeframe paperwork needed: ASAP    Additional notes: NA

## 2024-06-14 NOTE — TELEPHONE ENCOUNTER
PT CALLED BACK TO CHECK ON THIS. I CLARIFIED WITH PT THAT THE OFFICE WILL NOT BE RECEIVING PAPERWORK FROM ANYONE. PT NEEDS THE OFFICE TO TYPE UP A STATEMENT FOR THE PROJECTED TIME HE WILL NEED TO BE OFF WORK FROM DATE OF SURGERY/DATE OF RELEASE FROM HOSPITAL. PT WAS JUST RELEASED FROM HOSPITAL ON 6/12/24 AND SURGERY WAS ON 6/6/24.     PT EMPLOYER NEEDS THE STATEMENT SO HE CAN GET WIFE AND DAUGHTER ON MEDICAID WHILE HE'S OFF WORK/NOT DRAWING A PAYCHECK.     PT PROVIDED A FAX NUMBER TO FAX STATEMENT TO BUT SHE ACTUALLY PREFERS THE STATEMENT TO EMAILED IF POSSIBLE.     [email or web address]    IF YOU HAVE ANY QUESTIONS PLEASE CALL PT AT     [phone] (Home)     PLEASE CALL PT ONCE PAPERWORK HAS BEEN SENT AND LET HIM KNOW WHETHER IT WAS EMAILED OR FAXED.

## 2024-06-17 ENCOUNTER — TELEPHONE (OUTPATIENT)
Dept: CARDIAC SURGERY | Facility: CLINIC | Age: 44
End: 2024-06-17
Payer: COMMERCIAL

## 2024-06-17 NOTE — TELEPHONE ENCOUNTER
Pt calling to report some white/clear drainage with some red as well from chest tube sites this morning.  States was just a small amount.  Denies any warmth or unusual redness.  States he has been itching a lot in the areas where he was shaved but denies scratching these areas.  Pt calling to find out if this is normal or what to do.  Has appt with Veronica HICKS on 6-19-24.  Can reach pt at #[phone]/thu

## 2024-06-17 NOTE — TELEPHONE ENCOUNTER
Called patient.  No answer.  Left message to return call.  It is common to have some drainage from previous chest tube sites as the skin has not fully healed over.  It is also normal to itch from hair regrowth.

## 2024-06-18 NOTE — TELEPHONE ENCOUNTER
Called and informed pt that letter is ready. Pt will pick this up at his office visit tomorrow. He requested this letter also be faxed to Lana Smyth [phone]. This has been done.

## 2024-06-19 ENCOUNTER — OFFICE VISIT (OUTPATIENT)
Dept: CARDIAC SURGERY | Facility: CLINIC | Age: 44
End: 2024-06-19
Payer: COMMERCIAL

## 2024-06-19 ENCOUNTER — HOSPITAL ENCOUNTER (OUTPATIENT)
Dept: GENERAL RADIOLOGY | Facility: HOSPITAL | Age: 44
Discharge: HOME | End: 2024-06-19
Admitting: NURSE PRACTITIONER
Payer: COMMERCIAL

## 2024-06-19 VITALS
OXYGEN SATURATION: 98 % | BODY MASS INDEX: 37.19 KG/M2 | DIASTOLIC BLOOD PRESSURE: 68 MMHG | HEIGHT: 77 IN | HEART RATE: 86 BPM | WEIGHT: 315 LBS | SYSTOLIC BLOOD PRESSURE: 124 MMHG

## 2024-06-19 DIAGNOSIS — E66.01 CLASS 3 SEVERE OBESITY DUE TO EXCESS CALORIES WITH SERIOUS COMORBIDITY AND BODY MASS INDEX (BMI) OF 50.0 TO 59.9 IN ADULT: Chronic | ICD-10-CM

## 2024-06-19 DIAGNOSIS — I25.110 CORONARY ARTERY DISEASE INVOLVING NATIVE CORONARY ARTERY OF NATIVE HEART WITH UNSTABLE ANGINA PECTORIS: ICD-10-CM

## 2024-06-19 DIAGNOSIS — R31.0 GROSS HEMATURIA: ICD-10-CM

## 2024-06-19 DIAGNOSIS — I21.4 NSTEMI (NON-ST ELEVATED MYOCARDIAL INFARCTION): Primary | ICD-10-CM

## 2024-06-19 DIAGNOSIS — M10.9 ACUTE GOUT OF LEFT FOOT, UNSPECIFIED CAUSE: ICD-10-CM

## 2024-06-19 DIAGNOSIS — Z95.1 S/P CABG (CORONARY ARTERY BYPASS GRAFT): ICD-10-CM

## 2024-06-19 PROCEDURE — 99024 POSTOP FOLLOW-UP VISIT: CPT | Performed by: NURSE PRACTITIONER

## 2024-06-19 PROCEDURE — 71046 X-RAY EXAM CHEST 2 VIEWS: CPT

## 2024-06-19 RX ORDER — ALLOPURINOL 200 MG/1
1 TABLET ORAL DAILY
COMMUNITY
Start: 2024-02-28

## 2024-06-19 RX ORDER — OXYCODONE HYDROCHLORIDE AND ACETAMINOPHEN 5; 325 MG/1; MG/1
1 TABLET ORAL EVERY 8 HOURS PRN
Qty: 9 TABLET | Refills: 0 | Status: SHIPPED | OUTPATIENT
Start: 2024-06-19

## 2024-06-19 NOTE — PROGRESS NOTES
Subjective   Chief Complaint   Patient presents with    Post-op Follow-up     Patient had CABG x 4 on 6/6. CXR today.      Patient ID: Constantin Seals is a 44 y.o. male who is here for follow-up having had Coronary artery bypass grafting-4 vessel (left internal mammary artery/left anterior descending, reverse saphenous vein graft/distal right coronary artery, reverse saphenous vein graft/obtuse marginal, and reverse saphenous vein graft/diagonal artery), right endoscopic vein harvest of leg proper and limited distal thigh harvest, sternal lock XP sternal plating, application of Prevena incision management by Dr. Trejo on 6/6/2024.      History of Present Illness  Post operative recovery was uneventful without any major complications but remarkable for hematuria and probably kidney stone. He returns today for follow up. Joined by his wife and daughter. He is in a wheelchair. Has gout flare up of left foot. He has difficulty bearing weight on left foot. Was seen by PCP this morning. He took colchicine today. He needs pain medication refill. Taking percocet at bedtime for sternal pain. He noticed sternal wound drainage today. Drainage is yellow in color. Admits to violation of sternal restrictions occasionally. Sleep habits are fair. No fevers/sweats/chills. No drainage from saphenectomy incision.  Denies sternal clicks. Appetite is good. He walked 2/10 of a mile prior to gout flare up.  Reports no issues with voiding and urine is without gross blood.  Has planned follow-up with urology services in 2 weeks.  No issues with swallowing and previously reported right sided tongue pain has resolved. Weight has decreased on home scale.     The following portions of the patient's history were reviewed and updated as appropriate: allergies, current medications, past family history, past medical history, past social history, past surgical history and problem list.    Review of Systems   Constitutional:  Positive for activity  "change. Negative for chills, diaphoresis and fever.   Respiratory:  Negative for shortness of breath and wheezing.    Cardiovascular:  Positive for chest pain (incisional) and leg swelling.   Genitourinary:  Negative for difficulty urinating, dysuria, flank pain and hematuria.   Musculoskeletal:  Positive for gait problem and joint swelling (Left foot gout flareup).   Neurological:  Positive for weakness.       Objective   Visit Vitals  /68 (BP Location: Right arm, Patient Position: Sitting, Cuff Size: Large Adult)   Pulse 86   Ht 195.6 cm (77\")   Wt (!) 195 kg (429 lb 9.6 oz) Comment: Pt reported - in wheelchair due to gout flare up   SpO2 98%   BMI 50.94 kg/m²       Physical Exam  Vitals reviewed.   Constitutional:       General: He is not in acute distress.     Appearance: He is well-developed. He is obese. He is not diaphoretic.   HENT:      Head: Normocephalic and atraumatic.   Eyes:      Pupils: Pupils are equal, round, and reactive to light.   Cardiovascular:      Rate and Rhythm: Normal rate and regular rhythm.      Heart sounds: Normal heart sounds. No murmur heard.     No friction rub.   Pulmonary:      Effort: Pulmonary effort is normal. No respiratory distress.      Breath sounds: Normal breath sounds. No wheezing or rales.   Abdominal:      General: There is no distension.      Palpations: Abdomen is soft.      Tenderness: There is no abdominal tenderness. There is no guarding.   Musculoskeletal:         General: Swelling (left foot - gout) present.      Cervical back: Normal range of motion and neck supple.      Right lower leg: Edema present.      Left lower leg: Edema present.   Skin:     General: Skin is warm and dry.      Coloration: Skin is not pale.      Findings: Bruising present. No rash.      Comments: Upper portion of the sternotomy site is clean and dry with Steri-Strips intact.  The mid to lower portion has loose Steri-Strips with serous drainage.  Removed loose Steri-Strips.  There " is about a centimeter long area of fibrinous tissue.  No evidence of infection. Sternum stable. No clicks.  Saphenectomy site clean, dry, and intact.  Bruising to right lower leg.   Neurological:      Mental Status: He is alert and oriented to person, place, and time.   Psychiatric:         Behavior: Behavior normal.         XR Chest 2 View    Result Date: 6/19/2024  Narrative: EXAMINATION: XR CHEST 2 VW-  6/19/2024 1:27 PM  HISTORY: Coronary artery disease.  FINDINGS: Today's exam is compared to a previous study of 6/11/2024. A small left effusion is present with blunting of the costophrenic angle. There is mild atelectasis in the lingular segment of the left upper lobe. The lungs are otherwise expanded and clear. There is no free air beneath the diaphragms.      Impression: 1.. Small left effusion with left lingular atelectasis. Lungs are otherwise clear. 2. Accentuated thoracic kyphosis.  This report was signed and finalized on 6/19/2024 1:28 PM by Dr. Marcos Howell MD.        Assessment & Plan     Independent Review of Radiographic Studies:    Trace to Small left pleural effusion-improved compared to last image on 6/11/2024    Diagnoses and all orders for this visit:    1. NSTEMI (non-ST elevated myocardial infarction) (Primary)    2. Coronary artery disease involving native coronary artery of native heart with unstable angina pectoris    3. S/P CABG (coronary artery bypass graft)  -     oxyCODONE-acetaminophen (Percocet) 5-325 MG per tablet; Take 1 tablet by mouth Every 8 (Eight) Hours As Needed for Other (pain).  Dispense: 9 tablet; Refill: 0    4. Class 3 severe obesity due to excess calories with serious comorbidity and body mass index (BMI) of 50.0 to 59.9 in adult    5. Acute gout of left foot, unspecified cause    6. Gross hematuria         Overall, Constantin Seals is doing well.  Surgical incisions are without evidence of infection.  Unfortunately, there is drainage at the inferior portion of the  sternal wound.  I have removed loose Steri-Strips.  Instructed to clean with antibacterial soap and water and keep area dry.  Discussed strict sternal precautions. Continue post op surgical wound care: shower daily with antibacterial soap and water, avoid use of lotions, creams, ointments, powders etc, allow steri strips to fall off on their own. Following post op cardiac surgery home instructions.  Follow-up in 1 week for wound recheck with Veronica HICKS.    Pain medication refill of percocet given today. PDMP checked without suspicious activity.     Advised him to hold Ozempic for now and will reassess next week pending gout flareup and sternal wound evaluation.    Keep planned follow up with urology services.     Provided support and encouragement. All questions have been answered to the best of my ability. Will discuss starting cardiac rehabilitation at official 1 month post op visit. Patient has follow up with Dr. Trejo in a few weeks. Patient has follow up with Cardiology in a few weeks. Patient has been instructed to contact our office with any questions or concerns should they arise prior to the next office visit.     Constantin Seals  reports that he has never smoked. He has been exposed to tobacco smoke. He quit smokeless tobacco use about 17 years ago.  His smokeless tobacco use included snuff and chew.      Advance Care Planning   ACP discussion was declined by the patient. Patient does not have an advance directive, declines further assistance.

## 2024-06-20 ENCOUNTER — READMISSION MANAGEMENT (OUTPATIENT)
Dept: CALL CENTER | Facility: HOSPITAL | Age: 44
End: 2024-06-20
Payer: COMMERCIAL

## 2024-06-20 ENCOUNTER — NURSE TRIAGE (OUTPATIENT)
Dept: CALL CENTER | Facility: HOSPITAL | Age: 44
End: 2024-06-20
Payer: COMMERCIAL

## 2024-06-20 NOTE — OUTREACH NOTE
CT Surgery Week 1 Survey      Flowsheet Row Responses   [facility] patient discharged from? [place]   Does the patient have one of the following disease processes/diagnoses(primary or secondary)? Cardiothoracic surgery   Week 1 attempt successful? Yes   Call start time 1625   Call end time 1629   Discharge diagnosis NSTEMI, CAD, CABG x 4 using right leg vein harvest, sternal plating   Meds reviewed with patient/caregiver? Yes   Is the patient having any side effects they believe may be caused by any medication additions or changes? No   Does the patient have all medications related to this admission filled (includes all antibiotics, pain medications, cardiac medications, etc.) Yes   Is the patient taking all medications as directed (includes completed medication regime)? Yes   Does the patient have a primary care provider?  Yes   Does the patient have an appointment scheduled with their C/T surgeon? Yes  [6/19/24]   Has the patient kept scheduled appointments due by today? Yes  [surgery FU / PCP apt since [facility]]   Has home health visited the patient within 72 hours of discharge? N/A   What DME was ordered? BSC from [facility]   Has all DME been delivered? Yes   Psychosocial issues? No   Did the patient receive a copy of their discharge instructions? Yes   Nursing interventions Reviewed instructions with patient   What is the patient's perception of their health status since discharge? Improving   Nursing interventions Nurse provided patient education   Is the patient/caregiver able to teach back normal signs of recovery? Nausea and lack of appetite, Depression or irritability, Pain or discomfort at incisional site, Constipation   Nursing interventions Reassured on normal signs of recovery   Is the patient /caregiver able to teach back basic post-op care? Shower daily, No tub bath, swimming, or hot tub until instructed by MD, Use a clean wash cloth and antibacterial bar or liquid soap to clean incisions, If the  steri-strips are falling off, it is okay to remove them. (If applicable), Lifting as instructed by MD in discharge instructions, Drive as instructed by MD in discharge instructions, Continue use of incentive spirometry at least 1 week post discharge, Hold pillow to support chest when coughing   Is the patient/caregiver able to teach back signs and symptoms of incisional infection? Increased redness, swelling or pain at the incisonal site, Increased drainage or bleeding, Incisional warmth, Pus or odor from incision, Fever  [some drainage - surgeon's office was made aware of drainage during apt 6/19/24]   Is the patient/caregiver able to teach back steps to recovery at home? Set small, achievable goals for return to baseline health, Rest and rebuild strength, gradually increase activity, Make a list of questions for surgeon's appointment, Eat a well-balance diet, Practice good oral hygiene   If the patient is a current smoker, are they able to teach back resources for cessation? Not a smoker   Is the patient/caregiver able to teach back the hierarchy of who to call/visit for symptoms/problems? PCP, Specialist, Home health nurse, Urgent Care, ED, 911 Yes   Week 1 call completed? Yes   Call end time 1475              Eli H - Registered Nurse

## 2024-06-21 NOTE — TELEPHONE ENCOUNTER
Caller has question regarding feet, states he recently had bypass surgery and has also been dealing with gout exacerbation in ankle which has decreased mobility. States feet have been swollen and he has been elevating them per advice from PCP. Advises that when he elevates them that they start to feel cold and when he puts them down they get warm again. Not cold to touch but sensation of cold, wants to know if this is normal or if he should be concerned. Advised caller that when you elevate an extremity that you can get the sensation of decreased temp due to elevating and a natural decrease in blood flow to area which will return when extremities are dangled or placed down and that should alone should not be cause for concern. Verbalizes understanding.       Reason for Disposition   [1] MILD swelling of both ankles (i.e., pedal edema) AND [2] is a chronic symptom (recurrent or ongoing AND present > 4 weeks)    Additional Information   Negative: SEVERE difficulty breathing (e.g., struggling for each breath, speaks in single words)   Negative: Looks like a broken bone or dislocated joint (e.g., crooked or deformed)   Negative: Sounds like a life-threatening emergency to the triager   Negative: Chest pain   Negative: Followed a leg injury   Negative: [1] Followed an insect bite AND [2] localized swelling (e.g., small area of puffy or swollen skin)   Negative: Swelling of one ankle joint   Negative: Swelling of knee is main symptom   Negative: Pregnant   Negative: Postpartum (from 0 to 6 weeks after delivery)   Negative: [1] Heart failure suspected (e.g., ankle swelling, shortness of breath, weight gain) AND [2] recently in hospital for heart failure   Negative: Difficulty breathing at rest   Negative: Entire foot is cool or blue in comparison to other side   Negative: [1] Can't walk or can barely walk AND [2] new-onset   Negative: [1] Difficulty breathing with exertion (e.g., walking) AND [2] new-onset or worsening    "Negative: [1] Red area or streak AND [2] fever   Negative: [1] Swelling is painful to touch AND [2] fever   Negative: [1] Cast on leg or ankle AND [2] now increased pain   Negative: Patient sounds very sick or weak to the triager   Negative: SEVERE leg swelling (e.g., swelling extends above knee, entire leg is swollen, weeping fluid)   Negative: [1] Red area or streak [2] large (> 2 in. or 5 cm)   Negative: [1] Thigh or calf pain AND [2] only 1 side AND [3] present > 1 hour   Negative: [1] Thigh, calf, or ankle swelling AND [2] only 1 side   Negative: [1] Thigh, calf, or ankle swelling AND [2] bilateral AND [3] 1 side is more swollen   Negative: [1] MODERATE leg swelling (e.g., swelling extends up to knees) AND [2] new-onset or worsening   Negative: Swelling of face, arm or hands  (Exception: Slight puffiness of fingers occurring during hot weather.)   Negative: Looks like a boil, infected sore, deep ulcer or other infected rash (spreading redness, pus)   Negative: [1] MILD swelling of both ankles (i.e., pedal edema) AND [2] new-onset or worsening   Negative: [1] MILD swelling of both ankles (i.e., pedal edema) AND [2] varicose veins   Negative: [1] MILD swelling of both ankles (i.e., pedal edema) AND [2] caused by hot weather    Answer Assessment - Initial Assessment Questions  1. ONSET: \"When did the swelling start?\" (e.g., minutes, hours, days)      Chronic  2. LOCATION: \"What part of the leg is swollen?\"  \"Are both legs swollen or just one leg?\"      Both legs  3. SEVERITY: \"How bad is the swelling?\" (e.g., localized; mild, moderate, severe)    - Localized: Small area of swelling localized to one leg.    - MILD pedal edema: Swelling limited to foot and ankle, pitting edema < 1/4 inch (6 mm) deep, rest and elevation eliminate most or all swelling.    - MODERATE edema: Swelling of lower leg to knee, pitting edema > 1/4 inch (6 mm) deep, rest and elevation only partially reduce swelling.    - SEVERE edema: Swelling " "extends above knee, facial or hand swelling present.       Mod  4. REDNESS: \"Does the swelling look red or infected?\"      No  5. PAIN: \"Is the swelling painful to touch?\" If Yes, ask: \"How painful is it?\"   (Scale 1-10; mild, moderate or severe)      No  6. FEVER: \"Do you have a fever?\" If Yes, ask: \"What is it, how was it measured, and when did it start?\"       Not asked  7. CAUSE: \"What do you think is causing the leg swelling?\"      Decreased mobility, resent surgery, and gout   8. MEDICAL HISTORY: \"Do you have a history of blood clots (e.g., DVT), cancer, heart failure, kidney disease, or liver failure?\"      Na  9. RECURRENT SYMPTOM: \"Have you had leg swelling before?\" If Yes, ask: \"When was the last time?\" \"What happened that time?\"      Yes  10. OTHER SYMPTOMS: \"Do you have any other symptoms?\" (e.g., chest pain, difficulty breathing)        Denies  11. PREGNANCY: \"Is there any chance you are pregnant?\" \"When was your last menstrual period?\"        NA    Protocols used: Leg Swelling and Edema-ADULT-AH    "

## 2024-06-25 ENCOUNTER — TELEPHONE (OUTPATIENT)
Dept: CARDIAC SURGERY | Facility: CLINIC | Age: 44
End: 2024-06-25
Payer: COMMERCIAL

## 2024-06-25 NOTE — TELEPHONE ENCOUNTER
Called pt to conf appt tomorrow.  Pt stated he while he is out of work for the next 3 months, he will have no income.  He has been working at getting different things set up for this such as Medicaid.  He will need a letter stating that his wife is his caregiver while he is off work.  Informed pt I would put a message through to ask about this but that Dr Trejo is in surgery this afternoon/thu

## 2024-06-26 ENCOUNTER — OFFICE VISIT (OUTPATIENT)
Dept: CARDIAC SURGERY | Facility: CLINIC | Age: 44
End: 2024-06-26
Payer: COMMERCIAL

## 2024-06-26 ENCOUNTER — READMISSION MANAGEMENT (OUTPATIENT)
Dept: CALL CENTER | Facility: HOSPITAL | Age: 44
End: 2024-06-26
Payer: COMMERCIAL

## 2024-06-26 VITALS
DIASTOLIC BLOOD PRESSURE: 78 MMHG | OXYGEN SATURATION: 97 % | BODY MASS INDEX: 37.19 KG/M2 | SYSTOLIC BLOOD PRESSURE: 121 MMHG | HEART RATE: 88 BPM | HEIGHT: 77 IN | WEIGHT: 315 LBS

## 2024-06-26 DIAGNOSIS — R31.0 GROSS HEMATURIA: ICD-10-CM

## 2024-06-26 DIAGNOSIS — I21.4 NSTEMI (NON-ST ELEVATED MYOCARDIAL INFARCTION): ICD-10-CM

## 2024-06-26 DIAGNOSIS — I25.110 CORONARY ARTERY DISEASE INVOLVING NATIVE CORONARY ARTERY OF NATIVE HEART WITH UNSTABLE ANGINA PECTORIS: Primary | ICD-10-CM

## 2024-06-26 DIAGNOSIS — E66.01 CLASS 3 SEVERE OBESITY DUE TO EXCESS CALORIES WITH SERIOUS COMORBIDITY AND BODY MASS INDEX (BMI) OF 50.0 TO 59.9 IN ADULT: Chronic | ICD-10-CM

## 2024-06-26 DIAGNOSIS — I10 PRIMARY HYPERTENSION: Chronic | ICD-10-CM

## 2024-06-26 PROCEDURE — 99024 POSTOP FOLLOW-UP VISIT: CPT

## 2024-06-26 NOTE — PROGRESS NOTES
Subjective   Chief Complaint   Patient presents with    Wound Check     Patient is here for wound check     Patient ID: Constantin Seals is a 44 y.o. male who is here for follow-up having had Coronary artery bypass grafting-4 vessel (left internal mammary artery/left anterior descending, reverse saphenous vein graft/distal right coronary artery, reverse saphenous vein graft/obtuse marginal, and reverse saphenous vein graft/diagonal artery), right endoscopic vein harvest of leg proper and limited distal thigh harvest, sternal lock XP sternal plating, application of Prevena incision management by Dr. Trejo on 6/6/2024.      History of Present Illness  Post operative recovery was uneventful without any major complications but remarkable for hematuria and probably kidney stone. He returns today for follow up. Joined by his wife. Gout has been treated and is resolved. He reports walking without difficulty. He has now made it up to 1/2 mile. He is still having difficulty sleeping and taking oxycodone for night.  No fevers/sweats/chills. No drainage from saphenectomy incision. Reports a small amount of drainage from sternotomy site.  Denies sternal clicks. Appetite is good.  Reports no issues with voiding and urine is without gross blood.  Has planned follow-up with urology services next week. Weight has decreased on home scale. He does reports starting Ozempic back last week.     The following portions of the patient's history were reviewed and updated as appropriate: allergies, current medications, past family history, past medical history, past social history, past surgical history and problem list.    Review of Systems   Constitutional:  Positive for activity change. Negative for chills, diaphoresis and fever.   Respiratory:  Negative for shortness of breath and wheezing.    Cardiovascular:  Positive for chest pain (incisional) and leg swelling.   Genitourinary:  Negative for difficulty urinating, dysuria, flank pain  "and hematuria.   Musculoskeletal:  Joint swelling: Left foot gout flareup.       Objective   Visit Vitals  /78 (BP Location: Right arm, Patient Position: Sitting, Cuff Size: Large Adult)   Pulse 88   Ht 195.6 cm (77\")   Wt (!) 195 kg (429 lb 9.6 oz)   SpO2 97%   BMI 50.94 kg/m²     Physical Exam  Vitals reviewed.   Constitutional:       General: He is not in acute distress.     Appearance: He is well-developed. He is obese. He is not diaphoretic.   HENT:      Head: Normocephalic and atraumatic.   Eyes:      Pupils: Pupils are equal, round, and reactive to light.   Cardiovascular:      Rate and Rhythm: Normal rate and regular rhythm.      Heart sounds: Normal heart sounds. No murmur heard.     No friction rub.   Pulmonary:      Effort: Pulmonary effort is normal. No respiratory distress.      Breath sounds: Normal breath sounds. No wheezing or rales.   Abdominal:      General: There is no distension.      Palpations: Abdomen is soft.      Tenderness: There is no abdominal tenderness. There is no guarding.   Musculoskeletal:         Cervical back: Normal range of motion and neck supple.      Right lower leg: Mild edema present.      Left lower leg: Mild edema present.   Skin:     General: Skin is warm and dry.      Coloration: Skin is not pale.      Findings: Bruising present. No rash.      Comments: Upper portion of the sternotomy site is clean and dry with Steri-Strips intact.  Serous drainage noted to the mid to lower portion. No evidence of infection. Sternum stable. No clicks.  Saphenectomy site clean, dry, and intact.  Bruising to right lower leg.   Neurological:      Mental Status: He is alert and oriented to person, place, and time.   Psychiatric:         Behavior: Behavior normal.      Assessment & Plan       Diagnoses and all orders for this visit:    1. Coronary artery disease involving native coronary artery of native heart with unstable angina pectoris (Primary)    2. NSTEMI (non-ST elevated " myocardial infarction)    3. Primary hypertension    4. Class 3 severe obesity due to excess calories with serious comorbidity and body mass index (BMI) of 50.0 to 59.9 in adult    5. Gross hematuria      Overall, Constantin Seals is doing well.  Surgical incisions are without evidence of infection.  Manage continues at the inferior portion of the sternal wound. Instructed to clean with antibacterial soap and water and keep area dry.  Discussed strict sternal precautions. Continue post op surgical wound care: shower daily with antibacterial soap and water, avoid use of lotions, creams, ointments, powders etc, allow steri strips to fall off on their own. Following post op cardiac surgery home instructions.  Follow-up in 1 week for wound recheck with KURT Melendez.    Keep planned follow up with urology services.     Provided support and encouragement. All questions have been answered to the best of my ability. Will discuss starting cardiac rehabilitation at official 1 month post op visit. Patient has follow up with Dr. Trejo in a few weeks. Patient has follow up with Cardiology in a few weeks. Patient has been instructed to contact our office with any questions or concerns should they arise prior to the next office visit.     Constantin Seals  reports that he has never smoked. He has been exposed to tobacco smoke. He quit smokeless tobacco use about 17 years ago.  His smokeless tobacco use included snuff and chew.    Advance Care Planning   ACP discussion was declined by the patient. Patient does not have an advance directive, declines further assistance.

## 2024-06-26 NOTE — OUTREACH NOTE
CT Surgery Week 2 Survey      Flowsheet Row Responses   [facility] patient discharged from? [facility]   Does the patient have one of the following disease processes/diagnoses(primary or secondary)? Cardiothoracic surgery   Week 2 attempt successful? Yes   Call start time 1018   Call end time 1022   Discharge diagnosis NSTEMI, CAD, CABG x 4 using right leg vein harvest, sternal plating   Meds reviewed with patient/caregiver? Yes   Is the patient having any side effects they believe may be caused by any medication additions or changes? No   Does the patient have all medications related to this admission filled (includes all antibiotics, pain medications, cardiac medications, etc.) Yes   Is the patient taking all medications as directed (includes completed medication regime)? Yes   Does the patient have a primary care provider?  Yes   Does the patient have an appointment scheduled with their C/T surgeon? Yes   Has the patient kept scheduled appointments due by today? Yes   Has home health visited the patient within 72 hours of discharge? N/A   Psychosocial issues? No   What is the patient's perception of their health status since discharge? Improving   Week 2 call completed? Yes   Wrap up additional comments Having some right side pain. Sees  today and will address.   Call end time 1022            Diana PINEDO - Registered Nurse

## 2024-06-27 NOTE — TELEPHONE ENCOUNTER
Called pt and advised letter is ready. He states he will pick this up on 7/2 or 7/3. I advised I would leave it at the  for him. He voiced understanding.

## 2024-07-01 NOTE — PROGRESS NOTES
Subjective    Mr. Seals is 44 y.o. male    Chief Complaint: Gross Hematuria    History of Present Illness  Patient is a 44-year-old gentleman who went coronary artery bypass grafting x 4 in the course of his hospitalization he developed gross hematuria so had to have complex Vera catheter placement by Dr. Beaulieu.  Patient is on blood thinner he also developed sudden severe flank pain CT scan was done which showed prominent left perinephric stranding but no hydronephrosis which could have been related to recently passed stone.  Patient states not seen any further episodes of gross hematuria.  Corded the patient and  patient did had hematuria before the catheter was placed.      The following portions of the patient's history were reviewed and updated as appropriate: allergies, current medications, past family history, past medical history, past social history, past surgical history and problem list.    Review of Systems   Genitourinary:  Positive for hematuria.         Current Outpatient Medications:     aspirin 81 MG EC tablet, Take 1 tablet by mouth Daily., Disp: 30 tablet, Rfl: 2    atorvastatin (LIPITOR) 40 MG tablet, Take 1 tablet by mouth Every Night., Disp: 30 tablet, Rfl: 2    clopidogrel (PLAVIX) 75 MG tablet, Take 1 tablet by mouth Daily., Disp: 30 tablet, Rfl: 2    colchicine 0.6 MG tablet, Take 1 tablet by mouth Daily As Needed (gout flare up)., Disp: , Rfl:     lisinopril (PRINIVIL,ZESTRIL) 2.5 MG tablet, Take 1 tablet by mouth Daily., Disp: 30 tablet, Rfl: 0    metoprolol tartrate (LOPRESSOR) 25 MG tablet, Take 1 tablet by mouth Every 12 (Twelve) Hours., Disp: 60 tablet, Rfl: 2    oxyCODONE-acetaminophen (Percocet) 5-325 MG per tablet, Take 1 tablet by mouth Every 8 (Eight) Hours As Needed for Other (pain)., Disp: 9 tablet, Rfl: 0    pantoprazole (PROTONIX) 40 MG EC tablet, Take 1 tablet by mouth Daily., Disp: 30 tablet, Rfl: 0    Allopurinol 200 MG tablet, Take 1 tablet by mouth  "Daily. (Patient not taking: Reported on 7/3/2024), Disp: , Rfl:     Ozempic, 2 MG/DOSE, 8 MG/3ML solution pen-injector, Inject 2 mg under the skin into the appropriate area as directed 1 (One) Time Per Week. (Patient not taking: Reported on 7/3/2024), Disp: , Rfl:     Past Medical History:   Diagnosis Date    COVID     GERD (gastroesophageal reflux disease)     Gout     Heartburn     Hypertension     Obesity        Past Surgical History:   Procedure Laterality Date    CARDIAC CATHETERIZATION N/A 5/31/2024    Procedure: Left Heart Cath;  Surgeon: Chip Roberts MD;  Location:  [facility];  Service: Cardiology;  Laterality: N/A;    CORONARY ARTERY BYPASS GRAFT N/A 6/6/2024    Procedure: CORONARY ARTERY BYPASS GRAFTING X4, LEFT INTERNAL MAMMARY ARTERY GRAFT, RIGHT LEG ENDOSCOPIC VEIN HARVEST, STERNAL PLATING, APPLICATION PREVENA, TRANSESOPHAGEAL ECHOCARDIOGRAM;  Surgeon: Sean Trejo MD;  Location:  [facility] OR;  Service: Cardiothoracic;  Laterality: N/A;    DISTAL BICEPS TENDON REPAIR Right 6/7/2022    Procedure: RIGHT DISTAL BICEPS TENDON REPAIR WITH SEMI-T ALLOGRAFT;  Surgeon: Benjamin العرقاي MD;  Location:  [facility] OR;  Service: Orthopedics;  Laterality: Right;    ELBOW PROCEDURE      EYE SURGERY Right 1984    COLES CATHETER N/A 6/6/2024    Procedure: COLES CATHETER INSERTION;  Surgeon: Omari Beaulieu MD;  Location:  [facility] OR;  Service: Urology;  Laterality: N/A;       Social History     Socioeconomic History    Marital status:    Tobacco Use    Smoking status: Never     Passive exposure: Past    Smokeless tobacco: Former     Types: Snuff, Chew     Quit date: 2007   Vaping Use    Vaping status: Never Used   Substance and Sexual Activity    Alcohol use: No    Drug use: Defer       Family History   Problem Relation Age of Onset    Diabetes Father     Hypertension Father     Heart disease Father        Objective    Temp 97.8 °F (36.6 °C)   Ht 195.6 cm (77\")   Wt (!) 201 kg " (443 lb)   BMI 52.53 kg/m²     Physical Exam  Constitutional:       General: He is not in acute distress.     Appearance: Normal appearance. He is not toxic-appearing.   HENT:      Head: Normocephalic and atraumatic.   Pulmonary:      Effort: Pulmonary effort is normal.   Neurological:      Mental Status: He is alert.             Results for orders placed or performed in visit on 07/03/24   POC Urinalysis Dipstick, Multipro    Specimen: Urine   Result Value Ref Range    Color Yellow Yellow, Straw, Dark Yellow, Eli    Clarity, UA Clear Clear    Glucose, UA Negative Negative mg/dL    Bilirubin Negative Negative    Ketones, UA Negative Negative    Specific Gravity  1.020 1.005 - 1.030    Blood, UA Negative Negative    pH, Urine 6.0 5.0 - 8.0    Protein, POC Negative Negative mg/dL    Urobilinogen, UA 0.2 E.U./dL Normal, 0.2 E.U./dL    Nitrite, UA Negative Negative    Leukocytes Negative Negative     Assessment and Plan    Diagnoses and all orders for this visit:    1. Gross hematuria (Primary)  -     POC Urinalysis Dipstick, Multipro  -     CT Abdomen Pelvis With & Without Contrast; Future  -     Discontinue: sulfamethoxazole-trimethoprim (Bactrim DS) 800-160 MG per tablet; Take 1 tablet by mouth 2 (Two) Times a Day for 14 days.  Dispense: 28 tablet; Refill: 0    2. Weak urine stream  -     Discontinue: tamsulosin (FLOMAX) 0.4 MG capsule 24 hr capsule; Take 2 capsules by mouth Every Night for 360 days.  Dispense: 60 capsule; Refill: 11        Patient seen in the hospital for gross hematuria also had to have complex catheter placement of three-way Vera catheter.  Currently the hematuria had started prior to catheter placement also developed flank pain and he had left periureteral stranding thought to possibly related to recently passed stone.  He is also on blood thinner.  Although he is at risk for developing hematuria being on anticoagulants he does need further evaluation which was recommended by Dr. Beaulieu  for his hematuria.    Will schedule patient for CT urogram and cystoscopy.

## 2024-07-02 NOTE — PROGRESS NOTES
Subjective   Chief Complaint   Patient presents with    Post-op Follow-up     Pt had CABG x on 06/06/2024     Patient ID: Constantin Seals is a 44 y.o. male who is here for follow-up having had Coronary artery bypass grafting-4 vessel (left internal mammary artery/left anterior descending, reverse saphenous vein graft/distal right coronary artery, reverse saphenous vein graft/obtuse marginal, and reverse saphenous vein graft/diagonal artery), right endoscopic vein harvest of leg proper and limited distal thigh harvest, sternal lock XP sternal plating, application of Prevena incision management by Dr. Trejo on 6/6/2024.      History of Present Illness  Post operative recovery was uneventful without any major complications but remarkable for hematuria and probably kidney stone. He returns today for follow up. Joined by his wife.  Reports he is doing well.  Gout flareup has resolved.  He is able to walk well without remark.  He has not restarted allopurinol yet.  No further gross hematuria.  He has an appointment with urology services later today.  He did restart Ozempic last week.  He has not yet taken a dose this week.  He plans on taking this every other week as he has significant acid reflux as a side effect.  Sternal wound drainage is about the same.  He is sleeping in a recliner and stretches his arms overhead-wonders if this is causing sternal wound or at least putting tension on wound.  Previous tongue lesion has resolved.  Denies sternal clicks.  No fevers, sweats, or chills.      The following portions of the patient's history were reviewed and updated as appropriate: allergies, current medications, past family history, past medical history, past social history, past surgical history and problem list.    Review of Systems   Constitutional:  Negative for chills, diaphoresis and fever.   Respiratory:  Negative for shortness of breath and wheezing.    Cardiovascular:  Positive for chest pain (incisional, wound)  "and leg swelling (mild).       Objective   Visit Vitals  /74 (BP Location: Right arm, Patient Position: Sitting, Cuff Size: Adult)   Pulse 81   Ht 195.6 cm (77\")   Wt (!) 201 kg (443 lb)   SpO2 96%   BMI 52.53 kg/m²         Physical Exam  Vitals reviewed.   Constitutional:       General: He is not in acute distress.     Appearance: He is well-developed. He is obese. He is not diaphoretic.   HENT:      Head: Normocephalic and atraumatic.   Eyes:      Pupils: Pupils are equal, round, and reactive to light.   Cardiovascular:      Rate and Rhythm: Normal rate and regular rhythm.      Heart sounds: Normal heart sounds. No murmur heard.     No friction rub.   Pulmonary:      Effort: Pulmonary effort is normal. No respiratory distress.      Breath sounds: Normal breath sounds. No wheezing or rales.   Abdominal:      General: There is no distension.      Palpations: Abdomen is soft.      Tenderness: There is no abdominal tenderness. There is no guarding.   Musculoskeletal:      Cervical back: Normal range of motion and neck supple.      Right lower leg: Edema present.      Left lower leg: Edema present.   Skin:     General: Skin is warm and dry.      Coloration: Skin is not pale.      Findings: No rash.      Comments: Upper portion of the sternotomy site is clean and dry.  Distal sternotomy site has a 1-1/2 cm x 1-1/2 cm area of fibrinous tissue.  No evidence of infection. Sternum stable. No clicks.  Saphenectomy site clean, dry, and intact.  Bruising to right lower leg-resolving.   Neurological:      Mental Status: He is alert and oriented to person, place, and time.   Psychiatric:         Behavior: Behavior normal.           Assessment & Plan         Diagnoses and all orders for this visit:    1. NSTEMI (non-ST elevated myocardial infarction) (Primary)    2. Coronary artery disease involving native coronary artery of native heart with unstable angina pectoris    3. Gross hematuria    4. Class 3 severe obesity due to " excess calories with serious comorbidity and body mass index (BMI) of 50.0 to 59.9 in adult           Overall, Constantin Seals is doing well.  Good to now gout flareup has resolved.  We discussed from our surgical standpoint it is okay to take allopurinol.  Surgical incisions are without evidence of infection.  There is  an area of fibrinous tissue at the distal sternotomy site.  Instructed to clean with antibacterial soap and water and keep area dry.  Discussed strict sternal precautions and minimizing stretching his arms overhead while in the recliner as this is putting tension on the surgical incision. Continue post op surgical wound care: shower daily with antibacterial soap and water, avoid use of lotions, creams, ointments, powders etc, allow steri strips to fall off on their own. Following post op cardiac surgery home instructions.     Keep planned follow up with urology services later today.     Provided support and encouragement. All questions have been answered to the best of my ability. Will discuss starting cardiac rehabilitation at official 1 month post op visit. Patient has follow up with Dr. Trejo in a few weeks. Patient has follow up with Cardiology in a few weeks. Patient has been instructed to contact our office with any questions or concerns should they arise prior to the next office visit.     Constantin Seals  reports that he has never smoked. He has been exposed to tobacco smoke. He quit smokeless tobacco use about 17 years ago.  His smokeless tobacco use included snuff and chew.      Advance Care Planning   ACP discussion was declined by the patient. Patient does not have an advance directive, declines further assistance.

## 2024-07-03 ENCOUNTER — OFFICE VISIT (OUTPATIENT)
Dept: CARDIAC SURGERY | Facility: CLINIC | Age: 44
End: 2024-07-03
Payer: COMMERCIAL

## 2024-07-03 ENCOUNTER — OFFICE VISIT (OUTPATIENT)
Dept: UROLOGY | Facility: CLINIC | Age: 44
End: 2024-07-03
Payer: COMMERCIAL

## 2024-07-03 VITALS
BODY MASS INDEX: 37.19 KG/M2 | OXYGEN SATURATION: 96 % | HEART RATE: 81 BPM | WEIGHT: 315 LBS | SYSTOLIC BLOOD PRESSURE: 116 MMHG | HEIGHT: 77 IN | DIASTOLIC BLOOD PRESSURE: 74 MMHG

## 2024-07-03 VITALS — HEIGHT: 77 IN | WEIGHT: 315 LBS | TEMPERATURE: 97.8 F | BODY MASS INDEX: 37.19 KG/M2

## 2024-07-03 DIAGNOSIS — I25.110 CORONARY ARTERY DISEASE INVOLVING NATIVE CORONARY ARTERY OF NATIVE HEART WITH UNSTABLE ANGINA PECTORIS: ICD-10-CM

## 2024-07-03 DIAGNOSIS — E66.01 CLASS 3 SEVERE OBESITY DUE TO EXCESS CALORIES WITH SERIOUS COMORBIDITY AND BODY MASS INDEX (BMI) OF 50.0 TO 59.9 IN ADULT: Chronic | ICD-10-CM

## 2024-07-03 DIAGNOSIS — I21.4 NSTEMI (NON-ST ELEVATED MYOCARDIAL INFARCTION): Primary | ICD-10-CM

## 2024-07-03 DIAGNOSIS — R39.12 WEAK URINE STREAM: ICD-10-CM

## 2024-07-03 DIAGNOSIS — R31.0 GROSS HEMATURIA: Primary | ICD-10-CM

## 2024-07-03 DIAGNOSIS — R31.0 GROSS HEMATURIA: ICD-10-CM

## 2024-07-03 LAB
BILIRUB BLD-MCNC: NEGATIVE MG/DL
CLARITY, POC: CLEAR
COLOR UR: YELLOW
GLUCOSE UR STRIP-MCNC: NEGATIVE MG/DL
KETONES UR QL: NEGATIVE
LEUKOCYTE EST, POC: NEGATIVE
NITRITE UR-MCNC: NEGATIVE MG/ML
PH UR: 6 [PH] (ref 5–8)
PROT UR STRIP-MCNC: NEGATIVE MG/DL
RBC # UR STRIP: NEGATIVE /UL
SP GR UR: 1.02 (ref 1–1.03)
UROBILINOGEN UR QL: NORMAL

## 2024-07-03 PROCEDURE — 99024 POSTOP FOLLOW-UP VISIT: CPT | Performed by: NURSE PRACTITIONER

## 2024-07-03 RX ORDER — SULFAMETHOXAZOLE AND TRIMETHOPRIM 800; 160 MG/1; MG/1
1 TABLET ORAL 2 TIMES DAILY
Qty: 28 TABLET | Refills: 0 | Status: SHIPPED | OUTPATIENT
Start: 2024-07-03 | End: 2024-07-03

## 2024-07-03 RX ORDER — TAMSULOSIN HYDROCHLORIDE 0.4 MG/1
2 CAPSULE ORAL NIGHTLY
Qty: 60 CAPSULE | Refills: 11 | Status: SHIPPED | OUTPATIENT
Start: 2024-07-03 | End: 2024-07-03

## 2024-07-05 ENCOUNTER — TELEPHONE (OUTPATIENT)
Dept: CARDIAC SURGERY | Facility: CLINIC | Age: 44
End: 2024-07-05

## 2024-07-05 ENCOUNTER — TELEPHONE (OUTPATIENT)
Dept: UROLOGY | Facility: CLINIC | Age: 44
End: 2024-07-05
Payer: COMMERCIAL

## 2024-07-05 NOTE — TELEPHONE ENCOUNTER
Called pt. Back to let him know Chip discontinued the Flomax so he did not need to pick it up. Pt. Wanting to change his cysto appt. To a different day, appt. Changed to 7/10.

## 2024-07-05 NOTE — TELEPHONE ENCOUNTER
Called patient to transfer him to Central Scheduling to set up his CT prior to his next appointment on 7/17/24 with Dr. Fonseca.  Patient said that he had received a prescription for Flomax recently from [facility].  He did not know anything about this prescription and I do not see the prescription in his chart.  He said he would contact clinical after he sets up his CT to ask about this.

## 2024-07-05 NOTE — TELEPHONE ENCOUNTER
Caller: WILBUR FLORES    Relationship to patient: SELF    Best call back number: [phone]    Patient is needing: PATIENT CALLED AND SAID [facility] SAID HIS FLOMAX PRESCRIPTION IS READY. HE SAID HE DOESN'T THINK [facility] WANTED HIM TO TAKE THAT MEDICATION AND I DONT SEE THAT WE SENT IT IN HIS CHART. PLEASE CALL PATIENT BACK FOR CLARIFICATION

## 2024-07-05 NOTE — TELEPHONE ENCOUNTER
Caller: Constantin Seals    Relationship to patient: Self    Best call back number: [phone] (home)       Chief complaint: PT WANTS EARLIER APPT TIME IF POSSIBLE    Type of visit: POST OP    Requested date: 7/10/24 - EARLIER TIME     If rescheduling, when is the original appointment: 7/10/24 AT 330PM     Additional notes:IF WE HAVE ANY CANCELLATIONS ON 7/10/24 PRIOR TO 3:30PM - PLEASE CALL PT BECAUSE HE IS SCHEDULED FOR A CT SCAN AT 930AM ON THE SAME DAY AND WILL BE WAITING AROUND FOR APPT WITH DR ARANGO.

## 2024-07-09 NOTE — TELEPHONE ENCOUNTER
Would it be alright if I moved this pt up to 10:45am tomorrow?  Looks like that pt resched.  This would open a slot in the afternoon./thu

## 2024-07-10 ENCOUNTER — HOSPITAL ENCOUNTER (OUTPATIENT)
Dept: CT IMAGING | Facility: HOSPITAL | Age: 44
Discharge: HOME | End: 2024-07-10
Admitting: PHYSICIAN ASSISTANT
Payer: COMMERCIAL

## 2024-07-10 ENCOUNTER — PROCEDURE VISIT (OUTPATIENT)
Dept: UROLOGY | Facility: CLINIC | Age: 44
End: 2024-07-10
Payer: COMMERCIAL

## 2024-07-10 ENCOUNTER — OFFICE VISIT (OUTPATIENT)
Dept: CARDIAC SURGERY | Facility: CLINIC | Age: 44
End: 2024-07-10
Payer: COMMERCIAL

## 2024-07-10 VITALS
SYSTOLIC BLOOD PRESSURE: 128 MMHG | HEIGHT: 77 IN | BODY MASS INDEX: 37.19 KG/M2 | HEART RATE: 93 BPM | DIASTOLIC BLOOD PRESSURE: 83 MMHG | OXYGEN SATURATION: 98 % | WEIGHT: 315 LBS

## 2024-07-10 DIAGNOSIS — R31.0 GROSS HEMATURIA: ICD-10-CM

## 2024-07-10 DIAGNOSIS — R31.0 GROSS HEMATURIA: Primary | ICD-10-CM

## 2024-07-10 DIAGNOSIS — Z95.1 S/P CABG (CORONARY ARTERY BYPASS GRAFT): Primary | ICD-10-CM

## 2024-07-10 LAB
BILIRUB BLD-MCNC: NEGATIVE MG/DL
CLARITY, POC: CLEAR
COLOR UR: YELLOW
GLUCOSE UR STRIP-MCNC: NEGATIVE MG/DL
KETONES UR QL: NEGATIVE
LEUKOCYTE EST, POC: NEGATIVE
NITRITE UR-MCNC: NEGATIVE MG/ML
PH UR: 5.5 [PH] (ref 5–8)
PROT UR STRIP-MCNC: NEGATIVE MG/DL
RBC # UR STRIP: NEGATIVE /UL
SP GR UR: 1.01 (ref 1–1.03)
UROBILINOGEN UR QL: NORMAL

## 2024-07-10 PROCEDURE — 25510000001 IOPAMIDOL PER 1 ML: Performed by: PHYSICIAN ASSISTANT

## 2024-07-10 PROCEDURE — 74178 CT ABD&PLV WO CNTR FLWD CNTR: CPT

## 2024-07-10 PROCEDURE — 99024 POSTOP FOLLOW-UP VISIT: CPT | Performed by: THORACIC SURGERY (CARDIOTHORACIC VASCULAR SURGERY)

## 2024-07-10 RX ADMIN — IOPAMIDOL 100 ML: 755 INJECTION, SOLUTION INTRAVENOUS at 09:54

## 2024-07-10 NOTE — PROGRESS NOTES
Subjective   Patient ID: Constantin Seals is a 44 y.o. male having had Coronary artery bypass grafting-4 vessel (left internal mammary artery/left anterior descending, reverse saphenous vein graft/distal right coronary artery, reverse saphenous vein graft/obtuse marginal, and reverse saphenous vein graft/diagonal artery), right endoscopic vein harvest of leg proper and limited distal thigh harvest, sternal lock XP sternal plating, application of Prevena incision management by Dr. Trejo on 6/6/2024.     History of Present Illness  The patient returns today following recent undergoing coronary artery bypass grafting on 06/06/2023. He is accompanied by his wife.    The patient reports overall well-being, however, he experienced a back injury after sleeping in bed for the first time. He was informed by Veronica of a non-healing spot on his chest, which has decreased in size by approximately 0.25 inches. The area on the right side is not as deep as it was 1 to 2 weeks ago, but it is currently healing. He has been cleaning the area daily with gauze and has not been packing the area. His dietary intake is satisfactory, and he took Ozempic 2 weeks ago. 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 He is seeking a doctor's note to cease spray welding. He recently had his first physical therapy session for his back. He is capable of rising from a seated position and walking between 3/10 to 5/10 of a mile without difficulty. He denies experiencing fevers, sweats, or chills. He has been provided with dietary education.      The following portions of the patient's history were reviewed and updated as appropriate: allergies, current medications, past family history, past medical history, past social history, past surgical history and problem list.        Objective   Visit Vitals  /83 (BP Location: Right arm, Patient Position: Sitting, Cuff Size: Adult)   Pulse 93   Ht 195.6 cm  "(77\")   Wt (!) 200 kg (440 lb 12.8 oz)   SpO2 98%   BMI 52.27 kg/m²       Physical Exam  Physical Exam  Patient is in no acute distress, appropriate, and alert.  Lungs are clear to auscultation bilaterally without wheezing, rubs, or rales.  Heart has a regular rate and rhythm without murmurs, rubs, or gallops.  Abdomen is soft, nontender, and obese.  Musculoskeletal system shows no clubbing, cyanosis, or edema. There is a well-healed saphenectomy incision.  Sternal incision is healing nicely except the lower portion of sternotomy incision where there is a 3 cm in size approximately a defect of skin in midline. Subcutaneous fat is visualized. Leeroy's fascia appears to be intact. There is evidence of some granulation tissue already.  Leeroy's fascia vicryl suture is seen and intact.      CT Abdomen Pelvis With & Without Contrast    Result Date: 7/10/2024  Narrative: EXAMINATION: CT ABDOMEN PELVIS W WO CONTRAST-   7/10/2024 8:47 AM  HISTORY: gross hematuria; R31.0-Gross hematuria  In order to have a CT radiation dose as low as reasonably achievable Automated Exposure Control was utilized for adjustment of the mA and/or KV according to patient size.  Total DLP = 7894.23 mGy.cm  CT scan of the abdomen and pelvis is performed before and after intravenous contrast enhancement.  Images are acquired in axial plane during corticomedullary, nephrographic and urographic phases and subsequent reconstruction in coronal and sagittal planes.  Comparison is made with the previous study dated 6/11/2024.  The lung bases included on the study are unremarkable.  Limited visualized cardiomediastinal structures show atheromatous change of coronary arteries. A small pericardial effusion. Evidence of a prior CABG. There are skin changes with subcutaneous fat infiltration of the lower anterior mid chest wall representing prior surgery.  The liver and spleen are normal.  No radiopaque gallstones.  Pancreas is normal.  The adrenal glands " bilaterally are normal.  The kidneys bilaterally are normal. No calculi. No hydronephrosis. Limited visualized ureters are nondilated. The urinary bladder is moderately well distended. No intrinsic abnormality.  Prostate is not significantly enlarged.  There are fat-containing inguinal hernias bilaterally. There is a moderate sized fat-containing umbilical/paraumbilical hernia which is similar to the previous study.  The stomach is decompressed. No focal abnormality. Duodenum is normal. Small bowel is nondistended. Normal appendix. Moderate gas and stool is seen in the colon. Diverticulosis of the colon. No evidence of a diverticulitis.  Normal abdominal aorta and iliac arteries.  No evidence of abdominal or pelvic lymphadenopathy.  Images reviewed in bone window show moderate chronic degenerative changes of the lumbar and limited visualized thoracic spine. No acute bony abnormality.      Impression: 1. No acute abnormality of the abdomen or pelvis to account for patient's symptoms. No renal or ureteral calculi or obstructive uropathy. 2. Diverticulosis of the colon. No evidence for diverticulitis.             This report was signed and finalized on 7/10/2024 10:19 AM by Dr. Caleb Dodd MD.      XR Chest 2 View    Result Date: 6/19/2024  Narrative: EXAMINATION: XR CHEST 2 VW-  6/19/2024 1:27 PM  HISTORY: Coronary artery disease.  FINDINGS: Today's exam is compared to a previous study of 6/11/2024. A small left effusion is present with blunting of the costophrenic angle. There is mild atelectasis in the lingular segment of the left upper lobe. The lungs are otherwise expanded and clear. There is no free air beneath the diaphragms.      Impression: 1.. Small left effusion with left lingular atelectasis. Lungs are otherwise clear. 2. Accentuated thoracic kyphosis.  This report was signed and finalized on 6/19/2024 1:28 PM by Dr. Marcos Howell MD.      US Scrotum & Testicles    Result Date:  6/11/2024  Narrative: US SCROTUM AND TESTICLES- 6/11/2024 6:16 PM  REASON FOR EXAM: left testicle pain; R07.9-Chest pain, unspecified; R79.89-Other specified abnormal findings of blood chemistry; I21.4-Non-ST elevation (NSTEMI) myocardial infarction; I25.110-Atherosclerotic heart disease of native coronary artery with unstable angina pectoris; Z74.09-Other reduced mobility   COMPARISON: None  TECHNIQUE: Multiple longitudinal and real-time ultrasound images of the scrotum are obtained with limited Doppler analysis of the bilateral testicles. Ultrasound images and report are stored in the PACS per institutional protocol.  FINDINGS:   RIGHT TESTICLE: 5.8 x 2.3 x 3.5 cm. Normal in size and echogenicity without focal mass or abnormality. Normal arterial and venous flow.  RIGHT EPIDIDYMIS: 1 cm epididymal head cyst. Otherwise unremarkable.  LEFT TESTICLE: 5.5 x 2.9 x 3.7 cm. Normal in size and echogenicity without focal mass or abnormality. Normal arterial and venous flow.  LEFT EPIDIDYMIS: Normal in size and echogenicity.  SCROTAL SPACE: Normal physiologic fluid surrounds bilateral testicles.      Impression: 1. No testicular mass, torsion or epididymoorchitis. 2. Incidentally noted 1 cm right epididymal head cyst. Otherwise unremarkable.   This report was signed and finalized on 6/11/2024 7:38 PM by Dr Howard Cortez.      CT Abdomen Pelvis Without Contrast    Result Date: 6/11/2024  Narrative: CT ABDOMEN PELVIS WO CONTRAST- 6/11/2024 3:55 PM  HISTORY: FLANK PAIN RULE OUT URETERAL STONE; R07.9-Chest pain, unspecified; R79.89-Other specified abnormal findings of blood chemistry; I21.4-Non-ST elevation (NSTEMI) myocardial infarction; I25.110-Atherosclerotic heart disease of native coronary artery with unstable angina pectoris; Z74.09-Other reduced mobility  COMPARISON: Abdominal CT dated 5/16/2022  DOSE LENGTH PRODUCT: 2672.58 mGy.cm. Automated exposure control was also utilized to decrease patient radiation dose.   TECHNIQUE: Noncontrast images of the abdomen and pelvis obtained without oral contrast. Multiplanar reformatted images were provided for review.  FINDINGS:  LOWER CHEST: Bilateral small layering pleural effusions. Previous coronary bypass.  LIVER: Liver appears grossly normal without contrast.  BILIARY SYSTEM: The gallbladder is unremarkable. No intrahepatic or extrahepatic ductal dilatation.  PANCREAS: Pancreas appears grossly normal without contrast.  SPLEEN: Unremarkable.  ADRENALS: Unremarkable.  KIDNEYS: No hydronephrosis identified. There is a prominent left perinephric stranding. No urolithiasis identified.  RETROPERITONEUM: Left perinephric stranding as described above. No adenopathy or hemorrhage.  GI TRACT: Stomach is nondistended. Small bowel loops are nondilated. There are a few scattered diverticula along the descending and sigmoid colon. No acute diverticulitis. The appendix is visualized and unremarkable.  OTHER: There is no mesenteric mass, lymphadenopathy, free air or free fluid. Lumbar facet arthropathy. No acute bony abnormality. Fat-containing periumbilical hernia.  PELVIS: No mass lesion, fluid collection or significant lymphadenopathy is seen in the pelvis. Urinary bladder is decompressed.      Impression:  1.  Left periureteral stranding could be secondary to a recently passed renal stone or perhaps a left upper tract infection. No stone visualized at this time. No hydronephrosis.  This report was signed and finalized on 6/11/2024 5:09 PM by Dr Howard Cortez.      XR Chest PA & Lateral    Result Date: 6/11/2024  Narrative: EXAMINATION: XR CHEST PA AND LATERAL-  6/11/2024 4:18 AM  HISTORY: Post CABG; R07.9-Chest pain, unspecified; R79.89-Other specified abnormal findings of blood chemistry; I21.4-Non-ST elevation (NSTEMI) myocardial infarction; I25.110-Atherosclerotic heart disease of native coronary artery with unstable angina pectoris; Z74.09-Other reduced mobility  2 views of the chest are  compared with the previous study dated 6/9/2024.  The lungs are poorly expanded, better than the previous study.  There is small bibasilar pleural effusion, left more than the right. A small loculated pleural effusion along the lower lateral chest walls bilaterally, left more than the right.  There are atelectatic changes in the lower lungs, left more than the right.  There is no pulmonary congestion or pneumothorax.  There is persistent moderate cardiomegaly. There is evidence of prior cardiac surgery.  There is no visible acute bony abnormality.      Impression: 1. A mildly improved lung expansion since the previous study. 2. Persistent small bibasilar pleural effusion and lower lobar atelectasis. 3. Persistent moderate cardiomegaly.       This report was signed and finalized on 6/11/2024 6:16 AM by Dr. Caleb Dodd MD.     Results      Assessment & Plan       Diagnoses and all orders for this visit:    1. S/P CABG (coronary artery bypass graft) (Primary)  -     Cancel: Ambulatory Referral to Cardiac Rehab  -     Ambulatory Referral to Cardiac Rehab      Assessment & Plan  1. Multivessel coronary disease, NSTEMI, morbid obesity, on Ozempic.  The patient has been initiated on wet-to-dry dressing changes, with instructions provided. A collection of 2 x 2s was handed to him, as his wife is comfortable performing this. Sternotomy precautions were discussed and how they will progress over the next few months. He may begin driving. A follow-up appointment with Veronica Hardin has been scheduled for 1 week to reassess his sternotomy incision. Wet-to-dry dressing changes, twice daily with water, are recommended. The patient's residence is equipped with a source of water from the city. Several queries regarding his employment, including the inhalation of Castolin, were raised. It was discussed that the familiarity for potential long-term sequelae from the heart point of view will need to be reviewed-which will be  discussed at length during the next visit. 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 The importance of ongoing cardiovascular risk factor modification was also emphasized. He has previously made durable changes, including sustained weight loss. While there is no clear data pathway to guide decision making as to Ozempic, it is preferable to hold on Ozempic at this time to ensure suitable caloric intake to heal. It was acknowledged that he may gain weight during this period. Our goal is to suitably heal from the operation, followed by a long-term plan to lose weight for the best long-term cardiovascular benefit. He has been cleared to commence cardiac rehabilitation, which will be performed at [facility]. He is a non-smoker. All his queries were addressed to the best of my ability. He is agreeable to the aforementioned plan.    Follow-up  A routine follow-up appointment is scheduled for 6 weeks from now.      Transcribed from ambient dictation for Sean Trejo MD by Sean Trejo MD.  07/10/24   12:04 CDT    Patient or patient representative verbalized consent for the use of Ambient Listening during the visit with  Sean Trejo MD for chart documentation. 7/28/2024  10:22 CDT

## 2024-07-11 NOTE — PROGRESS NOTES
Pre- operative diagnosis:  Hematuria at time of catheter placement at time of CABG on 6/6/2024.  He had some sudden onset left flank pain prior to discharge and passed what looked to him like a small stone prior to getting a CT scan showing left periureteral stranding consistent with recently passed stone.  CT urogram done today shows normal upper tracts.    Post operative diagnosis:  BPH    Procedure:  The patient was prepped and draped in a normal sterile fashion.  The urethra was anesthetized with 2% lidocaine jelly.  A flexible cystoscope was introduced per urethra.      Urethra:  Normal    Bladder:  Normal mucosa and No bladder tumor    Ureteral orifices:  Normal position bilaterally and Clear efflux bilaterally    Prostate:  lateral lobe hypertrophy-mild, early enlargement.    Patient tolerated the procedure well    Complications: none    Blood loss: minimal    Follow up:    Routine follow up -as needed      This document has been signed by DEEPTI Fonseca MD on July 11, 2024 13:22 CDT

## 2024-07-12 ENCOUNTER — NURSE TRIAGE (OUTPATIENT)
Dept: CALL CENTER | Facility: HOSPITAL | Age: 44
End: 2024-07-12
Payer: COMMERCIAL

## 2024-07-13 ENCOUNTER — APPOINTMENT (OUTPATIENT)
Dept: GENERAL RADIOLOGY | Facility: HOSPITAL | Age: 44
End: 2024-07-13
Payer: COMMERCIAL

## 2024-07-13 ENCOUNTER — NURSE TRIAGE (OUTPATIENT)
Dept: CALL CENTER | Facility: HOSPITAL | Age: 44
End: 2024-07-13
Payer: COMMERCIAL

## 2024-07-13 ENCOUNTER — HOSPITAL ENCOUNTER (EMERGENCY)
Facility: HOSPITAL | Age: 44
Discharge: HOME | End: 2024-07-14
Payer: COMMERCIAL

## 2024-07-13 DIAGNOSIS — Z48.89 ENCOUNTER FOR POST SURGICAL WOUND CHECK: ICD-10-CM

## 2024-07-13 DIAGNOSIS — M10.9 ACUTE GOUT OF RIGHT ANKLE, UNSPECIFIED CAUSE: Primary | ICD-10-CM

## 2024-07-13 LAB
ALBUMIN SERPL-MCNC: 3.8 G/DL (ref 3.5–5.2)
ALBUMIN/GLOB SERPL: 1.1 G/DL
ALP SERPL-CCNC: 108 U/L (ref 39–117)
ALT SERPL W P-5'-P-CCNC: 35 U/L (ref 1–41)
ANION GAP SERPL CALCULATED.3IONS-SCNC: 11 MMOL/L (ref 5–15)
AST SERPL-CCNC: 20 U/L (ref 1–40)
BASOPHILS # BLD AUTO: 0.09 10*3/MM3 (ref 0–0.2)
BASOPHILS NFR BLD AUTO: 0.9 % (ref 0–1.5)
BILIRUB SERPL-MCNC: 0.3 MG/DL (ref 0–1.2)
BUN SERPL-MCNC: 26 MG/DL (ref 6–20)
BUN/CREAT SERPL: 24.3 (ref 7–25)
CALCIUM SPEC-SCNC: 9.3 MG/DL (ref 8.6–10.5)
CHLORIDE SERPL-SCNC: 98 MMOL/L (ref 98–107)
CO2 SERPL-SCNC: 27 MMOL/L (ref 22–29)
CREAT SERPL-MCNC: 1.07 MG/DL (ref 0.76–1.27)
DEPRECATED RDW RBC AUTO: 42.7 FL (ref 37–54)
EGFRCR SERPLBLD CKD-EPI 2021: 88.3 ML/MIN/1.73
EOSINOPHIL # BLD AUTO: 0.25 10*3/MM3 (ref 0–0.4)
EOSINOPHIL NFR BLD AUTO: 2.4 % (ref 0.3–6.2)
ERYTHROCYTE [DISTWIDTH] IN BLOOD BY AUTOMATED COUNT: 13.7 % (ref 12.3–15.4)
GLOBULIN UR ELPH-MCNC: 3.6 GM/DL
GLUCOSE SERPL-MCNC: 111 MG/DL (ref 65–99)
HCT VFR BLD AUTO: 41.2 % (ref 37.5–51)
HGB BLD-MCNC: 13 G/DL (ref 13–17.7)
HOLD SPECIMEN: NORMAL
IMM GRANULOCYTES # BLD AUTO: 0.04 10*3/MM3 (ref 0–0.05)
IMM GRANULOCYTES NFR BLD AUTO: 0.4 % (ref 0–0.5)
LYMPHOCYTES # BLD AUTO: 2.03 10*3/MM3 (ref 0.7–3.1)
LYMPHOCYTES NFR BLD AUTO: 19.4 % (ref 19.6–45.3)
MCH RBC QN AUTO: 27 PG (ref 26.6–33)
MCHC RBC AUTO-ENTMCNC: 31.6 G/DL (ref 31.5–35.7)
MCV RBC AUTO: 85.7 FL (ref 79–97)
MONOCYTES # BLD AUTO: 0.91 10*3/MM3 (ref 0.1–0.9)
MONOCYTES NFR BLD AUTO: 8.7 % (ref 5–12)
NEUTROPHILS NFR BLD AUTO: 68.2 % (ref 42.7–76)
NEUTROPHILS NFR BLD AUTO: 7.15 10*3/MM3 (ref 1.7–7)
NRBC BLD AUTO-RTO: 0 /100 WBC (ref 0–0.2)
PLATELET # BLD AUTO: 278 10*3/MM3 (ref 140–450)
PMV BLD AUTO: 9.1 FL (ref 6–12)
POTASSIUM SERPL-SCNC: 4.6 MMOL/L (ref 3.5–5.2)
PROT SERPL-MCNC: 7.4 G/DL (ref 6–8.5)
RBC # BLD AUTO: 4.81 10*6/MM3 (ref 4.14–5.8)
SODIUM SERPL-SCNC: 136 MMOL/L (ref 136–145)
WBC NRBC COR # BLD AUTO: 10.47 10*3/MM3 (ref 3.4–10.8)
WHOLE BLOOD HOLD COAG: NORMAL
WHOLE BLOOD HOLD SPECIMEN: NORMAL

## 2024-07-13 PROCEDURE — 85652 RBC SED RATE AUTOMATED: CPT

## 2024-07-13 PROCEDURE — 73610 X-RAY EXAM OF ANKLE: CPT

## 2024-07-13 PROCEDURE — 84550 ASSAY OF BLOOD/URIC ACID: CPT

## 2024-07-13 PROCEDURE — 80053 COMPREHEN METABOLIC PANEL: CPT

## 2024-07-13 PROCEDURE — 99283 EMERGENCY DEPT VISIT LOW MDM: CPT

## 2024-07-13 PROCEDURE — 85025 COMPLETE CBC W/AUTO DIFF WBC: CPT

## 2024-07-13 RX ORDER — SODIUM CHLORIDE 0.9 % (FLUSH) 0.9 %
10 SYRINGE (ML) INJECTION AS NEEDED
Status: DISCONTINUED | OUTPATIENT
Start: 2024-07-13 | End: 2024-07-14 | Stop reason: HOSPADM

## 2024-07-13 NOTE — TELEPHONE ENCOUNTER
"Reason for Disposition   [1] Caller has URGENT question AND [2] triager unable to answer question    Additional Information   Negative: [1] Major abdominal surgical incision AND [2] wound gaping open AND [3] visible internal organs   Negative: Sounds like a life-threatening emergency to the triager   Negative: [1] Bleeding from incision AND [2] won't stop after 10 minutes of direct pressure   Negative: [1] Widespread rash AND [2] bright red, sunburn-like   Negative: Severe pain in the incision   Negative: [1] Incision gaping open AND [2] < 48 hours since wound re-opened   Negative: [1] Incision gaping open AND [2] length of opening > 2 inches (5 cm)   Negative: Patient sounds very sick or weak to the triager   Negative: Sounds like a serious complication to the triager   Negative: Fever > 100.4 F (38.0 C)   Negative: [1] Incision looks infected (spreading redness, pain) AND [2] fever > 99.5 F (37.5 C)   Negative: [1] Incision looks infected (spreading redness, pain) AND [2] large red area (> 2 in. or 5 cm)   Negative: [1] Incision looks infected (spreading redness, pain) AND [2] face wound   Negative: [1] Red streak runs from the incision AND [2] longer than 1 inch (2.5 cm)   Negative: [1] Pus or bad-smelling fluid draining from incision AND [2] no fever   Negative: [1] Post-op pain AND [2] not controlled with pain medications   Negative: Dressing soaked with blood or body fluid (e.g., drainage)   Negative: [1] Raised bruise and [2] size > 2 inches (5 cm) and expanding    Answer Assessment - Initial Assessment Questions  1. SYMPTOM: \"What's the main symptom you're concerned about?\" (e.g., redness, pain, drainage)      Says there is a \"stringlike\" substance hanging out of his chest wound, feels like suture material and a couple inches long  2. ONSET: \"When did \"string\"  start?\"      tonight  3. SURGERY: \"What surgery was performed?\"      CABG  4. DATE of SURGERY: \"When was surgery performed?\"       6/6/24  5. " "INCISION SITE: \"Where is the incision located?\"       chest  6. REDNESS: \"Is there any redness at the incision site?\" If yes, ask: \"How wide across is the redness?\" (Inches, centimeters)       Nothing new    7. PAIN: \"Is there any pain?\" If so, ask: \"How bad is it?\"  (Scale 1-10; or mild, moderate, severe)      denies  8. BLEEDING: \"Is there any bleeding?\" If so, ask: \"How much?\" and \"Where?\"      Denies; he is having to do wet to dry dressing changes due to open area    9. DRAINAGE: \"Is there any drainage from the incision site?\" If yes, ask: \"What color and how much?\" (e.g., red, cloudy, pus; drops, teaspoon)      .denies  10. FEVER: \"Do you have a fever?\" If so, ask: \"What is your temperature, how was it measured, and when did it start?\"        .denies  11. OTHER SYMPTOMS: \"Do you have any other symptoms?\" (e.g., shaking chills, weakness, rash elsewhere on body)        .denies    Protocols used: Post-Op Incision Symptoms-ADULT-AH    "

## 2024-07-14 VITALS
BODY MASS INDEX: 37.19 KG/M2 | DIASTOLIC BLOOD PRESSURE: 87 MMHG | OXYGEN SATURATION: 98 % | HEART RATE: 97 BPM | HEIGHT: 77 IN | RESPIRATION RATE: 18 BRPM | SYSTOLIC BLOOD PRESSURE: 172 MMHG | TEMPERATURE: 98.5 F | WEIGHT: 315 LBS

## 2024-07-14 LAB
ERYTHROCYTE [SEDIMENTATION RATE] IN BLOOD: 28 MM/HR (ref 0–15)
URATE SERPL-MCNC: 7.8 MG/DL (ref 3.4–7)

## 2024-07-14 RX ORDER — COLCHICINE 0.6 MG/1
0.6 TABLET ORAL DAILY
Qty: 14 TABLET | Refills: 0 | Status: SHIPPED | OUTPATIENT
Start: 2024-07-14 | End: 2024-07-28

## 2024-07-14 RX ORDER — COLCHICINE 0.6 MG/1
1.2 TABLET ORAL ONCE
Status: COMPLETED | OUTPATIENT
Start: 2024-07-14 | End: 2024-07-14

## 2024-07-14 RX ADMIN — COLCHICINE 1.2 MG: 0.6 TABLET ORAL at 01:18

## 2024-07-14 NOTE — TELEPHONE ENCOUNTER
"Caller reported had bypass surgery on June 6th. Currently having a gout flare up, is out of colchicine. Wanting to know if can take ibuprofen for pain as he stated tylenol doesn't help.    Is currently on plavix and asa.   Advised against taking any NSAIDs d/t blood thinning properties. Advised to try elevation and ice/cool compresses to area. Drink plenty of fluids. Encouraged to be seen at UC or ER    Reason for Disposition   [1] Swollen foot AND [2] no fever  (Exceptions: localized bump from bunions, calluses, insect bite, sting)    Additional Information   Negative: Followed a foot injury   Negative: Diabetes mellitus   Negative: Toe pain is main symptom   Negative: Ankle pain is main symptom   Negative: Thigh or calf pain is main symptom   Negative: Entire foot is cool or blue in comparison to other foot   Negative: Purple or black skin on foot or toe   Negative: [1] Red area or streak AND [2] fever   Negative: [1] Swollen foot AND [2] fever   Negative: Patient sounds very sick or weak to the triager   Negative: [1] SEVERE pain (e.g., excruciating, unable to do any normal activities) AND [2] not improved after 2 hours of pain medicine   Negative: [1] Looks infected (spreading redness, pus) AND [2] large red area (> 2 in. or 5 cm)   Negative: Looks like a boil, infected sore, or deep ulcer   Negative: [1] Redness of the skin AND [2] no fever    Answer Assessment - Initial Assessment Questions  1. ONSET: \"When did the pain start?\"       Last night  2. LOCATION: \"Where is the pain located?\"       Right ankle  3. PAIN: \"How bad is the pain?\"    (Scale 1-10; or mild, moderate, severe)   - MILD (1-3): doesn't interfere with normal activities.    - MODERATE (4-7): interferes with normal activities (e.g., work or school) or awakens from sleep, limping.    - SEVERE (8-10): excruciating pain, unable to do any normal activities, unable to walk.       severe  4. WORK OR EXERCISE: \"Has there been any recent work or exercise " "that involved this part of the body?\"       Gout flare up  5. CAUSE: \"What do you think is causing the foot pain?\"      Gout flare up  6. OTHER SYMPTOMS: \"Do you have any other symptoms?\" (e.g., leg pain, rash, fever, numbness)      Reported area is swollen, difficulty bearing weight d/t pain, is out of colchicine, wanting to know if there is anything else he can take  7. PREGNANCY: \"Is there any chance you are pregnant?\" \"When was your last menstrual period?\"      N/a    Protocols used: Foot Pain-ADULT-AH    "

## 2024-07-14 NOTE — ED PROVIDER NOTES
"Subjective   History of Present Illness  Patient is a 44-year-old male that presents to the emergency department for complaints of probable gout to the right ankle and wound check.  Patient states he had CABG x 4 with Dr. Trejo on 6/6/2024.  Patient states he has been following up with his office for complaints of sternal incision opening and draining.  Patient states he has been cleaning the area as instructed and doing wet-to-dry dressings.  He states that yesterday when changing his wet-to-dry dressing he noticed a \"2 inch white string hanging out\" of the distal end of the incision.  Patient states that there does not appear any difference in the amount of drinking out today.  He states he does not have any pain or discomfort to the affected area but did want wound checked during encounter.    Patient reports main complaint for presentation to the ED today consist of right ankle pain and reports he thinks he is having possible gout flareup.  Patient reports he has a history of recurrent issues with gout that tends to switch between both left and right ankle.  He states that he takes scheduled allopurinol and has colchicine that he uses during flareups.  He states that he has been out of colchicine for the last 2 weeks.  He states that he noticed discomfort last night to the right ankle and increased swelling to the right lower extremity today.  Patient denies any other complaints.  Denies any falls, injury, or trauma to the right lower extremity.  Denies any fevers, body aches, chills, cough or congestion.  Denies any chest pain, shortness of breath, abdominal pain, nausea, vomiting, constipation or diarrhea.      Review of Systems   Cardiovascular:  Positive for leg swelling.   Musculoskeletal:  Positive for arthralgias and myalgias.   Skin:  Positive for wound.        Surgical incision wound noted.    All other systems reviewed and are negative.      Past Medical History:   Diagnosis Date    COVID     GERD " (gastroesophageal reflux disease)     Gout     Heartburn     Hypertension     Obesity        Allergies   Allergen Reactions    Jardiance [Empagliflozin] Other (See Comments)     Hematuria        Past Surgical History:   Procedure Laterality Date    CARDIAC CATHETERIZATION N/A 5/31/2024    Procedure: Left Heart Cath;  Surgeon: Chip Roberts MD;  Location:  [facility];  Service: Cardiology;  Laterality: N/A;    CORONARY ARTERY BYPASS GRAFT N/A 6/6/2024    Procedure: CORONARY ARTERY BYPASS GRAFTING X4, LEFT INTERNAL MAMMARY ARTERY GRAFT, RIGHT LEG ENDOSCOPIC VEIN HARVEST, STERNAL PLATING, APPLICATION PREVENA, TRANSESOPHAGEAL ECHOCARDIOGRAM;  Surgeon: Sean Trejo MD;  Location:  [facility] OR;  Service: Cardiothoracic;  Laterality: N/A;    DISTAL BICEPS TENDON REPAIR Right 6/7/2022    Procedure: RIGHT DISTAL BICEPS TENDON REPAIR WITH SEMI-T ALLOGRAFT;  Surgeon: Benjamin العراقي MD;  Location:  [facility] OR;  Service: Orthopedics;  Laterality: Right;    ELBOW PROCEDURE      EYE SURGERY Right 1984    COLES CATHETER N/A 6/6/2024    Procedure: COLES CATHETER INSERTION;  Surgeon: Omari Beaulieu MD;  Location:  [facility] OR;  Service: Urology;  Laterality: N/A;       Family History   Problem Relation Age of Onset    Diabetes Father     Hypertension Father     Heart disease Father        Social History     Socioeconomic History    Marital status:    Tobacco Use    Smoking status: Never     Passive exposure: Past    Smokeless tobacco: Former     Types: Snuff, Chew     Quit date: 2007   Vaping Use    Vaping status: Never Used   Substance and Sexual Activity    Alcohol use: No    Drug use: Defer           Objective   Physical Exam  Vitals and nursing note reviewed.   Constitutional:       Appearance: He is obese.      Comments: Nontoxic appearing. In no acute distress.    HENT:      Head: Normocephalic and atraumatic.      Right Ear: External ear normal.      Left Ear: External ear normal.       Nose: Nose normal.      Mouth/Throat:      Mouth: Mucous membranes are moist.      Pharynx: Oropharynx is clear.   Eyes:      Extraocular Movements: Extraocular movements intact.      Conjunctiva/sclera: Conjunctivae normal.      Pupils: Pupils are equal, round, and reactive to light.   Cardiovascular:      Rate and Rhythm: Normal rate and regular rhythm.      Pulses: Normal pulses.      Heart sounds: Normal heart sounds.   Pulmonary:      Effort: Pulmonary effort is normal. No respiratory distress.      Breath sounds: Normal breath sounds. No wheezing.      Comments: Sternal incision noted to be well-healing.  There is small opening of incision noted to the distal end of incision.  Wound appears to be healing well with no obvious signs of infection.  There is no erythema, swelling, warmth to touch, or purulent drainage noted.  Noted to the distal end of incision there does appear to be a very small white suture visible in wound.  Chest:      Chest wall: No tenderness.   Abdominal:      General: Bowel sounds are normal. There is no distension.      Palpations: Abdomen is soft.      Tenderness: There is no abdominal tenderness. There is no right CVA tenderness, left CVA tenderness, guarding or rebound.   Musculoskeletal:         General: Tenderness present. Normal range of motion.      Cervical back: Normal range of motion and neck supple.      Right lower leg: Edema present.      Left lower leg: No edema.      Comments: Patient reports tenderness upon palpation to the right ankle.  Right ankle is swollen but patient is able to perform full range of motion without difficulty and ambulate on the affected extremity.  Denies any numbness, tingling or loss of sensation to the affected extremity.  Pulses were dopplered and were strong and equal bilaterally.  Patient appears to be neurovascular intact.   Skin:     General: Skin is warm and dry.      Capillary Refill: Capillary refill takes less than 2 seconds.    Neurological:      General: No focal deficit present.      Mental Status: He is alert and oriented to person, place, and time. Mental status is at baseline.   Psychiatric:         Mood and Affect: Mood normal.         Behavior: Behavior normal.         Thought Content: Thought content normal.         Judgment: Judgment normal.       Labs Reviewed   COMPREHENSIVE METABOLIC PANEL - Abnormal; Notable for the following components:       Result Value    Glucose 111 (*)     BUN 26 (*)     All other components within normal limits    Narrative:     GFR Normal >60  Chronic Kidney Disease <60  Kidney Failure <15     URIC ACID - Abnormal; Notable for the following components:    Uric Acid 7.8 (*)     All other components within normal limits   SEDIMENTATION RATE - Abnormal; Notable for the following components:    Sed Rate 28 (*)     All other components within normal limits   CBC WITH AUTO DIFFERENTIAL - Abnormal; Notable for the following components:    Lymphocyte % 19.4 (*)     Neutrophils, Absolute 7.15 (*)     Monocytes, Absolute 0.91 (*)     All other components within normal limits   RAINBOW DRAW    Narrative:     The following orders were created for panel order Glen Draw.  Procedure                               Abnormality         Status                     ---------                               -----------         ------                     Green Top (Gel)[[id number]]                                  Final result               Lavender Top[[id number]]                                     Final result               Red Top[[id number]]                                          Final result               Glen Blood Culture Gavin...[[id number]]                      Final result               Isaac Top[[id number]]                                         Final result               Light Blue Top[[id number]]                                   Final result                 Please view results for these tests on the individual  "orders.   CBC AND DIFFERENTIAL    Narrative:     The following orders were created for panel order CBC & Differential.  Procedure                               Abnormality         Status                     ---------                               -----------         ------                     CBC Auto Differential[[id number]]        Abnormal            Final result                 Please view results for these tests on the individual orders.   GREEN TOP   LAVENDER TOP   RED TOP   RAINBOW BLOOD CULTURE BOTTLES - 1 SET   GRAY TOP   LIGHT BLUE TOP      XR Ankle 3+ View Right   Final Result   1. Diffuse soft tissue swelling and edema and no acute osseous   abnormality.       This report was signed and finalized on 7/14/2024 8:37 AM by Kehinde Rios.               Procedures           ED Course                                             Medical Decision Making  Constantin Seals is a 44 y.o. male who presents to the ED for complaints of probable gout to the right ankle and wound check.  Patient states he had CABG x 4 with Dr. Trejo on 6/6/2024.  Patient states he has been following up with his office for complaints of sternal incision opening and draining.  Patient states he has been cleaning the area as instructed and doing wet-to-dry dressings.  He states that yesterday when changing his wet-to-dry dressing he noticed a \"2 inch white string hanging out\" of the distal end of the incision.  Patient states that there does not appear any difference in the amount of drinking out today.  He states he does not have any pain or discomfort to the affected area but did want wound checked during encounter.    Patient reports main complaint for presentation to the ED today consist of right ankle pain and reports he thinks he is having possible gout flareup.  Patient reports he has a history of recurrent issues with gout that tends to switch between both left and right ankle.  He states that he takes scheduled allopurinol and " has colchicine that he uses during flareups.  He states that he has been out of colchicine for the last 2 weeks.  He states that he noticed discomfort last night to the right ankle and increased swelling to the right lower extremity today.  Patient denies any other complaints.  Denies any falls, injury, or trauma to the right lower extremity.  Denies any fevers, body aches, chills, cough or congestion.  Denies any chest pain, shortness of breath, abdominal pain, nausea, vomiting, constipation or diarrhea.    Patient was non-toxic appearing on arrival. No acute distress was noted.  Vital signs stable.     Past medical history, surgical history, and medication regimen reviewed.     Previous notes, labs, imaging and more reviewed.    Patient's presentation raises suspicion for differentials including, but not limited to, gout, wound infection, postop complication, sprain, fracture, cellulitis.    Please refer to above section of note for lab and imaging results that were reviewed and interpreted by radiology as well as attending physician.     Medications administered,   colchicine tablet 1.2 mg (1.2 mg Oral Given 7/14/24 0118)     Given findings described above, patient's presentation is likely consistent with encounter for wound check and gout to the right ankle.     I had an in-depth discussion with the patient regarding all lab and imaging results completed during today's ED encounter.  I discussed that surgical incision does appear to be well-healing.  In regards to new findings of suture I would recommend that patient continue doing wet-to-dry dressings as recommended by CT surgery and to leave suture alone.  Discussed that he will need to call Dr. Trejo's office for further guidance on findings of suture.  Discussed that if more of the suture becomes visible or he begins having pain or other complicating symptoms he needs to report back to the ED in the meantime.  Also discussed that patient's lab work in  addition to physical exam findings are consistent with a gout flareup to the right ankle.  Patient was administered dose of colchicine while in the ED and will be discharged home with prescription for colchicine.  Discussed that he will need to follow-up with his primary care provider for further evaluation and treatment of gout. I answered all the questions regarding the emergency department evaluation, diagnosis, and treatment plan in plain and simple language that was understandable. We discussed that due to always having some diagnostic uncertainty while in the ER, there is always a chance that symptoms may change or new symptoms may reveal themselves after being discharged. Because of this, I stressed the importance of Constantin following up with their PCP and Dr. Trejo. Patient informed that appointment will need to be done by calling their office to set up an appointment within the next few days or as soon as reasonably possible so that the symptoms can be re-evaluated for improvement or for any other questions. I also gave Constantin common sense return precautions and prompted patient to return to the emergency department within 24 - 48hrs if there are any new, worsening, or concerning symptoms. The patient verbalized understanding of the discharge instructions and agreed with them. Constantin was discharged in stable condition.     Dragon disclaimer:  Parts of this note may be an electronic transcription/translation of spoken language to printed text using the Dragon dictation system.       Problems Addressed:  Acute gout of right ankle, unspecified cause: complicated acute illness or injury  Encounter for post surgical wound check: complicated acute illness or injury    Amount and/or Complexity of Data Reviewed  Labs: ordered.  Radiology: ordered.    Risk  Prescription drug management.        Final diagnoses:   Acute gout of right ankle, unspecified cause   Encounter for post surgical wound check       ED Disposition  ED  Disposition       ED Disposition   Discharge    Condition   Stable    Comment   --               Linda Sylvester, APRN  [address] [facility] [id number] [phone]    Schedule an appointment as soon as possible for a visit       Sean Trejo MD  [address] [facility] [address]  [facility] [id number] [phone]    Schedule an appointment as soon as possible for a visit       [facility] EMERGENCY DEPARTMENT  [address] [facility] [address] [phone]    If symptoms worsen         Medication List        Changed      colchicine 0.6 MG tablet  Take 1 tablet by mouth Daily for 14 days.  What changed:   when to take this  reasons to take this               Where to Get Your Medications        These medications were sent to [facility] [place], [place] - [address] - [phone]  - [phone]   [address] [address]      Phone: [phone]   colchicine 0.6 MG tablet            Merrill Power, APRN  07/15/24 1244

## 2024-07-15 ENCOUNTER — TELEPHONE (OUTPATIENT)
Dept: CARDIAC SURGERY | Facility: CLINIC | Age: 44
End: 2024-07-15

## 2024-07-15 ENCOUNTER — TELEPHONE (OUTPATIENT)
Dept: CARDIAC SURGERY | Facility: CLINIC | Age: 44
End: 2024-07-15
Payer: COMMERCIAL

## 2024-07-15 RX ORDER — LISINOPRIL 2.5 MG/1
2.5 TABLET ORAL DAILY
Qty: 30 TABLET | Refills: 0 | Status: SHIPPED | OUTPATIENT
Start: 2024-07-15

## 2024-07-15 NOTE — TELEPHONE ENCOUNTER
I can look at the string (likely suture spitting out) on Wednesday at follow up visit  I would defer management of gout to PCP. This is his second bout of gout since surgery.

## 2024-07-15 NOTE — TELEPHONE ENCOUNTER
"Called pt and informed as directed.  Pt states he has already contacted PCP and they told him to avoid \"beer, beef, lamb and pork\" and that there was nothing else to give him but the Colchicine.  Pt was calling us to see if there is any type of anti-inflammatory med he can take that might help with this but that would not interfere with any of his meds from us or with his heart and surgery recovery.  Pt advised as directed re: poss suture.  Pt states he will leave this as is and will come to appt Wednesday \"if he is able to get up by then\" as at this time he is not even able to walk to the bathroom.  He is having to use a bedside commode./thu  "

## 2024-07-15 NOTE — TELEPHONE ENCOUNTER
"Pt calling to report 2 issues.    1)  Pt states that there is a \"2 inch long piece of string\" hanging from the area of his chest incision that has not healed.  Wondering if he should cut this off or what to do about this?  2)  Pt states he is having gout so bad he cannot stand up or walk.  Pt states he has been to ER and rec'd Colchicine but it is not helping.  He is calling to find out what he can take for inflammation or what to do to help with this.  Can reach pt at #[phone]/thu  "

## 2024-07-15 NOTE — TELEPHONE ENCOUNTER
Caller: Constantin Seals    Relationship: Self    Best call back number:     [phone] (Home)       What is the best time to reach you: ANY    Who are you requesting to speak with (clinical staff, provider,  specific staff member): ANY    Do you know the name of the person who called: N/A    What was the call regarding: PT WAS INSTRUCTED TO HAVE [facility] TRANSFER 2 REFILLS ON METROPROLOL 25MG TO [facility]. [facility] STATES THEY HAVE ZERO REFILLS REMAINING AND CANNOT TRANSFER ANY. PLEASE ADVISE. IF POSSIBLE PLEASE SEND 2 REFILLS TO [facility] TO REMOVE [facility] FROM THE EQUATION. PLEASE CALL PT TO CONFIRM ONCE SENT.     Is it okay if the provider responds through [employer]: N/A  
.  
Tried to call pharmacy but no answer and got a busy tone.  There were 2 additional refills but despite this I went ahead and sent in another prescription to his new pharmacy.
[facility]  Family Medicine  [address]  [place], [place] [address]  Phone: [phone]  Fax:   Follow Up Time: 4-6 Days

## 2024-07-15 NOTE — TELEPHONE ENCOUNTER
Pt informed as directed & voiced understanding.  Pt is now stating he has medications that do not have refills on them - Metoprolol & Lisinopril.  Pt uses [facility]./thu

## 2024-07-15 NOTE — TELEPHONE ENCOUNTER
I can send in refill to [facility] but he has 2 refills of metoprolol   He will need to get them transferred to his pharmacy

## 2024-07-17 ENCOUNTER — OFFICE VISIT (OUTPATIENT)
Dept: CARDIAC SURGERY | Facility: CLINIC | Age: 44
End: 2024-07-17
Payer: COMMERCIAL

## 2024-07-17 VITALS
HEART RATE: 86 BPM | SYSTOLIC BLOOD PRESSURE: 118 MMHG | WEIGHT: 315 LBS | OXYGEN SATURATION: 98 % | BODY MASS INDEX: 37.19 KG/M2 | HEIGHT: 77 IN | DIASTOLIC BLOOD PRESSURE: 90 MMHG

## 2024-07-17 DIAGNOSIS — E66.01 CLASS 3 SEVERE OBESITY DUE TO EXCESS CALORIES WITH SERIOUS COMORBIDITY AND BODY MASS INDEX (BMI) OF 50.0 TO 59.9 IN ADULT: Chronic | ICD-10-CM

## 2024-07-17 DIAGNOSIS — S21.109A WOUND OF STERNAL REGION: Primary | ICD-10-CM

## 2024-07-17 DIAGNOSIS — I25.110 CORONARY ARTERY DISEASE INVOLVING NATIVE CORONARY ARTERY OF NATIVE HEART WITH UNSTABLE ANGINA PECTORIS: ICD-10-CM

## 2024-07-17 DIAGNOSIS — I21.4 NSTEMI (NON-ST ELEVATED MYOCARDIAL INFARCTION): ICD-10-CM

## 2024-07-17 PROCEDURE — 99024 POSTOP FOLLOW-UP VISIT: CPT | Performed by: NURSE PRACTITIONER

## 2024-07-17 NOTE — PROGRESS NOTES
Subjective   Chief Complaint   Patient presents with    Post-op Follow-up     Pt had CABG x on 06/06/2024    Wound Check     Patient ID: Constantin Seals is a 44 y.o. male who is here for follow-up having had Coronary artery bypass grafting-4 vessel (left internal mammary artery/left anterior descending, reverse saphenous vein graft/distal right coronary artery, reverse saphenous vein graft/obtuse marginal, and reverse saphenous vein graft/diagonal artery), right endoscopic vein harvest of leg proper and limited distal thigh harvest, sternal lock XP sternal plating, application of Prevena incision management by Dr. Trejo on 6/6/2024.      Wound Check    Post operative recovery was uneventful without any major complications but remarkable for hematuria and kidney stone.  Returns today for follow-up of a sternal wound.  Has been performing wet-to-dry dressing changes twice daily.  Unfortunately, he has had a second bout of gout attack.  Was seen in the ER recently.  Over the last couple days gout has improved as well as his walking.  He has been seen by PCP again with titration of allopurinol.  He does note eating 2 hotdogs at a baseball game the night before his second gout attack.  His wife is trying to get him to eat more chicken.  No fevers, sweats, or chills.  Exposed suture at saphenectomy site.  Reports he was cleared by urology services at last visit to follow-up as needed.    Attending cardiac rehab.  He did  medications from the pharmacy earlier this week as he needed refills for lisinopril and Lopressor.    The following portions of the patient's history were reviewed and updated as appropriate: allergies, current medications, past family history, past medical history, past social history, past surgical history and problem list.    Review of Systems   Constitutional:  Negative for chills, diaphoresis and fever.   Respiratory:  Negative for shortness of breath and wheezing.    Cardiovascular:   "Positive for chest pain (incisional, wound) and leg swelling (mild).       Objective   Visit Vitals  /90 (BP Location: Right arm, Patient Position: Sitting, Cuff Size: Large Adult)   Pulse 86   Ht 195.6 cm (77\")   Wt (!) 195 kg (429 lb)   SpO2 98%   BMI 50.87 kg/m²         Physical Exam  Vitals reviewed.   Constitutional:       General: He is not in acute distress.     Appearance: He is well-developed. He is obese. He is not diaphoretic.   HENT:      Head: Normocephalic and atraumatic.   Eyes:      Pupils: Pupils are equal, round, and reactive to light.   Cardiovascular:      Rate and Rhythm: Normal rate and regular rhythm.      Heart sounds: Normal heart sounds. No murmur heard.     No friction rub.   Pulmonary:      Effort: Pulmonary effort is normal. No respiratory distress.      Breath sounds: Normal breath sounds. No wheezing or rales.   Abdominal:      General: There is no distension.      Palpations: Abdomen is soft.      Tenderness: There is no abdominal tenderness. There is no guarding.   Musculoskeletal:      Cervical back: Normal range of motion and neck supple.      Right lower leg: Edema present.      Left lower leg: No edema.   Skin:     General: Skin is warm and dry.      Coloration: Skin is not pale.      Findings: No rash.      Comments: Upper portion of the sternotomy site is clean and dry.  Distal sternotomy wound measures 5 cm long x 2 cm wide x 0.8 cm deep (at deepest portion). Wound bed lightly debrided with good raw bleeding noted. Packed with dry gauze. No evidence of infection. Sternum stable. No clicks.  Saphenectomy site clean, dry, and intact-exposed suture clipped.    Neurological:      Mental Status: He is alert and oriented to person, place, and time.   Psychiatric:         Behavior: Behavior normal.           Assessment & Plan         Diagnoses and all orders for this visit:    1. Wound of sternal region (Primary)    2. NSTEMI (non-ST elevated myocardial infarction)    3. " Coronary artery disease involving native coronary artery of native heart with unstable angina pectoris    4. Class 3 severe obesity due to excess calories with serious comorbidity and body mass index (BMI) of 50.0 to 59.9 in adult    Continue wet-to-dry dressing changes twice daily, gauze supplies provided to patient       Overall, Constantin Seals is doing well from cardiac surgery standpoint.  Unfortunately, he does have an ongoing sternal wound.  There is no evidence of infection.  Continue wet-to-dry dressing changes twice daily.  Follow-up in 1 week for wound recheck.  Continue cardiac rehab.  Continue to advance sternotomy restrictions as previously discussed by Dr. Trejo.  Continue to follow with cardiology services. Provided support and encouragement. All questions have been answered to the best of my ability. Patient has been instructed to contact our office with any questions or concerns should they arise prior to the next office visit.     Continue to follow with PCP for management of gout.     Constantin Seals  reports that he has never smoked. He has been exposed to tobacco smoke. He quit smokeless tobacco use about 17 years ago.  His smokeless tobacco use included snuff and chew.    Body mass index is 50.87 kg/m².  Continue to follow with primary care to establish durable lifestyle changes accomplish weight acceptable for age and height.      Advance Care Planning   ACP discussion was declined by the patient. Patient does not have an advance directive, declines further assistance.

## 2024-07-18 NOTE — PROGRESS NOTES
Reason for Visit: cardiovascular follow up.    HPI:  Constantin Seals is a 44 y.o. male is here today for follow-up.  He was recently admitted at the end of May with a non-STEMI.  LHC on 5/31/2024 showed severe multivessel coronary artery disease.  He underwent four-vessel CABG on 6/6/2024.  He had an area of his incision that opened up but is starting to heal.  He starts cardiac rehab next week.  He reports that gout has been worse without the nonsteroidal anti-inflammatory drugs.  He had an episode that occurred after eating hot dogs that he attributes as the precipitating event.  Overall he has lost 86 pounds since starting weight loss drugs.  Blood pressure is mildly elevated today but better controlled at home.  He has been walking more for exercise.  He also likes to hunt and fish.    Previous Cardiac Testing and Procedures:  -LHC (5/31/2024) severe multivessel coronary artery disease   -Echo (6/1/2024) EF 61 to 65%, mild LVH, normal diastolic function, normal RV size and function, no significant valve dysfunction  -4-vessel CABG (6/6/2024) LIMA to LAD, SVG to distal RCA, SVG to OM, SVG to diagonal    Lab data:  -Lipid panel (5/31/2024) total cholesterol 176, HDL 38, , triglycerides 89  -BMP (7/13/2024) creatinine 1.07, potassium 4.6, sodium 136    Patient Active Problem List   Diagnosis    Class 3 severe obesity due to excess calories with serious comorbidity and body mass index (BMI) of 50.0 to 59.9 in adult    Primary hypertension    Gastroesophageal reflux disease without esophagitis    SHERRON (obstructive sleep apnea)    NSTEMI (non-ST elevated myocardial infarction)    Coronary artery disease involving native coronary artery of native heart without angina pectoris    Gross hematuria    Myoglobinuria    Wound of sternal region    Mixed hyperlipidemia       Social History     Tobacco Use    Smoking status: Never     Passive exposure: Past    Smokeless tobacco: Former     Types: Snuff, Chew     Quit  date: 2007   Vaping Use    Vaping status: Never Used   Substance Use Topics    Alcohol use: No    Drug use: Defer       Family History   Problem Relation Age of Onset    Diabetes Father     Hypertension Father     Heart disease Father        The following portions of the patient's history were reviewed and updated as appropriate: allergies, current medications, past family history, past medical history, past social history, past surgical history, and problem list.      Current Outpatient Medications:     allopurinol (ZYLOPRIM) 300 MG tablet, Take 1 tablet by mouth Daily., Disp: , Rfl:     aspirin 81 MG EC tablet, Take 1 tablet by mouth Daily., Disp: 30 tablet, Rfl: 2    atorvastatin (LIPITOR) 40 MG tablet, Take 1 tablet by mouth Every Night., Disp: 30 tablet, Rfl: 2    clopidogrel (PLAVIX) 75 MG tablet, Take 1 tablet by mouth Daily., Disp: 30 tablet, Rfl: 2    lisinopril (PRINIVIL,ZESTRIL) 2.5 MG tablet, Take 1 tablet by mouth Daily., Disp: 30 tablet, Rfl: 0    metoprolol tartrate (LOPRESSOR) 25 MG tablet, Take 1 tablet by mouth Every 12 (Twelve) Hours., Disp: 60 tablet, Rfl: 0    oxyCODONE-acetaminophen (Percocet) 5-325 MG per tablet, Take 1 tablet by mouth Every 8 (Eight) Hours As Needed for Other (pain)., Disp: 9 tablet, Rfl: 0    Ozempic, 2 MG/DOSE, 8 MG/3ML solution pen-injector, Inject 2 mg under the skin into the appropriate area as directed 1 (One) Time Per Week., Disp: , Rfl:     Review of Systems   Constitutional: Negative for chills and fever.   Cardiovascular:  Negative for chest pain and paroxysmal nocturnal dyspnea.   Respiratory:  Negative for cough and shortness of breath.    Skin:  Positive for poor wound healing. Negative for rash.   Musculoskeletal:  Positive for gout and joint pain.   Gastrointestinal:  Negative for abdominal pain and heartburn.   Neurological:  Negative for dizziness and numbness.       Objective   /86   Pulse 92   Wt (!) 195 kg (429 lb 9.6 oz)   SpO2 100%   BMI 50.94  kg/m²   Constitutional:       Appearance: Well-developed. Morbidly obese.   HENT:      Head: Normocephalic and atraumatic.   Pulmonary:      Effort: Pulmonary effort is normal.      Breath sounds: Normal breath sounds.   Cardiovascular:      Normal rate. Regular rhythm.   Edema:     Peripheral edema absent.   Skin:     General: Skin is warm and dry.   Neurological:      Mental Status: Alert and oriented to person, place, and time.       Procedures      ICD-10-CM ICD-9-CM   1. Coronary artery disease involving native coronary artery of native heart without angina pectoris  I25.10 414.01   2. Primary hypertension  I10 401.9   3. Mixed hyperlipidemia  E78.2 272.2   4. Class 3 severe obesity due to excess calories with serious comorbidity and body mass index (BMI) of 50.0 to 59.9 in adult  E66.01 278.01    Z68.43 V85.43         Assessment/Plan:  1.  Coronary artery disease: Recent non-STEMI with [facility] on 5/31/2024 with severe multivessel disease.  Underwent CABG with Dr. Trejo on 6/6/2024.  Recovering from surgery.    2.  Hypertension: Blood pressure is mildly elevated today but he reports good control at home.  Continue metoprolol and lisinopril.  Encouraged him to monitor at home and consider titrating up lisinopril if necessary.    3.  Mixed hyperlipidemia: Elevated LDL with low HDL and lipid panel 5/31/2024.  Continue atorvastatin and plan to order repeat lipid panel at follow-up if not already done by PCP.    4.  Morbid obesity: Body mass index is 50.94 kg/m².  Patient has been working on weight loss and is down to 86 pounds total.  Continue to  on lifestyle modification.

## 2024-07-19 ENCOUNTER — OFFICE VISIT (OUTPATIENT)
Dept: CARDIOLOGY | Facility: CLINIC | Age: 44
End: 2024-07-19
Payer: COMMERCIAL

## 2024-07-19 ENCOUNTER — TELEPHONE (OUTPATIENT)
Dept: CARDIAC SURGERY | Facility: CLINIC | Age: 44
End: 2024-07-19
Payer: COMMERCIAL

## 2024-07-19 DIAGNOSIS — I25.10 CORONARY ARTERY DISEASE INVOLVING NATIVE CORONARY ARTERY OF NATIVE HEART WITHOUT ANGINA PECTORIS: Primary | ICD-10-CM

## 2024-07-19 DIAGNOSIS — E78.2 MIXED HYPERLIPIDEMIA: ICD-10-CM

## 2024-07-19 DIAGNOSIS — E66.01 CLASS 3 SEVERE OBESITY DUE TO EXCESS CALORIES WITH SERIOUS COMORBIDITY AND BODY MASS INDEX (BMI) OF 50.0 TO 59.9 IN ADULT: Chronic | ICD-10-CM

## 2024-07-19 DIAGNOSIS — I10 PRIMARY HYPERTENSION: Chronic | ICD-10-CM

## 2024-07-19 NOTE — LETTER
July 30, 2024     KURT Fraga  [address] [facility] [address]    Patient: Constantin Seals   YOB: 1980   Date of Visit: 7/19/2024       Dear KURT Fraga    Constantin Selas was in my office today. Below is a copy of my note.    If you have questions, please do not hesitate to call me. I look forward to following Constantin along with you.         Sincerely,        Chip Roberts MD        CC: No Recipients      Reason for Visit: cardiovascular follow up.    HPI:  Constantin Seals is a 44 y.o. male is here today for follow-up.  He was recently admitted at the end of May with a non-STEMI.  LHC on 5/31/2024 showed severe multivessel coronary artery disease.  He underwent four-vessel CABG on 6/6/2024.  He had an area of his incision that opened up but is starting to heal.  He starts cardiac rehab next week.  He reports that gout has been worse without the nonsteroidal anti-inflammatory drugs.  He had an episode that occurred after eating hot dogs that he attributes as the precipitating event.  Overall he has lost 86 pounds since starting weight loss drugs.  Blood pressure is mildly elevated today but better controlled at home.  He has been walking more for exercise.  He also likes to hunt and fish.    Previous Cardiac Testing and Procedures:  -LHC (5/31/2024) severe multivessel coronary artery disease   -Echo (6/1/2024) EF 61 to 65%, mild LVH, normal diastolic function, normal RV size and function, no significant valve dysfunction  -4-vessel CABG (6/6/2024) LIMA to LAD, SVG to distal RCA, SVG to OM, SVG to diagonal    Lab data:  -Lipid panel (5/31/2024) total cholesterol 176, HDL 38, , triglycerides 89  -BMP (7/13/2024) creatinine 1.07, potassium 4.6, sodium 136    Patient Active Problem List   Diagnosis   • Class 3 severe obesity due to excess calories with serious comorbidity and body mass index (BMI) of 50.0 to 59.9 in adult   • Primary hypertension   • Gastroesophageal  reflux disease without esophagitis   • SHERRON (obstructive sleep apnea)   • NSTEMI (non-ST elevated myocardial infarction)   • Coronary artery disease involving native coronary artery of native heart without angina pectoris   • Gross hematuria   • Myoglobinuria   • Wound of sternal region   • Mixed hyperlipidemia       Social History     Tobacco Use   • Smoking status: Never     Passive exposure: Past   • Smokeless tobacco: Former     Types: Snuff, Chew     Quit date: 2007   Vaping Use   • Vaping status: Never Used   Substance Use Topics   • Alcohol use: No   • Drug use: Defer       Family History   Problem Relation Age of Onset   • Diabetes Father    • Hypertension Father    • Heart disease Father        The following portions of the patient's history were reviewed and updated as appropriate: allergies, current medications, past family history, past medical history, past social history, past surgical history, and problem list.      Current Outpatient Medications:   •  allopurinol (ZYLOPRIM) 300 MG tablet, Take 1 tablet by mouth Daily., Disp: , Rfl:   •  aspirin 81 MG EC tablet, Take 1 tablet by mouth Daily., Disp: 30 tablet, Rfl: 2  •  atorvastatin (LIPITOR) 40 MG tablet, Take 1 tablet by mouth Every Night., Disp: 30 tablet, Rfl: 2  •  clopidogrel (PLAVIX) 75 MG tablet, Take 1 tablet by mouth Daily., Disp: 30 tablet, Rfl: 2  •  lisinopril (PRINIVIL,ZESTRIL) 2.5 MG tablet, Take 1 tablet by mouth Daily., Disp: 30 tablet, Rfl: 0  •  metoprolol tartrate (LOPRESSOR) 25 MG tablet, Take 1 tablet by mouth Every 12 (Twelve) Hours., Disp: 60 tablet, Rfl: 0  •  oxyCODONE-acetaminophen (Percocet) 5-325 MG per tablet, Take 1 tablet by mouth Every 8 (Eight) Hours As Needed for Other (pain)., Disp: 9 tablet, Rfl: 0  •  Ozempic, 2 MG/DOSE, 8 MG/3ML solution pen-injector, Inject 2 mg under the skin into the appropriate area as directed 1 (One) Time Per Week., Disp: , Rfl:     Review of Systems   Constitutional: Negative for chills and  fever.   Cardiovascular:  Negative for chest pain and paroxysmal nocturnal dyspnea.   Respiratory:  Negative for cough and shortness of breath.    Skin:  Positive for poor wound healing. Negative for rash.   Musculoskeletal:  Positive for gout and joint pain.   Gastrointestinal:  Negative for abdominal pain and heartburn.   Neurological:  Negative for dizziness and numbness.       Objective  /86   Pulse 92   Wt (!) 195 kg (429 lb 9.6 oz)   SpO2 100%   BMI 50.94 kg/m²   Constitutional:       Appearance: Well-developed. Morbidly obese.   HENT:      Head: Normocephalic and atraumatic.   Pulmonary:      Effort: Pulmonary effort is normal.      Breath sounds: Normal breath sounds.   Cardiovascular:      Normal rate. Regular rhythm.   Edema:     Peripheral edema absent.   Skin:     General: Skin is warm and dry.   Neurological:      Mental Status: Alert and oriented to person, place, and time.       Procedures      ICD-10-CM ICD-9-CM   1. Coronary artery disease involving native coronary artery of native heart without angina pectoris  I25.10 414.01   2. Primary hypertension  I10 401.9   3. Mixed hyperlipidemia  E78.2 272.2   4. Class 3 severe obesity due to excess calories with serious comorbidity and body mass index (BMI) of 50.0 to 59.9 in adult  E66.01 278.01    Z68.43 V85.43         Assessment/Plan:  1.  Coronary artery disease: Recent non-STEMI with [facility] on 5/31/2024 with severe multivessel disease.  Underwent CABG with Dr. Trejo on 6/6/2024.  Recovering from surgery.    2.  Hypertension: Blood pressure is mildly elevated today but he reports good control at home.  Continue metoprolol and lisinopril.  Encouraged him to monitor at home and consider titrating up lisinopril if necessary.    3.  Mixed hyperlipidemia: Elevated LDL with low HDL and lipid panel 5/31/2024.  Continue atorvastatin and plan to order repeat lipid panel at follow-up if not already done by PCP.    4.  Morbid obesity: Body mass index is  50.94 kg/m².  Patient has been working on weight loss and is down to 86 pounds total.  Continue to  on lifestyle modification.

## 2024-07-24 ENCOUNTER — OFFICE VISIT (OUTPATIENT)
Dept: CARDIAC SURGERY | Facility: CLINIC | Age: 44
End: 2024-07-24
Payer: COMMERCIAL

## 2024-07-24 VITALS
OXYGEN SATURATION: 98 % | HEART RATE: 90 BPM | BODY MASS INDEX: 37.19 KG/M2 | SYSTOLIC BLOOD PRESSURE: 138 MMHG | WEIGHT: 315 LBS | DIASTOLIC BLOOD PRESSURE: 83 MMHG | HEIGHT: 77 IN

## 2024-07-24 DIAGNOSIS — I21.4 NSTEMI (NON-ST ELEVATED MYOCARDIAL INFARCTION): ICD-10-CM

## 2024-07-24 DIAGNOSIS — I25.110 CORONARY ARTERY DISEASE INVOLVING NATIVE CORONARY ARTERY OF NATIVE HEART WITH UNSTABLE ANGINA PECTORIS: ICD-10-CM

## 2024-07-24 DIAGNOSIS — E66.01 CLASS 3 SEVERE OBESITY DUE TO EXCESS CALORIES WITH SERIOUS COMORBIDITY AND BODY MASS INDEX (BMI) OF 50.0 TO 59.9 IN ADULT: Chronic | ICD-10-CM

## 2024-07-24 DIAGNOSIS — S21.109A WOUND OF STERNAL REGION: Primary | ICD-10-CM

## 2024-07-24 PROCEDURE — 99024 POSTOP FOLLOW-UP VISIT: CPT | Performed by: NURSE PRACTITIONER

## 2024-07-24 NOTE — PROGRESS NOTES
"Subjective   Chief Complaint   Patient presents with    Wound Check     Patient is here for wound check     Patient ID: Constantin Seals is a 44 y.o. male who is here for follow-up having had Coronary artery bypass grafting-4 vessel (left internal mammary artery/left anterior descending, reverse saphenous vein graft/distal right coronary artery, reverse saphenous vein graft/obtuse marginal, and reverse saphenous vein graft/diagonal artery), right endoscopic vein harvest of leg proper and limited distal thigh harvest, sternal lock XP sternal plating, application of Prevena incision management by Dr. Trejo on 6/6/2024.      Wound Check    Post operative recovery was uneventful without any major complications but remarkable for hematuria and kidney stone.  Returns today for follow-up of a sternal wound.  Has been performing wet-to-dry dressing changes twice daily.  Inquires about using pistol to shoot snakes. Called the other day to inquire about mowing the yard. Attending cardiac rehab.      The following portions of the patient's history were reviewed and updated as appropriate: allergies, current medications, past family history, past medical history, past social history, past surgical history and problem list.    Review of Systems   Constitutional:  Negative for chills, diaphoresis and fever.   Respiratory:  Negative for shortness of breath and wheezing.    Cardiovascular:  Positive for chest pain (incisional, wound) and leg swelling (mild).       Objective   Visit Vitals  /83 (BP Location: Right arm, Patient Position: Sitting, Cuff Size: Adult)   Pulse 90   Ht 195.6 cm (77\")   Wt (!) 204 kg (450 lb 3.2 oz)   SpO2 98%   BMI 53.39 kg/m²         Physical Exam  Vitals reviewed.   Constitutional:       General: He is not in acute distress.     Appearance: He is well-developed. He is obese. He is not diaphoretic.   HENT:      Head: Normocephalic and atraumatic.   Eyes:      Pupils: Pupils are equal, round, and " reactive to light.   Cardiovascular:      Rate and Rhythm: Normal rate and regular rhythm.      Heart sounds: Normal heart sounds. No murmur heard.     No friction rub.   Pulmonary:      Effort: Pulmonary effort is normal. No respiratory distress.      Breath sounds: Normal breath sounds. No wheezing or rales.   Abdominal:      General: There is no distension.      Palpations: Abdomen is soft.      Tenderness: There is no abdominal tenderness. There is no guarding.   Musculoskeletal:      Cervical back: Normal range of motion and neck supple.      Right lower leg: Edema present.      Left lower leg: No edema.   Skin:     General: Skin is warm and dry.      Coloration: Skin is not pale.      Findings: No rash.      Comments: Upper portion of the sternotomy site is clean and dry.  Distal sternotomy wound measures 4 cm long x 2 cm wide x 1 cm deep (at deepest portion). Wound bed with anemic granulation tissue. Lightly debrided with good raw bleeding noted. Packed with dry gauze. No evidence of infection. Sternum stable. No clicks.  Saphenectomy site clean, dry, and intact.   Neurological:      Mental Status: He is alert and oriented to person, place, and time.   Psychiatric:         Behavior: Behavior normal.           Assessment & Plan         Diagnoses and all orders for this visit:    1. Wound of sternal region (Primary)    2. NSTEMI (non-ST elevated myocardial infarction)    3. Coronary artery disease involving native coronary artery of native heart with unstable angina pectoris    4. Class 3 severe obesity due to excess calories with serious comorbidity and body mass index (BMI) of 50.0 to 59.9 in adult      Continue wet-to-dry dressing changes twice daily       Overall, Constantin Seals is doing well from cardiac surgery standpoint.  No evidence of infection to sternal wound.  Continue ongoing treatment of wet-to-dry dressing changes twice daily.  Follow-up in 2 weeks for wound recheck or sooner if  needed.    Okay to continue cardiac rehab.  He was advised he is not cleared to shoot guns or mow the yard.      Provided support and encouragement. All questions have been answered to the best of my ability. Patient has been instructed to contact our office with any questions or concerns should they arise prior to the next office visit.       Constantin Whiteeny  reports that he has never smoked. He has been exposed to tobacco smoke. He quit smokeless tobacco use about 17 years ago.  His smokeless tobacco use included snuff and chew.    Body mass index is 53.39 kg/m².  Continue to follow with primary care to establish durable lifestyle changes accomplish weight acceptable for age and height.      Advance Care Planning   ACP discussion was declined by the patient. Patient does not have an advance directive, declines further assistance.

## 2024-07-30 VITALS
SYSTOLIC BLOOD PRESSURE: 142 MMHG | HEART RATE: 92 BPM | WEIGHT: 315 LBS | DIASTOLIC BLOOD PRESSURE: 86 MMHG | OXYGEN SATURATION: 100 % | BODY MASS INDEX: 50.94 KG/M2

## 2024-07-30 PROBLEM — I25.10 CORONARY ARTERY DISEASE INVOLVING NATIVE CORONARY ARTERY OF NATIVE HEART WITHOUT ANGINA PECTORIS: Status: ACTIVE | Noted: 2024-06-01

## 2024-07-30 PROBLEM — E78.2 MIXED HYPERLIPIDEMIA: Status: ACTIVE | Noted: 2024-07-30

## 2024-08-07 ENCOUNTER — OFFICE VISIT (OUTPATIENT)
Dept: CARDIAC SURGERY | Facility: CLINIC | Age: 44
End: 2024-08-07
Payer: COMMERCIAL

## 2024-08-07 VITALS
BODY MASS INDEX: 50.94 KG/M2 | OXYGEN SATURATION: 97 % | DIASTOLIC BLOOD PRESSURE: 82 MMHG | SYSTOLIC BLOOD PRESSURE: 142 MMHG | HEIGHT: 77 IN | HEART RATE: 80 BPM

## 2024-08-07 DIAGNOSIS — Z95.1 S/P CABG X 4: ICD-10-CM

## 2024-08-07 DIAGNOSIS — S21.109A WOUND OF STERNAL REGION: Primary | ICD-10-CM

## 2024-08-07 DIAGNOSIS — E66.01 CLASS 3 SEVERE OBESITY DUE TO EXCESS CALORIES WITH SERIOUS COMORBIDITY AND BODY MASS INDEX (BMI) OF 50.0 TO 59.9 IN ADULT: Chronic | ICD-10-CM

## 2024-08-07 PROCEDURE — 99024 POSTOP FOLLOW-UP VISIT: CPT

## 2024-08-07 NOTE — PROGRESS NOTES
"Subjective   Chief Complaint   Patient presents with    Post-op Follow-up     Pt is here for 2 week follow up    Pt had CABG x on 06/06/2024     Patient ID: Constantin Seals is a 44 y.o. male who is here for follow-up having had Coronary artery bypass grafting-4 vessel (left internal mammary artery/left anterior descending, reverse saphenous vein graft/distal right coronary artery, reverse saphenous vein graft/obtuse marginal, and reverse saphenous vein graft/diagonal artery), right endoscopic vein harvest of leg proper and limited distal thigh harvest, sternal lock XP sternal plating, application of Prevena incision management by Dr. Trejo on 6/6/2024.      Wound Check    Post operative recovery was uneventful without any major complications but remarkable for hematuria and kidney stone. Returns today for follow-up of a sternal wound.  Has been performing wet-to-dry dressing changes twice daily.  He misunderstood education on wet-to-dry dressing changes.  He was using a wet gauze in the morning and a dry gauze at night.  Inquires about using pistol to shoot snakes and push mowing the yard. Attending cardiac rehab.      The following portions of the patient's history were reviewed and updated as appropriate: allergies, current medications, past family history, past medical history, past social history, past surgical history and problem list.    Review of Systems   Constitutional:  Negative for chills, diaphoresis and fever.   Respiratory:  Negative for shortness of breath and wheezing.    Cardiovascular:  Positive for chest pain (incisional, wound) and leg swelling (mild).     Objective   Visit Vitals  /82 (BP Location: Right arm, Patient Position: Sitting, Cuff Size: Large Adult)   Pulse 80   Ht 195.6 cm (77\")   SpO2 97%   BMI 50.94 kg/m²     Physical Exam  Vitals reviewed.   Constitutional:       General: He is not in acute distress.     Appearance: He is well-developed. He is obese. He is not diaphoretic. "   HENT:      Head: Normocephalic and atraumatic.   Eyes:      Pupils: Pupils are equal, round, and reactive to light.   Cardiovascular:      Rate and Rhythm: Normal rate and regular rhythm.      Heart sounds: Normal heart sounds. No murmur heard.     No friction rub.   Pulmonary:      Effort: Pulmonary effort is normal. No respiratory distress.      Breath sounds: Normal breath sounds. No wheezing or rales.   Abdominal:      General: There is no distension.      Palpations: Abdomen is soft.      Tenderness: There is no abdominal tenderness. There is no guarding.   Musculoskeletal:      Cervical back: Normal range of motion and neck supple.      Right lower leg: Edema present.      Left lower leg: No edema.   Skin:     General: Skin is warm and dry.      Coloration: Skin is not pale.      Findings: No rash.      Comments: Upper portion of the sternotomy site is clean and dry.  Distal sternotomy wound measures 3 cm long x 2 cm wide x 5 mm deep (at deepest portion). Wound bed with healthy pink tissue. Packed with dry gauze. No evidence of infection. Sternum stable. No clicks.  Saphenectomy site clean, dry, and intact.   Neurological:      Mental Status: He is alert and oriented to person, place, and time.   Psychiatric:         Behavior: Behavior normal.       Assessment & Plan       Diagnoses and all orders for this visit:    1. Wound of sternal region (Primary)    2. Class 3 severe obesity due to excess calories with serious comorbidity and body mass index (BMI) of 50.0 to 59.9 in adult    3. S/P CABG x 4      Continue wet-to-dry dressing changes twice daily.  Education given on wet to dry dressing changes.      Overall, Constantin Seals is doing well from cardiac surgery standpoint.  No evidence of infection to sternal wound.  Continue ongoing treatment of wet-to-dry dressing changes twice daily.  Keep schedule follow-up with Dr. Trejo in 2 weeks for wound recheck or sooner if needed.    Okay to continue cardiac  rehab. He was advised he is not cleared to shoot guns or mow the yard.      Provided support and encouragement. All questions have been answered to the best of my ability. Patient has been instructed to contact our office with any questions or concerns should they arise prior to the next office visit.     Constantin Seals  reports that he has never smoked. He has been exposed to tobacco smoke. He quit smokeless tobacco use about 17 years ago.  His smokeless tobacco use included snuff and chew.    Body mass index is 50.94 kg/m².  Continue to follow with primary care to establish durable lifestyle changes accomplish weight acceptable for age and height.    Advance Care Planning   ACP discussion was declined by the patient. Patient does not have an advance directive, declines further assistance.    Veronica Trinidad, APRN

## 2024-08-13 ENCOUNTER — TELEPHONE (OUTPATIENT)
Dept: CARDIAC SURGERY | Facility: CLINIC | Age: 44
End: 2024-08-13
Payer: COMMERCIAL

## 2024-08-13 NOTE — TELEPHONE ENCOUNTER
Caller: Constantin Seals    Relationship: Self    Best call back number: [phone]    What is the best time to reach you: ANY    Who are you requesting to speak with (clinical staff, provider,  specific staff member): WILLIAN VELIZ    Do you know the name of the person who called: PT    What was the call regarding: PT STATES HE SAW WILLIAN VELIZ LAST WEEK AND SHE TOLD HIM SHE MAY BE ABLE TO SQUEEZE HIM IN TOMORROW TO CHECK ON HIS CHEST. PT WAS CALLING TO SEE IF THAT WAS POSSIBLE. THERESA BRADY ADDRESSED TO [facility] INSTEAD OF JOSE ALFREDO. PLEASE GIVE HIM A CALL BACK TO DISCUSS. THANK YOU    Is it okay if the provider responds through [facility]: NO

## 2024-08-13 NOTE — TELEPHONE ENCOUNTER
Spoke with patient.  I had originally told patient that if his sternal wound had healed he could make an appointment with me sooner so that his restrictions could be lifted.  Patient reports that his sternal wound is still not completely healed but better.  I have advised him to wait until it is completely healed to come in seeing as his restrictions will not change at this time.  He voices understanding.

## 2024-08-14 RX ORDER — LISINOPRIL 2.5 MG/1
2.5 TABLET ORAL DAILY
Qty: 30 TABLET | Refills: 11 | Status: SHIPPED | OUTPATIENT
Start: 2024-08-14

## 2024-08-21 ENCOUNTER — OFFICE VISIT (OUTPATIENT)
Dept: CARDIAC SURGERY | Facility: CLINIC | Age: 44
End: 2024-08-21
Payer: COMMERCIAL

## 2024-08-21 VITALS
DIASTOLIC BLOOD PRESSURE: 92 MMHG | HEART RATE: 96 BPM | WEIGHT: 315 LBS | SYSTOLIC BLOOD PRESSURE: 140 MMHG | OXYGEN SATURATION: 99 % | BODY MASS INDEX: 37.19 KG/M2 | HEIGHT: 77 IN

## 2024-08-21 DIAGNOSIS — E66.01 CLASS 3 SEVERE OBESITY DUE TO EXCESS CALORIES WITH SERIOUS COMORBIDITY AND BODY MASS INDEX (BMI) OF 50.0 TO 59.9 IN ADULT: Chronic | ICD-10-CM

## 2024-08-21 DIAGNOSIS — Z95.1 S/P CABG X 4: Primary | ICD-10-CM

## 2024-08-21 PROCEDURE — 99024 POSTOP FOLLOW-UP VISIT: CPT | Performed by: THORACIC SURGERY (CARDIOTHORACIC VASCULAR SURGERY)

## 2024-08-21 RX ORDER — COLCHICINE 0.6 MG/1
TABLET ORAL
COMMUNITY
Start: 2024-08-07

## 2024-08-21 RX ORDER — HYDROCODONE BITARTRATE AND ACETAMINOPHEN 7.5; 325 MG/1; MG/1
TABLET ORAL
COMMUNITY
Start: 2024-08-08 | End: 2024-09-04 | Stop reason: DRUGHIGH

## 2024-08-21 NOTE — PROGRESS NOTES
Subjective   Patient ID: Constantin Seals is a 44 y.o. male who underwent:  Coronary artery bypass grafting-4 vessel (left internal mammary artery/left anterior descending, reverse saphenous vein graft/distal right coronary artery, reverse saphenous vein graft/obtuse marginal, and reverse saphenous vein graft/diagonal artery)  2. Right endoscopic vein harvest of leg proper and limited distal thigh harvest   3. Sternalock XP sternal plating  4. Application of Prevena Incision management system  On June 6, 2024 by Dr. Trejo      History of Present Illness  The patient returns today for a subsequent office visit.    He has seen Veronica Hardin and Veronica Trinidad in the interim to manage superficial dehiscence of the sternotomy incision inferiorly. There were some questions about dressing changes, and Veronica Trinidad reinforced wet-to-dry dressing changes twice a day. He has been adhering to a lifting restriction of 35 pounds. He reports no history of diabetes but acknowledges being prediabetic. His wound healing process was progressing well until last Saturday when he noticed the top layer of the wound had come off. For wound care, he has been applying saline solution on a 4 x 4 gauze and taping it over the wound, followed by draining it twice daily. He denies experiencing any clicks or significant pain in his breast bone.    He has been participating in cardiac rehabilitation three days a week, which includes treadmill and bike exercises. He had questions regarding shooting guns or mowing the yard and was advised to hold off on these activities. He also mentions that his work involves exposure to certain chemicals and he is considering changing his job due to this and other reasons. 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    SOCIAL HISTORY  He is a non-smoker.      The following portions of the patient's history were reviewed and updated as appropriate: allergies, current medications, past  "family history, past medical history, past social history, past surgical history and problem list.        Objective   Visit Vitals  /92 (BP Location: Right arm, Patient Position: Sitting, Cuff Size: Large Adult)   Pulse 96   Ht 195.6 cm (77\")   Wt (!) 206 kg (453 lb 9.6 oz)   SpO2 99%   BMI 53.79 kg/m²       Physical Exam  Physical Exam  No acute distress observed.  Lungs are clear to auscultation bilaterally without wheezing, rubs, or rales.  Heart exhibits a regular rate and rhythm without murmurs, rubs, or gallops.  Sternum is stable. Sternotomy incision is healing nicely except for the noted inferior aspect of the sternotomy incision wound. No clicks. Granulation bed now is only about 1-1/2 mm deep. The skin measures 1-1/2 in width by 3.5 cm in length. There is evidence of wound contracture. No evidence of infection. Granulation bed is vigorous.        Results      Assessment & Plan       Diagnoses and all orders for this visit:    1. S/P CABG x 4 (Primary)    2. Class 3 severe obesity due to excess calories with serious comorbidity and body mass index (BMI) of 50.0 to 59.9 in adult      Assessment & Plan  1. Multivessel coronary artery disease post CABG.  He is making good progress with cardiac rehab, currently participating three days a week on the treadmill and bike. He is advised to avoid cutting grass strictly for a full 3 months following cardiac surgery, with the first day to consider being 09/06/2024. He is also encouraged not to engage in any firearm use at this time. He is currently at about 35 pounds lifting restriction and has had no pain with that. He did inadvertently pick his daughter up who weighed 40 pounds without pain, understanding he broke the sternal precautions. He will continue to advance as tolerated. He will return to see Veronica Hardin in 2 weeks to assess the wound. Follow-up thereafter will be determined by Veronica's exam. He will be seen one additional time at least 4 to 6 weeks " out to assess that his sternum has healed well prior to releasing him back to Dr. Roberts.  And Aleks was taken to once again reminded him about the importance of ongoing cardiovascular risk factor modification both with his pharmacology as well as diet.  Discussed importance of a cardiovascular lifestyle that is stable for longevity including a progressive and consistent exercise program.  To that end I discussed that ultimately his weight is an independent predictor of mortality.  The latter is critical towards long-term success.        2. Prediabetes.  He is advised that his prediabetic status may affect his healing process. Continued monitoring and management of blood glucose levels are recommended.    3. Morbid obesity.  Weight management strategies were discussed, including the importance of a balanced diet and regular physical activity. He is encouraged to continue with cardiac rehab and gradually increase physical activities as tolerated.    He is a non smoker.      Transcribed from ambient dictation for Sean Trejo MD by Sean Trejo MD.  08/21/24   09:31 CDT    Patient or patient representative verbalized consent for the use of Ambient Listening during the visit with  Sean Trejo MD for chart documentation. 9/8/2024  16:15 CDT

## 2024-08-25 ENCOUNTER — NURSE TRIAGE (OUTPATIENT)
Dept: CALL CENTER | Facility: HOSPITAL | Age: 44
End: 2024-08-25
Payer: COMMERCIAL

## 2024-08-25 NOTE — TELEPHONE ENCOUNTER
"Reason for Disposition   Pain in the big toe joint    Additional Information   Negative: Followed a foot injury   Negative: Diabetes mellitus   Negative: Toe pain is main symptom   Negative: Ankle pain is main symptom   Negative: Thigh or calf pain is main symptom   Negative: Entire foot is cool or blue in comparison to other foot   Negative: Purple or black skin on foot or toe   Negative: [1] Red area or streak AND [2] fever   Negative: [1] Swollen foot AND [2] fever   Negative: Patient sounds very sick or weak to the triager   Negative: [1] SEVERE pain (e.g., excruciating, unable to do any normal activities) AND [2] not improved after 2 hours of pain medicine   Negative: [1] Looks infected (spreading redness, pus) AND [2] large red area (> 2 in. or 5 cm)   Negative: Looks like a boil, infected sore, or deep ulcer   Negative: [1] Redness of the skin AND [2] no fever   Negative: [1] Swollen foot AND [2] no fever  (Exceptions: localized bump from bunions, calluses, insect bite, sting)   Negative: Weakness (i.e., loss of strength) of new-onset in foot or toes  (Exceptions: not truly weak, foot feels weak because of pain; weakness present > 2 weeks)   Negative: Numbness (i.e., loss of sensation) in foot or toes  (Exception: Just tingling; numbness present > 2 weeks.)   Negative: [1] MODERATE pain (e.g., interferes with normal activities, limping) AND [2] present > 3 days   Negative: [1] Weakness or numbness in foot or toes AND [2] present > 2 weeks   Negative: [1] Tingling in feet AND [2] new or increased    Answer Assessment - Initial Assessment Questions  1. ONSET: \"When did the pain start?\"       Had CABG in June and since then had 7 flares  2. LOCATION: \"Where is the pain located?\"       Right foot mainly but also in the left sometimes  3. PAIN: \"How bad is the pain?\"    (Scale 1-10; or mild, moderate, severe)   - MILD (1-3): doesn't interfere with normal activities.    - MODERATE (4-7): interferes with normal " "activities (e.g., work or school) or awakens from sleep, limping.    - SEVERE (8-10): excruciating pain, unable to do any normal activities, unable to walk.       5  4. WORK OR EXERCISE: \"Has there been any recent work or exercise that involved this part of the body?\"       Is off work for two more weeks  5. CAUSE: \"What do you think is causing the foot pain?\"      Gout, asking if surgery could be related or new meds  6. OTHER SYMPTOMS: \"Do you have any other symptoms?\" (e.g., leg pain, rash, fever, numbness)      Plantar pain  7. PREGNANCY: \"Is there any chance you are pregnant?\" \"When was your last menstrual period?\"      na    Protocols used: Foot Pain-ADULT-[facility]    "

## 2024-08-25 NOTE — TELEPHONE ENCOUNTER
Had long discussion with caller about low purine diet.  Some medications such as betablockers can increase flare ups.  He is taking plavix, metoprolol, lisinopril, ASA, provastatin.  Is not allowed to take and NSAID since having heart surgery.  He also takes colchicine PRN and allopurinol daily.    He eats chicken and deer meat as his main proteins.  Discussed game meat is higher in purine like red meat.    Need to reach out to PCP, CT surgery and cardiology to see if any meds can be adjusted along with diet changes.

## 2024-08-26 ENCOUNTER — TELEPHONE (OUTPATIENT)
Dept: CARDIOLOGY | Facility: CLINIC | Age: 44
End: 2024-08-26
Payer: COMMERCIAL

## 2024-08-26 ENCOUNTER — TELEPHONE (OUTPATIENT)
Dept: CARDIAC SURGERY | Facility: CLINIC | Age: 44
End: 2024-08-26

## 2024-08-26 NOTE — TELEPHONE ENCOUNTER
This pt called concerning gout. He has had 7 attacks since he was d/c from the hospital. He takes allopurinol and colchicine for this. He called the nurse line and was told that it could be the side effects of metoprolol. He wants to know what to do? He needs to get back to work soon but he has been unable to walk due to gout. Pt has an appt with pcp this Wednesday. Please advise

## 2024-08-27 RX ORDER — METOPROLOL TARTRATE 25 MG/1
25 TABLET, FILM COATED ORAL EVERY 12 HOURS SCHEDULED
Qty: 60 TABLET | Refills: 11 | Status: SHIPPED | OUTPATIENT
Start: 2024-08-27

## 2024-08-27 RX ORDER — LISINOPRIL 2.5 MG/1
2.5 TABLET ORAL DAILY
Qty: 30 TABLET | Refills: 11 | Status: SHIPPED | OUTPATIENT
Start: 2024-08-27

## 2024-08-28 ENCOUNTER — TRANSCRIBE ORDERS (OUTPATIENT)
Dept: ADMINISTRATIVE | Facility: HOSPITAL | Age: 44
End: 2024-08-28
Payer: COMMERCIAL

## 2024-08-28 DIAGNOSIS — M79.661 PAIN OF RIGHT LOWER LEG: Primary | ICD-10-CM

## 2024-08-29 ENCOUNTER — HOSPITAL ENCOUNTER (OUTPATIENT)
Dept: ULTRASOUND IMAGING | Facility: HOSPITAL | Age: 44
Discharge: HOME | End: 2024-08-29
Admitting: NURSE PRACTITIONER
Payer: COMMERCIAL

## 2024-08-29 DIAGNOSIS — M79.661 PAIN OF RIGHT LOWER LEG: ICD-10-CM

## 2024-08-29 PROCEDURE — 93971 EXTREMITY STUDY: CPT

## 2024-09-04 ENCOUNTER — OFFICE VISIT (OUTPATIENT)
Dept: CARDIAC SURGERY | Facility: CLINIC | Age: 44
End: 2024-09-04
Payer: COMMERCIAL

## 2024-09-04 ENCOUNTER — TELEPHONE (OUTPATIENT)
Dept: CARDIAC SURGERY | Facility: CLINIC | Age: 44
End: 2024-09-04

## 2024-09-04 VITALS
HEIGHT: 77 IN | WEIGHT: 315 LBS | OXYGEN SATURATION: 98 % | HEART RATE: 84 BPM | BODY MASS INDEX: 37.19 KG/M2 | DIASTOLIC BLOOD PRESSURE: 84 MMHG | SYSTOLIC BLOOD PRESSURE: 136 MMHG

## 2024-09-04 DIAGNOSIS — I25.10 CORONARY ARTERY DISEASE INVOLVING NATIVE CORONARY ARTERY OF NATIVE HEART WITHOUT ANGINA PECTORIS: ICD-10-CM

## 2024-09-04 DIAGNOSIS — Z95.1 S/P CABG X 4: ICD-10-CM

## 2024-09-04 DIAGNOSIS — I21.4 NSTEMI (NON-ST ELEVATED MYOCARDIAL INFARCTION): ICD-10-CM

## 2024-09-04 DIAGNOSIS — E66.01 CLASS 3 SEVERE OBESITY DUE TO EXCESS CALORIES WITH SERIOUS COMORBIDITY AND BODY MASS INDEX (BMI) OF 50.0 TO 59.9 IN ADULT: Chronic | ICD-10-CM

## 2024-09-04 DIAGNOSIS — S21.109A WOUND OF STERNAL REGION: Primary | ICD-10-CM

## 2024-09-04 PROCEDURE — 99024 POSTOP FOLLOW-UP VISIT: CPT | Performed by: NURSE PRACTITIONER

## 2024-09-04 RX ORDER — HYDROCODONE BITARTRATE AND ACETAMINOPHEN 10; 325 MG/1; MG/1
1 TABLET ORAL EVERY 12 HOURS PRN
COMMUNITY

## 2024-09-04 NOTE — PROGRESS NOTES
Subjective   Chief Complaint   Patient presents with    Post-op Follow-up     Pt had CABG x 4 on 06/06/2024     Patient ID: Constantin Seals is a 44 y.o. male who is here for follow-up having had Coronary artery bypass grafting-4 vessel (left internal mammary artery/left anterior descending, reverse saphenous vein graft/distal right coronary artery, reverse saphenous vein graft/obtuse marginal, and reverse saphenous vein graft/diagonal artery), right endoscopic vein harvest of leg proper and limited distal thigh harvest, sternal lock XP sternal plating, application of Prevena incision management by Dr. Trejo on 6/6/2024.      Wound Check    Mr. Seals returns today for follow-up of sternal wound.  Had been performing wet-to-dry dressing changes twice daily but discontinued after near wound closure.  He indicates there is a new/second scab above the previous and initial sternal wound.  He was instructed to stop metoprolol after seventh bout of gout by cardiology.  He has follow-up with Dr. Roberts at the end of October.  He did violate sternal restrictions yesterday by picking up a family member who weighed about 200 pounds after they fell and hit their head.  He denies sternal pain or chest pain.  He denied any popping or clicking of his sternum.  He admits today that he has shot a few guns despite being cleared to do so.  Again, he denies any issues with pain or sternal instability.  He continues to attend cardiac rehab.  He does admit to having decreased stamina.  He would like to return to work on Monday, September 9.  He is employed as a .  He was seen by PCP recently and given prescription for Lasix due to lower extremity edema.      The following portions of the patient's history were reviewed and updated as appropriate: allergies, current medications, past family history, past medical history, past social history, past surgical history and problem list.    Review of Systems   Constitutional:  Negative  "for chills, diaphoresis and fever.   Respiratory:  Negative for shortness of breath and wheezing.    Cardiovascular:  Positive for chest pain (incisional, wound) and leg swelling.       Objective   Visit Vitals  /84 (BP Location: Right arm, Patient Position: Sitting, Cuff Size: Large Adult)   Pulse 84   Ht 195.6 cm (77\")   Wt (!) 214 kg (472 lb)   SpO2 98%   BMI 55.97 kg/m²         Physical Exam  Vitals reviewed.   Constitutional:       General: He is not in acute distress.     Appearance: He is well-developed. He is obese. He is not diaphoretic.   HENT:      Head: Normocephalic and atraumatic.   Eyes:      Pupils: Pupils are equal, round, and reactive to light.   Cardiovascular:      Rate and Rhythm: Normal rate and regular rhythm.      Heart sounds: Normal heart sounds. No murmur heard.     No friction rub.   Pulmonary:      Effort: Pulmonary effort is normal. No respiratory distress.      Breath sounds: Normal breath sounds. No wheezing or rales.   Abdominal:      General: There is no distension.      Palpations: Abdomen is soft.      Tenderness: There is no abdominal tenderness. There is no guarding.   Musculoskeletal:      Cervical back: Normal range of motion and neck supple.      Right lower leg: Edema present.      Left lower leg: Edema present.   Skin:     General: Skin is warm and dry.      Coloration: Skin is not pale.      Findings: No rash.      Comments: Sternal incision mostly healed.  There is a scab which is small along the mid sternum area.  Distally the previous sternal wound measured 1-1/2 x 3-1/2 but now shows evidence of wound contracture and healing.  Sternum stable.  No clicks.  No drainage.  No evidence of infection.  Saphenectomy site clean, dry, and intact.   Neurological:      Mental Status: He is alert and oriented to person, place, and time.   Psychiatric:         Behavior: Behavior normal.           Assessment & Plan         Diagnoses and all orders for this visit:    1. Wound of " sternal region (Primary)    2. NSTEMI (non-ST elevated myocardial infarction)    3. Coronary artery disease involving native coronary artery of native heart without angina pectoris    4. S/P CABG x 4    5. Class 3 severe obesity due to excess calories with serious comorbidity and body mass index (BMI) of 50.0 to 59.9 in adult           Overall, Constantin Seals is doing well from cardiac surgery standpoint.  He was advised that he is cleared to discontinue wet-to-dry dressing changes at this time.  Continue to keep sternal incision clean with antibacterial soap and water.  Plan for follow-up in 4 to 6 weeks to ensure full healing.  Discussed with Dr. Trejo and he has been cleared to return to work without any restrictions.  Letter will be provided upon his behalf.  Again, we discussed advancement of sternal restrictions over the next several weeks.  Encouraged him to continue cardiac rehab while he is able to do so while complying with job duties to help with his stamina and endurance. Provided support and encouragement. All questions have been answered to the best of my ability. Patient has been instructed to contact our office with any questions or concerns should they arise prior to the next office visit.     Keep planned follow-up with cardiology services for CAD.    Constantin Seals  reports that he has never smoked. He has been exposed to tobacco smoke. He quit smokeless tobacco use about 17 years ago.  His smokeless tobacco use included snuff and chew.    Body mass index is 55.97 kg/m².  Continue to follow with primary care to establish durable lifestyle changes accomplish weight acceptable for age and height.      Advance Care Planning   ACP discussion was declined by the patient. Patient does not have an advance directive, declines further assistance.

## 2024-09-04 NOTE — TELEPHONE ENCOUNTER
Reviewed with Dr. Trejo. He has cleared him to return to work on Monday 9/9/2024 without any restrictions. Letter has been written and awaiting signature. Please inform patient and he will need follow up with myself in 4-6 weeks to ensure full healing of sternal wound.

## 2024-09-05 NOTE — TELEPHONE ENCOUNTER
Called and informed pt as directed.  Pt would like letter mailed to him with copy of his appt notice for next time.  Follow up sched as directed and will mail all to pt as requested/thu

## 2024-09-09 ENCOUNTER — HOSPITAL ENCOUNTER (OUTPATIENT)
Dept: CARDIAC REHAB | Age: 44
Setting detail: THERAPIES SERIES
Discharge: HOME | End: 2024-09-09

## 2024-09-11 ENCOUNTER — HOSPITAL ENCOUNTER (OUTPATIENT)
Dept: CARDIAC REHAB | Age: 44
Setting detail: THERAPIES SERIES
Discharge: HOME | End: 2024-09-11

## 2024-09-12 RX ORDER — CLOPIDOGREL BISULFATE 75 MG/1
75 TABLET ORAL DAILY
Qty: 90 TABLET | Refills: 3 | Status: SHIPPED | OUTPATIENT
Start: 2024-09-12

## 2024-09-12 RX ORDER — LISINOPRIL 2.5 MG/1
2.5 TABLET ORAL DAILY
Qty: 90 TABLET | Refills: 3 | Status: SHIPPED | OUTPATIENT
Start: 2024-09-12

## 2024-09-12 RX ORDER — ATORVASTATIN CALCIUM 40 MG/1
40 TABLET, FILM COATED ORAL NIGHTLY
Qty: 90 TABLET | Refills: 3 | Status: SHIPPED | OUTPATIENT
Start: 2024-09-12

## 2024-09-13 ENCOUNTER — TELEPHONE (OUTPATIENT)
Dept: CARDIAC SURGERY | Facility: CLINIC | Age: 44
End: 2024-09-13

## 2024-09-13 NOTE — TELEPHONE ENCOUNTER
Caller: Constantin Seals    Relationship: Self    Best call back number: [phone]      What is the medical concern/diagnosis: PT IS HAVING PAIN IN FEET AND ANKLES. HAVING TROUBLE PUSHING GAS IN CAR-CAN'T STAND WITHOUT EXCRUCIATING PAIN-HAS HAD SOME MED CHANGES AND IS REQUESTING A REF TO ORTHO=PLEASE CALL PT ASAP TO ADVISE IF REF IS SENT    What specialty or service is being requested: ORTHO    What is the provider, practice or medical service name:  [facility]    What is the office location: [facility]    What is the office phone number: N/A

## 2024-09-14 ENCOUNTER — HOSPITAL ENCOUNTER (OUTPATIENT)
Dept: GENERAL RADIOLOGY | Age: 44
End: 2024-09-14
Payer: COMMERCIAL

## 2024-09-14 DIAGNOSIS — M79.671 PAIN IN RIGHT FOOT: ICD-10-CM

## 2024-09-14 DIAGNOSIS — M79.661 PAIN OF RIGHT LOWER LEG: ICD-10-CM

## 2024-09-14 PROCEDURE — 73610 X-RAY EXAM OF ANKLE: CPT

## 2024-09-14 PROCEDURE — 73630 X-RAY EXAM OF FOOT: CPT

## 2024-09-15 ENCOUNTER — APPOINTMENT (OUTPATIENT)
Dept: GENERAL RADIOLOGY | Facility: HOSPITAL | Age: 44
End: 2024-09-15
Payer: COMMERCIAL

## 2024-09-15 ENCOUNTER — NURSE TRIAGE (OUTPATIENT)
Dept: CALL CENTER | Facility: HOSPITAL | Age: 44
End: 2024-09-15
Payer: COMMERCIAL

## 2024-09-15 PROCEDURE — 73610 X-RAY EXAM OF ANKLE: CPT

## 2024-09-15 PROCEDURE — 73630 X-RAY EXAM OF FOOT: CPT

## 2024-09-15 NOTE — TELEPHONE ENCOUNTER
"Back in June had bypass surgery, 09/06 Dr. Trejo released me to go back to work, I have had gout , 7 times since surgery, I have meds to take for it,  I have been having trouble with swelling in feet, on lasix, after first week of work, feet throbbing, Friday was seen , got compression socks, right foot is better, left foot is really hurting,  and so swollen, cannot wear work shoes, hard to walk , can barely bend toes on left foot, triage done. Told to be seen in UC at least or ER today.   Reason for Disposition   [1] Swollen foot AND [2] no fever  (Exceptions: localized bump from bunions, calluses, insect bite, sting)    Additional Information   Negative: Followed a foot injury   Negative: Diabetes mellitus   Negative: Toe pain is main symptom   Negative: Ankle pain is main symptom   Negative: Thigh or calf pain is main symptom   Negative: Entire foot is cool or blue in comparison to other foot   Negative: Purple or black skin on foot or toe   Negative: [1] Red area or streak AND [2] fever   Negative: [1] Swollen foot AND [2] fever   Negative: Patient sounds very sick or weak to the triager   Negative: [1] SEVERE pain (e.g., excruciating, unable to do any normal activities) AND [2] not improved after 2 hours of pain medicine   Negative: [1] Looks infected (spreading redness, pus) AND [2] large red area (> 2 in. or 5 cm)   Negative: Looks like a boil, infected sore, or deep ulcer   Negative: [1] Redness of the skin AND [2] no fever   Negative: Weakness (i.e., loss of strength) of new-onset in foot or toes  (Exceptions: not truly weak, foot feels weak because of pain; weakness present > 2 weeks)   Negative: Numbness (i.e., loss of sensation) in foot or toes  (Exception: Just tingling; numbness present > 2 weeks.)   Negative: [1] MODERATE pain (e.g., interferes with normal activities, limping) AND [2] present > 3 days    Answer Assessment - Initial Assessment Questions  1. ONSET: \"When did the pain start?\"      This " "week got worse  2. LOCATION: \"Where is the pain located?\"       Both feet leftfoot worse  3. PAIN: \"How bad is the pain?\"    (Scale 1-10; or mild, moderate, severe)   - MILD (1-3): doesn't interfere with normal activities.    - MODERATE (4-7): interferes with normal activities (e.g., work or school) or awakens from sleep, limping.    - SEVERE (8-10): excruciating pain, unable to do any normal activities, unable to walk.       Pain rated 6  4. WORK OR EXERCISE: \"Has there been any recent work or exercise that involved this part of the body?\"       Working on feet all week  5. CAUSE: \"What do you think is causing the foot pain?\"      Work related  6. OTHER SYMPTOMS: \"Do you have any other symptoms?\" (e.g., leg pain, rash, fever, numbness)      Feet pain  7. PREGNANCY: \"Is there any chance you are pregnant?\" \"When was your last menstrual period?\"      no    Protocols used: Foot Pain-ADULT-AH    "

## 2024-09-16 ENCOUNTER — TELEPHONE (OUTPATIENT)
Dept: CARDIAC SURGERY | Facility: CLINIC | Age: 44
End: 2024-09-16
Payer: COMMERCIAL

## 2024-09-17 ENCOUNTER — HOSPITAL ENCOUNTER (EMERGENCY)
Facility: HOSPITAL | Age: 44
Discharge: HOME | End: 2024-09-18
Attending: EMERGENCY MEDICINE | Admitting: EMERGENCY MEDICINE
Payer: COMMERCIAL

## 2024-09-17 ENCOUNTER — NURSE TRIAGE (OUTPATIENT)
Dept: CALL CENTER | Facility: HOSPITAL | Age: 44
End: 2024-09-17
Payer: COMMERCIAL

## 2024-09-17 DIAGNOSIS — M10.172 LEAD-INDUCED ACUTE GOUT OF LEFT ANKLE, INITIAL ENCOUNTER: Primary | ICD-10-CM

## 2024-09-17 DIAGNOSIS — T56.0X1A LEAD-INDUCED ACUTE GOUT OF LEFT ANKLE, INITIAL ENCOUNTER: Primary | ICD-10-CM

## 2024-09-17 LAB
ALBUMIN SERPL-MCNC: 3.7 G/DL (ref 3.5–5.2)
ALBUMIN/GLOB SERPL: 1 G/DL
ALP SERPL-CCNC: 94 U/L (ref 39–117)
ALT SERPL W P-5'-P-CCNC: 36 U/L (ref 1–41)
ANION GAP SERPL CALCULATED.3IONS-SCNC: 10 MMOL/L (ref 5–15)
APTT PPP: 32.9 SECONDS (ref 24.5–36)
AST SERPL-CCNC: 24 U/L (ref 1–40)
BASOPHILS # BLD AUTO: 0.05 10*3/MM3 (ref 0–0.2)
BASOPHILS NFR BLD AUTO: 0.5 % (ref 0–1.5)
BILIRUB SERPL-MCNC: 0.3 MG/DL (ref 0–1.2)
BUN SERPL-MCNC: 16 MG/DL (ref 6–20)
BUN/CREAT SERPL: 16.5 (ref 7–25)
CALCIUM SPEC-SCNC: 9 MG/DL (ref 8.6–10.5)
CHLORIDE SERPL-SCNC: 100 MMOL/L (ref 98–107)
CO2 SERPL-SCNC: 28 MMOL/L (ref 22–29)
CREAT SERPL-MCNC: 0.97 MG/DL (ref 0.76–1.27)
CRP SERPL-MCNC: 9.71 MG/DL (ref 0–0.5)
DEPRECATED RDW RBC AUTO: 42.8 FL (ref 37–54)
EGFRCR SERPLBLD CKD-EPI 2021: 99.3 ML/MIN/1.73
EOSINOPHIL # BLD AUTO: 0.11 10*3/MM3 (ref 0–0.4)
EOSINOPHIL NFR BLD AUTO: 1.1 % (ref 0.3–6.2)
ERYTHROCYTE [DISTWIDTH] IN BLOOD BY AUTOMATED COUNT: 13.9 % (ref 12.3–15.4)
ERYTHROCYTE [SEDIMENTATION RATE] IN BLOOD: 56 MM/HR (ref 0–15)
GLOBULIN UR ELPH-MCNC: 3.6 GM/DL
GLUCOSE SERPL-MCNC: 131 MG/DL (ref 65–99)
HCT VFR BLD AUTO: 42.8 % (ref 37.5–51)
HGB BLD-MCNC: 13.4 G/DL (ref 13–17.7)
IMM GRANULOCYTES # BLD AUTO: 0.03 10*3/MM3 (ref 0–0.05)
IMM GRANULOCYTES NFR BLD AUTO: 0.3 % (ref 0–0.5)
INR PPP: 0.98 (ref 0.91–1.09)
LYMPHOCYTES # BLD AUTO: 1.71 10*3/MM3 (ref 0.7–3.1)
LYMPHOCYTES NFR BLD AUTO: 17.7 % (ref 19.6–45.3)
MCH RBC QN AUTO: 26.2 PG (ref 26.6–33)
MCHC RBC AUTO-ENTMCNC: 31.3 G/DL (ref 31.5–35.7)
MCV RBC AUTO: 83.6 FL (ref 79–97)
MONOCYTES # BLD AUTO: 1.07 10*3/MM3 (ref 0.1–0.9)
MONOCYTES NFR BLD AUTO: 11.1 % (ref 5–12)
NEUTROPHILS NFR BLD AUTO: 6.68 10*3/MM3 (ref 1.7–7)
NEUTROPHILS NFR BLD AUTO: 69.3 % (ref 42.7–76)
NRBC BLD AUTO-RTO: 0 /100 WBC (ref 0–0.2)
NT-PROBNP SERPL-MCNC: 184.8 PG/ML (ref 0–450)
PLATELET # BLD AUTO: 337 10*3/MM3 (ref 140–450)
PMV BLD AUTO: 9.1 FL (ref 6–12)
POTASSIUM SERPL-SCNC: 4.4 MMOL/L (ref 3.5–5.2)
PROT SERPL-MCNC: 7.3 G/DL (ref 6–8.5)
PROTHROMBIN TIME: 13.4 SECONDS (ref 11.8–14.8)
RBC # BLD AUTO: 5.12 10*6/MM3 (ref 4.14–5.8)
SODIUM SERPL-SCNC: 138 MMOL/L (ref 136–145)
WBC NRBC COR # BLD AUTO: 9.65 10*3/MM3 (ref 3.4–10.8)

## 2024-09-17 PROCEDURE — 83880 ASSAY OF NATRIURETIC PEPTIDE: CPT | Performed by: EMERGENCY MEDICINE

## 2024-09-17 PROCEDURE — 85610 PROTHROMBIN TIME: CPT | Performed by: EMERGENCY MEDICINE

## 2024-09-17 PROCEDURE — 85652 RBC SED RATE AUTOMATED: CPT | Performed by: EMERGENCY MEDICINE

## 2024-09-17 PROCEDURE — 85730 THROMBOPLASTIN TIME PARTIAL: CPT | Performed by: EMERGENCY MEDICINE

## 2024-09-17 PROCEDURE — 84145 PROCALCITONIN (PCT): CPT | Performed by: EMERGENCY MEDICINE

## 2024-09-17 PROCEDURE — 80053 COMPREHEN METABOLIC PANEL: CPT | Performed by: EMERGENCY MEDICINE

## 2024-09-17 PROCEDURE — 99283 EMERGENCY DEPT VISIT LOW MDM: CPT

## 2024-09-17 PROCEDURE — 85025 COMPLETE CBC W/AUTO DIFF WBC: CPT | Performed by: EMERGENCY MEDICINE

## 2024-09-17 PROCEDURE — 86140 C-REACTIVE PROTEIN: CPT | Performed by: EMERGENCY MEDICINE

## 2024-09-17 RX ORDER — METOPROLOL TARTRATE 25 MG/1
25 TABLET, FILM COATED ORAL 2 TIMES DAILY
COMMUNITY
End: 2024-09-24

## 2024-09-17 RX ORDER — KETOROLAC TROMETHAMINE 15 MG/ML
15 INJECTION, SOLUTION INTRAMUSCULAR; INTRAVENOUS ONCE
Status: COMPLETED | OUTPATIENT
Start: 2024-09-18 | End: 2024-09-18

## 2024-09-17 RX ORDER — OXYCODONE AND ACETAMINOPHEN 10; 325 MG/1; MG/1
1 TABLET ORAL ONCE
Status: COMPLETED | OUTPATIENT
Start: 2024-09-18 | End: 2024-09-18

## 2024-09-17 RX ORDER — SODIUM CHLORIDE 0.9 % (FLUSH) 0.9 %
10 SYRINGE (ML) INJECTION AS NEEDED
Status: DISCONTINUED | OUTPATIENT
Start: 2024-09-17 | End: 2024-09-18 | Stop reason: HOSPADM

## 2024-09-18 ENCOUNTER — NURSE TRIAGE (OUTPATIENT)
Dept: CALL CENTER | Facility: HOSPITAL | Age: 44
End: 2024-09-18
Payer: COMMERCIAL

## 2024-09-18 VITALS
SYSTOLIC BLOOD PRESSURE: 133 MMHG | HEIGHT: 77 IN | DIASTOLIC BLOOD PRESSURE: 79 MMHG | OXYGEN SATURATION: 100 % | TEMPERATURE: 97.8 F | WEIGHT: 315 LBS | BODY MASS INDEX: 37.19 KG/M2 | HEART RATE: 78 BPM | RESPIRATION RATE: 16 BRPM

## 2024-09-18 LAB — PROCALCITONIN SERPL-MCNC: 0.05 NG/ML (ref 0–0.25)

## 2024-09-18 PROCEDURE — 25010000002 KETOROLAC TROMETHAMINE PER 15 MG: Performed by: EMERGENCY MEDICINE

## 2024-09-18 PROCEDURE — 96374 THER/PROPH/DIAG INJ IV PUSH: CPT

## 2024-09-18 RX ORDER — COLCHICINE 0.6 MG/1
0.6 TABLET ORAL 2 TIMES DAILY
Qty: 6 TABLET | Refills: 0 | Status: SHIPPED | OUTPATIENT
Start: 2024-09-18

## 2024-09-18 RX ADMIN — OXYCODONE AND ACETAMINOPHEN 1 TABLET: 10; 325 TABLET ORAL at 00:00

## 2024-09-18 RX ADMIN — KETOROLAC TROMETHAMINE 15 MG: 15 INJECTION, SOLUTION INTRAMUSCULAR; INTRAVENOUS at 00:01

## 2024-09-24 ENCOUNTER — TELEPHONE (OUTPATIENT)
Dept: CARDIOLOGY | Facility: CLINIC | Age: 44
End: 2024-09-24
Payer: COMMERCIAL

## 2024-10-08 ENCOUNTER — OFFICE VISIT (OUTPATIENT)
Age: 44
End: 2024-10-08
Payer: COMMERCIAL

## 2024-10-08 VITALS — WEIGHT: 315 LBS | HEIGHT: 77 IN | BODY MASS INDEX: 37.19 KG/M2

## 2024-10-08 DIAGNOSIS — M1A.0720 BILATERAL IDIOPATHIC CHRONIC GOUT OF FOOT: Primary | ICD-10-CM

## 2024-10-08 DIAGNOSIS — M1A.0710 BILATERAL IDIOPATHIC CHRONIC GOUT OF FOOT: Primary | ICD-10-CM

## 2024-10-08 PROCEDURE — 99203 OFFICE O/P NEW LOW 30 MIN: CPT | Performed by: NURSE PRACTITIONER

## 2024-10-08 RX ORDER — COLCHICINE 0.6 MG/1
0.6 TABLET ORAL 2 TIMES DAILY
COMMUNITY
Start: 2024-09-18

## 2024-10-08 RX ORDER — ALLOPURINOL 300 MG/1
1 TABLET ORAL DAILY
COMMUNITY
Start: 2024-02-28

## 2024-10-08 RX ORDER — FUROSEMIDE 20 MG
20 TABLET ORAL DAILY
COMMUNITY

## 2024-10-08 RX ORDER — ASPIRIN 81 MG/1
1 TABLET ORAL DAILY
COMMUNITY
Start: 2024-06-12

## 2024-10-08 RX ORDER — LISINOPRIL 2.5 MG/1
2.5 TABLET ORAL DAILY
COMMUNITY
Start: 2024-09-12

## 2024-10-08 RX ORDER — FEBUXOSTAT 40 MG/1
40 TABLET, FILM COATED ORAL DAILY
Qty: 30 TABLET | Refills: 0 | Status: SHIPPED | OUTPATIENT
Start: 2024-10-08 | End: 2024-11-07

## 2024-10-08 RX ORDER — FEBUXOSTAT 40 MG/1
40 TABLET, FILM COATED ORAL DAILY
Qty: 30 TABLET | Refills: 2 | Status: SHIPPED | OUTPATIENT
Start: 2024-10-08 | End: 2024-10-08 | Stop reason: CLARIF

## 2024-10-08 RX ORDER — SEMAGLUTIDE 0.68 MG/ML
INJECTION, SOLUTION SUBCUTANEOUS
COMMUNITY
Start: 2024-09-20

## 2024-10-08 RX ORDER — CLOPIDOGREL BISULFATE 75 MG/1
75 TABLET ORAL DAILY
COMMUNITY

## 2024-10-08 NOTE — PROGRESS NOTES
[facility]  [facility]  [address]  [facility] [address]  [phone]     Patient: John Ozuna   YOB: 1980   Date: 10/8/2024   Visit Type:      History of Present Illness  Chief Complaint   Patient presents with    Foot Pain     Consult for bilateral foot pain.       This is a 44 y.o. male presents today complaining of bilateral lower extremity pain.  He relates a history of chronic gout to both feet.  He states he has been on allopurinol 300 mg for many years.  He states that he has had approximately 8 gout flare since Jessica the 13th.  He states that he recently had a myocardial infarction over the summer.  He states that he is no longer able to take anti-inflammatory medications.  He states that his feet and ankles during a gout flare will become so painful he is not able to attend work and unable to do his normal daily activities.  He states that its even hard to get to the bathroom at times.  He has had a recent uric acid level that did show elevation.  He is wondering what his treatment options are for gout.  The allopurinol does not seem to be keeping him from having flareups.  He states that he does take colchicine during flareups as well.  His primary care provider sent him here for evaluation.  He denies any complaints or flareup currently of the feet and ankles.    Past Medical History:   Diagnosis Date    Heart disease     MI (myocardial infarction) (HCC)       Past Surgical History:   Procedure Laterality Date    BICEPS TENDON REPAIR Right     ELBOW SURGERY      EYE SURGERY        Social History     Socioeconomic History    Marital status:      Spouse name: None    Number of children: None    Years of education: None    Highest education level: None   Tobacco Use    Smoking status: Never    Smokeless tobacco: Former     Social Determinants of Health      Received from [facility]

## 2024-10-11 ENCOUNTER — TELEPHONE (OUTPATIENT)
Dept: CARDIOLOGY | Facility: CLINIC | Age: 44
End: 2024-10-11
Payer: COMMERCIAL

## 2024-10-11 NOTE — TELEPHONE ENCOUNTER
Patient was recently switched from Allopurinol to Uloric, he is wanting to know if that is OK from a cardiac standpoint?

## 2024-10-14 ENCOUNTER — OFFICE VISIT (OUTPATIENT)
Dept: CARDIOLOGY | Facility: CLINIC | Age: 44
End: 2024-10-14
Payer: COMMERCIAL

## 2024-10-14 VITALS
DIASTOLIC BLOOD PRESSURE: 86 MMHG | WEIGHT: 315 LBS | HEIGHT: 77 IN | SYSTOLIC BLOOD PRESSURE: 138 MMHG | BODY MASS INDEX: 37.19 KG/M2 | OXYGEN SATURATION: 99 % | HEART RATE: 88 BPM

## 2024-10-14 DIAGNOSIS — E78.2 MIXED HYPERLIPIDEMIA: ICD-10-CM

## 2024-10-14 DIAGNOSIS — I25.10 CORONARY ARTERY DISEASE INVOLVING NATIVE CORONARY ARTERY OF NATIVE HEART WITHOUT ANGINA PECTORIS: Primary | ICD-10-CM

## 2024-10-14 DIAGNOSIS — I10 PRIMARY HYPERTENSION: Chronic | ICD-10-CM

## 2024-10-14 DIAGNOSIS — E66.813 CLASS 3 SEVERE OBESITY DUE TO EXCESS CALORIES WITH SERIOUS COMORBIDITY AND BODY MASS INDEX (BMI) OF 50.0 TO 59.9 IN ADULT: Chronic | ICD-10-CM

## 2024-10-14 DIAGNOSIS — E66.01 CLASS 3 SEVERE OBESITY DUE TO EXCESS CALORIES WITH SERIOUS COMORBIDITY AND BODY MASS INDEX (BMI) OF 50.0 TO 59.9 IN ADULT: Chronic | ICD-10-CM

## 2024-10-14 PROBLEM — I21.4 NSTEMI (NON-ST ELEVATED MYOCARDIAL INFARCTION): Status: RESOLVED | Noted: 2024-05-31 | Resolved: 2024-10-14

## 2024-10-14 PROCEDURE — 99214 OFFICE O/P EST MOD 30 MIN: CPT | Performed by: INTERNAL MEDICINE

## 2024-10-14 PROCEDURE — 93000 ELECTROCARDIOGRAM COMPLETE: CPT | Performed by: INTERNAL MEDICINE

## 2024-10-14 RX ORDER — FEBUXOSTAT 40 MG/1
1 TABLET, FILM COATED ORAL DAILY
COMMUNITY
Start: 2024-10-08 | End: 2024-11-08

## 2024-10-14 RX ORDER — ROSUVASTATIN CALCIUM 20 MG/1
20 TABLET, COATED ORAL DAILY
Qty: 30 TABLET | Refills: 11 | Status: SHIPPED | OUTPATIENT
Start: 2024-10-14

## 2024-10-14 RX ORDER — SEMAGLUTIDE 0.68 MG/ML
INJECTION, SOLUTION SUBCUTANEOUS
COMMUNITY
Start: 2024-09-20

## 2024-10-14 NOTE — PROGRESS NOTES
Reason for Visit: Rapid heartbeat.    HPI:  Constantin Seals is a 44 y.o. male is here today for follow-up due to rapid heartbeat.  He notes his heart rate was 107 bpm after walking from his car to his PCP office and was in a lot of pain.  He has no longer taking metoprolol which was on his medication list last visit.  This was stopped due to concerns about metoprolol increasing uric acid levels.  He was down to 429 pounds last visit in July but is subsequently gained weight.  He is also having issues with gout.  The pain from the gout has been severe and limited his quality of life.  He is also considering going to pain management.      Previous Cardiac Testing and Procedures:  -LHC (5/31/2024) severe multivessel coronary artery disease   -Echo (6/1/2024) EF 61 to 65%, mild LVH, normal diastolic function, normal RV size and function, no significant valve dysfunction  -4-vessel CABG (6/6/2024) LIMA to LAD, SVG to distal RCA, SVG to OM, SVG to diagonal     Lab data:  -Lipid panel (5/31/2024) total cholesterol 176, HDL 38, , triglycerides 89  -BMP (7/13/2024) creatinine 1.07, potassium 4.6, sodium 136  -proBNP (9/17/2024) 185, normal 0-450    Patient Active Problem List   Diagnosis    Class 3 severe obesity due to excess calories with serious comorbidity and body mass index (BMI) of 50.0 to 59.9 in adult    Primary hypertension    Gastroesophageal reflux disease without esophagitis    SHERRON (obstructive sleep apnea)    Coronary artery disease involving native coronary artery of native heart without angina pectoris    Gross hematuria    Myoglobinuria    Wound of sternal region    Mixed hyperlipidemia    S/P CABG x 4       Social History     Tobacco Use    Smoking status: Never     Passive exposure: Past    Smokeless tobacco: Former     Types: Snuff, Chew     Quit date: 2007   Vaping Use    Vaping status: Never Used   Substance Use Topics    Alcohol use: No    Drug use: Never       Family History   Problem  "Relation Age of Onset    Diabetes Father     Hypertension Father     Heart disease Father        The following portions of the patient's history were reviewed and updated as appropriate: allergies, current medications, past family history, past medical history, past social history, past surgical history, and problem list.      Current Outpatient Medications:     aspirin 81 MG EC tablet, Take 1 tablet by mouth Daily., Disp: 30 tablet, Rfl: 2    clopidogrel (PLAVIX) 75 MG tablet, Take 1 tablet by mouth Daily., Disp: 90 tablet, Rfl: 3    colchicine 0.6 MG tablet, Take 1 tablet by mouth 2 (Two) Times a Day., Disp: 6 tablet, Rfl: 0    febuxostat (ULORIC) 40 MG tablet, Take 1 tablet by mouth Daily., Disp: , Rfl:     furosemide (LASIX) 20 MG tablet, Take 1 tablet by mouth Daily., Disp: , Rfl:     lisinopril (PRINIVIL,ZESTRIL) 2.5 MG tablet, Take 1 tablet by mouth Daily., Disp: 90 tablet, Rfl: 3    Ozempic, 0.25 or 0.5 MG/DOSE, 2 MG/3ML solution pen-injector, INJECT 0.25MG ONCE WEEKLY FOR 4 WEEKS, THEN INCREASE TO 0.5MG ONCE WEEKLY, Disp: , Rfl:     rosuvastatin (CRESTOR) 20 MG tablet, Take 1 tablet by mouth Daily., Disp: 30 tablet, Rfl: 11    Review of Systems   Constitutional: Negative for chills and fever.   Cardiovascular:  Negative for chest pain and paroxysmal nocturnal dyspnea.   Respiratory:  Negative for cough and shortness of breath.    Skin:  Negative for rash.   Gastrointestinal:  Negative for abdominal pain and heartburn.   Neurological:  Negative for dizziness and numbness.       Objective   /86 (BP Location: Left arm, Patient Position: Sitting, Cuff Size: Large Adult)   Pulse 88   Ht 195.6 cm (77.01\")   Wt (!) 207 kg (456 lb)   SpO2 99%   BMI 54.06 kg/m²   Constitutional:       Appearance: Well-developed.   HENT:      Head: Normocephalic and atraumatic.   Pulmonary:      Effort: Pulmonary effort is normal.      Breath sounds: Normal breath sounds.   Cardiovascular:      Normal rate. Regular rhythm. "   Edema:     Peripheral edema absent.   Skin:     General: Skin is warm and dry.   Neurological:      Mental Status: Alert and oriented to person, place, and time.         ECG 12 Lead    Date/Time: 10/14/2024 2:15 PM  Performed by: Chip Roberts MD    Authorized by: Chip Roberts MD  Comparison: compared with previous ECG from 6/7/2024  Comparison to previous ECG: Lateral ST elevation is no longer present  Rhythm: sinus rhythm  Rate: normal    Clinical impression: normal ECG            ICD-10-CM ICD-9-CM   1. Coronary artery disease involving native coronary artery of native heart without angina pectoris  I25.10 414.01   2. Primary hypertension  I10 401.9   3. Mixed hyperlipidemia  E78.2 272.2   4. Class 3 severe obesity due to excess calories with serious comorbidity and body mass index (BMI) of 50.0 to 59.9 in adult  E66.813 278.01    Z68.43 V85.43    E66.01          Assessment/Plan:  1.  Coronary artery disease: Non-STEMI s/p 4 vessel CABG with Dr. Trejo on 6/6/2024.  No current cardiac symptoms.  Continue aspirin and Plavix.  Start rosuvastatin.  Metoprolol previously stopped due to concerns of this raising uric acid levels.     2.  Hypertension: Possibly has a component of whitecoat hypertension his blood pressure is usually better controlled at home.  Continue lisinopril and monitor.       3.  Mixed hyperlipidemia: Atorvastatin stopped due to concerns about side effects.  Start rosuvastatin and order repeat lipid panel to be done prior to follow-up.       4.  Morbid obesity: Body mass index is 54.06 kg/m².  He was losing weight but subsequently gained weight.  He is now on Ozempic.  May need to consider referral to bariatric surgery if he is unsuccessful at weight loss with this.

## 2024-10-14 NOTE — LETTER
October 14, 2024     KURT Fraga  [address] [facility] [address]    Patient: Constantin Seals   YOB: 1980   Date of Visit: 10/14/2024       Dear KURT Fraga    Constantin Seals was in my office today. Below is a copy of my note.    If you have questions, please do not hesitate to call me. I look forward to following Constantin along with you.         Sincerely,        Chip Roberts MD        CC: No Recipients      Reason for Visit: Rapid heartbeat.    HPI:  Constantin Seals is a 44 y.o. male is here today for follow-up due to rapid heartbeat.  He notes his heart rate was 107 bpm after walking from his car to his PCP office and was in a lot of pain.  He has no longer taking metoprolol which was on his medication list last visit.  This was stopped due to concerns about metoprolol increasing uric acid levels.  He was down to 429 pounds last visit in July but is subsequently gained weight.  He is also having issues with gout.  The pain from the gout has been severe and limited his quality of life.  He is also considering going to pain management.      Previous Cardiac Testing and Procedures:  -LHC (5/31/2024) severe multivessel coronary artery disease   -Echo (6/1/2024) EF 61 to 65%, mild LVH, normal diastolic function, normal RV size and function, no significant valve dysfunction  -4-vessel CABG (6/6/2024) LIMA to LAD, SVG to distal RCA, SVG to OM, SVG to diagonal     Lab data:  -Lipid panel (5/31/2024) total cholesterol 176, HDL 38, , triglycerides 89  -BMP (7/13/2024) creatinine 1.07, potassium 4.6, sodium 136  -proBNP (9/17/2024) 185, normal 0-450    Patient Active Problem List   Diagnosis   • Class 3 severe obesity due to excess calories with serious comorbidity and body mass index (BMI) of 50.0 to 59.9 in adult   • Primary hypertension   • Gastroesophageal reflux disease without esophagitis   • SHERRON (obstructive sleep apnea)   • Coronary artery disease involving native  coronary artery of native heart without angina pectoris   • Gross hematuria   • Myoglobinuria   • Wound of sternal region   • Mixed hyperlipidemia   • S/P CABG x 4       Social History     Tobacco Use   • Smoking status: Never     Passive exposure: Past   • Smokeless tobacco: Former     Types: Snuff, Chew     Quit date: 2007   Vaping Use   • Vaping status: Never Used   Substance Use Topics   • Alcohol use: No   • Drug use: Never       Family History   Problem Relation Age of Onset   • Diabetes Father    • Hypertension Father    • Heart disease Father        The following portions of the patient's history were reviewed and updated as appropriate: allergies, current medications, past family history, past medical history, past social history, past surgical history, and problem list.      Current Outpatient Medications:   •  aspirin 81 MG EC tablet, Take 1 tablet by mouth Daily., Disp: 30 tablet, Rfl: 2  •  clopidogrel (PLAVIX) 75 MG tablet, Take 1 tablet by mouth Daily., Disp: 90 tablet, Rfl: 3  •  colchicine 0.6 MG tablet, Take 1 tablet by mouth 2 (Two) Times a Day., Disp: 6 tablet, Rfl: 0  •  febuxostat (ULORIC) 40 MG tablet, Take 1 tablet by mouth Daily., Disp: , Rfl:   •  furosemide (LASIX) 20 MG tablet, Take 1 tablet by mouth Daily., Disp: , Rfl:   •  lisinopril (PRINIVIL,ZESTRIL) 2.5 MG tablet, Take 1 tablet by mouth Daily., Disp: 90 tablet, Rfl: 3  •  Ozempic, 0.25 or 0.5 MG/DOSE, 2 MG/3ML solution pen-injector, INJECT 0.25MG ONCE WEEKLY FOR 4 WEEKS, THEN INCREASE TO 0.5MG ONCE WEEKLY, Disp: , Rfl:   •  rosuvastatin (CRESTOR) 20 MG tablet, Take 1 tablet by mouth Daily., Disp: 30 tablet, Rfl: 11    Review of Systems   Constitutional: Negative for chills and fever.   Cardiovascular:  Negative for chest pain and paroxysmal nocturnal dyspnea.   Respiratory:  Negative for cough and shortness of breath.    Skin:  Negative for rash.   Gastrointestinal:  Negative for abdominal pain and heartburn.   Neurological:   "Negative for dizziness and numbness.       Objective  /86 (BP Location: Left arm, Patient Position: Sitting, Cuff Size: Large Adult)   Pulse 88   Ht 195.6 cm (77.01\")   Wt (!) 207 kg (456 lb)   SpO2 99%   BMI 54.06 kg/m²   Constitutional:       Appearance: Well-developed.   HENT:      Head: Normocephalic and atraumatic.   Pulmonary:      Effort: Pulmonary effort is normal.      Breath sounds: Normal breath sounds.   Cardiovascular:      Normal rate. Regular rhythm.   Edema:     Peripheral edema absent.   Skin:     General: Skin is warm and dry.   Neurological:      Mental Status: Alert and oriented to person, place, and time.         ECG 12 Lead    Date/Time: 10/14/2024 2:15 PM  Performed by: Chip Roberts MD    Authorized by: Chip Roberts MD  Comparison: compared with previous ECG from 6/7/2024  Comparison to previous ECG: Lateral ST elevation is no longer present  Rhythm: sinus rhythm  Rate: normal    Clinical impression: normal ECG            ICD-10-CM ICD-9-CM   1. Coronary artery disease involving native coronary artery of native heart without angina pectoris  I25.10 414.01   2. Primary hypertension  I10 401.9   3. Mixed hyperlipidemia  E78.2 272.2   4. Class 3 severe obesity due to excess calories with serious comorbidity and body mass index (BMI) of 50.0 to 59.9 in adult  E66.813 278.01    Z68.43 V85.43    E66.01          Assessment/Plan:  1.  Coronary artery disease: Non-STEMI s/p 4 vessel CABG with Dr. Trejo on 6/6/2024.  No current cardiac symptoms.  Continue aspirin and Plavix.  Start rosuvastatin.  Metoprolol previously stopped due to concerns of this raising uric acid levels.     2.  Hypertension: Possibly has a component of whitecoat hypertension his blood pressure is usually better controlled at home.  Continue lisinopril and monitor.       3.  Mixed hyperlipidemia: Atorvastatin stopped due to concerns about side effects.  Start rosuvastatin and order repeat lipid panel to be done " prior to follow-up.       4.  Morbid obesity: Body mass index is 54.06 kg/m².  He was losing weight but subsequently gained weight.  He is now on Ozempic.  May need to consider referral to bariatric surgery if he is unsuccessful at weight loss with this.

## 2024-10-16 ENCOUNTER — OFFICE VISIT (OUTPATIENT)
Dept: CARDIAC SURGERY | Facility: CLINIC | Age: 44
End: 2024-10-16
Payer: COMMERCIAL

## 2024-10-16 VITALS
BODY MASS INDEX: 37.19 KG/M2 | HEART RATE: 89 BPM | OXYGEN SATURATION: 96 % | WEIGHT: 315 LBS | HEIGHT: 77 IN | SYSTOLIC BLOOD PRESSURE: 135 MMHG | DIASTOLIC BLOOD PRESSURE: 87 MMHG

## 2024-10-16 DIAGNOSIS — E66.01 CLASS 3 SEVERE OBESITY DUE TO EXCESS CALORIES WITH SERIOUS COMORBIDITY AND BODY MASS INDEX (BMI) OF 50.0 TO 59.9 IN ADULT: Chronic | ICD-10-CM

## 2024-10-16 DIAGNOSIS — S21.109A WOUND OF STERNAL REGION: Primary | ICD-10-CM

## 2024-10-16 DIAGNOSIS — Z95.1 S/P CABG X 4: ICD-10-CM

## 2024-10-16 DIAGNOSIS — I25.10 CORONARY ARTERY DISEASE INVOLVING NATIVE CORONARY ARTERY OF NATIVE HEART WITHOUT ANGINA PECTORIS: ICD-10-CM

## 2024-10-16 DIAGNOSIS — E66.813 CLASS 3 SEVERE OBESITY DUE TO EXCESS CALORIES WITH SERIOUS COMORBIDITY AND BODY MASS INDEX (BMI) OF 50.0 TO 59.9 IN ADULT: Chronic | ICD-10-CM

## 2024-10-16 PROCEDURE — 99213 OFFICE O/P EST LOW 20 MIN: CPT | Performed by: NURSE PRACTITIONER

## 2024-10-16 NOTE — PROGRESS NOTES
"Subjective   Chief Complaint   Patient presents with    Wound Check     Patient had CABG x 4 on 6/16. Pt is here for wound check.     Patient ID: Constantin Seals is a 44 y.o. male who is here for follow-up having had Coronary artery bypass grafting-4 vessel (left internal mammary artery/left anterior descending, reverse saphenous vein graft/distal right coronary artery, reverse saphenous vein graft/obtuse marginal, and reverse saphenous vein graft/diagonal artery), right endoscopic vein harvest of leg proper and limited distal thigh harvest, sternal lock XP sternal plating, application of Prevena incision management by Dr. Trejo on 6/6/2024.      Wound Check    Mr. Seals returns today for follow-up of sternal wound.  Indicates sternal wound is completely healed.  He is in the process of growing his beard back out as he is a  and will occasionally get burn marks on his chest.  Last Tuesday, he did burn himself but it is healing.  He was seen by Dr. Roberts on Monday.  Okay to take NSAIDs as needed for gout management.  He took a dose of diclofenac and is now able to walk and wear his work boots.  He is planning on restarting cardiac rehab now that his gout symptoms have improved.    The following portions of the patient's history were reviewed and updated as appropriate: allergies, current medications, past family history, past medical history, past social history, past surgical history and problem list.    Review of Systems   Constitutional:  Negative for chills, diaphoresis and fever.   Respiratory:  Negative for shortness of breath and wheezing.    Cardiovascular:  Negative for chest pain and leg swelling.       Objective   Visit Vitals  /87   Pulse 89   Ht 195.6 cm (77.01\")   Wt (!) 209 kg (460 lb)   SpO2 96%   BMI 54.53 kg/m²         Physical Exam  Vitals reviewed.   Constitutional:       General: He is not in acute distress.     Appearance: He is well-developed. He is obese. He is not " diaphoretic.   HENT:      Head: Normocephalic and atraumatic.   Eyes:      Pupils: Pupils are equal, round, and reactive to light.   Cardiovascular:      Rate and Rhythm: Normal rate and regular rhythm.      Heart sounds: Normal heart sounds. No murmur heard.     No friction rub.   Pulmonary:      Effort: Pulmonary effort is normal. No respiratory distress.      Breath sounds: Normal breath sounds. No wheezing or rales.   Abdominal:      General: There is no distension.      Palpations: Abdomen is soft.      Tenderness: There is no abdominal tenderness. There is no guarding.   Musculoskeletal:      Cervical back: Normal range of motion and neck supple.      Right lower leg: No edema.      Left lower leg: No edema.   Skin:     General: Skin is warm and dry.      Coloration: Skin is not pale.      Findings: No rash.      Comments: Sternal site with scar.  There is a tiny open area with scab approximately 1 to 2 mm near the sternal incision from a previous burn.  No evidence of infection.  Sternum stable.  No clicks. Saphenectomy site clean, dry, and intact.   Neurological:      Mental Status: He is alert and oriented to person, place, and time.   Psychiatric:         Behavior: Behavior normal.           Assessment & Plan         Diagnoses and all orders for this visit:    1. Wound of sternal region (Primary)    2. S/P CABG x 4    3. Coronary artery disease involving native coronary artery of native heart without angina pectoris    4. Class 3 severe obesity due to excess calories with serious comorbidity and body mass index (BMI) of 50.0 to 59.9 in adult             Overall, Constantin Seals is doing well from cardiac surgery standpoint.  Sternal wound has resolved and healed, he does have a tiny healing area from a previous burn near his sternal incision.  Offered return to clinic visit but at this point, I think he is safe to call us if wound/burn change and he needs to be seen.  Okay to continue cardiac rehab as  previously discussed.  Continue to advance restrictions as previously discussed.  He has returned to work.  Continue to follow with cardiology for hypertension, history of non-STEMI, and CAD.  Defer management of gout to PCP. Provided support and encouragement. All questions have been answered to the best of my ability.     Constantin Whiteeny  reports that he has never smoked. He has been exposed to tobacco smoke. He quit smokeless tobacco use about 17 years ago.  His smokeless tobacco use included snuff and chew.    Body mass index is 54.53 kg/m².  Continue to follow with primary care to establish durable lifestyle changes accomplish weight acceptable for age and height.      Advance Care Planning   ACP discussion was declined by the patient. Patient does not have an advance directive, declines further assistance.    Although have not provided a return to clinic visit, please do not hesitate to contact us if we could be of further assistance.  Follow-up as needed.

## 2024-12-03 ENCOUNTER — TRANSCRIBE ORDERS (OUTPATIENT)
Dept: ADMINISTRATIVE | Facility: HOSPITAL | Age: 44
End: 2024-12-03
Payer: COMMERCIAL

## 2024-12-03 DIAGNOSIS — M17.0 PRIMARY OSTEOARTHRITIS OF BOTH KNEES: Primary | ICD-10-CM

## 2024-12-12 ENCOUNTER — HOSPITAL ENCOUNTER (OUTPATIENT)
Dept: GENERAL RADIOLOGY | Facility: HOSPITAL | Age: 44
Discharge: HOME | End: 2024-12-12
Admitting: PHYSICAL MEDICINE & REHABILITATION
Payer: COMMERCIAL

## 2024-12-12 DIAGNOSIS — M17.0 PRIMARY OSTEOARTHRITIS OF BOTH KNEES: ICD-10-CM

## 2024-12-12 PROCEDURE — 73564 X-RAY EXAM KNEE 4 OR MORE: CPT

## 2024-12-26 ENCOUNTER — APPOINTMENT (OUTPATIENT)
Dept: GENERAL RADIOLOGY | Facility: HOSPITAL | Age: 44
End: 2024-12-26
Payer: COMMERCIAL

## 2024-12-26 PROCEDURE — 71046 X-RAY EXAM CHEST 2 VIEWS: CPT

## 2025-01-08 ENCOUNTER — OFFICE VISIT (OUTPATIENT)
Age: 45
End: 2025-01-08
Payer: COMMERCIAL

## 2025-01-08 VITALS — WEIGHT: 315 LBS | HEIGHT: 77 IN | BODY MASS INDEX: 37.19 KG/M2

## 2025-01-08 DIAGNOSIS — M1A.0710 BILATERAL IDIOPATHIC CHRONIC GOUT OF FOOT: Primary | ICD-10-CM

## 2025-01-08 DIAGNOSIS — M1A.0720 BILATERAL IDIOPATHIC CHRONIC GOUT OF FOOT: Primary | ICD-10-CM

## 2025-01-08 PROCEDURE — 99213 OFFICE O/P EST LOW 20 MIN: CPT | Performed by: NURSE PRACTITIONER

## 2025-01-08 RX ORDER — FEBUXOSTAT 40 MG/1
40 TABLET, FILM COATED ORAL DAILY
Qty: 30 TABLET | Refills: 4 | Status: SHIPPED | OUTPATIENT
Start: 2025-01-08

## 2025-01-08 RX ORDER — SEMAGLUTIDE 2.68 MG/ML
2 INJECTION, SOLUTION SUBCUTANEOUS
COMMUNITY
Start: 2024-12-18

## 2025-01-08 RX ORDER — DICLOFENAC SODIUM AND MISOPROSTOL 75; 200 MG/1; UG/1
1 TABLET, DELAYED RELEASE ORAL 2 TIMES DAILY
COMMUNITY

## 2025-01-08 RX ORDER — ROSUVASTATIN CALCIUM 20 MG/1
20 TABLET, COATED ORAL DAILY
COMMUNITY
Start: 2024-10-14

## 2025-01-08 RX ORDER — TRAMADOL HYDROCHLORIDE 50 MG/1
50 TABLET ORAL DAILY
COMMUNITY
Start: 2024-12-04

## 2025-01-08 NOTE — PROGRESS NOTES
[facility]  [facility]  [address]  [facility] [address]  [phone]     Patient: John Ozuna   YOB: 1980   Date: 1/8/2025   Visit Type:  Follow Up KARINE Feet    History of Present Illness  Chief Complaint   Patient presents with    Follow KARINE Feet     Last Seen in Clinic: 10/8/24    Pt states that they changed is medication from Lipitor to Crestor. Pt states since the change of medication is gout has improved significantly. Pt denies any complaints, concerns or issues at this time.     This is a 44 y.o. male presents today for follow-up of bilateral foot pain associated with gout.  He states that since his last visit on 10/8/2024 he has not had any recurrent gout flares.  On his initial exam we did discontinue allopurinol which she had been on for many many years and placed him on Uloric 40 mg once daily.  He is tolerating that without any difficulty or side effects.  On his initial exam he reported at least 8 gout flares since June 13.  He states that since his pain has resolved.  Has not had any flareups.  He also relates that his cardiologist recently discontinued his Lipitor to Crestor and he feels like that this has made a difference in his symptoms as well.    Past Medical History:   Diagnosis Date    Heart disease     MI (myocardial infarction) (HCC)       Past Surgical History:   Procedure Laterality Date    BICEPS TENDON REPAIR Right     ELBOW SURGERY      EYE SURGERY        Social History     Socioeconomic History    Marital status:      Spouse name: None    Number of children: None    Years of education: None    Highest education level: None   Tobacco Use    Smoking status: Never    Smokeless tobacco: Former     Social Determinants of Health      Received from [facility]    Family and Community Support   Intimate Partner Violence: Not At Risk (9/17/2024)    [facility]

## 2025-03-03 ENCOUNTER — OFFICE VISIT (OUTPATIENT)
Dept: CARDIOLOGY | Facility: CLINIC | Age: 45
End: 2025-03-03
Payer: COMMERCIAL

## 2025-03-03 VITALS
SYSTOLIC BLOOD PRESSURE: 147 MMHG | OXYGEN SATURATION: 98 % | WEIGHT: 315 LBS | HEIGHT: 77 IN | DIASTOLIC BLOOD PRESSURE: 102 MMHG | HEART RATE: 82 BPM | BODY MASS INDEX: 37.19 KG/M2

## 2025-03-03 DIAGNOSIS — E66.813 CLASS 3 SEVERE OBESITY DUE TO EXCESS CALORIES WITH SERIOUS COMORBIDITY AND BODY MASS INDEX (BMI) OF 50.0 TO 59.9 IN ADULT: Chronic | ICD-10-CM

## 2025-03-03 DIAGNOSIS — E78.2 MIXED HYPERLIPIDEMIA: ICD-10-CM

## 2025-03-03 DIAGNOSIS — I10 PRIMARY HYPERTENSION: Chronic | ICD-10-CM

## 2025-03-03 DIAGNOSIS — I25.10 CORONARY ARTERY DISEASE INVOLVING NATIVE CORONARY ARTERY OF NATIVE HEART WITHOUT ANGINA PECTORIS: Primary | ICD-10-CM

## 2025-03-03 DIAGNOSIS — E66.01 CLASS 3 SEVERE OBESITY DUE TO EXCESS CALORIES WITH SERIOUS COMORBIDITY AND BODY MASS INDEX (BMI) OF 50.0 TO 59.9 IN ADULT: Chronic | ICD-10-CM

## 2025-03-03 PROCEDURE — 99214 OFFICE O/P EST MOD 30 MIN: CPT | Performed by: INTERNAL MEDICINE

## 2025-03-03 RX ORDER — TIRZEPATIDE 5 MG/.5ML
5 INJECTION, SOLUTION SUBCUTANEOUS WEEKLY
COMMUNITY
Start: 2025-02-10

## 2025-03-03 RX ORDER — ROPINIROLE 0.5 MG/1
0.5 TABLET, FILM COATED ORAL
COMMUNITY
Start: 2025-02-07

## 2025-03-03 RX ORDER — LISINOPRIL 5 MG/1
5 TABLET ORAL DAILY
Qty: 30 TABLET | Refills: 11 | Status: SHIPPED | OUTPATIENT
Start: 2025-03-03

## 2025-03-03 RX ORDER — DICLOFENAC SODIUM AND MISOPROSTOL 75; 200 MG/1; UG/1
1 TABLET, DELAYED RELEASE ORAL 2 TIMES DAILY
COMMUNITY

## 2025-03-03 NOTE — LETTER
March 3, 2025     KURT Fraga  [address] [facility] [id number]    Patient: Constantin Seals   YOB: 1980   Date of Visit: 3/3/2025       Dear KURT Fraga    Constantin Seals was in my office today. Below is a copy of my note.    If you have questions, please do not hesitate to call me. I look forward to following Constantin along with you.         Sincerely,        Chip Roberts MD        CC: No Recipients      Reason for Visit: cardiovascular follow up.    HPI:  Constantin Seals is a 44 y.o. male is here today for follow-up.  His weight is up compared to last visit, but he reports this down overall.  He eats a lot of deer meat and fish.  He is on Monjaro and trying to cut back on calories.  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  He denies any chest pain, palpitations, dizziness, syncope, PND, or orthopnea.  He has not had a gout flair in a while.  He had recent blood work with his PCP.      Previous Cardiac Testing and Procedures:  -LHC (5/31/2024) severe multivessel coronary artery disease   -Echo (6/1/2024) EF 61-65%, mild LVH, normal diastolic function, normal RV size and function, no significant valve dysfunction  -4-vessel CABG (6/6/2024) LIMA to LAD, SVG to distal RCA, SVG to OM, SVG to diagonal     Lab data:  -Lipid panel (5/31/2024) total cholesterol 176, HDL 38, , triglycerides 89  -BMP (7/13/2024) creatinine 1.07, potassium 4.6, sodium 136  -proBNP (9/17/2024) 185, normal 0-450    Patient Active Problem List   Diagnosis   • Class 3 severe obesity due to excess calories with serious comorbidity and body mass index (BMI) of 50.0 to 59.9 in adult   • Primary hypertension   • Gastroesophageal reflux disease without esophagitis   • SHERRON (obstructive sleep apnea)   • Coronary artery disease involving native coronary artery of native heart without angina pectoris   • Gross hematuria   • Myoglobinuria   • Wound of sternal  region   • Mixed hyperlipidemia   • S/P CABG x 4       Social History     Tobacco Use   • Smoking status: Never     Passive exposure: Past   • Smokeless tobacco: Current     Types: Snuff, Chew     Last attempt to quit: 2007   • Tobacco comments:     occ   Vaping Use   • Vaping status: Never Used   Substance Use Topics   • Alcohol use: No   • Drug use: Never       Family History   Problem Relation Age of Onset   • Diabetes Father    • Hypertension Father    • Heart disease Father        The following portions of the patient's history were reviewed and updated as appropriate: allergies, current medications, past family history, past medical history, past social history, past surgical history, and problem list.      Current Outpatient Medications:   •  aspirin 81 MG EC tablet, Take 1 tablet by mouth Daily., Disp: 30 tablet, Rfl: 2  •  diclofenac-miSOPROStol (ARTHROTEC 75) 75-0.2 MG EC tablet, Take 1 tablet by mouth 2 (Two) Times a Day., Disp: , Rfl:   •  febuxostat (ULORIC) 40 MG tablet, Take 1 tablet by mouth Daily., Disp: , Rfl:   •  furosemide (LASIX) 20 MG tablet, Take 1 tablet by mouth Daily., Disp: , Rfl:   •  lisinopril (PRINIVIL,ZESTRIL) 5 MG tablet, Take 1 tablet by mouth Daily., Disp: 30 tablet, Rfl: 11  •  Mounjaro 5 MG/0.5ML solution auto-injector, Inject 5 mg under the skin into the appropriate area as directed 1 (One) Time Per Week., Disp: , Rfl:   •  rOPINIRole (REQUIP) 0.5 MG tablet, Take 1 tablet by mouth every night at bedtime., Disp: , Rfl:   •  colchicine 0.6 MG tablet, Take 1 tablet by mouth 2 (Two) Times a Day. (Patient not taking: Reported on 3/3/2025), Disp: 6 tablet, Rfl: 0  •  naloxone (NARCAN) 4 MG/0.1ML nasal spray, by Other route See Admin Instructions. (Patient not taking: Reported on 3/3/2025), Disp: , Rfl:   •  ondansetron ODT (ZOFRAN-ODT) 8 MG disintegrating tablet, Place 1 tablet twice a day by translingual route as needed. (Patient not taking: Reported on 3/3/2025), Disp: , Rfl:   •   "rosuvastatin (CRESTOR) 20 MG tablet, Take 1 tablet by mouth Daily., Disp: 30 tablet, Rfl: 11    Review of Systems   Constitutional: Negative for chills and fever.   Cardiovascular:  Negative for chest pain and paroxysmal nocturnal dyspnea.   Respiratory:  Negative for cough and shortness of breath.    Skin:  Negative for rash.   Gastrointestinal:  Negative for abdominal pain and heartburn.   Neurological:  Negative for dizziness and numbness.       Objective  BP (!) 147/102 (BP Location: Left arm, Patient Position: Sitting, Cuff Size: Adult)   Pulse 82   Ht 195.6 cm (77.01\")   Wt (!) 212 kg (468 lb)   SpO2 98%   BMI 55.49 kg/m²   Constitutional:       Appearance: Well-developed. Morbidly obese.   HENT:      Head: Normocephalic and atraumatic.   Pulmonary:      Effort: Pulmonary effort is normal.      Breath sounds: Normal breath sounds.   Cardiovascular:      Normal rate. Regular rhythm.   Edema:     Peripheral edema absent.   Skin:     General: Skin is warm and dry.   Neurological:      Mental Status: Alert and oriented to person, place, and time.       Procedures      ICD-10-CM ICD-9-CM   1. Coronary artery disease involving native coronary artery of native heart without angina pectoris  I25.10 414.01   2. Primary hypertension  I10 401.9   3. Mixed hyperlipidemia  E78.2 272.2   4. Class 3 severe obesity due to excess calories with serious comorbidity and body mass index (BMI) of 50.0 to 59.9 in adult  E66.813 278.01    Z68.43 V85.43    E66.01          Assessment/Plan:  1.  Coronary artery disease: Non-STEMI s/p 4 vessel CABG with Dr. Trejo on 6/6/2024.  He remains chest pain-free.  Continue aspirin and rosuvastatin.     2.  Hypertension: Blood pressure is elevated today and also running high at home.  Titrate up lisinopril to 5 mg.     3.  Mixed hyperlipidemia: Continue rosuvastatin and obtain copy of recent blood work from PCP.     4.  Morbid obesity: Body mass index is 55.49 kg/m².  Weight has increased 8 " pounds compared to last visit but he notes he is actually on a downward trend.  He is taking Mounjaro and trying to watch his diet.

## 2025-03-03 NOTE — PROGRESS NOTES
Reason for Visit: cardiovascular follow up.    HPI:  Constantin Seals is a 44 y.o. male is here today for follow-up.  His weight is up compared to last visit, but he reports this down overall.  He eats a lot of deer meat and fish.  He is on Monjaro and trying to cut back on calories.  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  He denies any chest pain, palpitations, dizziness, syncope, PND, or orthopnea.  He has not had a gout flair in a while.  He had recent blood work with his PCP.      Previous Cardiac Testing and Procedures:  -LHC (5/31/2024) severe multivessel coronary artery disease   -Echo (6/1/2024) EF 61-65%, mild LVH, normal diastolic function, normal RV size and function, no significant valve dysfunction  -4-vessel CABG (6/6/2024) LIMA to LAD, SVG to distal RCA, SVG to OM, SVG to diagonal     Lab data:  -Lipid panel (5/31/2024) total cholesterol 176, HDL 38, , triglycerides 89  -BMP (7/13/2024) creatinine 1.07, potassium 4.6, sodium 136  -proBNP (9/17/2024) 185, normal 0-450    Patient Active Problem List   Diagnosis    Class 3 severe obesity due to excess calories with serious comorbidity and body mass index (BMI) of 50.0 to 59.9 in adult    Primary hypertension    Gastroesophageal reflux disease without esophagitis    SHERRON (obstructive sleep apnea)    Coronary artery disease involving native coronary artery of native heart without angina pectoris    Gross hematuria    Myoglobinuria    Wound of sternal region    Mixed hyperlipidemia    S/P CABG x 4       Social History     Tobacco Use    Smoking status: Never     Passive exposure: Past    Smokeless tobacco: Current     Types: Snuff, Chew     Last attempt to quit: 2007    Tobacco comments:     occ   Vaping Use    Vaping status: Never Used   Substance Use Topics    Alcohol use: No    Drug use: Never       Family History   Problem Relation Age of Onset    Diabetes Father     Hypertension Father     Heart  disease Father        The following portions of the patient's history were reviewed and updated as appropriate: allergies, current medications, past family history, past medical history, past social history, past surgical history, and problem list.      Current Outpatient Medications:     aspirin 81 MG EC tablet, Take 1 tablet by mouth Daily., Disp: 30 tablet, Rfl: 2    diclofenac-miSOPROStol (ARTHROTEC 75) 75-0.2 MG EC tablet, Take 1 tablet by mouth 2 (Two) Times a Day., Disp: , Rfl:     febuxostat (ULORIC) 40 MG tablet, Take 1 tablet by mouth Daily., Disp: , Rfl:     furosemide (LASIX) 20 MG tablet, Take 1 tablet by mouth Daily., Disp: , Rfl:     lisinopril (PRINIVIL,ZESTRIL) 5 MG tablet, Take 1 tablet by mouth Daily., Disp: 30 tablet, Rfl: 11    Mounjaro 5 MG/0.5ML solution auto-injector, Inject 5 mg under the skin into the appropriate area as directed 1 (One) Time Per Week., Disp: , Rfl:     rOPINIRole (REQUIP) 0.5 MG tablet, Take 1 tablet by mouth every night at bedtime., Disp: , Rfl:     colchicine 0.6 MG tablet, Take 1 tablet by mouth 2 (Two) Times a Day. (Patient not taking: Reported on 3/3/2025), Disp: 6 tablet, Rfl: 0    naloxone (NARCAN) 4 MG/0.1ML nasal spray, by Other route See Admin Instructions. (Patient not taking: Reported on 3/3/2025), Disp: , Rfl:     ondansetron ODT (ZOFRAN-ODT) 8 MG disintegrating tablet, Place 1 tablet twice a day by translingual route as needed. (Patient not taking: Reported on 3/3/2025), Disp: , Rfl:     rosuvastatin (CRESTOR) 20 MG tablet, Take 1 tablet by mouth Daily., Disp: 30 tablet, Rfl: 11    Review of Systems   Constitutional: Negative for chills and fever.   Cardiovascular:  Negative for chest pain and paroxysmal nocturnal dyspnea.   Respiratory:  Negative for cough and shortness of breath.    Skin:  Negative for rash.   Gastrointestinal:  Negative for abdominal pain and heartburn.   Neurological:  Negative for dizziness and numbness.       Objective   BP (!) 147/102  "(BP Location: Left arm, Patient Position: Sitting, Cuff Size: Adult)   Pulse 82   Ht 195.6 cm (77.01\")   Wt (!) 212 kg (468 lb)   SpO2 98%   BMI 55.49 kg/m²   Constitutional:       Appearance: Well-developed. Morbidly obese.   HENT:      Head: Normocephalic and atraumatic.   Pulmonary:      Effort: Pulmonary effort is normal.      Breath sounds: Normal breath sounds.   Cardiovascular:      Normal rate. Regular rhythm.   Edema:     Peripheral edema absent.   Skin:     General: Skin is warm and dry.   Neurological:      Mental Status: Alert and oriented to person, place, and time.       Procedures      ICD-10-CM ICD-9-CM   1. Coronary artery disease involving native coronary artery of native heart without angina pectoris  I25.10 414.01   2. Primary hypertension  I10 401.9   3. Mixed hyperlipidemia  E78.2 272.2   4. Class 3 severe obesity due to excess calories with serious comorbidity and body mass index (BMI) of 50.0 to 59.9 in adult  E66.813 278.01    Z68.43 V85.43    E66.01          Assessment/Plan:  1.  Coronary artery disease: Non-STEMI s/p 4 vessel CABG with Dr. Trejo on 6/6/2024.  He remains chest pain-free.  Continue aspirin and rosuvastatin.     2.  Hypertension: Blood pressure is elevated today and also running high at home.  Titrate up lisinopril to 5 mg.     3.  Mixed hyperlipidemia: Continue rosuvastatin and obtain copy of recent blood work from PCP.     4.  Morbid obesity: Body mass index is 55.49 kg/m².  Weight has increased 8 pounds compared to last visit but he notes he is actually on a downward trend.  He is taking Mounjaro and trying to watch his diet.  "

## 2025-04-26 RX ORDER — LISINOPRIL 5 MG/1
5 TABLET ORAL DAILY
Qty: 90 TABLET | Refills: 3 | Status: SHIPPED | OUTPATIENT
Start: 2025-04-26

## 2025-07-01 RX ORDER — ROSUVASTATIN CALCIUM 20 MG/1
20 TABLET, COATED ORAL DAILY
Qty: 90 TABLET | Refills: 3 | Status: SHIPPED | OUTPATIENT
Start: 2025-07-01

## (undated) DEVICE — CLTH CLENS READYCLEANSE PERI CARE PK/5

## (undated) DEVICE — SYS DISTNTION VEIN BONCHEK 300MMHG

## (undated) DEVICE — SYS VASOVIEW HEMOPRO ENDOSCOPIC HARVST VESL

## (undated) DEVICE — DISPOSABLE TOURNIQUET CUFF SINGLE BLADDER, SINGLE PORT AND QUICK CONNECT CONNECTOR: Brand: COLOR CUFF

## (undated) DEVICE — SOLIDIFIER LIQUI LOC PLUS 2000CC

## (undated) DEVICE — PK OPN HEART 30

## (undated) DEVICE — GLV SURG SENSICARE PI ORTHO SZ8 LF STRL

## (undated) DEVICE — GLIDESHEATH SLENDER STAINLESS STEEL KIT: Brand: GLIDESHEATH SLENDER

## (undated) DEVICE — Device: Brand: ZDRIVE™

## (undated) DEVICE — SOL IRR NACL 0.9PCT BT 1000ML

## (undated) DEVICE — BLD SCLPL BEAVR MINI STR 2BVL 180D LF

## (undated) DEVICE — ADHS SKIN PREMIERPRO EXOFIN TOPICAL HI/VISC .5ML

## (undated) DEVICE — SUP ARMBRD ART/LINE BLU

## (undated) DEVICE — 4-PORT MANIFOLD: Brand: NEPTUNE 2

## (undated) DEVICE — BNDG ELAS W/CLIP 6IN 10YD LF STRL

## (undated) DEVICE — OASIS DRAIN, SINGLE, INLINE & ATS COMPATIBLE: Brand: OASIS

## (undated) DEVICE — DRSNG SURESITE WNDW 4X4.5

## (undated) DEVICE — BLD STERNALOCK XP ZDRIVE

## (undated) DEVICE — SPONGE,LAP,12"X12",XR,ST,5/PK,40PK/CS: Brand: MEDLINE

## (undated) DEVICE — PATIENT RETURN ELECTRODE, SINGLE-USE, CONTACT QUALITY MONITORING, ADULT, WITH 9FT CORD, FOR PATIENTS WEIGING OVER 33LBS. (15KG): Brand: MEGADYNE

## (undated) DEVICE — PK EXTRM 30

## (undated) DEVICE — ELECTRD BLD EZ CLN MOD 4IN

## (undated) DEVICE — PAD MINOR UNIVERSAL: Brand: MEDLINE INDUSTRIES, INC.

## (undated) DEVICE — E-PACK PROCEDURE KIT: Brand: E-PACK

## (undated) DEVICE — TOWEL,OR,DSP,ST,BLUE,DLX,10/PK,8PK/CS: Brand: MEDLINE

## (undated) DEVICE — ROTATING SURGICAL PUNCHES, 1 PER POUCH: Brand: A&E MEDICAL / ROTATING SURGICAL PUNCHES

## (undated) DEVICE — GLV SURG BIOGEL LTX PF 7 1/2

## (undated) DEVICE — MODEL AT P65, P/N 701554-001KIT CONTENTS: HAND CONTROLLER, 3-WAY HIGH-PRESSURE STOPCOCK WITH ROTATING END AND PREMIUM HIGH-PRESSURE TUBING: Brand: ANGIOTOUCH® KIT

## (undated) DEVICE — RESERVOIR,SUCTION,100CC,SILICONE: Brand: MEDLINE

## (undated) DEVICE — HYDROGEL COATED LATEX URINE METER FOLEY TRAY,16 FR/CH (5.3 MM), 5 ML CATHETER PRE-CONNECTED TO 2000 ML DRAINAGE BAG WITH NEEDLE SAMPLING: Brand: DOVER

## (undated) DEVICE — CVR BRD ARM 13X30

## (undated) DEVICE — 3M™ IOBAN™ 2 ANTIMICROBIAL INCISE DRAPE 6651EZ: Brand: IOBAN™ 2

## (undated) DEVICE — TB SXN SURG DLP MALL/CARD SFT/TP 6F 6IN

## (undated) DEVICE — DRP SLUSH MACH OM-ORS-320

## (undated) DEVICE — CVR UNIV C/ARM

## (undated) DEVICE — MODEL BT2000 P/N 700287-012KIT CONTENTS: MANIFOLD WITH SALINE AND CONTRAST PORTS, SALINE TUBING WITH SPIKE AND HAND SYRINGE, TRANSDUCER: Brand: BT2000 AUTOMATED MANIFOLD KIT

## (undated) DEVICE — KIT,ANTI FOG,W/SPONGE & FLUID,SOFT PACK: Brand: MEDLINE

## (undated) DEVICE — TBG PENCL TELESCP MEGADYNE SMOKE EVAC 10FT

## (undated) DEVICE — DOVER HYDROGEL COATED LATEX FOLEY CATHETER, 30 ML, 3-WAY 24 FR/CH (8.0 MM): Brand: DOVER

## (undated) DEVICE — PK CATH CARD 30 CA/4

## (undated) DEVICE — SCANLAN® SURG-I-PAW® INSTRUMENT COVERS - RED, 1/10" X 5"/ 3 MMX13 CM (2 - 5" PCS /PKG): Brand: SCANLAN® SURG-I-PAW® INSTRUMENT COVERS

## (undated) DEVICE — TRAP FLD MINIVAC MEGADYNE 100ML

## (undated) DEVICE — PK SET UP ANES OPN HEART 30

## (undated) DEVICE — PK TURNOVER RM ADV

## (undated) DEVICE — BLAKE SILICONE DRAINS CARDIO CONNECTOR 2:1: Brand: BLAKE

## (undated) DEVICE — ADHS LIQ MASTISOL 2/3ML

## (undated) DEVICE — STRIP,CLOSURE,WOUND,MEDI-STRIP,1/2X4: Brand: MEDLINE

## (undated) DEVICE — GLV SURG BIOGEL LTX PF 6 1/2

## (undated) DEVICE — DRAPE,ANGIO,BRACH,STERILE,38X44: Brand: MEDLINE

## (undated) DEVICE — 1/2 FORCE SURGICAL SPRING CLIP: Brand: STEALTH® SPRING CLIP

## (undated) DEVICE — BG OR ZSUT SADDLE 20IN CLR STRL

## (undated) DEVICE — DRSNG BRDR MEPILEXLITE SLFADHR SIL 2X5

## (undated) DEVICE — ANTIBACTERIAL UNDYED BRAIDED (POLYGLACTIN 910), SYNTHETIC ABSORBABLE SUTURE: Brand: COATED VICRYL

## (undated) DEVICE — UNDERCAST PADDING: Brand: DEROYAL

## (undated) DEVICE — TR BAND RADIAL ARTERY COMPRESSION DEVICE: Brand: TR BAND

## (undated) DEVICE — DRAPE,UTILITY,TAPE,15X26,STERILE: Brand: MEDLINE

## (undated) DEVICE — GLV SURG DERMASSURE GRN LF PF 8.0

## (undated) DEVICE — RADIFOCUS OPTITORQUE ANGIOGRAPHIC CATHETER: Brand: OPTITORQUE

## (undated) DEVICE — BLAKE SILICONE DRAIN, 19 FR ROUND, HUBLESS WITH 1/4" BENDABLE TROCAR: Brand: BLAKE

## (undated) DEVICE — DRSNG SURESITE123 6X8IN

## (undated) DEVICE — SPNG GZ WOVN 4X4IN 12PLY 10/BX STRL

## (undated) DEVICE — PAD, DEFIB, ADULT, RADIOTRANS, PHYSIO: Brand: MEDLINE

## (undated) DEVICE — CATH F5 INF JL 3.5 100CM: Brand: INFINITI

## (undated) DEVICE — CANN NASL ETCO2 LO/FLO A/

## (undated) DEVICE — SUT SILK 2/0 FS BLK 18IN 685G

## (undated) DEVICE — STERNUM BLADE, OFFSET (32.0 X 0.8 X 6.3MM)